# Patient Record
Sex: MALE | Race: WHITE | NOT HISPANIC OR LATINO | Employment: OTHER | URBAN - METROPOLITAN AREA
[De-identification: names, ages, dates, MRNs, and addresses within clinical notes are randomized per-mention and may not be internally consistent; named-entity substitution may affect disease eponyms.]

---

## 2017-02-23 DIAGNOSIS — I10 HYPERTENSION GOAL BP (BLOOD PRESSURE) < 140/90: ICD-10-CM

## 2017-02-24 NOTE — TELEPHONE ENCOUNTER
Routing refill request to provider for review/approval because:  Blood pressure not at goal.      lisinopril      Last Written Prescription Date: 8/24/16  Last Fill Quantity: 180, # refills: 1  Last Office Visit with [facility], [facility] or [facility] prescribing provider: 10/20/17       Potassium   Date Value Ref Range Status   08/02/2016 4.3 3.4 - 5.3 mmol/L Final     Creatinine   Date Value Ref Range Status   08/02/2016 0.97 0.66 - 1.25 mg/dL Final     BP Readings from Last 3 Encounters:   10/27/16 (!) 167/98   10/20/16 125/70   08/02/16 138/84     Mireya Pinzon RN,   [facility]

## 2017-02-27 RX ORDER — LISINOPRIL 10 MG/1
TABLET ORAL
Qty: 180 TABLET | Refills: 1 | Status: SHIPPED | OUTPATIENT
Start: 2017-02-27 | End: 2017-07-14

## 2017-02-27 NOTE — TELEPHONE ENCOUNTER
[facility] Call Center    Phone Message    Name of Caller: Wally    Phone Number: Cell number on file:    Telephone Information:   Mobile [phone]       Best time to return call: any    May a detailed message be left on voicemail: yes    Relation to patient: Self    Reason for Call: Other: pt calling to get update on medfill. please advise.     Action Taken: Message routed to:  Primary Care p [phone]

## 2017-02-27 NOTE — TELEPHONE ENCOUNTER
Routed to Dr. Triplett again.    Blood pressure not at goal.  Needs to be ordered by PCP.    Mireya Pinzon RN,   [facility]

## 2017-04-17 ENCOUNTER — RADIANT APPOINTMENT (OUTPATIENT)
Dept: ULTRASOUND IMAGING | Facility: CLINIC | Age: 57
End: 2017-04-17
Attending: INTERNAL MEDICINE
Payer: COMMERCIAL

## 2017-04-17 ENCOUNTER — TELEPHONE (OUTPATIENT)
Dept: NURSING | Facility: CLINIC | Age: 57
End: 2017-04-17

## 2017-04-17 ENCOUNTER — OFFICE VISIT (OUTPATIENT)
Dept: PEDIATRICS | Facility: CLINIC | Age: 57
End: 2017-04-17
Payer: COMMERCIAL

## 2017-04-17 VITALS
WEIGHT: 196.5 LBS | BODY MASS INDEX: 29.1 KG/M2 | SYSTOLIC BLOOD PRESSURE: 122 MMHG | HEIGHT: 69 IN | TEMPERATURE: 96.5 F | DIASTOLIC BLOOD PRESSURE: 90 MMHG | HEART RATE: 70 BPM | OXYGEN SATURATION: 99 %

## 2017-04-17 DIAGNOSIS — I82.4Y2 ACUTE DEEP VEIN THROMBOSIS (DVT) OF PROXIMAL VEIN OF LEFT LOWER EXTREMITY (H): Primary | ICD-10-CM

## 2017-04-17 DIAGNOSIS — M79.662 PAIN OF LEFT LOWER LEG: ICD-10-CM

## 2017-04-17 PROCEDURE — 93971 EXTREMITY STUDY: CPT | Mod: LT | Performed by: RADIOLOGY

## 2017-04-17 PROCEDURE — 99214 OFFICE O/P EST MOD 30 MIN: CPT | Performed by: INTERNAL MEDICINE

## 2017-04-17 RX ORDER — ENOXAPARIN SODIUM 100 MG/ML
90 INJECTION SUBCUTANEOUS 2 TIMES DAILY
Qty: 18 ML | Refills: 0 | Status: SHIPPED | OUTPATIENT
Start: 2017-04-17 | End: 2017-04-17

## 2017-04-17 RX ORDER — WARFARIN SODIUM 5 MG/1
5 TABLET ORAL DAILY
Qty: 30 TABLET | Refills: 1 | Status: SHIPPED | OUTPATIENT
Start: 2017-04-17 | End: 2017-06-06

## 2017-04-17 NOTE — TELEPHONE ENCOUNTER
Call Type: Triage Call    Presenting Problem: PHlebilitis in 2014' . This weekend having return  in left calf symptoms  with , heat and swelling .  Triage Note:  Guideline Title: Leg Non-Injury  Recommended Disposition: See ED Immediately  Original Inclination: Did not know what to do  Override Disposition:  Intended Action: Go to Hospital / ED  Physician Contacted: No  New, painful cord-like swelling in calf or thigh of the leg; cord-like swelling  may feel warm or look red or discolored. ?  YES  Gradual onset or worsening weakness/paralysis OR inability to purposely move ? NO  Gradual onset or worsening numbness/tingling ? NO  Generalized muscle pain or aching ? NO  Painful spasms or cramping of large muscle groups (back, legs or abdomen) AND  recent heat exposure ? NO  New onset of severe pain AND change in sensation (numb, tingling, or no feeling),  change in color (pale or blue), feels cool to touch compared to other extremity.  ? NO  Sudden onset of shortness of breath, chest pain and cough with blood tinged sputum  ? NO  New unexplained weakness/paralysis, change in sensation (numbness or tingling) or  inability to purposely move, especially when one side of body is involved  occurring now or within last 4 hours ? NO  Physician Instructions:  Care Advice: Another adult should drive.  Do not massage affected area if red, swollen, warm, or tender to touch OR  if history of blood clots.  Elevate part to improve circulation and decrease discomfort.  IMMEDIATE ACTION

## 2017-04-17 NOTE — PROGRESS NOTES
"  SUBJECTIVE:                                                    Wally Cano is a 56 year old male who presents to clinic today for the following health issues:    Joint Pain     Onset: 3-4 days    Description:   Location: left calf  Character: Sharp    Intensity: mild, moderate, 4/10    Progression of Symptoms: worse    Accompanying Signs & Symptoms:  Other symptoms: tenderness   History:   Previous similar pain: YES- had superficial thrombosis in 8/2014      Precipitating factors:   Trauma or overuse: no     Alleviating factors:  Improved by: rest/inactivity       Therapies Tried and outcome: kathy Lo has VSD (ventricular septal defect) and S/P infectious endocarditis on his pertinent problem list.   History of superficial thrombophlebitis in 2014, had negative hypercoag workup. Saw hematology, anticoagulated for total of 4 months (3 months was recommended, the extra month due to drug surplus). Has venous insufficiency, insurance does not cover for vein treatment. He has been wearing compression stockings on a regular basis. His work does require travel fairly frequently.         Problem list, Medication list, Allergies, and Medical/Social/Surgical histories reviewed in EPIC and updated as appropriate.    OBJECTIVE:                                                    /90 (BP Location: Right arm, Patient Position: Chair, Cuff Size: Adult Regular)  Pulse 70  Temp 96.5  F (35.8  C) (Temporal)  Ht 5' 8.75\" (1.746 m)  Wt 196 lb 8 oz (89.1 kg)  SpO2 99%  BMI 29.23 kg/m2    GEN: healthy, alert and no distress  LE: On the calf area there were couple palpable cords dull with tender. There is no significant swelling, or skin color changes.       Diagnostic test results:  Recent Results (from the past 744 hour(s))   US Lower Extremity Venous Duplex Left    Narrative    j EXAMINATION: US LOWER EXTREMITY VENOUS DUPLEX LEFT, 4/17/2017 4:06  PM     COMPARISON: 10/17/2014    HISTORY: History of " superficial thrombophlebitis in the left lower  extremity.    TECHNIQUE:  Gray-scale evaluation with compression, spectral flow, and  color Doppler assessment of the deep venous system of the left leg  from groin to knee, and then at the ankle.    FINDINGS:  The left common femoral vein, femoral vein, popliteal vein were  compressible with normal phasicity and augmentation.    There is a popliteal fossa cyst measuring 4.7 x 1.3 cm.    The left posterior tibialis vein was noncompressible. There is partial  blood flow at the ankle.    The superficial saphenous vein in the calf region was noncompressible  and contains echogenic thrombus. The distal superficial saphenous vein  was compressible. The peroneal vein in the mid calf region was also  noncompressible.    The right common femoral vein was compressible with normal phasicity  and augmentation.      Impression    IMPRESSION:  1.  Deep vein thrombosis in the left posterior tibial vein and both  peroneal veins in the proximal to mid-calf region.  The distal  posterior tibial vein near the ankle was compressible.  2.  Varicosity and thrombus in the superficial great saphenous vein in  the proximal and mid-calf region.  3.  Popliteal fossa cyst, 4.7 cm.    The patient was escorted back to the primary clinic area.    These findings were communicated to Dr. Triplett on 4/17/17 at 1629H.    AN MD KARL          ASSESSMENT/PLAN:                                                      56 year old male with the following diagnoses and treatment plan:      ICD-10-CM    1. Acute deep vein thrombosis (DVT) of proximal vein of left lower extremity (H) I82.4Y2 warfarin (COUMADIN) 5 MG tablet     INR     INR CLINIC REFERRAL     enoxaparin (LOVENOX) 100 MG/ML injection     DISCONTINUED: study - enoxaparin 100 mg/1 mL (IDS# [id number]) 100 MG/ML SOLN injection   2. Pain of left lower leg M79.662 US Lower Extremity Venous Duplex Left       -- Patient will be started on Lovenox and Coumadin for  DVT. He will continue to wear compression stockings. Duration of treatment will likely be 3 months again. Treating varicose veins and incompetent veins will be helpful in preventing future superficial thrombophlebitis. Avoid contact sports. May travel when therapeutic on INR. If urgent travel is needed, may bring Lovenox injection for travel. Stop ASA and NSAIDS while on warfarin.     A total of 25 minutes was spent face to face with this patient. More than 50% of the time was spent on education for the above problems and management plans.     Livier Triplett MD-PhD  [facility]    (Note: Chart documentation was done in part with Dragon Voice Recognition software. Although reviewed after completion, some word and grammatical errors may remain.)

## 2017-04-17 NOTE — NURSING NOTE
"Chief Complaint   Patient presents with     Musculoskeletal Problem     left calf pain, knots in the left calf x 3-4 days        Initial /90 (BP Location: Right arm, Patient Position: Chair, Cuff Size: Adult Regular)  Pulse 70  Temp 96.5  F (35.8  C) (Temporal)  Ht 5' 8.75\" (1.746 m)  Wt 196 lb 8 oz (89.1 kg)  SpO2 99%  BMI 29.23 kg/m2 Estimated body mass index is 29.23 kg/(m^2) as calculated from the following:    Height as of this encounter: 5' 8.75\" (1.746 m).    Weight as of this encounter: 196 lb 8 oz (89.1 kg).  Medication Reconciliation: complete      RACHEL Dill      "

## 2017-04-17 NOTE — MR AVS SNAPSHOT
After Visit Summary   4/17/2017    Wally Cano    MRN: [id number]           Patient Information     Date Of Birth          1960        Visit Information        Provider Department      4/17/2017 2:40 PM Livier Triplett MD [facility]        Today's Diagnoses     Acute deep vein thrombosis (DVT) of proximal vein of left lower extremity (H)    -  1    Pain of left lower leg          Care Instructions    INR clinic appointment in 3 days.    Medication(s) prescribed today:    Orders Placed This Encounter   Medications     study - enoxaparin 100 mg/1 mL (IDS# [id number]) 100 MG/ML SOLN injection     Sig: Inject 0.9 mLs (90 mg) Subcutaneous 2 times daily for 10 days     Dispense:  18 mL     Refill:  0     warfarin (COUMADIN) 5 MG tablet     Sig: Take 1 tablet (5 mg) by mouth daily     Dispense:  30 tablet     Refill:  1          Stop ASA and ibuprofen.        Follow-ups after your visit        Additional Services     INR CLINIC REFERRAL       Your provider has referred you to INR Services.    Please be aware that coverage of these services is subject to the terms and limitations of your health insurance plan.  Call member services at your health plan with any benefit or coverage questions.    Indication for Anticoagulation: DVT (first time)  If nonstandard INR is desired, indicate goal range and explanation: 2-3  Expected Duration of Therapy: 3 Months                  Future tests that were ordered for you today     Open Standing Orders        Priority Remaining Interval Expires Ordered    INR Routine 52/52 every 3 days to 1 year 4/17/2018 4/17/2017            Who to contact     If you have questions or need follow up information about today's clinic visit or your schedule please contact [facility] directly at [phone].  Normal or non-critical lab and imaging results will be communicated to you by MyChart, letter or phone within 4 business days after the clinic has  "received the results. If you do not hear from us within 7 days, please contact the clinic through [employer] or phone. If you have a critical or abnormal lab result, we will notify you by phone as soon as possible.  Submit refill requests through [employer] or call your pharmacy and they will forward the refill request to us. Please allow 3 business days for your refill to be completed.          Additional Information About Your Visit        [facility] Information     [employer] gives you secure access to your electronic health record. If you see a primary care provider, you can also send messages to your care team and make appointments. If you have questions, please call your primary care clinic.  If you do not have a primary care provider, please call [phone] and they will assist you.      [employer] is an electronic gateway that provides easy, online access to your medical records. With [employer], you can request a clinic appointment, read your test results, renew a prescription or communicate with your care team.     To access your existing account, please contact your [facility] or call [phone] for assistance.        Care EveryWhere ID     This is your Care EveryWhere ID. This could be used by other organizations to access your [facility] medical records  [id number]        Your Vitals Were     Pulse Temperature Height Pulse Oximetry BMI (Body Mass Index)       70 96.5  F (35.8  C) (Temporal) 5' 8.75\" (1.746 m) 99% 29.23 kg/m2        Blood Pressure from Last 3 Encounters:   04/17/17 122/90   10/27/16 (!) 167/98   10/20/16 125/70    Weight from Last 3 Encounters:   04/17/17 196 lb 8 oz (89.1 kg)   12/28/16 207 lb (93.9 kg)   10/20/16 206 lb 1.6 oz (93.5 kg)              We Performed the Following     INR CLINIC REFERRAL          Today's Medication Changes          These changes are accurate as of: 4/17/17  4:45 PM.  If you have any questions, ask your nurse or doctor.               Start " taking these medicines.        Dose/Directions    enoxaparin 100 MG/ML injection   Commonly known as:  LOVENOX   Used for:  Acute deep vein thrombosis (DVT) of proximal vein of left lower extremity (H)   Started by:  Livier Triplett MD        Dose:  90 mg   Inject 0.9 mLs (90 mg) Subcutaneous 2 times daily for 10 days   Quantity:  18 mL   Refills:  0       warfarin 5 MG tablet   Commonly known as:  COUMADIN   Used for:  Acute deep vein thrombosis (DVT) of proximal vein of left lower extremity (H)   Started by:  Livier Triplett MD        Dose:  5 mg   Take 1 tablet (5 mg) by mouth daily   Quantity:  30 tablet   Refills:  1            Where to get your medicines      These medications were sent to [facility] - [place], [place] - [address], [address]  [address], [address], [facility] [address]     Phone:  [phone]     enoxaparin 100 MG/ML injection    warfarin 5 MG tablet                Primary Care Provider Office Phone # Fax #    Livier Triplett [phone]       [facility] [address]  [facility] [address]        Thank you!     Thank you for choosing [facility]  for your care. Our goal is always to provide you with excellent care. Hearing back from our patients is one way we can continue to improve our services. Please take a few minutes to complete the written survey that you may receive in the mail after your visit with us. Thank you!             Your Updated Medication List - Protect others around you: Learn how to safely use, store and throw away your medicines at www.disposemymeds.org.          This list is accurate as of: 4/17/17  4:45 PM.  Always use your most recent med list.                   Brand Name Dispense Instructions for use    amoxicillin 500 MG capsule    AMOXIL    20 capsule    Take 4 capsules one hour prior to dental procedure       enoxaparin 100 MG/ML injection    LOVENOX    18 mL    Inject 0.9 mLs (90 mg) Subcutaneous 2  times daily for 10 days       hydrocortisone 2.5 % cream    ANUSOL-HC    30 g    Place  rectally 2 times daily.       IBUPROFEN PO      Take 200 mg by mouth Reported on 4/17/2017       lisinopril 10 MG tablet    PRINIVIL/ZESTRIL    180 tablet    TAKE ONE TABLET BY MOUTH TWICE DAILY       order for DME     2 each    Equipment being ordered: compression stocking knee high 20-30 mmHg       terazosin 1 MG capsule    HYTRIN    70 capsule    Start with 1 tab (1 mg) daily for 1 week, 2 tabs (2 mg) daily for 1 week, 3 tabs (3 mg) daily for 1 week, then 4 tabs (4 mg) daily       TYLENOL PO      Take 625 mg by mouth as needed for mild pain or fever       warfarin 5 MG tablet    COUMADIN    30 tablet    Take 1 tablet (5 mg) by mouth daily

## 2017-04-17 NOTE — PATIENT INSTRUCTIONS
INR clinic appointment in 3 days.    Medication(s) prescribed today:    Orders Placed This Encounter   Medications     study - enoxaparin 100 mg/1 mL (IDS# [id number]) 100 MG/ML SOLN injection     Sig: Inject 0.9 mLs (90 mg) Subcutaneous 2 times daily for 10 days     Dispense:  18 mL     Refill:  0     warfarin (COUMADIN) 5 MG tablet     Sig: Take 1 tablet (5 mg) by mouth daily     Dispense:  30 tablet     Refill:  1          Stop ASA and ibuprofen.

## 2017-04-19 NOTE — PROGRESS NOTES
Dear Wally,   Here are your recent results.   -- besides the blood clot, you also have a Baker's cyst (popliteal cyst) at the back of the knee. This is usually from arthritis. If you have significant left knee pain, may be worthwhile seeing a sports medicine doctor. If you don't have pain, by itself, the cyst does not need to be treated.     Please call or Mychart to our office if you have further questions.     Livier Triplett MD

## 2017-04-20 ENCOUNTER — ANTICOAGULATION THERAPY VISIT (OUTPATIENT)
Dept: PHARMACY | Facility: CLINIC | Age: 57
End: 2017-04-20
Payer: COMMERCIAL

## 2017-04-20 LAB — INR POINT OF CARE: 1.1 (ref 0.86–1.14)

## 2017-04-20 PROCEDURE — 99207 ZZC NO CHARGE NURSE ONLY: CPT

## 2017-04-20 PROCEDURE — 85610 PROTHROMBIN TIME: CPT | Mod: QW

## 2017-04-20 PROCEDURE — 36416 COLLJ CAPILLARY BLOOD SPEC: CPT

## 2017-04-20 NOTE — PROGRESS NOTES
ANTICOAGULATION FOLLOW-UP CLINIC VISIT    Patient Name:  Wally Cano  Date:  4/20/2017  Contact Type:  Face to Face    SUBJECTIVE:     Patient Findings     Positives No Problem Findings           OBJECTIVE    INR Protime   Date Value Ref Range Status   04/20/2017 1.1 0.86 - 1.14 Final       ASSESSMENT / PLAN  INR assessment SUB    Recheck INR In: 4 DAYS    INR Location Clinic      Anticoagulation Summary as of 4/20/2017     INR goal 2.0-3.0    Today's INR 1.1!    Maintenance plan No maintenance plan    Full instructions 4/20: 10 mg; 4/21: 7.5 mg; 4/22: 5 mg; 4/23: 5 mg; Otherwise No maintenance plan    Next INR check 4/24/2017    Target end date 6/19/2017      Anticoagulation Episode Summary     INR check location [facility]    Preferred lab     Send INR reminders to [facility] INR    Comments Estimated time frame is 3 months of anticoag treatment              See the Encounter Report to view Anticoagulation Flowsheet and Dosing Calendar (Go to Encounters tab in chart review, and find the Anticoagulation Therapy Visit)        Angelique Muro RN

## 2017-04-20 NOTE — MR AVS SNAPSHOT
Wally Dima Pritchardsimon   4/20/2017 12:00 PM   Anticoagulation Therapy Visit    Description:  56 year old male   Provider:  INR MG   Department:  Mg Med Monitoring           INR as of 4/20/2017     Today's INR 1.1!      Anticoagulation Summary as of 4/20/2017     INR goal 2.0-3.0    Today's INR 1.1!    Full instructions 4/20: 10 mg; 4/21: 7.5 mg; 4/22: 5 mg; 4/23: 5 mg; Otherwise No maintenance plan    Next INR check 4/24/2017      Your next Anticoagulation Clinic appointment(s)     Apr 24, 2017  9:00 AM CDT   Anticoagulation Visit with INR [facility]   [facility]    [address] [place] [address] [phone]              Contact Numbers     [facility]  Please call the anticoagulation nurse at [phone] to cancel and/or reschedule your appointment, or with any problems or questions regarding your therapy.  You may call the main clinic number at [phone] if the nurse is not available.           April 2017 Details    Sun Mon Tue Wed Thu Fri Sat           1                 2               3               4               5               6               7               8                 9               10               11               12               13               14               15                 16               17               18               19               20      10 mg   See details      21      7.5 mg         22      5 mg           23      5 mg         24            25               26               27               28               29                 30                      Date Details   04/20 This INR check       Date of next INR:  4/24/2017         How to take your warfarin dose     To take:  5 mg Take 1 of the 5 mg tablets.    To take:  7.5 mg Take 1.5 of the 5 mg tablets.    To take:  10 mg Take 2 of the 5 mg tablets.

## 2017-04-24 ENCOUNTER — ANTICOAGULATION THERAPY VISIT (OUTPATIENT)
Dept: PHARMACY | Facility: CLINIC | Age: 57
End: 2017-04-24
Payer: COMMERCIAL

## 2017-04-24 LAB — INR POINT OF CARE: 2.6 (ref 0.86–1.14)

## 2017-04-24 PROCEDURE — 99207 ZZC NO CHARGE NURSE ONLY: CPT

## 2017-04-24 PROCEDURE — 85610 PROTHROMBIN TIME: CPT | Mod: QW

## 2017-04-24 PROCEDURE — 36416 COLLJ CAPILLARY BLOOD SPEC: CPT

## 2017-04-24 NOTE — PROGRESS NOTES
ANTICOAGULATION FOLLOW-UP CLINIC VISIT    Patient Name:  Wally Cano  Date:  4/24/2017  Contact Type:  Face to Face    SUBJECTIVE:     Patient Findings     Positives No Problem Findings           OBJECTIVE    INR Protime   Date Value Ref Range Status   04/24/2017 2.6 (A) 0.86 - 1.14 Final       ASSESSMENT / PLAN  INR assessment THER    Recheck INR In: 4 DAYS    INR Location Clinic      Anticoagulation Summary as of 4/24/2017     INR goal 2.0-3.0    Today's INR 2.6    Maintenance plan No maintenance plan    Full instructions 4/24: 7.5 mg; 4/25: 5 mg; 4/26: 5 mg; 4/27: 7.5 mg; 4/28: 5 mg; Otherwise No maintenance plan    Next INR check 4/28/2017    Target end date 6/19/2017      Anticoagulation Episode Summary     INR check location [facility]    Preferred lab     Send INR reminders to [facility] INR    Comments Estimated time frame is 3 months of anticoag treatment              See the Encounter Report to view Anticoagulation Flowsheet and Dosing Calendar (Go to Encounters tab in chart review, and find the Anticoagulation Therapy Visit)        Angelique Muro RN

## 2017-04-24 NOTE — MR AVS SNAPSHOT
Wally Dima Jerome   4/24/2017 9:00 AM   Anticoagulation Therapy Visit    Description:  56 year old male   Provider:  INR MG   Department:  Mg Med Monitoring           INR as of 4/24/2017     Today's INR 2.6      Anticoagulation Summary as of 4/24/2017     INR goal 2.0-3.0    Today's INR 2.6    Full instructions 4/24: 7.5 mg; 4/25: 5 mg; 4/26: 5 mg; 4/27: 7.5 mg; 4/28: 5 mg; Otherwise No maintenance plan    Next INR check 4/28/2017      Your next Anticoagulation Clinic appointment(s)     Apr 28, 2017  8:40 AM CDT   [facility]    [address] [place] [address] [phone]              Contact Numbers     [facility]  Please call the anticoagulation nurse at [phone] to cancel and/or reschedule your appointment, or with any problems or questions regarding your therapy.  You may call the main clinic number at [phone] if the nurse is not available.           April 2017 Details    Sun Mon Tue Wed Thu Fri Sat           1                 2               3               4               5               6               7               8                 9               10               11               12               13               14               15                 16               17               18               19               20               21               22                 23               24      7.5 mg   See details      25      5 mg         26      5 mg         27      7.5 mg         28            29                 30                      Date Details   04/24 This INR check       Date of next INR:  4/28/2017         How to take your warfarin dose     To take:  5 mg Take 1 of the 5 mg tablets.    To take:  7.5 mg Take 1.5 of the 5 mg tablets.

## 2017-04-28 ENCOUNTER — ANTICOAGULATION THERAPY VISIT (OUTPATIENT)
Dept: PHARMACY | Facility: CLINIC | Age: 57
End: 2017-04-28
Payer: COMMERCIAL

## 2017-04-28 LAB — INR POINT OF CARE: 3.4 (ref 0.86–1.14)

## 2017-04-28 PROCEDURE — 99207 ZZC NO CHARGE NURSE ONLY: CPT

## 2017-04-28 PROCEDURE — 36416 COLLJ CAPILLARY BLOOD SPEC: CPT

## 2017-04-28 PROCEDURE — 85610 PROTHROMBIN TIME: CPT | Mod: QW

## 2017-04-28 NOTE — PROGRESS NOTES
ANTICOAGULATION FOLLOW-UP CLINIC VISIT    Patient Name:  Wally Cano  Date:  4/28/2017  Contact Type:  Face to Face    SUBJECTIVE:     Patient Findings     Positives No Problem Findings    Comments Pt's last dose of Lovenox was 4/27 AM.            OBJECTIVE    INR Protime   Date Value Ref Range Status   04/28/2017 3.4 (A) 0.86 - 1.14 Final       ASSESSMENT / PLAN  INR assessment SUPRA    Recheck INR In: 4 DAYS    INR Location Clinic      Anticoagulation Summary as of 4/28/2017     INR goal 2.0-3.0    Today's INR 3.4!    Maintenance plan No maintenance plan    Full instructions 4/28: 5 mg; 4/29: 5 mg; 4/30: 5 mg; Otherwise No maintenance plan    Next INR check 5/1/2017    Target end date 6/19/2017      Anticoagulation Episode Summary     INR check location [facility]    Preferred lab     Send INR reminders to [facility] INR    Comments Estimated time frame is 3 months of anticoag treatment              See the Encounter Report to view Anticoagulation Flowsheet and Dosing Calendar (Go to Encounters tab in chart review, and find the Anticoagulation Therapy Visit)        Angelique Muro RN

## 2017-04-28 NOTE — MR AVS SNAPSHOT
Wally Dima Bernabeleslee   4/28/2017 8:40 AM   Anticoagulation Therapy Visit    Description:  56 year old male   Provider:  INR MG   Department:  [facility]           INR as of 4/28/2017     Today's INR 3.4!      Anticoagulation Summary as of 4/28/2017     INR goal 2.0-3.0    Today's INR 3.4!    Full instructions 4/28: 5 mg; 4/29: 5 mg; 4/30: 5 mg; Otherwise No maintenance plan    Next INR check 5/1/2017      Your next Anticoagulation Clinic appointment(s)     May 01, 2017  7:40 AM CDT   Anticoagulation Visit with INR [facility]   [facility]    [address] [place] [address] [phone]              Contact Numbers     [facility]  Please call the anticoagulation nurse at [phone] to cancel and/or reschedule your appointment, or with any problems or questions regarding your therapy.  You may call the main clinic number at [phone] if the nurse is not available.           April 2017 Details    Sun Mon Tue Wed Thu Fri Sat           1                 2               3               4               5               6               7               8                 9               10               11               12               13               14               15                 16               17               18               19               20               21               22                 23               24               25               26               27               28      5 mg   See details      29      5 mg           30      5 mg                Date Details   04/28 This INR check               How to take your warfarin dose     To take:  5 mg Take 1 of the 5 mg tablets.           May 2017 Details    Sun Mon Tue Wed u Fri Sat      1            2               3               4               5               6                 7               8               9               10               11               12                13                 14               15               16               17               18               19               20                 21               22               23               24               25               26               27                 28               29               30               31                   Date Details   No additional details    Date of next INR:  5/1/2017

## 2017-05-03 ENCOUNTER — ANTICOAGULATION THERAPY VISIT (OUTPATIENT)
Dept: PHARMACY | Facility: CLINIC | Age: 57
End: 2017-05-03
Payer: COMMERCIAL

## 2017-05-03 LAB — INR POINT OF CARE: 4.9 (ref 0.86–1.14)

## 2017-05-03 PROCEDURE — 36416 COLLJ CAPILLARY BLOOD SPEC: CPT

## 2017-05-03 PROCEDURE — 85610 PROTHROMBIN TIME: CPT | Mod: QW

## 2017-05-03 PROCEDURE — 99207 ZZC NO CHARGE NURSE ONLY: CPT

## 2017-05-03 NOTE — MR AVS SNAPSHOT
Wally Dima Bernabeleslee   5/3/2017 7:30 AM   Anticoagulation Therapy Visit    Description:  56 year old male   Provider:  INR MG   Department:  [facility]           INR as of 5/3/2017     Today's INR 4.9!      Anticoagulation Summary as of 5/3/2017     INR goal 2.0-3.0    Today's INR 4.9!    Full instructions 5/3: Hold; 5/4: 5 mg; 5/5: 5 mg; 5/6: 5 mg; 5/7: 5 mg; 5/8: 5 mg; 5/9: 5 mg; 5/10: 5 mg; 5/11: 5 mg; 5/12: 5 mg; 5/13: 5 mg; Otherwise No maintenance plan    Next INR check 5/8/2017      Your next Anticoagulation Clinic appointment(s)     May 08, 2017  7:40 AM CDT   Anticoagulation Visit with INR MG   [facility] ([facility])    [address] [place] [address] [phone]              Contact Numbers     [facility]  Please call the anticoagulation nurse at [phone] to cancel and/or reschedule your appointment, or with any problems or questions regarding your therapy.  You may call the main clinic number at [phone] if the nurse is not available.           May 2017 Details    Sun Mon Tue Wed Thu Fri Sat      1               2               3      Hold   See details      4      5 mg         5      5 mg         6      5 mg           7      5 mg         8            9               10               11               12               13                 14               15               16               17               18               19               20                 21               22               23               24               25               26               27                 28               29               30               31                   Date Details   05/03 This INR check       Date of next INR:  5/8/2017         How to take your warfarin dose     To take:  5 mg Take 1 of the 5 mg tablets.    Hold Do not take your warfarin dose. See the Details table to the right for additional instructions.

## 2017-05-03 NOTE — PROGRESS NOTES
ANTICOAGULATION FOLLOW-UP CLINIC VISIT    Patient Name:  Wally Cano  Date:  5/3/2017  Contact Type:  Face to Face    SUBJECTIVE:     Patient Findings     Positives Nose bleeds, No Problem Findings    Comments Pt reported two nose bleeds over the last couple days that lasted less than 30 seconds.            OBJECTIVE    INR Protime   Date Value Ref Range Status   05/03/2017 4.9 (A) 0.86 - 1.14 Final       ASSESSMENT / PLAN  INR assessment SUPRA    Recheck INR In: 5 DAYS    INR Location Clinic      Anticoagulation Summary as of 5/3/2017     INR goal 2.0-3.0    Today's INR 4.9!    Maintenance plan No maintenance plan    Full instructions 5/3: Hold; 5/4: 5 mg; 5/5: 5 mg; 5/6: 5 mg; 5/7: 5 mg; 5/8: 5 mg; 5/9: 5 mg; 5/10: 5 mg; 5/11: 5 mg; 5/12: 5 mg; 5/13: 5 mg; Otherwise No maintenance plan    Next INR check 5/8/2017    Target end date 6/19/2017      Anticoagulation Episode Summary     [facility]    Preferred lab     Send INR reminders to [facility] INR    Comments Estimated time frame is 3 months of anticoag treatment              See the Encounter Report to view Anticoagulation Flowsheet and Dosing Calendar (Go to Encounters tab in chart review, and find the Anticoagulation Therapy Visit)        Angelique Muro RN

## 2017-05-08 ENCOUNTER — ANTICOAGULATION THERAPY VISIT (OUTPATIENT)
Dept: PHARMACY | Facility: CLINIC | Age: 57
End: 2017-05-08
Payer: OTHER MISCELLANEOUS

## 2017-05-08 LAB — INR POINT OF CARE: 2.8 (ref 0.86–1.14)

## 2017-05-08 PROCEDURE — 36416 COLLJ CAPILLARY BLOOD SPEC: CPT

## 2017-05-08 PROCEDURE — 99207 ZZC NO CHARGE NURSE ONLY: CPT

## 2017-05-08 PROCEDURE — 85610 PROTHROMBIN TIME: CPT | Mod: QW

## 2017-05-08 NOTE — MR AVS SNAPSHOT
Wally Cano   5/8/2017 7:40 AM   Anticoagulation Therapy Visit    Description:  56 year old male   Provider:  INR MG   Department:  [facility]           INR as of 5/8/2017     Today's INR 2.8      Anticoagulation Summary as of 5/8/2017     INR goal 2.0-3.0    Today's INR 2.8    Full instructions 5/8: 5 mg; 5/9: 5 mg; 5/10: 5 mg; 5/11: 5 mg; 5/12: 5 mg; 5/13: 5 mg; Otherwise No maintenance plan    Next INR check 5/12/2017      Your next Anticoagulation Clinic appointment(s)     May 12, 2017  8:00 AM CDT   Anticoagulation Visit with INR MG   [facility]    [address] [place] [address] [phone]              Contact Numbers     [facility]  Please call the anticoagulation nurse at [phone] to cancel and/or reschedule your appointment, or with any problems or questions regarding your therapy.  You may call the main clinic number at [phone] if the nurse is not available.           May 2017 Details    Sun Mon Tue Wed Thu Fri Sat      1               2               3               4               5               6                 7               8      5 mg   See details      9      5 mg         10      5 mg         11      5 mg         12            13                 14               15               16               17               18               19               20                 21               22               23               24               25               26               27                 28               29               30               31                   Date Details   05/08 This INR check       Date of next INR:  5/12/2017         How to take your warfarin dose     To take:  5 mg Take 1 of the 5 mg tablets.

## 2017-05-11 ENCOUNTER — ANTICOAGULATION THERAPY VISIT (OUTPATIENT)
Dept: PHARMACY | Facility: CLINIC | Age: 57
End: 2017-05-11
Payer: COMMERCIAL

## 2017-05-11 LAB — INR POINT OF CARE: 2.7 (ref 0.86–1.14)

## 2017-05-11 PROCEDURE — 36416 COLLJ CAPILLARY BLOOD SPEC: CPT

## 2017-05-11 PROCEDURE — 85610 PROTHROMBIN TIME: CPT | Mod: QW

## 2017-05-11 PROCEDURE — 99207 ZZC NO CHARGE NURSE ONLY: CPT

## 2017-05-11 NOTE — MR AVS SNAPSHOT
Wally Dima Jerome   5/11/2017 7:40 AM   Anticoagulation Therapy Visit    Description:  56 year old male   Provider:  INR MG   Department:  [facility]           INR as of 5/11/2017     Today's INR 2.7      Anticoagulation Summary as of 5/11/2017     INR goal 2.0-3.0    Today's INR 2.7    Full instructions 5/11: 5 mg; 5/12: 5 mg; 5/13: 5 mg; Otherwise 5 mg every day    Next INR check 5/19/2017      Your next Anticoagulation Clinic appointment(s)     May 19, 2017  7:40 AM CDT   Anticoagulation Visit with INR MG   [facility]    [address] [place] [address] [phone]              Contact Numbers     [facility]  Please call the anticoagulation nurse at [phone] to cancel and/or reschedule your appointment, or with any problems or questions regarding your therapy.  You may call the main clinic number at [phone] if the nurse is not available.           May 2017 Details    Sun Mon Tue Wed Thu Fri Sat      1               2               3               4               5               6                 7               8               9               10               11      5 mg   See details      12      5 mg         13      5 mg           14      5 mg         15      5 mg         16      5 mg         17      5 mg         18      5 mg         19            20                 21               22               23               24               25               26               27                 28               29               30               31                   Date Details   05/11 This INR check       Date of next INR:  5/19/2017         How to take your warfarin dose     To take:  5 mg Take 1 of the 5 mg tablets.

## 2017-05-11 NOTE — PROGRESS NOTES
ANTICOAGULATION FOLLOW-UP CLINIC VISIT    Patient Name:  Wally Cano  Date:  5/11/2017  Contact Type:  Face to Face    SUBJECTIVE:     Patient Findings     Positives No Problem Findings           OBJECTIVE    INR Protime   Date Value Ref Range Status   05/11/2017 2.7 (A) 0.86 - 1.14 Final       ASSESSMENT / PLAN  INR assessment THER    Recheck INR In: 1 WEEK    INR Location Clinic      Anticoagulation Summary as of 5/11/2017     INR goal 2.0-3.0    Today's INR 2.7    Maintenance plan 5 mg (5 mg x 1) every day    Full instructions 5/11: 5 mg; 5/12: 5 mg; 5/13: 5 mg; Otherwise 5 mg every day    Weekly total 35 mg    Plan last modified Angelique Muro RN (5/11/2017)    Next INR check 5/19/2017    Target end date 6/19/2017      Anticoagulation Episode Summary     [facility]    Preferred lab     Send INR reminders to MG  INR    Comments Estimated time frame is 3 months of anticoag treatment              See the Encounter Report to view Anticoagulation Flowsheet and Dosing Calendar (Go to Encounters tab in chart review, and find the Anticoagulation Therapy Visit)      Angelique Muro RN

## 2017-05-19 ENCOUNTER — ANTICOAGULATION THERAPY VISIT (OUTPATIENT)
Dept: PHARMACY | Facility: CLINIC | Age: 57
End: 2017-05-19
Payer: COMMERCIAL

## 2017-05-19 LAB — INR POINT OF CARE: 3 (ref 0.86–1.14)

## 2017-05-19 PROCEDURE — 36416 COLLJ CAPILLARY BLOOD SPEC: CPT

## 2017-05-19 PROCEDURE — 85610 PROTHROMBIN TIME: CPT | Mod: QW

## 2017-05-19 PROCEDURE — 99207 ZZC NO CHARGE NURSE ONLY: CPT

## 2017-05-19 NOTE — MR AVS SNAPSHOT
Wally Cano   5/19/2017 7:40 AM   Anticoagulation Therapy Visit    Description:  56 year old male   Provider:  INR MG   Department:  [facility] Monitoring           INR as of 5/19/2017     Today's INR 3.0      Anticoagulation Summary as of 5/19/2017     INR goal 2.0-3.0    Today's INR 3.0    Full instructions 5 mg every day    Next INR check 5/26/2017      Your next Anticoagulation Clinic appointment(s)     May 26, 2017  8:00 AM CDT   Anticoagulation Visit with INR MG   [facility] ([facility])    [address] [place] [address] [phone]              Contact Numbers     [facility]  Please call the anticoagulation nurse at [phone] to cancel and/or reschedule your appointment, or with any problems or questions regarding your therapy.  You may call the main clinic number at [phone] if the nurse is not available.           May 2017 Details    Sun Mon Tue Wed Thu Fri Sat      1               2               3               4               5               6                 7               8               9               10               11               12               13                 14               15               16               17               18               19      5 mg   See details      20      5 mg           21      5 mg         22      5 mg         23      5 mg         24      5 mg         25      5 mg         26            27                 28               29               30               31                   Date Details   05/19 This INR check       Date of next INR:  5/26/2017         How to take your warfarin dose     To take:  5 mg Take 1 of the 5 mg tablets.

## 2017-05-19 NOTE — PROGRESS NOTES
ANTICOAGULATION FOLLOW-UP CLINIC VISIT    Patient Name:  Wally Cano  Date:  5/19/2017  Contact Type:  Face to Face    SUBJECTIVE:     Patient Findings     Positives No Problem Findings           OBJECTIVE    INR Protime   Date Value Ref Range Status   05/19/2017 3.0 (A) 0.86 - 1.14 Final       ASSESSMENT / PLAN  INR assessment THER    Recheck INR In: 1 WEEK    INR Location Clinic      Anticoagulation Summary as of 5/19/2017     INR goal 2.0-3.0    Today's INR 3.0    Maintenance plan 5 mg (5 mg x 1) every day    Full instructions 5 mg every day    Weekly total 35 mg    No change documented Angelique Muro RN    Plan last modified Angelique Muro RN (5/11/2017)    Next INR check 5/26/2017    Target end date 6/19/2017      Anticoagulation Episode Summary     [facility]    Preferred lab     Send INR reminders to [facility] INR    Comments Estimated time frame is 3 months of anticoag treatment              See the Encounter Report to view Anticoagulation Flowsheet and Dosing Calendar (Go to Encounters tab in chart review, and find the Anticoagulation Therapy Visit)      Angelique Muro RN

## 2017-05-26 ENCOUNTER — ANTICOAGULATION THERAPY VISIT (OUTPATIENT)
Dept: PHARMACY | Facility: CLINIC | Age: 57
End: 2017-05-26
Payer: COMMERCIAL

## 2017-05-26 LAB — INR POINT OF CARE: 3 (ref 0.86–1.14)

## 2017-05-26 PROCEDURE — 85610 PROTHROMBIN TIME: CPT | Mod: QW

## 2017-05-26 PROCEDURE — 36416 COLLJ CAPILLARY BLOOD SPEC: CPT

## 2017-05-26 PROCEDURE — 99207 ZZC NO CHARGE NURSE ONLY: CPT

## 2017-05-26 NOTE — PROGRESS NOTES
"  ANTICOAGULATION FOLLOW-UP CLINIC VISIT    Patient Name:  Wally Cano  Date:  5/26/2017  Contact Type:  Face to Face    SUBJECTIVE:     Patient Findings     Positives No Problem Findings    Comments Pt expressed concern about \"riding the higher end of his goal\" worried that if he misses a few green salads during the week that it might increase out of range. Adjusted Wednesday dose to accommodate this.            OBJECTIVE    INR Protime   Date Value Ref Range Status   05/26/2017 3.0 (A) 0.86 - 1.14 Final       ASSESSMENT / PLAN  INR assessment THER    Recheck INR In: 1 WEEK    INR Location Clinic      Anticoagulation Summary as of 5/26/2017     INR goal 2.0-3.0    Today's INR 3.0    Maintenance plan 2.5 mg (5 mg x 0.5) on Wed; 5 mg (5 mg x 1) all other days    Full instructions 5/31: 2.5 mg; Otherwise 2.5 mg on Wed; 5 mg all other days    Weekly total 32.5 mg    Plan last modified Angelique Mruo RN (5/26/2017)    Next INR check 6/2/2017    Target end date 6/19/2017      Anticoagulation Episode Summary     [facility]    Preferred lab     Send INR reminders to [facility] INR    Comments Estimated time frame is 3 months of anticoag treatment              See the Encounter Report to view Anticoagulation Flowsheet and Dosing Calendar (Go to Encounters tab in chart review, and find the Anticoagulation Therapy Visit)      Angelique Muro RN               "

## 2017-06-02 ENCOUNTER — ANTICOAGULATION THERAPY VISIT (OUTPATIENT)
Dept: PHARMACY | Facility: CLINIC | Age: 57
End: 2017-06-02
Payer: COMMERCIAL

## 2017-06-02 LAB — INR POINT OF CARE: 2.4 (ref 0.86–1.14)

## 2017-06-02 PROCEDURE — 36416 COLLJ CAPILLARY BLOOD SPEC: CPT

## 2017-06-02 PROCEDURE — 99207 ZZC NO CHARGE NURSE ONLY: CPT

## 2017-06-02 PROCEDURE — 85610 PROTHROMBIN TIME: CPT | Mod: QW

## 2017-06-02 NOTE — MR AVS SNAPSHOT
Wally Dima Bernabeleslee   6/2/2017 8:40 AM   Anticoagulation Therapy Visit    Description:  56 year old male   Provider:  INR MG   Department:  [facility] Monitoring           INR as of 6/2/2017     Today's INR 2.4      Anticoagulation Summary as of 6/2/2017     INR goal 2.0-3.0    Today's INR 2.4    Full instructions 2.5 mg on Wed; 5 mg all other days    Next INR check 6/23/2017      Your next Anticoagulation Clinic appointment(s)     Jun 02, 2017  8:40 AM CDT   Anticoagulation Visit with INR MG   [facility] ([facility])    [id number] [place] [address] [phone]            Jun 23, 2017  7:40 AM CDT   Anticoagulation Visit with INR MG   [facility])    [address] [address] [place] [address] [phone]              Contact Numbers     [facility]  Please call the anticoagulation nurse at [phone] to cancel and/or reschedule your appointment, or with any problems or questions regarding your therapy.  You may call the main clinic number at [phone] if the nurse is not available.           June 2017 Details    Sun Mon Tue Wed Thu Fri Sat         1               2      5 mg   See details      3      5 mg           4      5 mg         5      5 mg         6      5 mg         7      2.5 mg         8      5 mg         9      5 mg         10      5 mg           11      5 mg         12      5 mg         13      5 mg         14      2.5 mg         15      5 mg         16      5 mg         17      5 mg           18      5 mg         19      5 mg         20      5 mg         21      2.5 mg         22      5 mg         23            24                 25               26               27               28               29               30                 Date Details   06/02 This INR check       Date of next INR:  6/23/2017         How to take your warfarin dose     To take:  2.5 mg Take 0.5 of a 5 mg  tablet.    To take:  5 mg Take 1 of the 5 mg tablets.

## 2017-06-02 NOTE — PROGRESS NOTES
ANTICOAGULATION FOLLOW-UP CLINIC VISIT    Patient Name:  Wally Cano  Date:  6/2/2017  Contact Type:  Face to Face    SUBJECTIVE:     Patient Findings     Positives No Problem Findings           OBJECTIVE    INR Protime   Date Value Ref Range Status   06/02/2017 2.4 (A) 0.86 - 1.14 Final       ASSESSMENT / PLAN  No question data found.  Anticoagulation Summary as of 6/2/2017     INR goal 2.0-3.0    Today's INR 2.4    Maintenance plan 2.5 mg (5 mg x 0.5) on Wed; 5 mg (5 mg x 1) all other days    Full instructions 2.5 mg on Wed; 5 mg all other days    Weekly total 32.5 mg    No change documented Angelique Muro RN    Plan last modified Angelique Muro RN (5/26/2017)    Next INR check     Target end date 6/19/2017      Anticoagulation Episode Summary     IN[facility] location [facility]    Preferred lab     Send INR reminders to MG  INR    Comments Estimated time frame is 3 months of anticoag treatment              See the Encounter Report to view Anticoagulation Flowsheet and Dosing Calendar (Go to Encounters tab in chart review, and find the Anticoagulation Therapy Visit)      Angelique Muro RN

## 2017-06-06 DIAGNOSIS — I82.4Y2 ACUTE DEEP VEIN THROMBOSIS (DVT) OF PROXIMAL VEIN OF LEFT LOWER EXTREMITY (H): ICD-10-CM

## 2017-06-07 RX ORDER — WARFARIN SODIUM 5 MG/1
TABLET ORAL
Qty: 30 TABLET | Refills: 1 | Status: SHIPPED | OUTPATIENT
Start: 2017-06-07 | End: 2017-07-14

## 2017-06-07 NOTE — TELEPHONE ENCOUNTER
JANTOVEN 5 MG tablet  Last Written Prescription Date: 4/17/2017  Last Fill Qty: 30, # refills: 1  Last Office Visit with G, P or [facility] prescribing provider: 4/17/2017     Date and Result of Last PT/INR:   Lab Results   Component Value Date    INR 2.4 06/02/2017    INR 3.0 05/26/2017    INR 2.57 10/17/2014    INR 0.94 12/15/2009      Refilled per [facility] protocol.    Angelique Muro RN

## 2017-06-23 ENCOUNTER — ANTICOAGULATION THERAPY VISIT (OUTPATIENT)
Dept: PHARMACY | Facility: CLINIC | Age: 57
End: 2017-06-23
Payer: COMMERCIAL

## 2017-06-23 LAB — INR POINT OF CARE: 2.4 (ref 0.86–1.14)

## 2017-06-23 PROCEDURE — 85610 PROTHROMBIN TIME: CPT | Mod: QW

## 2017-06-23 PROCEDURE — 99207 ZZC NO CHARGE NURSE ONLY: CPT

## 2017-06-23 PROCEDURE — 36416 COLLJ CAPILLARY BLOOD SPEC: CPT

## 2017-06-23 NOTE — PROGRESS NOTES
ANTICOAGULATION FOLLOW-UP CLINIC VISIT    Patient Name:  Wally Cano  Date:  6/23/2017  Contact Type:  Face to Face    SUBJECTIVE:     Patient Findings     Positives No Problem Findings           OBJECTIVE    INR Protime   Date Value Ref Range Status   06/23/2017 2.4 (A) 0.86 - 1.14 Final       ASSESSMENT / PLAN  INR assessment THER    Recheck INR In: 3 WEEKS    INR Location Clinic      Anticoagulation Summary as of 6/23/2017     INR goal 2.0-3.0    Today's INR 2.4    Maintenance plan 2.5 mg (5 mg x 0.5) on Wed; 5 mg (5 mg x 1) all other days    Full instructions 2.5 mg on Wed; 5 mg all other days    Weekly total 32.5 mg    No change documented Angelique Muro RN    Plan last modified Angelique Muro RN (5/26/2017)    Next INR check 7/14/2017    Target end date 7/17/2017      Anticoagulation Episode Summary     INR check location [facility]    Preferred lab     Send INR reminders to MG  INR    Comments Estimated time frame is 3 months of anticoag treatment              See the Encounter Report to view Anticoagulation Flowsheet and Dosing Calendar (Go to Encounters tab in chart review, and find the Anticoagulation Therapy Visit)      Angelique Muro RN

## 2017-06-23 NOTE — MR AVS SNAPSHOT
Wally Dima Pritchardsimon   6/23/2017 7:40 AM   Anticoagulation Therapy Visit    Description:  56 year old male   Provider:  INR MG   Department:  [facility] Med Monitoring           INR as of 6/23/2017     Today's INR 2.4      Anticoagulation Summary as of 6/23/2017     INR goal 2.0-3.0    Today's INR 2.4    Full instructions 2.5 mg on Wed; 5 mg all other days    Next INR check 7/14/2017      Your next Anticoagulation Clinic appointment(s)     Jul 14, 2017  7:40 AM CDT   Anticoagulation Visit with INR MG   [facility]    [address] [place] [address] [phone]              Contact Numbers     [facility]  Please call the anticoagulation nurse at [phone] to cancel and/or reschedule your appointment, or with any problems or questions regarding your therapy.  You may call the main clinic number at [phone] if the nurse is not available.           June 2017 Details    Sun Mon Tue Wed Thu Fri Sat         1               2               3                 4               5               6               7               8               9               10                 11               12               13               14               15               16               17                 18               19               20               21               22               23      5 mg   See details      24      5 mg           25      5 mg         26      5 mg         27      5 mg         28      2.5 mg         29      5 mg         30      5 mg           Date Details   06/23 This INR check               How to take your warfarin dose     To take:  2.5 mg Take 0.5 of a 5 mg tablet.    To take:  5 mg Take 1 of the 5 mg tablets.           July 2017 Details    Sun Mon Tue Wed Thu Fri Sat           1      5 mg           2      5 mg         3      5 mg         4      5 mg         5      2.5 mg         6      5 mg         7      5 mg         8      5 mg            9      5 mg         10      5 mg         11      5 mg         12      2.5 mg         13      5 mg         14            15                 16               17               18               19               20               21               22                 23               24               25               26               27               28               29                 30               31                     Date Details   No additional details    Date of next INR:  7/14/2017         How to take your warfarin dose     To take:  2.5 mg Take 0.5 of a 5 mg tablet.    To take:  5 mg Take 1 of the 5 mg tablets.

## 2017-07-11 ENCOUNTER — RADIANT APPOINTMENT (OUTPATIENT)
Dept: ULTRASOUND IMAGING | Facility: CLINIC | Age: 57
End: 2017-07-11
Attending: INTERNAL MEDICINE
Payer: COMMERCIAL

## 2017-07-11 DIAGNOSIS — I82.442 ACUTE DEEP VEIN THROMBOSIS (DVT) OF TIBIAL VEIN OF LEFT LOWER EXTREMITY (H): ICD-10-CM

## 2017-07-11 PROCEDURE — 93971 EXTREMITY STUDY: CPT | Mod: LT | Performed by: RADIOLOGY

## 2017-07-14 ENCOUNTER — OFFICE VISIT (OUTPATIENT)
Dept: PEDIATRICS | Facility: CLINIC | Age: 57
End: 2017-07-14
Payer: COMMERCIAL

## 2017-07-14 ENCOUNTER — ANTICOAGULATION THERAPY VISIT (OUTPATIENT)
Dept: PHARMACY | Facility: CLINIC | Age: 57
End: 2017-07-14

## 2017-07-14 ENCOUNTER — ANTICOAGULATION THERAPY VISIT (OUTPATIENT)
Dept: PHARMACY | Facility: CLINIC | Age: 57
End: 2017-07-14
Payer: COMMERCIAL

## 2017-07-14 VITALS
HEIGHT: 69 IN | BODY MASS INDEX: 28.73 KG/M2 | DIASTOLIC BLOOD PRESSURE: 78 MMHG | HEART RATE: 63 BPM | SYSTOLIC BLOOD PRESSURE: 124 MMHG | OXYGEN SATURATION: 100 % | WEIGHT: 194 LBS | TEMPERATURE: 96.3 F

## 2017-07-14 DIAGNOSIS — Z00.00 ROUTINE GENERAL MEDICAL EXAMINATION AT A HEALTH CARE FACILITY: Primary | ICD-10-CM

## 2017-07-14 DIAGNOSIS — Z12.5 SCREENING FOR PROSTATE CANCER: ICD-10-CM

## 2017-07-14 DIAGNOSIS — I10 HYPERTENSION GOAL BP (BLOOD PRESSURE) < 140/90: ICD-10-CM

## 2017-07-14 DIAGNOSIS — E78.5 HYPERLIPIDEMIA LDL GOAL <130: ICD-10-CM

## 2017-07-14 DIAGNOSIS — I82.442 ACUTE DEEP VEIN THROMBOSIS (DVT) OF TIBIAL VEIN OF LEFT LOWER EXTREMITY (H): ICD-10-CM

## 2017-07-14 LAB
ANION GAP SERPL CALCULATED.3IONS-SCNC: 7 MMOL/L (ref 3–14)
BUN SERPL-MCNC: 19 MG/DL (ref 7–30)
CALCIUM SERPL-MCNC: 9.2 MG/DL (ref 8.5–10.1)
CHLORIDE SERPL-SCNC: 107 MMOL/L (ref 94–109)
CHOLEST SERPL-MCNC: 212 MG/DL
CO2 SERPL-SCNC: 28 MMOL/L (ref 20–32)
CREAT SERPL-MCNC: 0.94 MG/DL (ref 0.66–1.25)
CREAT UR-MCNC: 56 MG/DL
D DIMER PPP FEU-MCNC: 0.3 UG/ML FEU (ref 0–0.5)
GFR SERPL CREATININE-BSD FRML MDRD: 83 ML/MIN/1.7M2
GLUCOSE SERPL-MCNC: 97 MG/DL (ref 70–99)
HDLC SERPL-MCNC: 50 MG/DL
INR POINT OF CARE: 2.2 (ref 0.86–1.14)
LDLC SERPL CALC-MCNC: 125 MG/DL
MICROALBUMIN UR-MCNC: <5 MG/L
MICROALBUMIN/CREAT UR: NORMAL MG/G CR (ref 0–17)
NONHDLC SERPL-MCNC: 162 MG/DL
POTASSIUM SERPL-SCNC: 4.3 MMOL/L (ref 3.4–5.3)
PSA SERPL-ACNC: 2.09 UG/L (ref 0–4)
SODIUM SERPL-SCNC: 142 MMOL/L (ref 133–144)
TRIGL SERPL-MCNC: 184 MG/DL

## 2017-07-14 PROCEDURE — 80048 BASIC METABOLIC PNL TOTAL CA: CPT | Performed by: INTERNAL MEDICINE

## 2017-07-14 PROCEDURE — 80061 LIPID PANEL: CPT | Performed by: INTERNAL MEDICINE

## 2017-07-14 PROCEDURE — 82043 UR ALBUMIN QUANTITATIVE: CPT | Performed by: INTERNAL MEDICINE

## 2017-07-14 PROCEDURE — 99396 PREV VISIT EST AGE 40-64: CPT | Performed by: INTERNAL MEDICINE

## 2017-07-14 PROCEDURE — 85610 PROTHROMBIN TIME: CPT | Mod: QW

## 2017-07-14 PROCEDURE — 85379 FIBRIN DEGRADATION QUANT: CPT | Performed by: INTERNAL MEDICINE

## 2017-07-14 PROCEDURE — G0103 PSA SCREENING: HCPCS | Performed by: INTERNAL MEDICINE

## 2017-07-14 PROCEDURE — 36416 COLLJ CAPILLARY BLOOD SPEC: CPT

## 2017-07-14 PROCEDURE — 99207 ZZC NO CHARGE NURSE ONLY: CPT

## 2017-07-14 RX ORDER — LISINOPRIL 10 MG/1
10 TABLET ORAL 2 TIMES DAILY
Qty: 180 TABLET | Refills: 3 | Status: SHIPPED | OUTPATIENT
Start: 2017-07-14 | End: 2018-08-13

## 2017-07-14 NOTE — PROGRESS NOTES
Dear Wally,   Here are your recent results which are within the expected range. Please continue with your current plan of care.     Please call or Mychart to our office if you have further questions.     Livier Triplett MD

## 2017-07-14 NOTE — MR AVS SNAPSHOT
Wally Pritchardsimon   7/14/2017 7:40 AM   Anticoagulation Therapy Visit    Description:  56 year old male   Provider:  INR MG   Department:  Mg Med Monitoring           INR as of 7/14/2017     Today's INR 2.2      Anticoagulation Summary as of 7/14/2017     INR goal 2.0-3.0    Today's INR 2.2    Full instructions 2.5 mg on Wed; 5 mg all other days    Next INR check 8/4/2017      Your next Anticoagulation Clinic appointment(s)     Aug 04, 2017  7:40 AM CDT   Anticoagulation Visit with INR MG   [facility]    [address] [place] [id number] [phone]              Contact Numbers     [facility]  Please call the anticoagulation nurse at [phone] to cancel and/or reschedule your appointment, or with any problems or questions regarding your therapy.  You may call the main clinic number at [phone] if the nurse is not available.           July 2017 Details    Sun Mon Tue Wed Thu Fri Sat           1                 2               3               4               5               6               7               8                 9               10               11               12               13               14      5 mg   See details      15      5 mg           16      5 mg         17      5 mg         18      5 mg         19      2.5 mg         20      5 mg         21      5 mg         22      5 mg           23      5 mg         24      5 mg         25      5 mg         26      2.5 mg         27      5 mg         28      5 mg         29      5 mg           30      5 mg         31      5 mg               Date Details   07/14 This INR check               How to take your warfarin dose     To take:  2.5 mg Take 0.5 of a 5 mg tablet.    To take:  5 mg Take 1 of the 5 mg tablets.           August 2017 Details    Sun Mon Tue Wed Thu Fri Sat       1      5 mg         2      2.5 mg         3      5 mg         4            5                  6               7               8               9               10               11               12                 13               14               15               16               17               18               19                 20               21               22               23               24               25               26                 27               28               29               30               31                  Date Details   No additional details    Date of next INR:  8/4/2017         How to take your warfarin dose     To take:  2.5 mg Take 0.5 of a 5 mg tablet.    To take:  5 mg Take 1 of the 5 mg tablets.

## 2017-07-14 NOTE — PROGRESS NOTES
ANTICOAGULATION FOLLOW-UP CLINIC VISIT    Patient Name:  Wally Cano  Date:  7/14/2017  Contact Type:  Face to Face    SUBJECTIVE:     Patient Findings     Positives No Problem Findings    Comments Patient is scheduled to follow up with Dr. Triplett today to determine if he can discontinue Warfarin. Patient had an ultrasound last week that confirmed the clot has dissolved. Patient will inform clinic of this or Dr. Triplett.            OBJECTIVE    INR Protime   Date Value Ref Range Status   07/14/2017 2.2 (A) 0.86 - 1.14 Final       ASSESSMENT / PLAN  INR assessment THER    Recheck INR In: 3 WEEKS    INR Location Clinic      Anticoagulation Summary as of 7/14/2017     INR goal 2.0-3.0    Today's INR 2.2    Maintenance plan 2.5 mg (5 mg x 0.5) on Wed; 5 mg (5 mg x 1) all other days    Full instructions 2.5 mg on Wed; 5 mg all other days    Weekly total 32.5 mg    No change documented Angelique Muro RN    Plan last modified Angelique Muro RN (5/26/2017)    Next INR check 8/4/2017    Target end date 7/17/2017      Anticoagulation Episode Summary     INR check location [facility]    Preferred lab     Send INR reminders to MG  INR    Comments Estimated time frame is 3 months of anticoag treatment              See the Encounter Report to view Anticoagulation Flowsheet and Dosing Calendar (Go to Encounters tab in chart review, and find the Anticoagulation Therapy Visit)      Angelique Muro RN

## 2017-07-14 NOTE — MR AVS SNAPSHOT
After Visit Summary   7/14/2017    Wally Cano    MRN: [id number]           Patient Information     Date Of Birth          1960        Visit Information        Provider Department      7/14/2017 9:40 AM Livier Triplett MD [facility]        Today's Diagnoses     Routine general medical examination at a health care facility    -  1    Hypertension goal BP (blood pressure) < 140/90        Hyperlipidemia LDL goal <130        Screening for prostate cancer        Acute deep vein thrombosis (DVT) of tibial vein of left lower extremity (H)          Care Instructions    Get labs done today.     You can stop warfarin.       Preventive Health Recommendations  Male Ages 50 - 64    Yearly exam:             See your health care provider every year in order to  o   Review health changes.   o   Discuss preventive care.    o   Review your medicines if your doctor has prescribed any.     Have a cholesterol test every 5 years, or more frequently if you are at risk for high cholesterol/heart disease.     Have a diabetes test (fasting glucose) every three years. If you are at risk for diabetes, you should have this test more often.     Have a colonoscopy at age 50, or have a yearly FIT test (stool test). These exams will check for colon cancer.      Talk with your health care provider about whether or not a prostate cancer screening test (PSA) is right for you.    You should be tested each year for STDs (sexually transmitted diseases), if you re at risk.     Shots: Get a flu shot each year. Get a tetanus shot every 10 years.     Nutrition:    Eat at least 5 servings of fruits and vegetables daily.     Eat whole-grain bread, whole-wheat pasta and brown rice instead of white grains and rice.     Talk to your provider about Calcium and Vitamin D.     Lifestyle    Exercise for at least 150 minutes a week (30 minutes a day, 5 days a week). This will help you control your weight and prevent disease.      Limit alcohol to one drink per day.     No smoking.     Wear sunscreen to prevent skin cancer.     See your dentist every six months for an exam and cleaning.     See your eye doctor every 1 to 2 years.            Follow-ups after your visit        Your next 10 appointments already scheduled     Aug 04, 2017  7:40 AM CDT   Anticoagulation Visit with INR MG   [facility]    [address] [place] [id number] [phone]              Who to contact     If you have questions or need follow up information about today's clinic visit or your schedule please contact [facility] directly at [phone].  Normal or non-critical lab and imaging results will be communicated to you by Optherion[employer], letter or phone within 4 business days after the clinic has received the results. If you do not hear from us within 7 days, please contact the clinic through Optherion[employer] or phone. If you have a critical or abnormal lab result, we will notify you by phone as soon as possible.  Submit refill requests through [employer] or call your pharmacy and they will forward the refill request to us. Please allow 3 business days for your refill to be completed.          Additional Information About Your Visit        MyChart Information     [employer] gives you secure access to your electronic health record. If you see a primary care provider, you can also send messages to your care team and make appointments. If you have questions, please call your primary care clinic.  If you do not have a primary care provider, please call [phone] and they will assist you.      [employer] is an electronic gateway that provides easy, online access to your medical records. With [employer], you can request a clinic appointment, read your test results, renew a prescription or communicate with your care team.     To access your existing account, please contact your [facility]  "Clinic or call [phone] for assistance.        Care EveryWhere ID     This is your Care EveryWhere ID. This could be used by other organizations to access your [facility] medical records  [id number]        Your Vitals Were     Pulse Temperature Height Pulse Oximetry BMI (Body Mass Index)       63 96.3  F (35.7  C) (Temporal) 5' 8.75\" (1.746 m) 100% 28.86 kg/m2        Blood Pressure from Last 3 Encounters:   07/14/17 124/78   04/17/17 122/90   10/27/16 (!) 167/98    Weight from Last 3 Encounters:   07/14/17 194 lb (88 kg)   04/17/17 196 lb 8 oz (89.1 kg)   12/28/16 207 lb (93.9 kg)              We Performed the Following     Albumin Random Urine Quantitative     Basic metabolic panel     D dimer, quantitative     Lipid panel reflex to direct LDL     PSA, screen          Today's Medication Changes          These changes are accurate as of: 7/14/17 10:39 AM.  If you have any questions, ask your nurse or doctor.               These medicines have changed or have updated prescriptions.        Dose/Directions    lisinopril 10 MG tablet   Commonly known as:  PRINIVIL/ZESTRIL   This may have changed:  See the new instructions.   Used for:  Hypertension goal BP (blood pressure) < 140/90   Changed by:  Livier Triplett MD        Dose:  10 mg   Take 1 tablet (10 mg) by mouth 2 times daily   Quantity:  180 tablet   Refills:  3            Where to get your medicines      These medications were sent to [place] [address] [place] [facility] [id number] [place] [address]     Phone:  [phone]     lisinopril 10 MG tablet                Primary Care Provider Office Phone # Fax #    Livier Triplett [phone]       [facility] [address]  [facility] [address]        Equal Access to Services     [address] AH: Hadii aad ku hadasho Soomaali, waaxda luqadaha, qaybta kaalmada adeegyada, waxay lin bey. So [facility] [phone].    ATENCIÓN: Si habla " español, tiene a hickman disposición servicios gratuitos de asistencia lingüística. Alejandra cano [phone].    We comply with applicable federal civil rights laws and [place] laws. We do not discriminate on the basis of race, color, national origin, age, disability sex, sexual orientation or gender identity.            Thank you!     Thank you for choosing [facility]  for your care. Our goal is always to provide you with excellent care. Hearing back from our patients is one way we can continue to improve our services. Please take a few minutes to complete the written survey that you may receive in the mail after your visit with us. Thank you!             Your Updated Medication List - Protect others around you: Learn how to safely use, store and throw away your medicines at www.disposemymeds.org.          This list is accurate as of: 7/14/17 10:39 AM.  Always use your most recent med list.                   Brand Name Dispense Instructions for use Diagnosis    amoxicillin 500 MG capsule    AMOXIL    20 capsule    Take 4 capsules one hour prior to dental procedure    VSD (ventricular septal defect)       hydrocortisone 2.5 % cream    ANUSOL-HC    30 g    Place  rectally 2 times daily.    External hemorrhoids       IBUPROFEN PO      Take 200 mg by mouth Reported on 4/17/2017        lisinopril 10 MG tablet    PRINIVIL/ZESTRIL    180 tablet    Take 1 tablet (10 mg) by mouth 2 times daily    Hypertension goal BP (blood pressure) < 140/90       order for DME     2 each    Equipment being ordered: compression stocking knee high 20-30 mmHg    Varicose veins of both lower extremities       terazosin 1 MG capsule    HYTRIN    70 capsule    Start with 1 tab (1 mg) daily for 1 week, 2 tabs (2 mg) daily for 1 week, 3 tabs (3 mg) daily for 1 week, then 4 tabs (4 mg) daily    Benign prostatic hyperplasia with lower urinary tract symptoms, unspecified morphology, Nocturia       TYLENOL PO      Take 625 mg by mouth as  needed for mild pain or fever    Superficial thrombophlebitis

## 2017-07-14 NOTE — PROGRESS NOTES
Dear Wally,   Here are your recent results.   -- lipid panel is worse than last year. This is quite different from your norm.   -- did you eat anything else besides drinking coffee this morning?     Please call or Mychart to our office if you have further questions.     Livier Triplett MD

## 2017-07-14 NOTE — NURSING NOTE
"Chief Complaint   Patient presents with     Physical       Initial /78 (Cuff Size: Adult Regular)  Pulse 63  Temp 96.3  F (35.7  C) (Temporal)  Ht 5' 8.75\" (1.746 m)  Wt 194 lb (88 kg)  SpO2 100%  BMI 28.86 kg/m2 Estimated body mass index is 28.86 kg/(m^2) as calculated from the following:    Height as of this encounter: 5' 8.75\" (1.746 m).    Weight as of this encounter: 194 lb (88 kg).  Medication Reconciliation: complete    "

## 2017-07-14 NOTE — PROGRESS NOTES
SUBJECTIVE:   CC: aWlly Cano is an 56 year old male who presents for preventative health visit.     Healthy Habits:    Do you get at least three servings of calcium containing foods daily (dairy, green leafy vegetables, etc.)? yes    Amount of exercise or daily activities, outside of work: 7 day(s) per week    Problems taking medications regularly No    Medication side effects: No    Have you had an eye exam in the past two years? yes    Do you see a dentist twice per year? yes    Do you have sleep apnea, excessive snoring or daytime drowsiness?no        PROBLEMS TO ADD ON...  History of DVT on left LE, on anticoagulation for 3 months. Recently doppler is negative for DVT. Has baker cyst behind the left knee. Pt has seen ortho before, had cortisone injection, told he has meniscal damage as well.     History of varicose veins with superficial thrombophlebitis, had hypercoag workup in 2014 that was negative. Had see vascular surgeon in 2014, vein procedure discussed but not done due to lack of insurance coverage for this. His current insurance does cover for it now. So he plans to do this in the future.     Today's PHQ-2 Score:   PHQ-2 ( 1999 Pfizer) 4/17/2017 10/20/2016   Q1: Little interest or pleasure in doing things 0 0   Q2: Feeling down, depressed or hopeless 0 0   PHQ-2 Score 0 0       Abuse: Current or Past(Physical, Sexual or Emotional)- No  Do you feel safe in your environment - Yes    Social History   Substance Use Topics     Smoking status: Former Smoker     Years: 10.00     Types: Cigarettes, Cigars     Quit date: 10/28/2006     Smokeless tobacco: Never Used     Alcohol use No      Comment: rare     The patient does not drink >3 drinks per day nor >7 drinks per week.    Last PSA:   PSA   Date Value Ref Range Status   08/02/2016 1.61 0 - 4 ug/L Final     Comment:     Assay Method:  Chemiluminescence using Siemens Vista analyzer       Reviewed orders with patient. Reviewed health maintenance  "and updated orders accordingly - Yes  Labs reviewed in EPIC    Reviewed and updated as needed this visit by clinical staff  Tobacco  Allergies  Meds  Med Hx  Surg Hx  Fam Hx  Soc Hx        Reviewed and updated as needed this visit by Provider            ROS:  C: NEGATIVE for fever, chills, change in weight  I: NEGATIVE for worrisome rashes, moles or lesions  E: NEGATIVE for vision changes or irritation  ENT: NEGATIVE for ear, mouth and throat problems  R: NEGATIVE for significant cough or SOB  CV: NEGATIVE for chest pain, palpitations or peripheral edema  GI: NEGATIVE for nausea, abdominal pain, heartburn, or change in bowel habits   male: negative for dysuria, hematuria, decreased urinary stream, erectile dysfunction, urethral discharge  M: NEGATIVE for significant arthralgias or myalgia  N: NEGATIVE for weakness, dizziness or paresthesias  P: NEGATIVE for changes in mood or affect    OBJECTIVE:   /78 (Cuff Size: Adult Regular)  Pulse 63  Temp 96.3  F (35.7  C) (Temporal)  Ht 5' 8.75\" (1.746 m)  Wt 194 lb (88 kg)  SpO2 100%  BMI 28.86 kg/m2  EXAM:  GENERAL: healthy, alert and no distress  EYES: Eyes grossly normal to inspection, PERRL and conjunctivae and sclerae normal  HENT: ear canals and TM's normal, nose and mouth without ulcers or lesions  NECK: no adenopathy, no asymmetry, masses, or scars and thyroid normal to palpation  RESP: lungs clear to auscultation - no rales, rhonchi or wheezes  CV: regular rate and rhythm, normal S1 S2, no S3 or S4, no murmur, click or rub, no peripheral edema and peripheral pulses strong  ABDOMEN: soft, nontender, no hepatosplenomegaly, no masses and bowel sounds normal  MS: no gross musculoskeletal defects noted, no edema  SKIN: no suspicious lesions or rashes  NEURO: Normal strength and tone, mentation intact and speech normal  PSYCH: mentation appears normal, affect normal/bright    ASSESSMENT/PLAN:       ICD-10-CM    1. Routine general medical examination at " "a health care facility Z00.00    2. Hypertension goal BP (blood pressure) < 140/90 I10 lisinopril (PRINIVIL/ZESTRIL) 10 MG tablet     Basic metabolic panel     Albumin Random Urine Quantitative   3. Hyperlipidemia LDL goal <130 E78.5 Lipid panel reflex to direct LDL   4. Screening for prostate cancer Z12.5 PSA, screen   5. Acute deep vein thrombosis (DVT) of tibial vein of left lower extremity (H) I82.442 D dimer, quantitative       -- stable chronic health issues. Medications refilled.    -- ok to stop warfarin, start baby ASA. He is planning to see vascular surgeon regarding the varicose veins.    COUNSELING:  Reviewed preventive health counseling, as reflected in patient instructions         reports that he quit smoking about 10 years ago. His smoking use included Cigarettes and Cigars. He quit after 10.00 years of use. He has never used smokeless tobacco.    Estimated body mass index is 28.86 kg/(m^2) as calculated from the following:    Height as of this encounter: 5' 8.75\" (1.746 m).    Weight as of this encounter: 194 lb (88 kg).   Weight management plan: active life style, working on weight loss.    Counseling Resources:  ATP IV Guidelines  Pooled Cohorts Equation Calculator  FRAX Risk Assessment  ICSI Preventive Guidelines  Dietary Guidelines for Americans, 2010  USDA's MyPlate  ASA Prophylaxis  Lung CA Screening    Livier Triplett MD, MD  [facility]  "

## 2017-07-14 NOTE — PATIENT INSTRUCTIONS
Get labs done today.     You can stop warfarin.       Preventive Health Recommendations  Male Ages 50   64    Yearly exam:             See your health care provider every year in order to  o   Review health changes.   o   Discuss preventive care.    o   Review your medicines if your doctor has prescribed any.     Have a cholesterol test every 5 years, or more frequently if you are at risk for high cholesterol/heart disease.     Have a diabetes test (fasting glucose) every three years. If you are at risk for diabetes, you should have this test more often.     Have a colonoscopy at age 50, or have a yearly FIT test (stool test). These exams will check for colon cancer.      Talk with your health care provider about whether or not a prostate cancer screening test (PSA) is right for you.    You should be tested each year for STDs (sexually transmitted diseases), if you re at risk.     Shots: Get a flu shot each year. Get a tetanus shot every 10 years.     Nutrition:    Eat at least 5 servings of fruits and vegetables daily.     Eat whole-grain bread, whole-wheat pasta and brown rice instead of white grains and rice.     Talk to your provider about Calcium and Vitamin D.     Lifestyle    Exercise for at least 150 minutes a week (30 minutes a day, 5 days a week). This will help you control your weight and prevent disease.     Limit alcohol to one drink per day.     No smoking.     Wear sunscreen to prevent skin cancer.     See your dentist every six months for an exam and cleaning.     See your eye doctor every 1 to 2 years.

## 2017-08-03 ENCOUNTER — OFFICE VISIT (OUTPATIENT)
Dept: VASCULAR SURGERY | Facility: CLINIC | Age: 57
End: 2017-08-03
Payer: COMMERCIAL

## 2017-08-03 ENCOUNTER — APPOINTMENT (OUTPATIENT)
Dept: VASCULAR SURGERY | Facility: CLINIC | Age: 57
End: 2017-08-03
Payer: COMMERCIAL

## 2017-08-03 DIAGNOSIS — Z53.9 ERRONEOUS ENCOUNTER--DISREGARD: Primary | ICD-10-CM

## 2017-08-03 PROCEDURE — 99213 OFFICE O/P EST LOW 20 MIN: CPT | Performed by: SURGERY

## 2017-08-03 PROCEDURE — 93970 EXTREMITY STUDY: CPT | Performed by: SURGERY

## 2017-08-03 NOTE — PROGRESS NOTES
[facility] Office Note    Wally Cano is a 56-year-old gentleman with whom I'm well familiar.  I have seen him on two previous occasions regarding bilateral lower extremity chronic venous insufficiency.  I last saw him in January 2015 at which time he had symptoms of burning pain in the left medial leg after standing for long periods of time and especially in warm weather.  His pain improved with compression hose and with walking.  He has been wearing compression hose on a regular basis since July 2014.    Unfortunately, a bit more than three months ago he suffered another episode of superficial thrombophlebitis involving a tributary of his left great saphenous vein in the calf and a small deep vein thrombosis in one of his tibial veins.  He is not sure which tibial vein was involved.  He only recently finished a three month course of warfarin anticoagulation.  Thus he has now had two episodes of superficial thrombophlebitis of his left leg despite the use of support hose.    The symptoms are interfering with his activities of daily living or cause he flies frequently for his work and spends long hours on his feet during long surgical procedures for his work.  The symptoms have not been responsive to daily compression hose, leg elevation, exercise, weight control.    Past medical history:  Medical: Hypertension, ventricular septal defect  Surgical: Left inguinal herniorrhaphy, tonsillectomy    Family history:  Varicose veins in his mother who underwent vein stripping    Medicines:  Lisinopril, Hytrin, amoxicillin prior to dental procedures, ibuprofen p.r.n.    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    12 point review of systems was updated and reviewed and is significant for urinary frequency, productive cough, epistaxis, leg pain but is otherwise as noted in the history of present illness and past medical  history.    Physical exam  General: Less than gentleman in no acute distress.  He is 5 feet eight records inches tall and weighs 88 kg blood pressure is 102/98  HEENT: Normocephalic, atraumatic.  EOMI.  He wears corrective lenses external ears and nose are normal  Respiratory: Normal respiratory effort  Cardiovascular: Pulse is regular  Musculoskeletal: Gait and station are normal.  The joints of his fingers and toes are without deformity.  There is no cyanosis.  Psychiatric: Judgment, insight, mood and affect are normal  Neurologic: Grossly normal  Extremities: Coursing down the right medial thigh and the right medial leg are 8 mm varicosities.  There is trace edema of his right ankle but no significant venous stasis changes.  There are 8 mm varicosities coursing down the medial and anterior aspect of his left leg from his knee to his ankle.  There is no erythema or significant induration to suggest an acute thrombotic process.  There are no significant venous stasis changes.  There is trace edema.  He has easily palpable dorsalis pedis and posterior tibial pulses bilaterally.    Duplex ultrasound of his right lower extremity veins reveals no evidence of deep vein thrombosis.  His right common femoral vein is incompetent but the remaining deep vein valves are competent.  His right great saphenous vein is incompetent and dilated to 6.5 mm in diameter in the thigh.  It becomes quite superficial in the distal thigh and below the knee (2.4 mm deep) the small saphenous vein is not visualized from the saphenous popliteal junction to the proximal calf but is patent and competent from the mid calf to the distal calf.  The anterior accessory saphenous vein is incompetent and dilated to 6.3 mm in diameter supplying a 6 mm incompetent vein branch in the thigh.  The vein of Giacomini is not visualized.  There is a 4.2 mm diameter incompetent perforating vein 11 cm from the right medial malleolus.  Ultrasound of the left lower  extremity reveals no evidence of deep vein thrombosis or deep vein valve incompetence.  The left great saphenous vein is incompetent from the saphenofemoral junction to the ankle and is dilated to 9.3 mm in diameter, giving off a 6.9 mm incompetent vein branch in the left calf.  The left great saphenous vein is superficial in the proximal calf and mid calf measuring 2.8 mm deep to the skin.  The left small saphenous vein is not visualized from the saphenofemoral popliteal junction to the mid calf but is patent and competent from the mid distal calf.  There is a 3.4 mm diameter incompetent  13 cm from the left medial malleolus which communicates with a vein branch.  The left anterior accessory saphenous vein is competent.  The left vein of Giacomini is not visualized.    He has popliteal cysts in both legs.    Impression  CEAP3, VCSS 4 bilateral lower extremity chronic venous insufficiency with complications of recurrent superficial thrombophlebitis.  He is a [employer] employee who has been wearing compression hose for three years but has suffered recurrent superficial thrombophlebitis despite the use of support hose.  We discussed options of continued conservative management with the use of support hose, leg elevation, exercise, dietary measures and analgesics on an as-needed basis.  He does not feel that he wants to take the risk of recurrent superficial phlebitis and the need to take warfarin anticoagulation.  I do not feel a workup for a hypercoagulable state is indicated at this time as these have been provoked events due to his long travel and varicose veins.    If he chose treatment, he would be a candidate for endovenous ablation of his great saphenous veins bilaterally.  I would recommend phlebectomies given his previous superficial thrombophlebitis.  Given the superficial nature of his great saphenous veins bilaterally, he will need to have a non-thermal technique to close his veins.  The venous  seal procedure is an optimal treatment for this [employer] employee given the superficial nature of his great saphenous vein and his frequent travel as there are no travel restrictions following the procedure.  There is no risk of thermal injury to the skin with the nonthermal technique and no risk of thermal injury to his saphenous nerve.    We will use antibiotic prophylaxis given his septal defect to prevent endocarditis in the unlikely event of wound infection.    Questions were answered.    RYDER Muse M.D.

## 2017-08-03 NOTE — MR AVS SNAPSHOT
After Visit Summary   8/3/2017    Wally Cano    MRN: [id number]           Patient Information     Date Of Birth          1960        Visit Information        Provider Department      8/3/2017 2:00 PM Jose Luis Muse MD [facility]      Today's Diagnoses     ERRONEOUS ENCOUNTER--DISREGARD    -  1       Follow-ups after your visit        Who to contact     If you have questions or need follow up information about today's clinic visit or your schedule please contact [facility] directly at [phone].  Normal or non-critical lab and imaging results will be communicated to you by Giveit100hart, letter or phone within 4 business days after the clinic has received the results. If you do not hear from us within 7 days, please contact the clinic through mPortalt or phone. If you have a critical or abnormal lab result, we will notify you by phone as soon as possible.  Submit refill requests through Share Some Style or call your pharmacy and they will forward the refill request to us. Please allow 3 business days for your refill to be completed.          Additional Information About Your Visit        MyChart Information     Share Some Style gives you secure access to your electronic health record. If you see a primary care provider, you can also send messages to your care team and make appointments. If you have questions, please call your primary care clinic.  If you do not have a primary care provider, please call [phone] and they will assist you.        Care EveryWhere ID     This is your Care EveryWhere ID. This could be used by other organizations to access your [place] medical records  [id number]         Blood Pressure from Last 3 Encounters:   11/02/17 (!) 166/99   10/04/17 126/80   07/14/17 124/78    Weight from Last 3 Encounters:   10/04/17 86.8 kg (191 lb 6.4 oz)   07/14/17 88 kg (194 lb)   04/17/17 89.1 kg (196  lb 8 oz)              Today, you had the following     No orders found for display       Primary Care Provider Office Phone # Fax #    Livier Triplett MD PhD [phone] [address]  [facility] [address]        Equal Access to Services     MARLEYRIC JAD : Hadii aad ku hadshannono Soomaali, waaxda luqadaha, qaybta kaalmada adeegyada, waxerin ulisesin haybhargavn ademarilou khgómez laMarjanbhargavshayy bey. So [facility] [phone].    ATENCIÓN: Si habla español, tiene a hickman disposición servicios gratuitos de asistencia lingüística. Llame al [phone].    We comply with applicable federal civil rights laws and [place] laws. We do not discriminate on the basis of race, color, national origin, age, disability, sex, sexual orientation, or gender identity.            Thank you!     Thank you for choosing [facility]  for your care. Our goal is always to provide you with excellent care. Hearing back from our patients is one way we can continue to improve our services. Please take a few minutes to complete the written survey that you may receive in the mail after your visit with us. Thank you!             Your Updated Medication List - Protect others around you: Learn how to safely use, store and throw away your medicines at www.disposemymeds.org.          This list is accurate as of 8/3/17 11:59 PM.  Always use your most recent med list.                   Brand Name Dispense Instructions for use Diagnosis    hydrocortisone 2.5 % cream    ANUSOL-HC    30 g    Place  rectally 2 times daily.    External hemorrhoids       IBUPROFEN PO      Take 200 mg by mouth Reported on 4/17/2017        lisinopril 10 MG tablet    PRINIVIL/ZESTRIL    180 tablet    Take 1 tablet (10 mg) by mouth 2 times daily    Hypertension goal BP (blood pressure) < 140/90       order for DME     2 each    Equipment being ordered: compression stocking knee high 20-30 mmHg    Varicose veins of both lower extremities       terazosin 1 MG capsule     HYTRIN    70 capsule    Start with 1 tab (1 mg) daily for 1 week, 2 tabs (2 mg) daily for 1 week, 3 tabs (3 mg) daily for 1 week, then 4 tabs (4 mg) daily    Benign prostatic hyperplasia with lower urinary tract symptoms, unspecified morphology, Nocturia       TYLENOL PO      Take 625 mg by mouth as needed for mild pain or fever    Superficial thrombophlebitis

## 2017-08-24 ENCOUNTER — MYC MEDICAL ADVICE (OUTPATIENT)
Dept: PEDIATRICS | Facility: CLINIC | Age: 57
End: 2017-08-24

## 2017-08-24 DIAGNOSIS — Q21.0 VSD (VENTRICULAR SEPTAL DEFECT): ICD-10-CM

## 2017-08-24 RX ORDER — AMOXICILLIN 500 MG/1
CAPSULE ORAL
Qty: 20 CAPSULE | Refills: 2 | Status: SHIPPED | OUTPATIENT
Start: 2017-08-24 | End: 2018-07-31

## 2017-09-08 ENCOUNTER — OFFICE VISIT (OUTPATIENT)
Dept: VASCULAR SURGERY | Facility: CLINIC | Age: 57
End: 2017-09-08
Payer: COMMERCIAL

## 2017-09-08 DIAGNOSIS — Z53.9 ERRONEOUS ENCOUNTER--DISREGARD: Primary | ICD-10-CM

## 2017-09-08 PROCEDURE — 37799 UNLISTED PX VASCULAR SURGERY: CPT | Mod: RT | Performed by: SURGERY

## 2017-09-08 NOTE — MR AVS SNAPSHOT
After Visit Summary   9/8/2017    Wally Cano    MRN: [id number]           Patient Information     Date Of Birth          1960        Visit Information        Provider Department      9/8/2017 3:00 PM Jose Luis Muse MD;  VEIN PROCEDURE ROOM 1;  [facility]      Today's Diagnoses     ERRONEOUS ENCOUNTER--DISREGARD    -  1       Follow-ups after your visit        Who to contact     If you have questions or need follow up information about today's clinic visit or your schedule please contact [facility] directly at [phone].  Normal or non-critical lab and imaging results will be communicated to you by Selleroutlethart, letter or phone within 4 business days after the clinic has received the results. If you do not hear from us within 7 days, please contact the clinic through Selleroutlethart or phone. If you have a critical or abnormal lab result, we will notify you by phone as soon as possible.  Submit refill requests through [employer] or call your pharmacy and they will forward the refill request to us. Please allow 3 business days for your refill to be completed.          Additional Information About Your Visit        MyChart Information     [employer] gives you secure access to your electronic health record. If you see a primary care provider, you can also send messages to your care team and make appointments. If you have questions, please call your primary care clinic.  If you do not have a primary care provider, please call [phone] and they will assist you.        Care EveryWhere ID     This is your Care EveryWhere ID. This could be used by other organizations to access your [place] medical records  [id number]         Blood Pressure from Last 3 Encounters:   11/02/17 (!) 166/99   10/04/17 126/80   07/14/17 124/78    Weight from Last 3 Encounters:   10/04/17 86.8 kg (191 lb 6.4 oz)    07/14/17 88 kg (194 lb)   04/17/17 89.1 kg (196 lb 8 oz)              Today, you had the following     No orders found for display       Primary Care Provider Office Phone # Fax #    Livier Triplett MD PhD [phone]       [address]  [facility] [address]        Equal Access to Services     [facility]: Hadii aad ku hadasho Soomaali, waaxda luqadaha, qaybta kaalmada adeegyada, waxay idiin hayaan adeeg khartemsh laMarjanaan . So [facility] [phone].    ATENCIÓN: Si habla español, tiene a hickman disposición servicios gratuitos de asistencia lingüística. Llame al [phone].    We comply with applicable federal civil rights laws and [place] laws. We do not discriminate on the basis of race, color, national origin, age, disability, sex, sexual orientation, or gender identity.            Thank you!     Thank you for choosing [facility]  for your care. Our goal is always to provide you with excellent care. Hearing back from our patients is one way we can continue to improve our services. Please take a few minutes to complete the written survey that you may receive in the mail after your visit with us. Thank you!             Your Updated Medication List - Protect others around you: Learn how to safely use, store and throw away your medicines at www.disposemymeds.org.          This list is accurate as of 9/8/17 11:59 PM.  Always use your most recent med list.                   Brand Name Dispense Instructions for use Diagnosis    amoxicillin 500 MG capsule    AMOXIL    20 capsule    Take 4 capsules one hour prior to dental procedure    VSD (ventricular septal defect)       hydrocortisone 2.5 % cream    ANUSOL-HC    30 g    Place  rectally 2 times daily.    External hemorrhoids       IBUPROFEN PO      Take 200 mg by mouth Reported on 4/17/2017        lisinopril 10 MG tablet    PRINIVIL/ZESTRIL    180 tablet    Take 1 tablet (10 mg) by mouth 2 times daily    Hypertension goal BP (blood  pressure) < 140/90       order for DME     2 each    Equipment being ordered: compression stocking knee high 20-30 mmHg    Varicose veins of both lower extremities       terazosin 1 MG capsule    HYTRIN    70 capsule    Start with 1 tab (1 mg) daily for 1 week, 2 tabs (2 mg) daily for 1 week, 3 tabs (3 mg) daily for 1 week, then 4 tabs (4 mg) daily    Benign prostatic hyperplasia with lower urinary tract symptoms, unspecified morphology, Nocturia       TYLENOL PO      Take 625 mg by mouth as needed for mild pain or fever    Superficial thrombophlebitis

## 2017-09-08 NOTE — PROGRESS NOTES
[facility] Operative Report    PREOPERATIVE DIAGNOSIS  1. CEAP 3, VCSS 5 chronic venous insufficiency right lower extremity  2.  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  Kenia Melendez CST/CSFA    ANESTHESIA  1% lidocaine    OPERATIVE DESCRIPTION  Details of the procedure including risks of bleeding and infection were discussed.  The patient appeared to understand and wish to proceed.  Informed consent was obtained.    I initialed the right leg as the operative site.  We proceeded to the operating room, had the patient lie supine on her operating table and prepped and draped the right groin and entire right lower extremity sterilely.    A timeout was taken to confirm the appropriate operative site and procedure: Radiofrequency ablation of the right anterior accessory saphenous vein.    With the operating table in reverse Trendelenburg position I interrogated the right anterior thigh with ultrasound and was able to clearly identify the right anterior accessory saphenous vein noting that it had an area of tortuosity approximately 15 cm from the saphenofemoral junction.  I did not think I could negotiate this area of tortuosity with the delivery catheter, therefore I chose to access the vein just cephalad of the area of tortuosity.  I infiltrated the skin overlying the accessory saphenous vein at this level with 1% lidocaine, placed a micropuncture needle in the vein followed by a guidewire and a 7-[place] citizen sheath.  Within past the VenaSeal delivery catheter which had been pre-loaded with glue through the sheath and positioned the tip at the saphenofemoral junction.  We then withdrew the delivery catheter 5 cm and confirmed that it indeed was 5 cm from the saphenofemoral junction.  I located the tip of the delivery  catheter, removed the ultrasound probe 1.5 cm cephalad and then applied compression to occlude the anterior accessory saphenous vein.  One aliquot of glue was administered and the catheter withdrawn 1 cm, a second aliquot of glue was administered and the catheter was withdrawn 3 cm with three minutes of compression held.  We then backed the sheath out of the scan in along the catheter out of the operative field.  We located the delivery catheter tip, slid our probe 1.5 cm cephalad, applied compression and then delivered one aliquot of glue, withdrew the catheter 3 cm and held compression for 30 seconds.  This sequence was continued until we were 3 cm from the skin at which time we withdrew the catheter quickly and held pressure.    I confirmed that the anterior accessory saphenous vein was closed and that the glue extended to approximately 3 cm from the saphenofemoral junction.  The common femoral vein and saphenofemoral junctions were freely compressible and free of thrombus.    We cleaned the leg was having solution and applied a gauze and Tegaderm dressing to the vein access site.  We observe the patient for 15 minutes to ensure excellent hemostasis then allowed him to walk out of the clinic and drive himself home.  There were no complications with the procedure.    RYDER Muse M.D.

## 2017-09-22 ENCOUNTER — APPOINTMENT (OUTPATIENT)
Dept: VASCULAR SURGERY | Facility: CLINIC | Age: 57
End: 2017-09-22
Payer: COMMERCIAL

## 2017-09-22 ENCOUNTER — OFFICE VISIT (OUTPATIENT)
Dept: VASCULAR SURGERY | Facility: CLINIC | Age: 57
End: 2017-09-22
Payer: COMMERCIAL

## 2017-09-22 DIAGNOSIS — Z53.9 ERRONEOUS ENCOUNTER--DISREGARD: Primary | ICD-10-CM

## 2017-09-22 PROCEDURE — 99207 ZZC VEINSOLUTIONS NO CHARGE VISIT: CPT | Performed by: SURGERY

## 2017-09-22 PROCEDURE — 37799 UNLISTED PX VASCULAR SURGERY: CPT | Mod: 50 | Performed by: SURGERY

## 2017-09-22 PROCEDURE — 37766 PHLEB VEINS - EXTREM 20+: CPT | Mod: LT | Performed by: SURGERY

## 2017-09-22 PROCEDURE — 37765 STAB PHLEB VEINS XTR 10-20: CPT | Mod: RT | Performed by: SURGERY

## 2017-09-22 NOTE — MR AVS SNAPSHOT
After Visit Summary   9/22/2017    Wally Cano    MRN: [id number]           Patient Information     Date Of Birth          1960        Visit Information        Provider Department      9/22/2017 8:30 AM Jose Luis Muse MD; MG VEIN PROCEDURE ROOM 1; [facility]      Today's Diagnoses     ERRONEOUS ENCOUNTER--DISREGARD    -  1       Follow-ups after your visit        Who to contact     If you have questions or need follow up information about today's clinic visit or your schedule please contact [facility] directly at [phone].  Normal or non-critical lab and imaging results will be communicated to you by Socowavehart, letter or phone within 4 business days after the clinic has received the results. If you do not hear from us within 7 days, please contact the clinic through Socowavehart or phone. If you have a critical or abnormal lab result, we will notify you by phone as soon as possible.  Submit refill requests through [employer] or call your pharmacy and they will forward the refill request to us. Please allow 3 business days for your refill to be completed.          Additional Information About Your Visit        MyChart Information     [employer] gives you secure access to your electronic health record. If you see a primary care provider, you can also send messages to your care team and make appointments. If you have questions, please call your primary care clinic.  If you do not have a primary care provider, please call [phone] and they will assist you.        Care EveryWhere ID     This is your Care EveryWhere ID. This could be used by other organizations to access your [place] medical records  [id number]         Blood Pressure from Last 3 Encounters:   11/02/17 (!) 166/99   10/04/17 126/80   07/14/17 124/78    Weight from Last 3 Encounters:   10/04/17 86.8 kg (191 lb 6.4 oz)    07/14/17 88 kg (194 lb)   04/17/17 89.1 kg (196 lb 8 oz)              Today, you had the following     No orders found for display       Primary Care Provider Office Phone # Fax #    Livier Triplett MD PhD [phone]       [address]  [facility] [address]        Equal Access to Services     [facility]: Hadii aad ku hadasho Soomaali, waaxda luqadaha, qaybta kaalmada adeegyada, waxay idiin hayaan adeeg khartemsh laMarjanaan . So [facility] [phone].    ATENCIÓN: Si habla español, tiene a hickman disposición servicios gratuitos de asistencia lingüística. Llame al [phone].    We comply with applicable federal civil rights laws and [place] laws. We do not discriminate on the basis of race, color, national origin, age, disability, sex, sexual orientation, or gender identity.            Thank you!     Thank you for choosing [facility]  for your care. Our goal is always to provide you with excellent care. Hearing back from our patients is one way we can continue to improve our services. Please take a few minutes to complete the written survey that you may receive in the mail after your visit with us. Thank you!             Your Updated Medication List - Protect others around you: Learn how to safely use, store and throw away your medicines at www.disposemymeds.org.          This list is accurate as of 9/22/17 11:59 PM.  Always use your most recent med list.                   Brand Name Dispense Instructions for use Diagnosis    amoxicillin 500 MG capsule    AMOXIL    20 capsule    Take 4 capsules one hour prior to dental procedure    VSD (ventricular septal defect)       hydrocortisone 2.5 % cream    ANUSOL-HC    30 g    Place  rectally 2 times daily.    External hemorrhoids       IBUPROFEN PO      Take 200 mg by mouth Reported on 4/17/2017        lisinopril 10 MG tablet    PRINIVIL/ZESTRIL    180 tablet    Take 1 tablet (10 mg) by mouth 2 times daily    Hypertension goal BP (blood  pressure) < 140/90       order for DME     2 each    Equipment being ordered: compression stocking knee high 20-30 mmHg    Varicose veins of both lower extremities       terazosin 1 MG capsule    HYTRIN    70 capsule    Start with 1 tab (1 mg) daily for 1 week, 2 tabs (2 mg) daily for 1 week, 3 tabs (3 mg) daily for 1 week, then 4 tabs (4 mg) daily    Benign prostatic hyperplasia with lower urinary tract symptoms, unspecified morphology, Nocturia       TYLENOL PO      Take 625 mg by mouth as needed for mild pain or fever    Superficial thrombophlebitis

## 2017-09-22 NOTE — PROGRESS NOTES
[facility] Operative Report    Preoperative diagnosis  1.  Bilateral CEAP 3 chronic venous insufficiency  2.  Symptomatic bilateral great saphenous vein incompetence  3.  Bilateral lower extremity varicose veins    Postoperative diagnosis  Same    Procedure  1. VenaSeal right great saphenous vein  2. VenaSeal left great saphenous vein  3.  Multiple bilateral lower extremity stab phlebectomies (38 stabs left leg, 17 stabs right leg)    Surgeon  RYDER Muse M.D.    First Asst.  Kenia Melendez CST/CSFA    Operative description  Details of procedure including risks of bleeding, infection, nerve injury, scarring, hyperpigmentation, deep vein thrombosis, recanalization of the closed veins and recurrent varicose veins had been discussed.  The patient appeared to understand and wish proceed.  Informed consent was obtained.    I had  stand and marked varicosities coursing from his thighs to his ankles bilaterally.  We proceeded to the operating room, had him lie supine on the operating table and prepped and draped both groins and both lower extremities sterilely.    We took a timeout to confirm the appropriate operative site and procedure: VenaSeal bilateral great saphenous veins with multiple bilateral lower extremity phlebectomies.    With the operating table in reverse Trendelenburg position I interrogated the right leg with ultrasound and noted that the right great saphenous vein became tortuous in the distal thigh.  I therefore chose to access the vein just cephalad of the area of tortuosity area infiltrated the skin overlying the vein at this level with 1% lidocaine with bicarbonate, placed a micropuncture needle into the vein followed by a 5-Greenlandic sheath.  We then passed the 025 guidewire over which we passed a long sheath.  The tip of the sheath was positioned 5 cm from the saphenofemoral junction.    We then accessed the left great saphenous vein just below the knee after infiltrating the skin  overlying the vein with 1% lidocaine with bicarbonate.  We placed a micropuncture needle in the vein followed by a micropuncture guidewire, leaving the guidewire in place and covering it with a sterile towel.    The delivery catheter was passed through the sheath to the distal laser beau and the sheath withdrawn an additional 5 cm, then the delivery catheter was advanced 5 cm and locked with the sheath.  I confirmed that the tip of the catheter was 5 cm from the saphenofemoral junction under ultrasound guidance, applied compression with Probe 1.5 Cm Cephalad of the Tip of the Catheter and Then Gave One Aliquot of Glue, Withdrew the Catheter Was Centimeters, Gave Another Aliquot of Glue and Then Withdrew the Catheter 3 Cm.  We Held Pressure for Three Minutes.  I Then Found the Delivery Catheter Tip, Advanced the Ultrasound Probe 1/2 Cm Cephalad, Applied Compression and Then Delivered Another Aliquot of Glue, Withdrew the Catheter 3 Cm and Held Pressure for 30 Seconds.  This Sequence Was repeated until the vein had been treated.  The patient tolerated this well.  The common femoral vein was imaged and no thrombus was noted on completion.     The long guidewire and sheath were placed in the left great saphenous vein followed by delivery catheter with the tip of the delivery catheter position 5 cm from the saphenofemoral junction under Guidance.  We Then Treated the Left Great Saphenous Vein in Exactly the Same Sequence As We Did the Right.  The Patient Was Comfortable throughout.  We Reimaged the Left Common Femoral Vein on Completion of Procedure and Noted No Thrombus in the Vein.  The common femoral vein was pretty compressible.    We infiltrated the tissues surrounding each of the marked varicosities with tumescent anesthetic, allow the block to take effect, made stab wounds beside each of the marked varicosities with an ophthalmic blade and then retrieved the varicosities with vein hooks or washington hooks, clamped  them with mosquito clamps and remove them.  Hemostasis was secured with pressure.    After the phlebectomies had been removed the leg was cleaned with saline solution and petroleum jelly placed over the phlebectomy sites.  The legs were dressed with ABD pads, cast padding and an Ace bandage from the toes to the groin.    We moved the patient to recovery and observe him for 30 minutes.  He tolerated procedure well without complications.  We used a total of 335 mL of tumescent anesthetic to treat 38 left leg phlebectomies and 17 right leg phlebectomies.  A total of two mL of 1% lidocaine.    He was taken to his car in a wheelchair.

## 2017-09-25 ENCOUNTER — APPOINTMENT (OUTPATIENT)
Dept: VASCULAR SURGERY | Facility: CLINIC | Age: 57
End: 2017-09-25
Payer: COMMERCIAL

## 2017-09-25 PROCEDURE — 99207 ZZC VEINSOLUTIONS 48 HOUR: CPT | Performed by: SURGERY

## 2017-09-25 PROCEDURE — 93970 EXTREMITY STUDY: CPT | Performed by: SURGERY

## 2017-10-04 ENCOUNTER — RADIANT APPOINTMENT (OUTPATIENT)
Dept: GENERAL RADIOLOGY | Facility: CLINIC | Age: 57
End: 2017-10-04
Attending: NURSE PRACTITIONER
Payer: COMMERCIAL

## 2017-10-04 ENCOUNTER — OFFICE VISIT (OUTPATIENT)
Dept: FAMILY MEDICINE | Facility: CLINIC | Age: 57
End: 2017-10-04
Payer: COMMERCIAL

## 2017-10-04 VITALS
TEMPERATURE: 98.8 F | HEART RATE: 72 BPM | WEIGHT: 191.4 LBS | DIASTOLIC BLOOD PRESSURE: 80 MMHG | BODY MASS INDEX: 28.47 KG/M2 | RESPIRATION RATE: 12 BRPM | OXYGEN SATURATION: 98 % | SYSTOLIC BLOOD PRESSURE: 126 MMHG

## 2017-10-04 DIAGNOSIS — M79.675 PAIN OF TOE OF LEFT FOOT: Primary | ICD-10-CM

## 2017-10-04 PROCEDURE — 99213 OFFICE O/P EST LOW 20 MIN: CPT | Mod: 24 | Performed by: NURSE PRACTITIONER

## 2017-10-04 PROCEDURE — 73660 X-RAY EXAM OF TOE(S): CPT | Mod: LT

## 2017-10-04 RX ORDER — INDOMETHACIN 25 MG/1
25-50 CAPSULE ORAL 3 TIMES DAILY PRN
Qty: 42 CAPSULE | Refills: 1 | Status: SHIPPED | OUTPATIENT
Start: 2017-10-04 | End: 2018-01-26

## 2017-10-04 ASSESSMENT — PATIENT HEALTH QUESTIONNAIRE - PHQ9
5. POOR APPETITE OR OVEREATING: NOT AT ALL
SUM OF ALL RESPONSES TO PHQ QUESTIONS 1-9: 0

## 2017-10-04 ASSESSMENT — ANXIETY QUESTIONNAIRES
1. FEELING NERVOUS, ANXIOUS, OR ON EDGE: SEVERAL DAYS
7. FEELING AFRAID AS IF SOMETHING AWFUL MIGHT HAPPEN: NOT AT ALL
5. BEING SO RESTLESS THAT IT IS HARD TO SIT STILL: NOT AT ALL
6. BECOMING EASILY ANNOYED OR IRRITABLE: NOT AT ALL
2. NOT BEING ABLE TO STOP OR CONTROL WORRYING: SEVERAL DAYS
3. WORRYING TOO MUCH ABOUT DIFFERENT THINGS: NOT AT ALL
IF YOU CHECKED OFF ANY PROBLEMS ON THIS QUESTIONNAIRE, HOW DIFFICULT HAVE THESE PROBLEMS MADE IT FOR YOU TO DO YOUR WORK, TAKE CARE OF THINGS AT HOME, OR GET ALONG WITH OTHER PEOPLE: NOT DIFFICULT AT ALL
GAD7 TOTAL SCORE: 2

## 2017-10-04 NOTE — MR AVS SNAPSHOT
After Visit Summary   10/4/2017    Wally Cano    MRN: [id number]           Patient Information     Date Of Birth          1960        Visit Information        Provider Department      10/4/2017 8:40 AM Amairani Bass NP [facility]        Today's Diagnoses     Pain of toe of left foot    -  1       Follow-ups after your visit        Follow-up notes from your care team     Return if symptoms worsen or fail to improve.      Your next 10 appointments already scheduled     Nov 17, 2017  8:30 AM CST   Post Op with Jose Luis Muse MD   [facility] ([facility])    [address] [facility] [address] [phone]              Who to contact     If you have questions or need follow up information about today's clinic visit or your schedule please contact [facility] directly at [phone].  Normal or non-critical lab and imaging results will be communicated to you by MyChart, letter or phone within 4 business days after the clinic has received the results. If you do not hear from us within 7 days, please contact the clinic through MyChart or phone. If you have a critical or abnormal lab result, we will notify you by phone as soon as possible.  Submit refill requests through Modanisa or call your pharmacy and they will forward the refill request to us. Please allow 3 business days for your refill to be completed.          Additional Information About Your Visit        MyChart Information     Modanisa gives you secure access to your electronic health record. If you see a primary care provider, you can also send messages to your care team and make appointments. If you have questions, please call your primary care clinic.  If you do not have a primary care provider, please call [phone] and they will assist you.        Care EveryWhere ID     This is your Care EveryWhere ID. This could be used by other  organizations to access your [place] medical records  [id number]        Your Vitals Were     Pulse Temperature Respirations Pulse Oximetry BMI (Body Mass Index)       72 98.8  F (37.1  C) (Oral) 12 98% 28.47 kg/m2        Blood Pressure from Last 3 Encounters:   10/04/17 126/80   07/14/17 124/78   04/17/17 122/90    Weight from Last 3 Encounters:   10/04/17 86.8 kg (191 lb 6.4 oz)   07/14/17 88 kg (194 lb)   04/17/17 89.1 kg (196 lb 8 oz)              We Performed the Following     XR Toe Left G/E 2 Views          Today's Medication Changes          These changes are accurate as of: 10/4/17 10:59 AM.  If you have any questions, ask your nurse or doctor.               Start taking these medicines.        Dose/Directions    indomethacin 25 MG capsule   Commonly known as:  INDOCIN   Used for:  Pain of toe of left foot   Started by:  Amairani Bass, DAVID        Dose:  25-50 mg   Take 1-2 capsules (25-50 mg) by mouth 3 times daily as needed for moderate pain   Quantity:  42 capsule   Refills:  1            Where to get your medicines      These medications were sent to [place] [address] [place] [facility] [id number] [place] [address]  [id number] [place] [address]     Phone:  [phone]     indomethacin 25 MG capsule                Primary Care Provider Office Phone # Fax #    Livier Triplett MD PhD [phone]       [address]  [facility] [address]        Equal Access to Services     [address] [facility]: Hadii aad ku hadasho Soomaali, waaxda luqadaha, qaybta kaalmada adeegyada, waxerin lin bey. So [facility] [phone].    ATENCIÓN: Si habla español, tiene a hickman disposición servicios gratuitos de asistencia lingüística. Llame al [phone].    We comply with applicable federal civil rights laws and [place] laws. We do not discriminate on the basis of race, color, national origin, age, disability, sex, sexual orientation, or gender identity.            Thank you!     Thank you  for choosing [facility]  for your care. Our goal is always to provide you with excellent care. Hearing back from our patients is one way we can continue to improve our services. Please take a few minutes to complete the written survey that you may receive in the mail after your visit with us. Thank you!             Your Updated Medication List - Protect others around you: Learn how to safely use, store and throw away your medicines at www.disposemymeds.org.          This list is accurate as of: 10/4/17 10:59 AM.  Always use your most recent med list.                   Brand Name Dispense Instructions for use Diagnosis    amoxicillin 500 MG capsule    AMOXIL    20 capsule    Take 4 capsules one hour prior to dental procedure    VSD (ventricular septal defect)       hydrocortisone 2.5 % cream    ANUSOL-HC    30 g    Place  rectally 2 times daily.    External hemorrhoids       IBUPROFEN PO      Take 200 mg by mouth Reported on 4/17/2017        indomethacin 25 MG capsule    INDOCIN    42 capsule    Take 1-2 capsules (25-50 mg) by mouth 3 times daily as needed for moderate pain    Pain of toe of left foot       lisinopril 10 MG tablet    PRINIVIL/ZESTRIL    180 tablet    Take 1 tablet (10 mg) by mouth 2 times daily    Hypertension goal BP (blood pressure) < 140/90       order for DME     2 each    Equipment being ordered: compression stocking knee high 20-30 mmHg    Varicose veins of both lower extremities       terazosin 1 MG capsule    HYTRIN    70 capsule    Start with 1 tab (1 mg) daily for 1 week, 2 tabs (2 mg) daily for 1 week, 3 tabs (3 mg) daily for 1 week, then 4 tabs (4 mg) daily    Benign prostatic hyperplasia with lower urinary tract symptoms, unspecified morphology, Nocturia       TYLENOL PO      Take 625 mg by mouth as needed for mild pain or fever    Superficial thrombophlebitis

## 2017-10-04 NOTE — PROGRESS NOTES
SUBJECTIVE:   Wally Cano is a 56 year old male who presents to clinic today for the following health issues:      Joint Pain    Onset: 4-5 days ago    Description:   Location: LT foot Great Toe  Character: Sharp    Intensity: moderate    Progression of Symptoms: worse    Accompanying Signs & Symptoms:  Other symptoms: none    History:   Previous similar pain: YES- similar about a month ago but it was not as bad      Precipitating factors:   Trauma or overuse: no     Alleviating factors:  Improved by: not wearing shoes    Therapies Tried and outcome: new shoes, ibuprofen but it is not doing too much    Walks 15-20,000 steps a day. Bought new shoes 1 month ago. Has no pain when barefoot, more pain with shoes. Got jerked forward by dog when walking him few days ago, denies rolling ankle/foot. Also has arthritic/build up on right thumb and left pointer finger. Possible pain is from arthritic changes or build up of calcifications.           Problem list and histories reviewed & adjusted, as indicated.  Additional history: as documented    Patient Active Problem List   Diagnosis     Hyperlipidemia LDL goal <130     DDD (degenerative disc disease), lumbar     VSD (ventricular septal defect)     * * * SBE PROPHYLAXIS * * *     External hemorrhoids     Varicose veins     Hypertension goal BP (blood pressure) < 140/90     Advanced directives, counseling/discussion     NSAID long-term use     Overweight (BMI 25.0-29.9)     S/P infectious endocarditis     Superficial thrombophlebitis     Past Surgical History:   Procedure Laterality Date     ARTHROSCOPY KNEE RT/LT  1979    removal of bone chips LT knee      EYE SURGERY  2016    Bilateral Cataract removal     HEMORRHOIDECTOMY  1/10     HERNIA REPAIR, INGUINAL RT/LT      Left     IMPLANT SHUNT VENTRICULOATRIAL       MOUTH SURGERY      wisdom teeth     TONSILLECTOMY  1997       Social History   Substance Use Topics     Smoking status: Former Smoker     Years: 10.00      Types: Cigarettes, Cigars     Quit date: 10/28/2006     Smokeless tobacco: Never Used     Alcohol use No      Comment: rare     Family History   Problem Relation Age of Onset     HEART DISEASE Father 50     MI     DIABETES Father      CEREBROVASCULAR DISEASE Maternal Grandmother 70     CEREBROVASCULAR DISEASE Maternal Grandfather 80     CEREBROVASCULAR DISEASE Paternal Grandmother 80     HEART DISEASE Paternal Grandfather 62     MI     Hypertension Brother      Obesity Sister      Circulatory Mother      varicose veins     Prostate Cancer Maternal Uncle      1/2 brother to mother     Cancer - colorectal No family hx of          Current Outpatient Prescriptions   Medication Sig Dispense Refill     indomethacin (INDOCIN) 25 MG capsule Take 1-2 capsules (25-50 mg) by mouth 3 times daily as needed for moderate pain 42 capsule 1     amoxicillin (AMOXIL) 500 MG capsule Take 4 capsules one hour prior to dental procedure 20 capsule 2     lisinopril (PRINIVIL/ZESTRIL) 10 MG tablet Take 1 tablet (10 mg) by mouth 2 times daily 180 tablet 3     IBUPROFEN PO Take 200 mg by mouth Reported on 4/17/2017       terazosin (HYTRIN) 1 MG capsule Start with 1 tab (1 mg) daily for 1 week, 2 tabs (2 mg) daily for 1 week, 3 tabs (3 mg) daily for 1 week, then 4 tabs (4 mg) daily 70 capsule 0     order for DME Equipment being ordered: compression stocking knee high 20-30 mmHg 2 each 2     Acetaminophen (TYLENOL PO) Take 625 mg by mouth as needed for mild pain or fever       hydrocortisone (ANUSOL-HC) 2.5 % rectal cream Place  rectally 2 times daily. 30 g 4     Allergies   Allergen Reactions     Ivp Dye [Contrast Dye]      Hypotensive         Reviewed and updated as needed this visit by clinical staffTobacco  Allergies  Meds  Problems       Reviewed and updated as needed this visit by Provider  Allergies  Meds  Problems         ROS:  Constitutional,MS  systems are negative, except as otherwise noted.      OBJECTIVE:   /80 (BP  Location: Right arm, Patient Position: Sitting, Cuff Size: Adult Regular)  Pulse 72  Temp 98.8  F (37.1  C) (Oral)  Resp 12  Wt 86.8 kg (191 lb 6.4 oz)  SpO2 98%  BMI 28.47 kg/m2  Body mass index is 28.47 kg/(m^2).  GENERAL: healthy, alert and no distress  MS: left great toe with slight bump, slightly tender to touch but no erythema or swelling. Pain with ambulation with shoes, minimal pain with ambulation without shoes      Diagnostic Test Results:  No results found for this or any previous visit (from the past 24 hour(s)).    ASSESSMENT/PLAN:           1. Pain of toe of left foot  Could be arthritic changes/calcifications. Trial stronger NSAID. Do not use ibuprofen while on this, take with food. Not recommended for long term use.   - XR Toe Left G/E 2 Views  - indomethacin (INDOCIN) 25 MG capsule; Take 1-2 capsules (25-50 mg) by mouth 3 times daily as needed for moderate pain  Dispense: 42 capsule; Refill: 1    FUTURE APPOINTMENTS:       - Follow-up visit prn not improving    SEJAL Carter, NP-C  [facility]

## 2017-10-05 ASSESSMENT — ANXIETY QUESTIONNAIRES: GAD7 TOTAL SCORE: 2

## 2017-11-02 ENCOUNTER — OFFICE VISIT (OUTPATIENT)
Dept: ORTHOPEDICS | Facility: CLINIC | Age: 57
End: 2017-11-02
Payer: COMMERCIAL

## 2017-11-02 VITALS — HEART RATE: 61 BPM | SYSTOLIC BLOOD PRESSURE: 166 MMHG | DIASTOLIC BLOOD PRESSURE: 99 MMHG | OXYGEN SATURATION: 99 %

## 2017-11-02 DIAGNOSIS — M25.562 CHRONIC PAIN OF LEFT KNEE: Primary | ICD-10-CM

## 2017-11-02 DIAGNOSIS — G89.29 CHRONIC PAIN OF LEFT KNEE: Primary | ICD-10-CM

## 2017-11-02 PROCEDURE — 20610 DRAIN/INJ JOINT/BURSA W/O US: CPT | Mod: LT | Performed by: ORTHOPAEDIC SURGERY

## 2017-11-02 ASSESSMENT — PAIN SCALES - GENERAL: PAINLEVEL: MILD PAIN (3)

## 2017-11-02 NOTE — PROGRESS NOTES
Patient seen and examined with the resident.     Assesment: Predominantly PF OA    Plan: steroid injection    After written informed consent obtained and signed, after sufficient prepping and sterile technique, 40 mg of kenalog and , 8 cc of 1% lidocaine were injected without complication into the Left knee. The patient tolerated the injection well and a sterile dressing was applied.      I agree with history, physical and imaging as well as the assessment and plan as detailed by Dr. Earl.

## 2017-11-02 NOTE — MR AVS SNAPSHOT
After Visit Summary   11/2/2017    Wally Cano    MRN: [id number]           Patient Information     Date Of Birth          1960        Visit Information        Provider Department      11/2/2017 8:45 AM Lucian Griffin MD [facility]        Today's Diagnoses     Chronic pain of left knee    -  1      Care Instructions    Thanks for coming today.  Ortho/Sports Medicine Clinic  [address] [place] [address]    To schedule future appointments in Ortho Clinic, you may call [phone].    To schedule ordered imaging by your Provider: Call [facility] at [phone]    SubtleData available online at:   [email or web address]    Please call if any further questions or concerns [phone] and ask for the Orthopedic Department. Clinic hours 8 am to 5 pm.    Return to clinic if symptoms worsen.            Follow-ups after your visit        Your next 10 appointments already scheduled     Nov 17, 2017  8:30 AM CST   Post Op with Jose Luis Muse MD   [facility] ([facility])    [address] [facility] [address] [phone]              Who to contact     If you have questions or need follow up information about today's clinic visit or your schedule please contact [facility] directly at [phone].  Normal or non-critical lab and imaging results will be communicated to you by MyChart, letter or phone within 4 business days after the clinic has received the results. If you do not hear from us within 7 days, please contact the clinic through MyChart or phone. If you have a critical or abnormal lab result, we will notify you by phone as soon as possible.  Submit refill requests through SubtleData or call your pharmacy and they will forward the refill request to us. Please allow 3 business days for your refill to be completed.          Additional Information About Your  Visit        [email or web address] Information     Red Aril gives you secure access to your electronic health record. If you see a primary care provider, you can also send messages to your care team and make appointments. If you have questions, please call your primary care clinic.  If you do not have a primary care provider, please call [phone] and they will assist you.      Red Aril is an electronic gateway that provides easy, online access to your medical records. With Red Aril, you can request a clinic appointment, read your test results, renew a prescription or communicate with your care team.     To access your existing account, please contact your [facility] or call [phone] for assistance.        Care EveryWhere ID     This is your Care EveryWhere ID. This could be used by other organizations to access your [place] medical records  [id number]        Your Vitals Were     Pulse Pulse Oximetry                61 99%           Blood Pressure from Last 3 Encounters:   11/02/17 (!) 166/99   10/04/17 126/80   07/14/17 124/78    Weight from Last 3 Encounters:   10/04/17 86.8 kg (191 lb 6.4 oz)   07/14/17 88 kg (194 lb)   04/17/17 89.1 kg (196 lb 8 oz)              We Performed the Following     DRAIN/INJECT LARGE JOINT/BURSA     KENALOG PER 10 MG        Primary Care Provider Office Phone # Fax #    Livier Triplett MD PhD [phone]       [address]  [place] [id number]        Equal Access to Services     [facility]: Hadii aad ku felibertoo Somelissa, waaxda luqadaha, qaybta kaalmada gorgeyada, joceline robert . So [facility] [phone].    ATENCIÓN: Si habla español, tiene a hickman disposición servicios gratuitos de asistencia lingüística. Llame al [phone].    We comply with applicable federal civil rights laws and [place] laws. We do not discriminate on the basis of race, color, national origin, age, disability, sex, sexual orientation, or gender  identity.            Thank you!     Thank you for choosing [facility]  for your care. Our goal is always to provide you with excellent care. Hearing back from our patients is one way we can continue to improve our services. Please take a few minutes to complete the written survey that you may receive in the mail after your visit with us. Thank you!             Your Updated Medication List - Protect others around you: Learn how to safely use, store and throw away your medicines at www.disposemymeds.org.          This list is accurate as of: 11/2/17 11:59 PM.  Always use your most recent med list.                   Brand Name Dispense Instructions for use Diagnosis    amoxicillin 500 MG capsule    AMOXIL    20 capsule    Take 4 capsules one hour prior to dental procedure    VSD (ventricular septal defect)       hydrocortisone 2.5 % cream    ANUSOL-HC    30 g    Place  rectally 2 times daily.    External hemorrhoids       IBUPROFEN PO      Take 200 mg by mouth Reported on 4/17/2017        indomethacin 25 MG capsule    INDOCIN    42 capsule    Take 1-2 capsules (25-50 mg) by mouth 3 times daily as needed for moderate pain    Pain of toe of left foot       lisinopril 10 MG tablet    PRINIVIL/ZESTRIL    180 tablet    Take 1 tablet (10 mg) by mouth 2 times daily    Hypertension goal BP (blood pressure) < 140/90       order for DME     2 each    Equipment being ordered: compression stocking knee high 20-30 mmHg    Varicose veins of both lower extremities       terazosin 1 MG capsule    HYTRIN    70 capsule    Start with 1 tab (1 mg) daily for 1 week, 2 tabs (2 mg) daily for 1 week, 3 tabs (3 mg) daily for 1 week, then 4 tabs (4 mg) daily    Benign prostatic hyperplasia with lower urinary tract symptoms, unspecified morphology, Nocturia       TYLENOL PO      Take 625 mg by mouth as needed for mild pain or fever    Superficial thrombophlebitis

## 2017-11-02 NOTE — PROGRESS NOTES
HISTORY OF PRESENT ILLNESS:  Mr. Cano is a pleasant 57-year-old male who presents today for evaluation of chronic left knee pain.  He has a known tricompartmental osteoarthritis.  He had a steroid injection in 10/2016 and had about 3-4 months of relief from this.  This is less than his first set of injections.  Overall, he has been getting along fairly well.  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  He states that he does have some crepitus in his knee.  He has pain that is worse with activity.  He has more medial than lateral pain.      PHYSICAL EXAMINATION:  Pleasant, age-appropriate male in no acute distress.  Range of motion from 0-135 degrees.      IMAGING:  None new obtained.  Previous x-rays reviewed which shows tricompartmental osteoarthritis with medial joint space narrowing.  The majority of his disease is in the patellofemoral compartment where he has bone-on-bone wear.      IMPRESSION:  55-year-old man with left knee tricompartmental osteoarthritis with good response to steroid injections.      PLAN:  At this time, we will provide another steroid injection.  As long as he is getting utility from these, we can certainly continue with this mode of treatment.  It is likely that his next surgical procedure and certainly the most definitive procedure would be a total knee replacement.  He is certainly not ready for this from a pain or function standpoint at this time.  He will follow up with Dr. Griffin on an as-needed basis.      PROCEDURE:  Risks, benefits and alternatives of a steroid injection were discussed with the patient who elected to proceed.  Written consent was obtained.  40 mg of Kenalog and 8 cc of 1% lidocaine were injected into the knee under sterile technique.  This was well tolerated without complication.      The patient was seen and examined with Dr. Griffin  who agrees with the assessment and plan.

## 2017-11-02 NOTE — PATIENT INSTRUCTIONS
Thanks for coming today.  [facility]  [address] [place] [address]    To schedule future appointments in Ortho Clinic, you may call [phone].    To schedule ordered imaging by your Provider: Call [facility] at [phone]    Fridge available online at:   [email or web address]    Please call if any further questions or concerns [phone] and ask for the Orthopedic Department. Clinic hours 8 am to 5 pm.    Return to clinic if symptoms worsen.

## 2017-11-02 NOTE — NURSING NOTE
"Wally Cano's goals for this visit include: Left knee cortisone injection- Going to softball tournament in [place] and needs it prior. Pain worse on stairs than flat surfaces.  He requests these members of his care team be copied on today's visit information: yes    PCP: Livier Triplett    Referring Provider:  No referring provider defined for this encounter.    Chief Complaint   Patient presents with     RECHECK     Knee pain follow up-possible injection       Initial BP (!) 166/99  Pulse 61  SpO2 99% Estimated body mass index is 28.47 kg/(m^2) as calculated from the following:    Height as of 7/14/17: 1.746 m (5' 8.75\").    Weight as of 10/4/17: 86.8 kg (191 lb 6.4 oz).  Medication Reconciliation: complete    "

## 2017-11-27 ENCOUNTER — APPOINTMENT (OUTPATIENT)
Dept: VASCULAR SURGERY | Facility: CLINIC | Age: 57
End: 2017-11-27

## 2017-11-27 PROCEDURE — 99207 ZZC VEINSOLUTIONS POST OPERATIVE VISIT: CPT | Performed by: SURGERY

## 2017-12-22 ENCOUNTER — TRANSFERRED RECORDS (OUTPATIENT)
Dept: HEALTH INFORMATION MANAGEMENT | Facility: CLINIC | Age: 57
End: 2017-12-22

## 2017-12-24 ENCOUNTER — TRANSFERRED RECORDS (OUTPATIENT)
Dept: HEALTH INFORMATION MANAGEMENT | Facility: CLINIC | Age: 57
End: 2017-12-24

## 2017-12-29 ENCOUNTER — CARE COORDINATION (OUTPATIENT)
Dept: CARDIOLOGY | Facility: CLINIC | Age: 57
End: 2017-12-29

## 2017-12-29 DIAGNOSIS — Q21.0 MEMBRANOUS VENTRICULAR SEPTAL DEFECT: Primary | ICD-10-CM

## 2018-01-11 ENCOUNTER — TELEPHONE (OUTPATIENT)
Dept: CARDIOLOGY | Facility: CLINIC | Age: 58
End: 2018-01-11

## 2018-01-26 DIAGNOSIS — M79.675 PAIN OF TOE OF LEFT FOOT: ICD-10-CM

## 2018-01-26 NOTE — TELEPHONE ENCOUNTER
"Requested Prescriptions   Pending Prescriptions Disp Refills     indomethacin (INDOCIN) 25 MG capsule  Last Written Prescription Date:  10/4/17  Last Fill Quantity: 42 capsule,  # refills: 1   Last Office Visit with G provider:  10/04/17   Future Office Visit:    42 capsule 1     Sig: Take 1-2 capsules (25-50 mg) by mouth 3 times daily as needed for moderate pain    Gout Agents Protocol Failed    1/26/2018  8:47 AM       Failed - CBC on file in past 12 months    Recent Labs   Lab Test  07/17/14   1628   WBC  7.4   RBC  4.72   HGB  14.6   HCT  42.2   PLT  210            Failed - ALT on file in past 12 months    Recent Labs   Lab Test  07/17/14   1628   ALT  36            Failed - Uric acid greater than or equal to 6 on file in past 12 months    No lab results found.  If level is 6mg/dL or greater, ok to refill one time and refer to provider.          Passed - Recent or future visit with authorizing provider's specialty    Patient had office visit in the last year or has a visit in the next 30 days with authorizing provider.  See \"Patient Info\" tab in inbasket, or \"Choose Columns\" in Meds & Orders section of the refill encounter.            Passed - Patient is age 18 or older       Passed - Normal serum creatinine on file in the past 12 months    Recent Labs   Lab Test  07/14/17   1046   CR  0.94               "

## 2018-01-31 RX ORDER — INDOMETHACIN 25 MG/1
25-50 CAPSULE ORAL 3 TIMES DAILY PRN
Qty: 42 CAPSULE | Refills: 0 | Status: SHIPPED | OUTPATIENT
Start: 2018-01-31 | End: 2019-10-30

## 2018-03-07 NOTE — MR AVS SNAPSHOT
Wally Pritchardsimon   5/26/2017 8:00 AM   Anticoagulation Therapy Visit    Description:  56 year old male   Provider:  INR MG   Department:  [facility] Monitoring           INR as of 5/26/2017     Today's INR 3.0      Anticoagulation Summary as of 5/26/2017     INR goal 2.0-3.0    Today's INR 3.0    Full instructions 5/31: 2.5 mg; Otherwise 2.5 mg on Wed; 5 mg all other days    Next INR check 6/2/2017      Your next Anticoagulation Clinic appointment(s)     Jun 02, 2017  8:40 AM CDT   [facility]    [address] [place] [id number] [phone]              Contact Numbers     [facility]  Please call the anticoagulation nurse at [phone] to cancel and/or reschedule your appointment, or with any problems or questions regarding your therapy.  You may call the main clinic number at [phone] if the nurse is not available.           May 2017 Details    Sun Mon Tue Wed Thu Fri Sat      1               2               3               4               5               6                 7               8               9               10               11               12               13                 14               15               16               17               18               19               20                 21               22               23               24               25               26      5 mg   See details      27      5 mg           28      5 mg         29      5 mg         30      5 mg         31      2.5 mg             Date Details   05/26 This INR check               How to take your warfarin dose     To take:  2.5 mg Take 0.5 of a 5 mg tablet.    To take:  5 mg Take 1 of the 5 mg tablets.           June 2017 Details    Sun Mon Tue Wed Thu Fri Sat         1      5 mg         2            3                 4               5               6               7               8              Pt presents today for height, weight and vision check. Pt states that he is doing well with no current complaints or concerns.        9               10                 11               12               13               14               15               16               17                 18               19               20               21               22               23               24                 25               26               27               28               29               30                 Date Details   No additional details    Date of next INR:  6/2/2017         How to take your warfarin dose     To take:  5 mg Take 1 of the 5 mg tablets.

## 2018-07-31 ENCOUNTER — MYC REFILL (OUTPATIENT)
Dept: PEDIATRICS | Facility: CLINIC | Age: 58
End: 2018-07-31

## 2018-07-31 DIAGNOSIS — Q21.0 VSD (VENTRICULAR SEPTAL DEFECT): ICD-10-CM

## 2018-07-31 RX ORDER — AMOXICILLIN 500 MG/1
CAPSULE ORAL
Qty: 20 CAPSULE | Refills: 0 | Status: SHIPPED | OUTPATIENT
Start: 2018-07-31 | End: 2020-01-30

## 2018-07-31 NOTE — TELEPHONE ENCOUNTER
Message from [place]:  Original authorizing provider: Livier Triplett MD PhD    Wally Cano would like a refill of the following medications:  amoxicillin (AMOXIL) 500 MG capsule [Livier Triplett MD PhD]    Preferred pharmacy: [facility] [address] [place] [address]    Comment:  Please send refill to [facility] in [place]. This is needed for dental work scheduled for Friday morning.

## 2018-07-31 NOTE — TELEPHONE ENCOUNTER
amoxicillin (AMOXIL) 500 MG capsule 20 capsule 2 8/24/2017  No   Sig: Take 4 capsules one hour prior to dental procedure     Last OV with Dr. Triplett: 7/14/2017    Next 5 appointments (look out 90 days)     Aug 24, 2018 10:50 AM CDT   PHYSICAL with Livier Triplett MD PhD   [facility])    [address] [place] [id number]   [phone]            Oct 25, 2018  8:00 AM CDT   Return Visit with Lucian Griffin MD   [facility])    [address] [place] [id number] [phone]                Oral Acne/Rosacea Medications Protocol Failed7/31 8:40 AM   Confirmation of diagnosis is required    Patient is 12 years of age or older    Recent (12 mo) or future (30 days) visit within the authorizing provider's specialty     Associated Diagnoses   VSD (ventricular septal defect) [Q21.0        Refilled per [facility] protocol.    Angelique Muro RN

## 2018-08-13 DIAGNOSIS — I10 HYPERTENSION GOAL BP (BLOOD PRESSURE) < 140/90: ICD-10-CM

## 2018-08-13 RX ORDER — LISINOPRIL 10 MG/1
TABLET ORAL
Qty: 180 TABLET | Refills: 0 | Status: SHIPPED | OUTPATIENT
Start: 2018-08-13 | End: 2018-11-15 | Stop reason: DRUGHIGH

## 2018-08-13 NOTE — TELEPHONE ENCOUNTER
lisinopril (PRINIVIL/ZESTRIL) 10 MG tablet 180 tablet 3 7/14/2017  No   Sig - Route: Take 1 tablet (10 mg) by mouth 2 times daily - Oral     Last OV with Dr. Triplett: 7/14/2017    Next 5 appointments (look out 90 days)     Aug 24, 2018 10:50 AM CDT   [facility] with [facility] MD [facility]    [address] [facility] [address]   [phone]            Oct 25, 2018  8:00 AM CDT   [facility] Visit with Lucian [facility] MD   [facility]    [address] [facility] [address]   [phone]                BP Readings from Last 3 Encounters:   11/02/17 (!) 166/99   10/04/17 126/80   07/14/17 124/78     Creatinine   Date Value Ref Range Status   07/14/2017 0.94 0.66 - 1.25 mg/dL Final     Potassium   Date Value Ref Range Status   07/14/2017 4.3 3.4 - 5.3 mmol/L Final       ACE Inhibitors (Including Combos) Protocol Failed8/13 1:53 AM   Blood pressure under 140/90 in past 12 months    Normal serum creatinine on file in past 12 months    Normal serum potassium on file in past 12 months    Recent (12 mo) or future (30 days) visit within the authorizing provider's specialty    Patient is age 18 or older     Refilled per [facility] protocol.    Angelique Muro RN

## 2018-10-03 ENCOUNTER — OFFICE VISIT (OUTPATIENT)
Dept: PEDIATRICS | Facility: CLINIC | Age: 58
End: 2018-10-03
Payer: COMMERCIAL

## 2018-10-03 VITALS
TEMPERATURE: 96.5 F | DIASTOLIC BLOOD PRESSURE: 97 MMHG | SYSTOLIC BLOOD PRESSURE: 149 MMHG | WEIGHT: 194 LBS | HEART RATE: 60 BPM | HEIGHT: 68 IN | OXYGEN SATURATION: 100 % | BODY MASS INDEX: 29.4 KG/M2

## 2018-10-03 DIAGNOSIS — M25.572 ARTHRALGIA OF LEFT FOOT: ICD-10-CM

## 2018-10-03 DIAGNOSIS — E78.5 HYPERLIPIDEMIA LDL GOAL <130: ICD-10-CM

## 2018-10-03 DIAGNOSIS — Z12.5 SCREENING FOR PROSTATE CANCER: ICD-10-CM

## 2018-10-03 DIAGNOSIS — Z00.00 ROUTINE HISTORY AND PHYSICAL EXAMINATION OF ADULT: Primary | ICD-10-CM

## 2018-10-03 DIAGNOSIS — Q21.0 MEMBRANOUS VENTRICULAR SEPTAL DEFECT: ICD-10-CM

## 2018-10-03 DIAGNOSIS — R35.1 NOCTURIA: ICD-10-CM

## 2018-10-03 DIAGNOSIS — I10 HYPERTENSION GOAL BP (BLOOD PRESSURE) < 140/90: ICD-10-CM

## 2018-10-03 LAB
ALBUMIN SERPL-MCNC: 3.9 G/DL (ref 3.4–5)
ALP SERPL-CCNC: 61 U/L (ref 40–150)
ALT SERPL W P-5'-P-CCNC: 31 U/L (ref 0–70)
ANION GAP SERPL CALCULATED.3IONS-SCNC: 5 MMOL/L (ref 3–14)
AST SERPL W P-5'-P-CCNC: 32 U/L (ref 0–45)
BASOPHILS # BLD AUTO: 0 10E9/L (ref 0–0.2)
BASOPHILS NFR BLD AUTO: 0.4 %
BILIRUB SERPL-MCNC: 1.5 MG/DL (ref 0.2–1.3)
BUN SERPL-MCNC: 19 MG/DL (ref 7–30)
CALCIUM SERPL-MCNC: 8.9 MG/DL (ref 8.5–10.1)
CHLORIDE SERPL-SCNC: 108 MMOL/L (ref 94–109)
CHOLEST SERPL-MCNC: 199 MG/DL
CO2 SERPL-SCNC: 28 MMOL/L (ref 20–32)
CREAT SERPL-MCNC: 1.07 MG/DL (ref 0.66–1.25)
CREAT UR-MCNC: 22 MG/DL
DIFFERENTIAL METHOD BLD: NORMAL
EOSINOPHIL # BLD AUTO: 0.4 10E9/L (ref 0–0.7)
EOSINOPHIL NFR BLD AUTO: 5.5 %
ERYTHROCYTE [DISTWIDTH] IN BLOOD BY AUTOMATED COUNT: 12.6 % (ref 10–15)
GFR SERPL CREATININE-BSD FRML MDRD: 71 ML/MIN/1.7M2
GLUCOSE SERPL-MCNC: 106 MG/DL (ref 70–99)
HCT VFR BLD AUTO: 44.8 % (ref 40–53)
HDLC SERPL-MCNC: 48 MG/DL
HGB BLD-MCNC: 14.9 G/DL (ref 13.3–17.7)
IMM GRANULOCYTES # BLD: 0 10E9/L (ref 0–0.4)
IMM GRANULOCYTES NFR BLD: 0.3 %
LDLC SERPL CALC-MCNC: 118 MG/DL
LYMPHOCYTES # BLD AUTO: 1.2 10E9/L (ref 0.8–5.3)
LYMPHOCYTES NFR BLD AUTO: 17.7 %
MCH RBC QN AUTO: 30.4 PG (ref 26.5–33)
MCHC RBC AUTO-ENTMCNC: 33.3 G/DL (ref 31.5–36.5)
MCV RBC AUTO: 91 FL (ref 78–100)
MICROALBUMIN UR-MCNC: <5 MG/L
MICROALBUMIN/CREAT UR: NORMAL MG/G CR (ref 0–17)
MONOCYTES # BLD AUTO: 0.5 10E9/L (ref 0–1.3)
MONOCYTES NFR BLD AUTO: 6.8 %
NEUTROPHILS # BLD AUTO: 4.8 10E9/L (ref 1.6–8.3)
NEUTROPHILS NFR BLD AUTO: 69.3 %
NONHDLC SERPL-MCNC: 151 MG/DL
PLATELET # BLD AUTO: 220 10E9/L (ref 150–450)
POTASSIUM SERPL-SCNC: 4.6 MMOL/L (ref 3.4–5.3)
PROT SERPL-MCNC: 7.1 G/DL (ref 6.8–8.8)
PSA SERPL-ACNC: 2.2 UG/L (ref 0–4)
RBC # BLD AUTO: 4.9 10E12/L (ref 4.4–5.9)
SODIUM SERPL-SCNC: 141 MMOL/L (ref 133–144)
TRIGL SERPL-MCNC: 163 MG/DL
TSH SERPL DL<=0.005 MIU/L-ACNC: 2.22 MU/L (ref 0.4–4)
URATE SERPL-MCNC: 5.1 MG/DL (ref 3.5–7.2)
WBC # BLD AUTO: 6.9 10E9/L (ref 4–11)

## 2018-10-03 PROCEDURE — 85025 COMPLETE CBC W/AUTO DIFF WBC: CPT | Performed by: INTERNAL MEDICINE

## 2018-10-03 PROCEDURE — 82043 UR ALBUMIN QUANTITATIVE: CPT | Performed by: INTERNAL MEDICINE

## 2018-10-03 PROCEDURE — 84550 ASSAY OF BLOOD/URIC ACID: CPT | Performed by: INTERNAL MEDICINE

## 2018-10-03 PROCEDURE — 36415 COLL VENOUS BLD VENIPUNCTURE: CPT | Performed by: INTERNAL MEDICINE

## 2018-10-03 PROCEDURE — 84443 ASSAY THYROID STIM HORMONE: CPT | Performed by: INTERNAL MEDICINE

## 2018-10-03 PROCEDURE — 99396 PREV VISIT EST AGE 40-64: CPT | Performed by: INTERNAL MEDICINE

## 2018-10-03 PROCEDURE — G0103 PSA SCREENING: HCPCS | Performed by: INTERNAL MEDICINE

## 2018-10-03 PROCEDURE — 80053 COMPREHEN METABOLIC PANEL: CPT | Performed by: INTERNAL MEDICINE

## 2018-10-03 PROCEDURE — 80061 LIPID PANEL: CPT | Performed by: INTERNAL MEDICINE

## 2018-10-03 RX ORDER — TERAZOSIN 1 MG/1
CAPSULE ORAL
Qty: 60 CAPSULE | Refills: 0 | Status: SHIPPED | OUTPATIENT
Start: 2018-10-03 | End: 2018-10-25

## 2018-10-03 NOTE — MR AVS SNAPSHOT
After Visit Summary   10/3/2018    Wally Cano    MRN: [id number]           Patient Information     Date Of Birth          1960        Visit Information        Provider Department      10/3/2018 8:30 AM Livier Triplett MD PhD [facility]        Today's Diagnoses     Routine history and physical examination of adult    -  1    Arthralgia of left foot        Nocturia        Hyperlipidemia LDL goal <130        Screening for prostate cancer        Hypertension goal BP (blood pressure) < 140/90          Care Instructions    Make appointment(s) for:   -- BP recheck at cardiologist's office.         Medication(s) prescribed today:    Orders Placed This Encounter   Medications     terazosin (HYTRIN) 1 MG capsule     Sig: Start with 1 tab (1 mg) daily for 1 week, 2 tabs (2 mg) daily     Dispense:  60 capsule     Refill:  0           Preventive Health Recommendations  Male Ages 50 - 64    Yearly exam:             See your health care provider every year in order to  o   Review health changes.   o   Discuss preventive care.    o   Review your medicines if your doctor has prescribed any.     Have a cholesterol test every 5 years, or more frequently if you are at risk for high cholesterol/heart disease.     Have a diabetes test (fasting glucose) every three years. If you are at risk for diabetes, you should have this test more often.     Have a colonoscopy at age 50, or have a yearly FIT test (stool test). These exams will check for colon cancer.      Talk with your health care provider about whether or not a prostate cancer screening test (PSA) is right for you.    You should be tested each year for STDs (sexually transmitted diseases), if you re at risk.     Shots: Get a flu shot each year. Get a tetanus shot every 10 years.     Nutrition:    Eat at least 5 servings of fruits and vegetables daily.     Eat whole-grain bread, whole-wheat pasta and brown rice instead of white grains and  rice.     Get adequate Calcium and Vitamin D.     Lifestyle    Exercise for at least 150 minutes a week (30 minutes a day, 5 days a week). This will help you control your weight and prevent disease.     Limit alcohol to one drink per day.     No smoking.     Wear sunscreen to prevent skin cancer.     See your dentist every six months for an exam and cleaning.     See your eye doctor every 1 to 2 years.            Follow-ups after your visit        Your next 10 appointments already scheduled     Oct 09, 2018  8:00 AM CDT   Ech Congenital Adult with UCECHCR2   [facility]    [address]  3rd Floor  [facility] [id number] [phone]           1.  Please bring or wear a comfortable two-piece outfit. 2.  You may eat, drink and take your normal medicines. 3.  For any questions that cannot be answered, please contact the ordering physician 4.  Please do not wear perfumes or scented lotions on the day of your exam.            Oct 09, 2018  9:00 AM CDT   (Arrive by 8:45 AM)   RETURN CONGENITAL HEART with Karen Lemus MD   [facility] ([facility])    [address]  [address] [place] [id number] [phone]            Oct 25, 2018  8:00 AM CDT   Return Visit with Lucian Griffin MD   [facility]    [address] [place] [address] [phone]              Who to contact     If you have questions or need follow up information about today's clinic visit or your schedule please contact [facility] directly at [phone].  Normal or non-critical lab and imaging results will be communicated to you by MyChart, letter or phone within 4 business days after the clinic has received the results. If you do not hear from us within 7 days, please contact the clinic through MyChart or phone. If you have a critical or abnormal lab  "result, we will notify you by phone as soon as possible.  Submit refill requests through [employer] or call your pharmacy and they will forward the refill request to us. Please allow 3 business days for your refill to be completed.          Additional Information About Your Visit        [employer] Information     [employer] gives you secure access to your electronic health record. If you see a primary care provider, you can also send messages to your care team and make appointments. If you have questions, please call your primary care clinic.  If you do not have a primary care provider, please call [phone] and they will assist you.      [employer] is an electronic gateway that provides easy, online access to your medical records. With [employer], you can request a clinic appointment, read your test results, renew a prescription or communicate with your care team.     To access your existing account, please contact your [facility] or call [phone] for assistance.        Care EveryWhere ID     This is your Care EveryWhere ID. This could be used by other organizations to access your [facility] medical records  [id number]        Your Vitals Were     Pulse Temperature Height Pulse Oximetry BMI (Body Mass Index)       60 96.5  F (35.8  C) (Temporal) 5' 8.25\" (1.734 m) 100% 29.28 kg/m2        Blood Pressure from Last 3 Encounters:   10/03/18 (!) 149/97   11/02/17 (!) 166/99   10/04/17 126/80    Weight from Last 3 Encounters:   10/03/18 194 lb (88 kg)   10/04/17 191 lb 6.4 oz (86.8 kg)   07/14/17 194 lb (88 kg)              We Performed the Following     Albumin Random Urine Quantitative with Creat Ratio     Lipid panel reflex to direct LDL Fasting     PSA, screen     Uric acid          Today's Medication Changes          These changes are accurate as of 10/3/18  9:16 AM.  If you have any questions, ask your nurse or doctor.               These medicines have changed or have updated prescriptions.  "       Dose/Directions    terazosin 1 MG capsule   Commonly known as:  HYTRIN   This may have changed:  additional instructions   Used for:  Nocturia   Changed by:  Livier Triplett MD PhD        Start with 1 tab (1 mg) daily for 1 week, 2 tabs (2 mg) daily   Quantity:  60 capsule   Refills:  0            Where to get your medicines      These medications were sent to [facility] [address] [address] [facility] [address]  [address], [facility] [address]     Phone:  [phone]     terazosin 1 MG capsule                Primary Care Provider Office Phone # Fax #    Livier Triplett MD PhD [phone]       [address]  [facility] [address]        Equal Access to Services     [facility]: Hadii fany ku hadasho Soomaali, waaxda luqadaha, qaybta kaalmada adeegyada, waxerin ceballos haysung robert . So [facility] [phone].    ATENCIÓN: Si habla español, tiene a hickman disposición servicios gratuitos de asistencia lingüística. [facility] [phone].    We comply with applicable federal civil rights laws and [place] laws. We do not discriminate on the basis of race, color, national origin, age, disability, sex, sexual orientation, or gender identity.            Thank you!     Thank you for choosing [facility]  for your care. Our goal is always to provide you with excellent care. Hearing back from our patients is one way we can continue to improve our services. Please take a few minutes to complete the written survey that you may receive in the mail after your visit with us. Thank you!             Your Updated Medication List - Protect others around you: Learn how to safely use, store and throw away your medicines at www.disposemymeds.org.          This list is accurate as of 10/3/18  9:16 AM.  Always use your most recent med list.                   Brand Name Dispense Instructions for use Diagnosis    amoxicillin 500 MG capsule    AMOXIL    20 capsule    Take 4 capsules one  hour prior to dental procedure    VSD (ventricular septal defect)       ASPIRIN PO      Take 81 mg by mouth daily        hydrocortisone 2.5 % cream    ANUSOL-HC    30 g    Place  rectally 2 times daily.    External hemorrhoids       indomethacin 25 MG capsule    INDOCIN    42 capsule    Take 1-2 capsules (25-50 mg) by mouth 3 times daily as needed for moderate pain    Pain of toe of left foot       lisinopril 10 MG tablet    PRINIVIL/ZESTRIL    180 tablet    TAKE 1 TABLET (10 MG) BY MOUTH 2 TIMES DAILY    Hypertension goal BP (blood pressure) < 140/90       order for DME     2 each    Equipment being ordered: compression stocking knee high 20-30 mmHg    Varicose veins of both lower extremities       terazosin 1 MG capsule    HYTRIN    60 capsule    Start with 1 tab (1 mg) daily for 1 week, 2 tabs (2 mg) daily    Nocturia

## 2018-10-03 NOTE — PATIENT INSTRUCTIONS
Make appointment(s) for:   -- BP recheck at cardiologist's office.         Medication(s) prescribed today:    Orders Placed This Encounter   Medications     terazosin (HYTRIN) 1 MG capsule     Sig: Start with 1 tab (1 mg) daily for 1 week, 2 tabs (2 mg) daily     Dispense:  60 capsule     Refill:  0           Preventive Health Recommendations  Male Ages 50 - 64    Yearly exam:             See your health care provider every year in order to  o   Review health changes.   o   Discuss preventive care.    o   Review your medicines if your doctor has prescribed any.     Have a cholesterol test every 5 years, or more frequently if you are at risk for high cholesterol/heart disease.     Have a diabetes test (fasting glucose) every three years. If you are at risk for diabetes, you should have this test more often.     Have a colonoscopy at age 50, or have a yearly FIT test (stool test). These exams will check for colon cancer.      Talk with your health care provider about whether or not a prostate cancer screening test (PSA) is right for you.    You should be tested each year for STDs (sexually transmitted diseases), if you re at risk.     Shots: Get a flu shot each year. Get a tetanus shot every 10 years.     Nutrition:    Eat at least 5 servings of fruits and vegetables daily.     Eat whole-grain bread, whole-wheat pasta and brown rice instead of white grains and rice.     Get adequate Calcium and Vitamin D.     Lifestyle    Exercise for at least 150 minutes a week (30 minutes a day, 5 days a week). This will help you control your weight and prevent disease.     Limit alcohol to one drink per day.     No smoking.     Wear sunscreen to prevent skin cancer.     See your dentist every six months for an exam and cleaning.     See your eye doctor every 1 to 2 years.

## 2018-10-03 NOTE — PROGRESS NOTES
SUBJECTIVE:   CC: Wally Cano is an 57 year old male who presents for preventative health visit.     Healthy Habits:    Do you get at least three servings of calcium containing foods daily (dairy, green leafy vegetables, etc.)? yes    Amount of exercise or daily activities, outside of work: 5-7 day(s) per week    Problems taking medications regularly No    Medication side effects: No    Have you had an eye exam in the past two years? yes    Do you see a dentist twice per year? yes    Do you have sleep apnea, excessive snoring or daytime drowsiness?no     HPI:  Patient has a history of VSD, history of infectious endocarditis.  He follows routinely with cardiology.  His echo and cardiology follow-up next week.    He noticed that his blood pressure has been trending higher recently.  Working on cutting back on salt intake   Weight loss. Continues to be active and playing baseball.     His company has been acquired by [employer].  He continues to travel a lot for work.  History of nocturia, likely related to BPH.  He was given a prescription of Hytrin, but he did not fill it.      History of superficial thrombophlebitis.  He has seen vein specialist and had varicose vein ablation.  He has not had any problem with leg cramps since then.  He continues on baby aspirin daily, and uses compression stockings when he travels.  Hematology does not think he needs to be on prophylactic anticoagulant with his travels.    He had a bout of left big toe pain, put on Indocin.  Not specifically diagnosed with gout.   -------------------------------------    Today's PHQ-2 Score:   PHQ-2 ( 1999 Pfizer) 10/3/2018 4/17/2017   Q1: Little interest or pleasure in doing things 0 0   Q2: Feeling down, depressed or hopeless 0 0   PHQ-2 Score 0 0       Abuse: Current or Past(Physical, Sexual or Emotional)- No  Do you feel safe in your environment - Yes    Social History   Substance Use Topics     Smoking status: Former Smoker      "Years: 10.00     Types: Cigarettes, Cigars     Quit date: 10/28/2006     Smokeless tobacco: Never Used     Alcohol use No      Comment: rare      If you drink alcohol do you typically have >3 drinks per day or >7 drinks per week? No                      Last PSA:   PSA   Date Value Ref Range Status   07/14/2017 2.09 0 - 4 ug/L Final     Comment:     Assay Method:  Chemiluminescence using Siemens Vista analyzer       Reviewed orders with patient. Reviewed health maintenance and updated orders accordingly - Yes  Labs reviewed in EPIC    Reviewed and updated as needed this visit by clinical staff  Tobacco  Meds  Med Hx  Surg Hx  Fam Hx  Soc Hx        Reviewed and updated as needed this visit by Provider            ROS:  CONSTITUTIONAL: NEGATIVE for fever, chills, change in weight  INTEGUMENTARY/SKIN: NEGATIVE for worrisome rashes, moles or lesions  EYES: NEGATIVE for vision changes or irritation  ENT: NEGATIVE for ear, mouth and throat problems  RESP: NEGATIVE for significant cough or SOB  CV: NEGATIVE for chest pain, palpitations or peripheral edema  GI: NEGATIVE for nausea, abdominal pain, heartburn, or change in bowel habits   male: negative for dysuria, hematuria, decreased urinary stream, erectile dysfunction, urethral discharge  MUSCULOSKELETAL: NEGATIVE for significant arthralgias or myalgia  NEURO: NEGATIVE for weakness, dizziness or paresthesias  PSYCHIATRIC: NEGATIVE for changes in mood or affect    OBJECTIVE:   BP (!) 149/97  Pulse 60  Temp 96.5  F (35.8  C) (Temporal)  Ht 5' 8.25\" (1.734 m)  Wt 194 lb (88 kg)  SpO2 100%  BMI 29.28 kg/m2  EXAM:  GENERAL: healthy, alert and no distress  EYES: Eyes grossly normal to inspection, PERRL and conjunctivae and sclerae normal  HENT: ear canals and TM's normal, nose and mouth without ulcers or lesions  NECK: no adenopathy, no asymmetry, masses, or scars and thyroid normal to palpation  RESP: lungs clear to auscultation - no rales, rhonchi or wheezes  CV: " regular rate and rhythm, normal S1 S2, no S3 or S4, no murmur, click or rub, no peripheral edema and peripheral pulses strong  ABDOMEN: soft, nontender, no hepatosplenomegaly, no masses and bowel sounds normal  MS: no gross musculoskeletal defects noted, no edema  SKIN: no suspicious lesions or rashes  NEURO: Normal strength and tone, mentation intact and speech normal  PSYCH: mentation appears normal, affect normal/bright    Diagnostic Test Results:  Results for orders placed or performed in visit on 10/03/18 (from the past 24 hour(s))   Uric acid   Result Value Ref Range    Uric Acid 5.1 3.5 - 7.2 mg/dL   Lipid panel reflex to direct LDL Fasting   Result Value Ref Range    Cholesterol 199 <200 mg/dL    Triglycerides 163 (H) <150 mg/dL    HDL Cholesterol 48 >39 mg/dL    LDL Cholesterol Calculated 118 (H) <100 mg/dL    Non HDL Cholesterol 151 (H) <130 mg/dL       ASSESSMENT/PLAN:       ICD-10-CM    1. Routine history and physical examination of adult Z00.00    2. Arthralgia of left foot M25.572 Uric acid   3. Nocturia R35.1 terazosin (HYTRIN) 1 MG capsule   4. Hyperlipidemia LDL goal <130 E78.5 Lipid panel reflex to direct LDL Fasting   5. Screening for prostate cancer Z12.5 PSA, screen   6. Hypertension goal BP (blood pressure) < 140/90 I10 Albumin Random Urine Quantitative with Creat Ratio     Hypertension: Blood pressure not at goal.  Options are increasing lisinopril versus adding HCTZ with the lisinopril.  With his history of possible gout, HCTZ may not be the best option.  I will check a uric acid level.  Hytrin may help reduce his blood pressure as well.  Since he still has persistent nocturia, I will have him start taking Hytrin.  If his blood pressure improves with this, we will not change his lisinopril dose.  He is seeing his cardiologist next week.  His blood pressure can be rechecked at that time.  We will appreciate cardiology input as well.      COUNSELING:  Reviewed preventive health counseling, as  "reflected in patient instructions    BP Readings from Last 1 Encounters:   10/03/18 (!) 149/97     Estimated body mass index is 29.28 kg/(m^2) as calculated from the following:    Height as of this encounter: 5' 8.25\" (1.734 m).    Weight as of this encounter: 194 lb (88 kg).      Weight management plan:  Patient is actively working on weight loss.  Has been able to lose a few pounds in the last month.     reports that he quit smoking about 11 years ago. His smoking use included Cigarettes and Cigars. He quit after 10.00 years of use. He has never used smokeless tobacco.      Counseling Resources:  ATP IV Guidelines  Pooled Cohorts Equation Calculator  FRAX Risk Assessment  ICSI Preventive Guidelines  Dietary Guidelines for Americans, 2010  USDA's MyPlate  ASA Prophylaxis  Lung CA Screening    Livier Triplett MD PhD  [facility]  "

## 2018-10-09 ENCOUNTER — RADIANT APPOINTMENT (OUTPATIENT)
Dept: CARDIOLOGY | Facility: CLINIC | Age: 58
End: 2018-10-09
Attending: INTERNAL MEDICINE
Payer: COMMERCIAL

## 2018-10-09 VITALS
BODY MASS INDEX: 31.9 KG/M2 | HEART RATE: 69 BPM | SYSTOLIC BLOOD PRESSURE: 168 MMHG | OXYGEN SATURATION: 96 % | RESPIRATION RATE: 18 BRPM | WEIGHT: 191.5 LBS | DIASTOLIC BLOOD PRESSURE: 104 MMHG | HEIGHT: 65 IN

## 2018-10-09 DIAGNOSIS — Q21.0 VSD (VENTRICULAR SEPTAL DEFECT): Primary | ICD-10-CM

## 2018-10-09 DIAGNOSIS — E78.5 HYPERLIPIDEMIA LDL GOAL <130: ICD-10-CM

## 2018-10-09 DIAGNOSIS — Q21.0 MEMBRANOUS VENTRICULAR SEPTAL DEFECT: ICD-10-CM

## 2018-10-09 PROCEDURE — 93010 ELECTROCARDIOGRAM REPORT: CPT | Mod: ZP | Performed by: INTERNAL MEDICINE

## 2018-10-09 PROCEDURE — G0463 HOSPITAL OUTPT CLINIC VISIT: HCPCS

## 2018-10-09 PROCEDURE — 99214 OFFICE O/P EST MOD 30 MIN: CPT | Mod: ZP | Performed by: INTERNAL MEDICINE

## 2018-10-09 RX ORDER — ATORVASTATIN CALCIUM 20 MG/1
20 TABLET, FILM COATED ORAL DAILY
Qty: 90 TABLET | Refills: 3 | Status: SHIPPED | OUTPATIENT
Start: 2018-10-09 | End: 2019-10-08

## 2018-10-09 ASSESSMENT — PAIN SCALES - GENERAL: PAINLEVEL: NO PAIN (0)

## 2018-10-09 NOTE — LETTER
10/9/2018      RE: Wally Cano  [address]  [place] [address]       Dear Colleague,    Thank you for the opportunity to participate in the care of your patient, Wally Cano, at the [facility]. Please see a copy of my visit note below.    HPI:  58 yo WM with known restrictive VSD and previous episode of endocarditis presents for ongoing evaluation and management. Pt denies any c/o.  Denies chest pain/pressure, sob, roman, orthopnea, pnd, syncope or presyncope, palpitations, live,any visual changes, weakness, numbness or paresthesias.  He denies any changes in exercise tolerance but does admit to not exercising as frequently as he has in the past.  He does still play baseball and is walking regularly typically obtained 100K steps per week. He denies any problems with his medications and reports compliance.  Reports that BP has been 140-150's/80's via home monitoring.      PAST MEDICAL HISTORY:  DDD (degenerative disc disease), lumbar    Elevated PSA 7/2014   Hypertension    Intervertebral disc rupture 2002   chronic pain medication-off now.    PE (pulmonary embolism) 1995   due to the SBE    SBE (subacute bacterial endocarditis) 1995   Varicose veins    VSD (ventricular septal defect)    1 cm south of septum        PSH   1. L left scope   2. tonsillectomy   3. wisdom teeth extraction.    Family Hx   No other congenital heart defects. Father had MI in his 50's. Mom healthy. 4 siblings, one with HTN o/w healthy.     Personal Hx   No tobacco since Jan 2008. Previous ~12 pack yr history. No etoh, no illicit drug use.  with four healthy children.    CURRENT MEDICATIONS:  Current Outpatient Prescriptions   Medication Sig Dispense Refill     Acetaminophen (TYLENOL PO) Take 500 mg by mouth once       amoxicillin (AMOXIL) 500 MG capsule Take 4 capsules one hour prior to dental procedure 20 capsule 0     ASPIRIN PO Take 81 mg by  "mouth daily       atorvastatin (LIPITOR) 20 MG tablet Take 1 tablet (20 mg) by mouth daily 90 tablet 3     hydrocortisone (ANUSOL-HC) 2.5 % rectal cream Place  rectally 2 times daily. 30 g 4     IBUPROFEN PO Take 400 mg by mouth every 8 hours as needed for moderate pain       indomethacin (INDOCIN) 25 MG capsule Take 1-2 capsules (25-50 mg) by mouth 3 times daily as needed for moderate pain 42 capsule 0     lisinopril (PRINIVIL/ZESTRIL) 10 MG tablet TAKE 1 TABLET (10 MG) BY MOUTH 2 TIMES DAILY 180 tablet 0     order for DME Equipment being ordered: compression stocking knee high 20-30 mmHg 2 each 2     terazosin (HYTRIN) 1 MG capsule Start with 1 tab (1 mg) daily for 1 week, 2 tabs (2 mg) daily 60 capsule 0       ROS:   Constitutional: No fever, chills, or sweats. No weight gain/loss.   ENT: No visual disturbance, ear ache, epistaxis, sore throat.   Allergies/Immunologic: Negative.   Respiratory: No cough, hemoptysis.   Cardiovascular: As per HPI.   GI: No nausea, vomiting, hematemesis, melena, or hematochezia.   : No urinary frequency, dysuria, or hematuria.   Integument: Negative.   Psychiatric: Negative.   Neuro: Negative.   Endocrinology: Negative.   Musculoskeletal: Negative.    EXAM:  BP (!) 168/104 (BP Location: Left arm, Patient Position: Sitting, Cuff Size: Adult Large)  Pulse 69  Resp 18  Ht 1.657 m (5' 5.25\")  Wt 86.9 kg (191 lb 8 oz)  SpO2 96%  BMI 31.62 kg/m2  General: appears comfortable, alert and articulate  Head: normocephalic, atraumatic  Eyes: anicteric sclera, EOMI  Neck: no adenopathy or masses, 2+ carotids without bruits  Orophyarynx: moist mucosa, no lesions, dentition intact  Heart: regular, S1/S2, no murmur, gallop, rub, estimated JVP 6-7cm  Lungs: clear, no rales or wheezing  Abdomen: soft, non-tender, bowel sounds present, no hepatomegaly  Extremities: no clubbing, cyanosis or edema  Neurological: normal speech and affect, no gross motor deficits    Labs:  CBC RESULTS:  Lab Results "   Component Value Date    WBC 6.9 10/03/2018    RBC 4.90 10/03/2018    HGB 14.9 10/03/2018    HCT 44.8 10/03/2018    MCV 91 10/03/2018    MCH 30.4 10/03/2018    MCHC 33.3 10/03/2018    RDW 12.6 10/03/2018     10/03/2018       CMP RESULTS:  Lab Results   Component Value Date     10/03/2018    POTASSIUM 4.6 10/03/2018    CHLORIDE 108 10/03/2018    CO2 28 10/03/2018    ANIONGAP 5 10/03/2018     (H) 10/03/2018    BUN 19 10/03/2018    CR 1.07 10/03/2018    GFRESTIMATED 71 10/03/2018    GFRESTBLACK 86 10/03/2018    PARK 8.9 10/03/2018    BILITOTAL 1.5 (H) 10/03/2018    ALBUMIN 3.9 10/03/2018    ALKPHOS 61 10/03/2018    ALT 31 10/03/2018    AST 32 10/03/2018        INR RESULTS:  Lab Results   Component Value Date    INR 2.2 (A) 07/14/2017    INR 2.57 (H) 10/17/2014       PROCEDURES:  Imaging   7/30/2008 Echocardiogram   Interpretation Summary  Left ventricular function is normal.The EF is 60-65%. No regional wall motion abnormalities are seen. A small membranousventricular-septal defect is present. The shunt is predominantly left-to-right. Borderline right ventricular enlargement. Pulmonary artery systolic pressure is normal. Borderline left ventricular dilation is present. Aortic valve is normal in structure and function.  The aortic valve is tricuspid.  Trace aortic insufficiency is present.    Echo 9/3/13:  CONCLUSION: Small perimembraneous pressure restrictive ventricular septal defect with a peak gradient of 100 mmHg. Trivial aortic insufficiency. No left sided cardiac enlargement.   M-Mode Report   Left Atrium 38 mm   Aortic Root 33 mm   LV end Diastolic 47 mm   LV end Systolic 29 mm   Intravent Septum 9 mm   LV Post Wall 9 mm   1. ECG shows normal sinus rhythm.   2. Normal left atrial dimension.   3. Normal left ventricular dimension and contractility.     Echo today reviewed by me:  There is a tiny perimembranous ventricular septal defect with restrictive flow  across thedefect. The peak gradient  across the ventricular septal defect 114  mmHg. Normal left ventricular size. The left and right ventricles have normal  chamber size, wall thickness, and systolic function.    EKG today reviewed by me:      Assessment and Plan:   58 yo WM with known restrictive VSD and previous episode of endocarditis presents for ongoing evaluation and management..   1. VSD - echo today continues to reveal small perimembraneous VSD with restrictive flow. Normal biventricular size and function with normal pulmonary pressures.  Also continue to have only trace to mild aortic regurgitation.  No indication for VSD repair unless pt develops progressive aortic valve regurgitation, LV dilation, pulmonary htn or recurrent endocarditis.  Stressed with patient the need to continue good oral hygiene and regular dental care with SBE prophylaxis. Would recommend TPN filters on all IV to reduce risk of paradoxical air embolus although acknowledge in fact in this risk this risk is quite low.   2. HTN - BP with borderline continue on current regimen.  BP elevated today however patient did not take lisinopril this am.  Instructed patient to conitnue to monitor BP, if BP is consistently greater than 135/85 will have patient increase lisinopril to 20mg bid. Pt will continue to follow with PCP.  Encouraged patient to begin increase aerobic exercise aiming for at least 150 minutes of moderate physical activity or 75 minutes of vigorous physical activity - or an equal combination of both - each week. and follow low-salt, heart healthy diet.  Pt CV risk is elevated to degree that statin therapy would be indicated.  Will begin atorvastatin 20mg daily.       Follow-up in 5 years with an echocardiogram.  Continue to follow up with your PCP for continued blood pressure management and cardiovascular risk factors.  Will be happy to see sooner if new questions or concerns develop.      Karen Lemus MD  Co-Director Adult Cong[facility]ital [facility]   of Medicine, Advanced Heart Failure and Transplant, Cardiology Division  [facility]      CC  Patient Care Team:  Livier Triplett MD PhD as PCP - General (Internal Medicine)  Herminio Quevedo, RN as Nurse Coordinator (Cardiology)

## 2018-10-09 NOTE — MR AVS SNAPSHOT
"              After Visit Summary   10/9/2018    Wally Cano    MRN: [id number]           Patient Information     Date Of Birth          1960        Visit Information        Provider Department      10/9/2018 9:30 AM Karen Lemus MD [facility]        Today's Diagnoses     VSD (ventricular septal defect)    -  1    Hyperlipidemia LDL goal <130          Care Instructions    You were seen today in the Adult Congenital and Cardiovascular Genetics Clinic at the [facility].    Cardiology Providers you saw during your visit:  Dr Karen Lemus    Diagnosis:  VSD    Results:  Dr Lemus reviewed the results of your EKG, labs and preliminary results of your echo with you in clinic today    Recommendations:    1.  Continue to eat a heart healthy, low salt diet.  2.  Continue to get 20-30 minutes of aerobic activity, 4-5 days per week.  Examples of aerobic activity include walking, running, swimming, cycling, etc.  3.  Continue to observe good oral hygiene, with regular dental visits.  4.  Continue to monitor your blood pressure at home.  If your blood pressure is consistently greater then 135/85, please increase your Lisinopril to 20 mg twice a day.  Please give us a call if you update your Lisinopril so we can update your prescription.  5.  Start taking Lipitor 20 mg 1 tablet every evening.       Vitals:    10/09/18 0906 10/09/18 0910   BP: 154/89 (!) 168/104   BP Location:  Left arm   Patient Position:  Sitting   Cuff Size:  Adult Large   Pulse: 69    Resp: 18    SpO2: 96%    Weight: 86.9 kg (191 lb 8 oz)    Height: 1.657 m (5' 5.25\")        SBE prophylaxis:   Yes__x__  No____    Lifelong Bacterial Endocarditis Prophylaxis:  YES____  NO____    If YES is checked, follow the recommendations outlined below:   1. Take antibiotic(s) prior to recommended dental procedures and procedures on the respiratory tract or with infected skin, muscle or bones. SBE prophylaxis is not needed for " routine GI and  procedures (ie. Colonoscopy or vaginal delivery)   2. Observe good oral hygiene daily, as advised by your dentist. Get regular professional dental care.   3. Keep cuts clean.   4. Infections should be treated promptly.   5. Symptoms of Infective Endocarditis could include: fever lasting more than 4-5 days or a recurrent fever that initially resolves but returns within 1-2 days)     Exercise restrictions:   Yes____  No__x__         If yes, list restrictions:  Must be allowed to rest if fatigued or SOB      Work restrictions:  Yes____  No__x__         If yes, list restrictions:    FASTING CHOLESTEROL was checked in the last 5 years YES_x__  NO___  2018  Continue to eat a heart healthy, low salt diet.         ____ Fasting lipid panel order today         ____ No changes in medications          ____ I recommend the following changes in your cholesterol medications.:          ____ Please follow up for cholesterol screening at your primary care physician      Follow-up:  Follow up with Dr Lemus in 5 years with an echocardiogram.  Continue to follow up with your PCP for continued blood pressure management and cardiovascular risk factors.     For after hours urgent needs, call [phone] and ask to speak to the Adult Congenital Physician on call.  Mention Job Code 0401.    For emergencies call [phone].    For any scheduling needs, please call [facility] , at [phone]  Thank you for your visit today!  If you have questions or concerns about today's visit, please call me.    Herminio Quevedo RN, BSN  Cardiology Care Coordinator  [facility]  [phone]    [address] [place] [address]  Mail Code [id number]  [place] [address]            Follow-ups after your visit        Your next 10 appointments already scheduled     Oct 25, 2018  8:00 AM CDT   [facility] Visit with Lucian Griffin MD   [facility]    [address]  "[address] [place] [address] [phone]              Who to contact     If you have questions or need follow up information about today's clinic visit or your schedule please contact [facility] directly at [phone].  Normal or non-critical lab and imaging results will be communicated to you by MyChart, letter or phone within 4 business days after the clinic has received the results. If you do not hear from us within 7 days, please contact the clinic through [employer] or phone. If you have a critical or abnormal lab result, we will notify you by phone as soon as possible.  Submit refill requests through [employer] or call your pharmacy and they will forward the refill request to us. Please allow 3 business days for your refill to be completed.          Additional Information About Your Visit        [employer] Information     [employer] gives you secure access to your electronic health record. If you see a primary care provider, you can also send messages to your care team and make appointments. If you have questions, please call your primary care clinic.  If you do not have a primary care provider, please call [phone] and they will assist you.        Care EveryWhere ID     This is your Care EveryWhere ID. This could be used by other organizations to access your [place] medical records  [id number]        Your Vitals Were     Pulse Respirations Height Pulse Oximetry BMI (Body Mass Index)       69 18 1.657 m (5' 5.25\") 96% 31.62 kg/m2        Blood Pressure from Last 3 Encounters:   10/09/18 (!) 168/104   10/03/18 (!) 149/97   11/02/17 (!) 166/99    Weight from Last 3 Encounters:   10/09/18 86.9 kg (191 lb 8 oz)   10/03/18 88 kg (194 lb)   10/04/17 86.8 kg (191 lb 6.4 oz)              We Performed the Following     EKG 12-lead, tracing only (Same Day)          Today's Medication Changes          These changes are accurate as of 10/9/18 10:07 AM.  If you have any questions, ask your nurse or " doctor.               Start taking these medicines.        Dose/Directions    atorvastatin 20 MG tablet   Commonly known as:  LIPITOR   Used for:  Hyperlipidemia LDL goal <130   Started by:  Karen Lemus MD        Dose:  20 mg   Take 1 tablet (20 mg) by mouth daily   Quantity:  90 tablet   Refills:  3            Where to get your medicines      These medications were sent to [place] [address] [facility] [address] [facility]  [address] [facility] [address]     Phone:  [phone]     atorvastatin 20 MG tablet                Primary Care Provider Office Phone # Fax #    Livier Triplett MD PhD [phone]       [address]  [facility] [address]        Equal Access to Services     [facility]: Hadii fany huber hadasho Soomaali, waaxda luqadaha, qaybta kaalmada adeegyada, joceline robert . So [facility] [phone].    ATENCIÓN: Si habla español, tiene a hickman disposición servicios gratuitos de asistencia lingüística. Llame al [phone].    We comply with applicable federal civil rights laws and [place] laws. We do not discriminate on the basis of race, color, national origin, age, disability, sex, sexual orientation, or gender identity.            Thank you!     Thank you for choosing [facility]  for your care. Our goal is always to provide you with excellent care. Hearing back from our patients is one way we can continue to improve our services. Please take a few minutes to complete the written survey that you may receive in the mail after your visit with us. Thank you!             Your Updated Medication List - Protect others around you: Learn how to safely use, store and throw away your medicines at www.disposemymeds.org.          This list is accurate as of 10/9/18 10:07 AM.  Always use your most recent med list.                   Brand Name Dispense Instructions for use Diagnosis    amoxicillin 500 MG capsule    AMOXIL    20 capsule     Take 4 capsules one hour prior to dental procedure    VSD (ventricular septal defect)       ASPIRIN PO      Take 81 mg by mouth daily        atorvastatin 20 MG tablet    LIPITOR    90 tablet    Take 1 tablet (20 mg) by mouth daily    Hyperlipidemia LDL goal <130       hydrocortisone 2.5 % cream    ANUSOL-HC    30 g    Place  rectally 2 times daily.    External hemorrhoids       IBUPROFEN PO      Take 400 mg by mouth every 8 hours as needed for moderate pain        indomethacin 25 MG capsule    INDOCIN    42 capsule    Take 1-2 capsules (25-50 mg) by mouth 3 times daily as needed for moderate pain    Pain of toe of left foot       lisinopril 10 MG tablet    PRINIVIL/ZESTRIL    180 tablet    TAKE 1 TABLET (10 MG) BY MOUTH 2 TIMES DAILY    Hypertension goal BP (blood pressure) < 140/90       order for DME     2 each    Equipment being ordered: compression stocking knee high 20-30 mmHg    Varicose veins of both lower extremities       terazosin 1 MG capsule    HYTRIN    60 capsule    Start with 1 tab (1 mg) daily for 1 week, 2 tabs (2 mg) daily    Nocturia       TYLENOL PO      Take 500 mg by mouth once

## 2018-10-09 NOTE — NURSING NOTE
Chief Complaint   Patient presents with     RECHECK     57 year old male with history of perimembranous VSD, endocarditis, and hypertension presenting for follow up     Dony Soriano CMA at 9:10 AM on 10/9/2018     Medications and vitals reviewed with patient and confirmed.

## 2018-10-09 NOTE — PATIENT INSTRUCTIONS
"You were seen today in the Adult Congenital and Cardiovascular Genetics Clinic at the [facility].    Cardiology Providers you saw during your visit:  Dr Karen Lemus    Diagnosis:  VSD    Results:  Dr Lemus reviewed the results of your EKG, labs and preliminary results of your echo with you in clinic today    Recommendations:    1.  Continue to eat a heart healthy, low salt diet.  2.  Continue to get 20-30 minutes of aerobic activity, 4-5 days per week.  Examples of aerobic activity include walking, running, swimming, cycling, etc.  3.  Continue to observe good oral hygiene, with regular dental visits.  4.  Continue to monitor your blood pressure at home.  If your blood pressure is consistently greater then 135/85, please increase your Lisinopril to 20 mg twice a day.  Please give us a call if you update your Lisinopril so we can update your prescription.  5.  Start taking Lipitor 20 mg 1 tablet every evening.       Vitals:    10/09/18 0906 10/09/18 0910   BP: 154/89 (!) 168/104   BP Location:  Left arm   Patient Position:  Sitting   Cuff Size:  Adult Large   Pulse: 69    Resp: 18    SpO2: 96%    Weight: 86.9 kg (191 lb 8 oz)    Height: 1.657 m (5' 5.25\")        SBE prophylaxis:   Yes__x__  No____    Lifelong Bacterial Endocarditis Prophylaxis:  YES____  NO____    If YES is checked, follow the recommendations outlined below:   1. Take antibiotic(s) prior to recommended dental procedures and procedures on the respiratory tract or with infected skin, muscle or bones. SBE prophylaxis is not needed for routine GI and  procedures (ie. Colonoscopy or vaginal delivery)   2. Observe good oral hygiene daily, as advised by your dentist. Get regular professional dental care.   3. Keep cuts clean.   4. Infections should be treated promptly.   5. Symptoms of Infective Endocarditis could include: fever lasting more than 4-5 days or a recurrent fever that initially resolves but returns within 1-2 days)     Exercise " restrictions:   Yes____  No__x__         If yes, list restrictions:  Must be allowed to rest if fatigued or SOB      Work restrictions:  Yes____  No__x__         If yes, list restrictions:    FASTING CHOLESTEROL was checked in the last 5 years YES_x__  NO___  2018  Continue to eat a heart healthy, low salt diet.         ____ Fasting lipid panel order today         ____ No changes in medications          ____ I recommend the following changes in your cholesterol medications.:          ____ Please follow up for cholesterol screening at your primary care physician      Follow-up:  Follow up with Dr Lemus in 5 years with an echocardiogram.  Continue to follow up with your PCP for continued blood pressure management and cardiovascular risk factors.     For after hours urgent needs, call [phone] and ask to speak to the Adult Congenital Physician on call.  Mention Job Code 0401.    For emergencies call 911.    For any scheduling needs, please call [facility] , at [phone]  Thank you for your visit today!  If you have questions or concerns about today's visit, please call me.    Herminio Quevedo, RN, BSN  Cardiology Care Coordinator  [facility]  [phone]    [address] [facility]  Mail Code [id number]  [place] [address]

## 2018-10-09 NOTE — PROGRESS NOTES
HPI:  58 yo WM with known restrictive VSD and previous episode of endocarditis presents for ongoing evaluation and management. Pt denies any c/o.  Denies chest pain/pressure, sob, roman, orthopnea, pnd, syncope or presyncope, palpitations, live,any visual changes, weakness, numbness or paresthesias.  He denies any changes in exercise tolerance but does admit to not exercising as frequently as he has in the past.  He does still play baseball and is walking regularly typically obtained 100K steps per week. He denies any problems with his medications and reports compliance.  Reports that BP has been 140-150's/80's via home monitoring.      PAST MEDICAL HISTORY:  DDD (degenerative disc disease), lumbar    Elevated PSA 7/2014   Hypertension    Intervertebral disc rupture 2002   chronic pain medication-off now.    PE (pulmonary embolism) 1995   due to the SBE    SBE (subacute bacterial endocarditis) 1995   Varicose veins    VSD (ventricular septal defect)    1 cm south of septum        PSH   1. L left scope   2. tonsillectomy   3. wisdom teeth extraction.    Family Hx   No other congenital heart defects. Father had MI in his 50's. Mom healthy. 4 siblings, one with HTN o/w healthy.     Personal Hx   No tobacco since Jan 2008. Previous ~12 pack yr history. No etoh, no illicit drug use.  with four healthy children.    CURRENT MEDICATIONS:  Current Outpatient Prescriptions   Medication Sig Dispense Refill     Acetaminophen (TYLENOL PO) Take 500 mg by mouth once       amoxicillin (AMOXIL) 500 MG capsule Take 4 capsules one hour prior to dental procedure 20 capsule 0     ASPIRIN PO Take 81 mg by mouth daily       atorvastatin (LIPITOR) 20 MG tablet Take 1 tablet (20 mg) by mouth daily 90 tablet 3     hydrocortisone (ANUSOL-HC) 2.5 % rectal cream Place  rectally 2 times daily. 30 g 4     IBUPROFEN PO Take 400 mg by mouth every 8 hours as needed for moderate pain       indomethacin (INDOCIN) 25 MG capsule Take 1-2 capsules  "(25-50 mg) by mouth 3 times daily as needed for moderate pain 42 capsule 0     lisinopril (PRINIVIL/ZESTRIL) 10 MG tablet TAKE 1 TABLET (10 MG) BY MOUTH 2 TIMES DAILY 180 tablet 0     order for DME Equipment being ordered: compression stocking knee high 20-30 mmHg 2 each 2     terazosin (HYTRIN) 1 MG capsule Start with 1 tab (1 mg) daily for 1 week, 2 tabs (2 mg) daily 60 capsule 0       ROS:   Constitutional: No fever, chills, or sweats. No weight gain/loss.   ENT: No visual disturbance, ear ache, epistaxis, sore throat.   Allergies/Immunologic: Negative.   Respiratory: No cough, hemoptysis.   Cardiovascular: As per HPI.   GI: No nausea, vomiting, hematemesis, melena, or hematochezia.   : No urinary frequency, dysuria, or hematuria.   Integument: Negative.   Psychiatric: Negative.   Neuro: Negative.   Endocrinology: Negative.   Musculoskeletal: Negative.    EXAM:  BP (!) 168/104 (BP Location: Left arm, Patient Position: Sitting, Cuff Size: Adult Large)  Pulse 69  Resp 18  Ht 1.657 m (5' 5.25\")  Wt 86.9 kg (191 lb 8 oz)  SpO2 96%  BMI 31.62 kg/m2  General: appears comfortable, alert and articulate  Head: normocephalic, atraumatic  Eyes: anicteric sclera, EOMI  Neck: no adenopathy or masses, 2+ carotids without bruits  Orophyarynx: moist mucosa, no lesions, dentition intact  Heart: regular, S1/S2, no murmur, gallop, rub, estimated JVP 6-7cm  Lungs: clear, no rales or wheezing  Abdomen: soft, non-tender, bowel sounds present, no hepatomegaly  Extremities: no clubbing, cyanosis or edema  Neurological: normal speech and affect, no gross motor deficits    Labs:  CBC RESULTS:  Lab Results   Component Value Date    WBC 6.9 10/03/2018    RBC 4.90 10/03/2018    HGB 14.9 10/03/2018    HCT 44.8 10/03/2018    MCV 91 10/03/2018    MCH 30.4 10/03/2018    MCHC 33.3 10/03/2018    RDW 12.6 10/03/2018     10/03/2018       CMP RESULTS:  Lab Results   Component Value Date     10/03/2018    POTASSIUM 4.6 10/03/2018 "    CHLORIDE 108 10/03/2018    CO2 28 10/03/2018    ANIONGAP 5 10/03/2018     (H) 10/03/2018    BUN 19 10/03/2018    CR 1.07 10/03/2018    GFRESTIMATED 71 10/03/2018    GFRESTBLACK 86 10/03/2018    PARK 8.9 10/03/2018    BILITOTAL 1.5 (H) 10/03/2018    ALBUMIN 3.9 10/03/2018    ALKPHOS 61 10/03/2018    ALT 31 10/03/2018    AST 32 10/03/2018        INR RESULTS:  Lab Results   Component Value Date    INR 2.2 (A) 07/14/2017    INR 2.57 (H) 10/17/2014       PROCEDURES:  Imaging   7/30/2008 Echocardiogram   Interpretation Summary  Left ventricular function is normal.The EF is 60-65%. No regional wall motion abnormalities are seen. A small membranousventricular-septal defect is present. The shunt is predominantly left-to-right. Borderline right ventricular enlargement. Pulmonary artery systolic pressure is normal. Borderline left ventricular dilation is present. Aortic valve is normal in structure and function.  The aortic valve is tricuspid.  Trace aortic insufficiency is present.    Echo 9/3/13:  CONCLUSION: Small perimembraneous pressure restrictive ventricular septal defect with a peak gradient of 100 mmHg. Trivial aortic insufficiency. No left sided cardiac enlargement.   M-Mode Report   Left Atrium 38 mm   Aortic Root 33 mm   LV end Diastolic 47 mm   LV end Systolic 29 mm   Intravent Septum 9 mm   LV Post Wall 9 mm   1. ECG shows normal sinus rhythm.   2. Normal left atrial dimension.   3. Normal left ventricular dimension and contractility.     Echo today reviewed by me:  There is a tiny perimembranous ventricular septal defect with restrictive flow  across thedefect. The peak gradient across the ventricular septal defect 114  mmHg. Normal left ventricular size. The left and right ventricles have normal  chamber size, wall thickness, and systolic function.    EKG today reviewed by me:      Assessment and Plan:   56 yo WM with known restrictive VSD and previous episode of endocarditis presents for ongoing  evaluation and management..   1. VSD - echo today continues to reveal small perimembraneous VSD with restrictive flow. Normal biventricular size and function with normal pulmonary pressures.  Also continue to have only trace to mild aortic regurgitation.  No indication for VSD repair unless pt develops progressive aortic valve regurgitation, LV dilation, pulmonary htn or recurrent endocarditis.  Stressed with patient the need to continue good oral hygiene and regular dental care with SBE prophylaxis. Would recommend TPN filters on all IV to reduce risk of paradoxical air embolus although acknowledge in fact in this risk this risk is quite low.   2. HTN - BP with borderline continue on current regimen.  BP elevated today however patient did not take lisinopril this am.  Instructed patient to conitnue to monitor BP, if BP is consistently greater than 135/85 will have patient increase lisinopril to 20mg bid. Pt will continue to follow with PCP.  Encouraged patient to begin increase aerobic exercise aiming for at least 150 minutes of moderate physical activity or 75 minutes of vigorous physical activity - or an equal combination of both - each week. and follow low-salt, heart healthy diet.  Pt CV risk is elevated to degree that statin therapy would be indicated.  Will begin atorvastatin 20mg daily.       Follow-up in 5 years with an echocardiogram.  Continue to follow up with your PCP for continued blood pressure management and cardiovascular risk factors.  Will be happy to see sooner if new questions or concerns develop.      Karen Lemus MD  Co-Director [facility]   of Medicine, Advanced Heart Failure and Transplant, Cardiology Division  [facility]  Patient Care Team:  Livier Triplett MD PhD as PCP - General (Internal Medicine)  Herminio Quevedo RN as Nurse Coordinator (Cardiology)  Karen Lemus MD as MD (Cardiology)  SELF,  REFERRED

## 2018-10-09 NOTE — NURSING NOTE
Cardiac Testing: Patient given instructions regarding  echocardiogram . Discussed purpose, preparation, procedure and when to expect results reported back to the patient. Patient demonstrated understanding of this information and agreed to call with further questions or concerns.    Labs: Patient was given results of the laboratory testing obtained today.  Patient demonstrated understanding of this information and agreed to call with further questions or concerns.     New Medication: Lipitor 20 mg daily.  Patient was educated regarding newly prescribed medication, including discussion of  the indication, administration, side effects, and when to report to MD or RN. Patient demonstrated understanding of this information and agreed to call with further questions or concerns.    Return Appointment: Follow up with Dr Lemus in 5 years with an echo.  Patient given instructions regarding scheduling next clinic visit. Patient demonstrated understanding of this information and agreed to call with further questions or concerns.    Patient stated he understood all health information given and agreed to call with further questions or concerns.    Herminio Quevedo, RN  RN Care Coordinator  [facility]  [phone]

## 2018-10-11 LAB — INTERPRETATION ECG - MUSE: NORMAL

## 2018-10-24 DIAGNOSIS — G89.29 CHRONIC PAIN OF LEFT KNEE: Primary | ICD-10-CM

## 2018-10-24 DIAGNOSIS — M25.562 CHRONIC PAIN OF LEFT KNEE: Primary | ICD-10-CM

## 2018-10-25 ENCOUNTER — OFFICE VISIT (OUTPATIENT)
Dept: ORTHOPEDICS | Facility: CLINIC | Age: 58
End: 2018-10-25
Payer: COMMERCIAL

## 2018-10-25 ENCOUNTER — RADIANT APPOINTMENT (OUTPATIENT)
Dept: GENERAL RADIOLOGY | Facility: CLINIC | Age: 58
End: 2018-10-25
Attending: ORTHOPAEDIC SURGERY
Payer: COMMERCIAL

## 2018-10-25 VITALS — HEART RATE: 78 BPM | OXYGEN SATURATION: 96 % | SYSTOLIC BLOOD PRESSURE: 143 MMHG | DIASTOLIC BLOOD PRESSURE: 77 MMHG

## 2018-10-25 DIAGNOSIS — R35.1 NOCTURIA: ICD-10-CM

## 2018-10-25 DIAGNOSIS — G89.29 CHRONIC PAIN OF LEFT KNEE: ICD-10-CM

## 2018-10-25 DIAGNOSIS — M17.12 PRIMARY OSTEOARTHRITIS OF LEFT KNEE: Primary | ICD-10-CM

## 2018-10-25 DIAGNOSIS — M25.562 CHRONIC PAIN OF LEFT KNEE: ICD-10-CM

## 2018-10-25 PROCEDURE — 99207 ZZC DROP WITH A PROCEDURE: CPT | Mod: GC | Performed by: ORTHOPAEDIC SURGERY

## 2018-10-25 PROCEDURE — 20610 DRAIN/INJ JOINT/BURSA W/O US: CPT | Mod: LT | Performed by: ORTHOPAEDIC SURGERY

## 2018-10-25 PROCEDURE — 73562 X-RAY EXAM OF KNEE 3: CPT | Mod: LT | Performed by: RADIOLOGY

## 2018-10-25 RX ORDER — TRIAMCINOLONE ACETONIDE 40 MG/ML
40 INJECTION, SUSPENSION INTRA-ARTICULAR; INTRAMUSCULAR ONCE
Status: DISCONTINUED | OUTPATIENT
Start: 2018-10-25 | End: 2024-06-19

## 2018-10-25 ASSESSMENT — PAIN SCALES - GENERAL: PAINLEVEL: MODERATE PAIN (4)

## 2018-10-25 NOTE — TELEPHONE ENCOUNTER
Pending Prescriptions:                       Disp   Refills    terazosin (HYTRIN) 1 MG capsule           60 cap*0            Sig: Start with 1 tab (1 mg) daily for 1 week, 2 tabs           (2 mg) daily      Last Written Prescription Date:  10/3/18  Last Fill Quantity: 60,  # refills: 0   Last office visit: 10/3/2018 with prescribing provider:  Dr. Livier Triplett   Future Office Visit:      90 day supply request per pharmacy

## 2018-10-25 NOTE — LETTER
10/25/2018         RE: Wally Cano  [address] [facility] [address]        Dear Colleague,    Thank you for referring your patient, Wally Cano, to the [facility]. Please see a copy of my visit note below.    DIAGNOSIS:   1. Left knee osteoarthritis.     PROCEDURES:  1. Non-operative. Steroid injections x2.    HISTORY:  57-year-old male who remains active in and over [employer] baseball league who has known left knee tricompartmental osteoarthritis.  He has had a previous steroid injection in October 2016 which provided him about 3-4 months relief and then most recently, he had a steroid injection in November 2017.  After this injection he had approximately 5 months relief of symptoms.  Although he admits he is seeing shorter-term relief from the injections he would still like to continue with nonoperative treatment. Currently he feels the knee is doing pretty well but states that the pain returned over the summer.  He will be leaving in about a week or so to go back down to [place] and participate in a baseball tournament. Today he would like to get a repeat corticosteroid injection.    EXAM:     General: Awake, Alert, and oriented. Articulates and communicates with a normal affect     Left lower Extremity:    No effusion    Tenderness to palpation: No tenderness palpation over the medial or lateral joint lines but there is pain with patellar grind    Range of motion 0-130 degrees    Neurovascularly intact    IMAGING:  3 views of the left knee are reviewed and compared to films obtained approximately 1 year ago.  Tricompartmental osteoarthritis with the majority of degenerative changes in the medial and patellofemoral compartments persists.  There is been some slight progression of medial compartment osteoarthritis.    ASSESSMENT:  1. 57-year-old male with known left knee tricompartmental osteoarthritis chiefly in the medial and patellofemoral compartments who has been  managing nonoperatively with steroid injections up until this point.    PLAN:   -He was counseled that he has had some progression of his medial compartment arthritis based on his most recent x-rays.  He also verified that he is starting to see shorter term relief after steroid injections but he still feels that they give a lasting enough benefit for him to participate in his activities of daily living and recreational activities and would like to proceed with another corticosteroid injection in his left knee.   -Follow-up as needed      PROCEDURE:   Risks, benefits and alternatives of a steroid injection were discussed with the patient who elected to proceed.  Written consent was obtained.  40 mg of Kenalog and 8 cc of 1% lidocaine were injected into the knee under sterile technique.  This was well tolerated without complication.     Patient seen and examined with Dr. Gaby Joseph PGY-5  Orthopedic Surgery          Patient seen and examined with the resident.     Assesment: left knee medial compartment OA, responding well to injections    Plan: repeat injection today    After written informed consent obtained and signed, after sufficient prepping and sterile technique, 40 mg of kenalog and , 8 cc of 1% lidocaine were injected without complication into the Left knee. The patient tolerated the injection well and a sterile dressing was applied.    I agree with history, physical and imaging as well as the assessment and plan as detailed by Dr. Joseph.       Again, thank you for allowing me to participate in the care of your patient.        Sincerely,        Lucian Griffin MD

## 2018-10-25 NOTE — MR AVS SNAPSHOT
After Visit Summary   10/25/2018    Wally Cano    MRN: [id number]           Patient Information     Date Of Birth          1960        Visit Information        Provider Department      10/25/2018 8:00 AM Lucian Griffin MD [facility]        Today's Diagnoses     Primary osteoarthritis of left knee    -  1       Follow-ups after your visit        Who to contact     If you have questions or need follow up information about today's clinic visit or your schedule please contact [facility] directly at [phone].  Normal or non-critical lab and imaging results will be communicated to you by [employer] Mark[employer], letter or phone within 4 business days after the clinic has received the results. If you do not hear from us within 7 days, please contact the clinic through TwitChatt or phone. If you have a critical or abnormal lab result, we will notify you by phone as soon as possible.  Submit refill requests through [employer] or call your pharmacy and they will forward the refill request to us. Please allow 3 business days for your refill to be completed.          Additional Information About Your Visit        MyChart Information     [employer] gives you secure access to your electronic health record. If you see a primary care provider, you can also send messages to your care team and make appointments. If you have questions, please call your primary care clinic.  If you do not have a primary care provider, please call [phone] and they will assist you.      En[employer]ch is an electronic gateway that provides easy, online access to your medical records. With [employer], you can request a clinic appointment, read your test results, renew a prescription or communicate with your care team.     To access your existing account, please contact your [facility] or call [phone] for assistance.        Care EveryWhere ID     This is your Care  EveryWhere ID. This could be used by other organizations to access your [place] medical records  [id number]        Your Vitals Were     Pulse Pulse Oximetry                78 96%           Blood Pressure from Last 3 Encounters:   10/25/18 143/77   10/09/18 (!) 168/104   10/03/18 (!) 149/97    Weight from Last 3 Encounters:   10/09/18 86.9 kg (191 lb 8 oz)   10/03/18 88 kg (194 lb)   10/04/17 86.8 kg (191 lb 6.4 oz)              Today, you had the following     No orders found for display       Primary Care Provider Office Phone # Fax #    Livier Triplett MD [facility] [phone]       [address] [facility] [address]        Equal Access to Services     [address] : Hadii fany huber hadasho Soomaali, waaxda luqadaha, qaybta kaalmada adeegyada, joceline ceballos haysung robert . So [facility] [phone].    ATENCIÓN: Si habla español, tiene a hickman disposición servicios gratuitos de asistencia lingüística. Llame al [phone].    We comply with applicable federal civil rights laws and [place] laws. We do not discriminate on the basis of race, color, national origin, age, disability, sex, sexual orientation, or gender identity.            Thank you!     Thank you for choosing [facility]  for your care. Our goal is always to provide you with excellent care. Hearing back from our patients is one way we can continue to improve our services. Please take a few minutes to complete the written survey that you may receive in the mail after your visit with us. Thank you!             Your Updated Medication List - Protect others around you: Learn how to safely use, store and throw away your medicines at www.disposemymeds.org.          This list is accurate as of 10/25/18 10:08 AM.  Always use your most recent med list.                   Brand Name Dispense Instructions for use Diagnosis    amoxicillin 500 MG capsule    AMOXIL    20 capsule    Take 4 capsules one hour prior to dental procedure     VSD (ventricular septal defect)       ASPIRIN PO      Take 81 mg by mouth daily        atorvastatin 20 MG tablet    LIPITOR    90 tablet    Take 1 tablet (20 mg) by mouth daily    Hyperlipidemia LDL goal <130       hydrocortisone 2.5 % cream    ANUSOL-HC    30 g    Place  rectally 2 times daily.    External hemorrhoids       IBUPROFEN PO      Take 400 mg by mouth every 8 hours as needed for moderate pain        indomethacin 25 MG capsule    INDOCIN    42 capsule    Take 1-2 capsules (25-50 mg) by mouth 3 times daily as needed for moderate pain    Pain of toe of left foot       lisinopril 10 MG tablet    PRINIVIL/ZESTRIL    180 tablet    TAKE 1 TABLET (10 MG) BY MOUTH 2 TIMES DAILY    Hypertension goal BP (blood pressure) < 140/90       order for DME     2 each    Equipment being ordered: compression stocking knee high 20-30 mmHg    Varicose veins of both lower extremities       terazosin 1 MG capsule    HYTRIN    60 capsule    Start with 1 tab (1 mg) daily for 1 week, 2 tabs (2 mg) daily    Nocturia       TYLENOL PO      Take 500 mg by mouth once

## 2018-10-25 NOTE — PROGRESS NOTES
Patient seen and examined with the resident.     Assesment: left knee medial compartment OA, responding well to injections    Plan: repeat injection today    After written informed consent obtained and signed, after sufficient prepping and sterile technique, 40 mg of kenalog and , 8 cc of 1% lidocaine were injected without complication into the Left knee. The patient tolerated the injection well and a sterile dressing was applied.    I agree with history, physical and imaging as well as the assessment and plan as detailed by Dr. Joseph.

## 2018-10-25 NOTE — PROGRESS NOTES
DIAGNOSIS:   1. Left knee osteoarthritis.     PROCEDURES:  1. Non-operative. Steroid injections x2.    HISTORY:  57-year-old male who remains active in and over [employer] baseball league who has known left knee tricompartmental osteoarthritis.  He has had a previous steroid injection in October 2016 which provided him about 3-4 months relief and then most recently, he had a steroid injection in November 2017.  After this injection he had approximately 5 months relief of symptoms.  Although he admits he is seeing shorter-term relief from the injections he would still like to continue with nonoperative treatment. Currently he feels the knee is doing pretty well but states that the pain returned over the summer.  He will be leaving in about a week or so to go back down to [place] and participate in a baseball tournament. Today he would like to get a repeat corticosteroid injection.    EXAM:     General: Awake, Alert, and oriented. Articulates and communicates with a normal affect     Left lower Extremity:    No effusion    Tenderness to palpation: No tenderness palpation over the medial or lateral joint lines but there is pain with patellar grind    Range of motion 0-130 degrees    Neurovascularly intact    IMAGING:  3 views of the left knee are reviewed and compared to films obtained approximately 1 year ago.  Tricompartmental osteoarthritis with the majority of degenerative changes in the medial and patellofemoral compartments persists.  There is been some slight progression of medial compartment osteoarthritis.    ASSESSMENT:  1. 57-year-old male with known left knee tricompartmental osteoarthritis chiefly in the medial and patellofemoral compartments who has been managing nonoperatively with steroid injections up until this point.    PLAN:   -He was counseled that he has had some progression of his medial compartment arthritis based on his most recent x-rays.  He also verified that he is starting to see shorter term  relief after steroid injections but he still feels that they give a lasting enough benefit for him to participate in his activities of daily living and recreational activities and would like to proceed with another corticosteroid injection in his left knee.   -Follow-up as needed      PROCEDURE:   Risks, benefits and alternatives of a steroid injection were discussed with the patient who elected to proceed.  Written consent was obtained.  40 mg of Kenalog and 8 cc of 1% lidocaine were injected into the knee under sterile technique.  This was well tolerated without complication.     Patient seen and examined with Dr. Gaby Joseph PGY-5  Orthopedic Surgery

## 2018-10-26 RX ORDER — TERAZOSIN 2 MG/1
2 CAPSULE ORAL AT BEDTIME
Qty: 90 CAPSULE | Refills: 3 | Status: SHIPPED | OUTPATIENT
Start: 2018-10-26 | End: 2019-08-12

## 2018-10-26 NOTE — TELEPHONE ENCOUNTER
Routing refill request to provider for review/approval because:  Labs out of range:  BP's    terazosin (HYTRIN) 1 MG capsule 60 capsule 0 10/3/2018  No   Sig: Start with 1 tab (1 mg) daily for 1 week, 2 tabs (2 mg) daily     Last OV with Dr. Triplett: 10/3/2018    No future apts.     BP Readings from Last 3 Encounters:   10/25/18 143/77   10/09/18 (!) 168/104   10/03/18 (!) 149/97     Alpha Blockers [id number] 4:28 PM   Blood pressure under 140/90 in past 12 months    Recent (12 mo) or future (30 days) visit within the authorizing provider's specialty    Patient does not have Tadalafil, Vardenafil, or Sildenafil on their medication list    Patient is 18 years of age or older     Angelique Muro RN

## 2018-11-11 DIAGNOSIS — I10 HYPERTENSION GOAL BP (BLOOD PRESSURE) < 140/90: ICD-10-CM

## 2018-11-13 NOTE — TELEPHONE ENCOUNTER
Routing refill request to provider for review/approval because:  Drug interaction warning    lisinopril (PRINIVIL/ZESTRIL) 10 MG tablet 180 tablet 0 8/13/2018  No   Sig: TAKE 1 TABLET (10 MG) BY MOUTH 2 TIMES DAILY   Class: E-Prescribe   Notes to Pharmacy: Patient needs to be seen for further refills. Apt 8/24/2018     Last OV with Dr. MOSS: 10/3/2018    No future apts.     BP Readings from Last 3 Encounters:   10/25/18 143/77   10/09/18 (!) 168/104   10/03/18 (!) 149/97     Creatinine   Date Value Ref Range Status   10/03/2018 1.07 0.66 - 1.25 mg/dL Final     Potassium   Date Value Ref Range Status   10/03/2018 4.6 3.4 - 5.3 mmol/L Final     ACE Inhibitors (Including Combos) Protocol [id number] 1:54 AM   Blood pressure under 140/90 in past 12 months    Recent (12 mo) or future (30 days) visit within the authorizing provider's specialty    Patient is age 18 or older    Normal serum creatinine on file in past 12 months    Normal serum potassium on file in past 12 months     Angelique Muro RN

## 2018-11-13 NOTE — TELEPHONE ENCOUNTER
Please inform patient.  His last blood pressure at the orthopedics office is elevated.  His cardiologist want his blood pressure go to be 135/85.  Has he checked his blood pressure outside of the clinic?.  If his blood pressure is above this goal, increase lisinopril to 20 mg twice a day.  Please let me know his blood pressure outside of the clinic.

## 2018-11-14 NOTE — TELEPHONE ENCOUNTER
[employer] message sent to patient with 's note below.    Mireya Pinzon RN,   [facility]

## 2018-11-15 RX ORDER — LISINOPRIL 20 MG/1
20 TABLET ORAL DAILY
Qty: 90 TABLET | Refills: 0 | Status: SHIPPED | OUTPATIENT
Start: 2018-11-15 | End: 2018-11-21

## 2018-11-15 RX ORDER — LISINOPRIL 10 MG/1
TABLET ORAL
Qty: 180 TABLET | Refills: 1 | OUTPATIENT
Start: 2018-11-15

## 2018-11-15 NOTE — TELEPHONE ENCOUNTER
Spoke with patient he reports that his BP's at home have been 140-150/80-90's.     He reports that Dr. Lemus is agreeable to the patient increasing his Lisinopril to 20 mg BID.     New script sent per Dr. Triplett.     Writer asked that the patient check BP with home monitor daily for a week after starting the new dose and report the readings through a [employer] message or by calling in. Patient verbalized understanding and is agreeable to plan.     Angelique Muro RN

## 2019-02-19 DIAGNOSIS — I10 HYPERTENSION GOAL BP (BLOOD PRESSURE) < 140/90: ICD-10-CM

## 2019-02-19 RX ORDER — LISINOPRIL 20 MG/1
20 TABLET ORAL 2 TIMES DAILY
Qty: 180 TABLET | Refills: 0 | OUTPATIENT
Start: 2019-02-19

## 2019-03-22 ENCOUNTER — OFFICE VISIT (OUTPATIENT)
Dept: FAMILY MEDICINE | Facility: CLINIC | Age: 59
End: 2019-03-22
Payer: COMMERCIAL

## 2019-03-22 ENCOUNTER — TELEPHONE (OUTPATIENT)
Dept: PEDIATRICS | Facility: CLINIC | Age: 59
End: 2019-03-22

## 2019-03-22 VITALS
BODY MASS INDEX: 31.96 KG/M2 | WEIGHT: 191.8 LBS | OXYGEN SATURATION: 98 % | HEIGHT: 65 IN | TEMPERATURE: 95.8 F | DIASTOLIC BLOOD PRESSURE: 78 MMHG | SYSTOLIC BLOOD PRESSURE: 133 MMHG | HEART RATE: 71 BPM

## 2019-03-22 DIAGNOSIS — M77.12 LATERAL EPICONDYLITIS OF LEFT ELBOW: Primary | ICD-10-CM

## 2019-03-22 DIAGNOSIS — E66.09 CLASS 1 OBESITY DUE TO EXCESS CALORIES WITH SERIOUS COMORBIDITY AND BODY MASS INDEX (BMI) OF 31.0 TO 31.9 IN ADULT: ICD-10-CM

## 2019-03-22 DIAGNOSIS — E66.811 CLASS 1 OBESITY DUE TO EXCESS CALORIES WITH SERIOUS COMORBIDITY AND BODY MASS INDEX (BMI) OF 31.0 TO 31.9 IN ADULT: ICD-10-CM

## 2019-03-22 DIAGNOSIS — I10 HYPERTENSION GOAL BP (BLOOD PRESSURE) < 140/90: ICD-10-CM

## 2019-03-22 PROCEDURE — 99213 OFFICE O/P EST LOW 20 MIN: CPT | Performed by: NURSE PRACTITIONER

## 2019-03-22 ASSESSMENT — MIFFLIN-ST. JEOR: SCORE: 1620.84

## 2019-03-22 NOTE — PROGRESS NOTES
SUBJECTIVE:   Wally Cano is a 58 year old male who presents to clinic today for the following health issues:      Musculoskeletal problem/pain      Duration: 1 week    Description  Location: left elbow    Intensity:  moderate, severe    Accompanying signs and symptoms: swelling    History  Previous similar problem: no   Previous evaluation:  none    Precipitating or alleviating factors:  Trauma or overuse: YES- overuse  Aggravating factors include: lifting and movement    Therapies tried and outcome: Ibuprofen and ice  Patient has been doing a lot of repetitive work, is left handed.  Rest helps.  He has not taken anything for pain.    Hypertension Follow-up      Outpatient blood pressures are not being checked.    Low Salt Diet: low salt      Problem list and histories reviewed & adjusted, as indicated.  Additional history: as documented    Patient Active Problem List   Diagnosis     Hyperlipidemia LDL goal <130     DDD (degenerative disc disease), lumbar     VSD (ventricular septal defect)     * * * SBE PROPHYLAXIS * * *     External hemorrhoids     Varicose veins     Hypertension goal BP (blood pressure) < 140/90     Advanced directives, counseling/discussion     NSAID long-term use     Overweight (BMI 25.0-29.9)     S/P infectious endocarditis     Superficial thrombophlebitis     [facility]     Past Surgical History:   Procedure Laterality Date     ARTHROSCOPY KNEE RT/LT      removal of bone chips LT knee      EYE SURGERY  2016    Bilateral Cataract removal     HEMORRHOIDECTOMY  1/10     HERNIA REPAIR, INGUINAL RT/LT      Left     IMPLANT SHUNT VENTRICULOATRIAL       MOUTH SURGERY      wisdom teeth     TONSILLECTOMY         Social History     Tobacco Use     Smoking status: Former Smoker     Years: 10.00     Types: Cigarettes, Cigars     Last attempt to quit: 10/28/2006     Years since quittin.4     Smokeless tobacco:  Never Used   Substance Use Topics     Alcohol use: No     Comment: rare     Family History   Problem Relation Age of Onset     Heart Disease Father 50        MI     Diabetes Father      Cerebrovascular Disease Maternal Grandmother 70     Cerebrovascular Disease Maternal Grandfather 80     Cerebrovascular Disease Paternal Grandmother 80     Heart Disease Paternal Grandfather 62        MI     Hypertension Brother      Obesity Sister      Circulatory Mother         varicose veins     Prostate Cancer Maternal Uncle         1/2 brother to mother     Cancer - colorectal No family hx of          Current Outpatient Medications   Medication Sig Dispense Refill     Acetaminophen (TYLENOL PO) Take 500 mg by mouth once       amoxicillin (AMOXIL) 500 MG capsule Take 4 capsules one hour prior to dental procedure 20 capsule 0     ASPIRIN PO Take 81 mg by mouth daily       atorvastatin (LIPITOR) 20 MG tablet Take 1 tablet (20 mg) by mouth daily 90 tablet 3     hydrocortisone (ANUSOL-HC) 2.5 % rectal cream Place  rectally 2 times daily. 30 g 4     IBUPROFEN PO Take 400 mg by mouth every 8 hours as needed for moderate pain       indomethacin (INDOCIN) 25 MG capsule Take 1-2 capsules (25-50 mg) by mouth 3 times daily as needed for moderate pain 42 capsule 0     lisinopril (PRINIVIL/ZESTRIL) 20 MG tablet Take 1 tablet (20 mg) by mouth 2 times daily 180 tablet 1     order for DME Equipment being ordered: Tennis elbow strap 1 Device 0     order for DME Equipment being ordered: compression stocking knee high 20-30 mmHg 2 each 2     terazosin (HYTRIN) 2 MG capsule Take 1 capsule (2 mg) by mouth At Bedtime 90 capsule 3     BP Readings from Last 3 Encounters:   03/22/19 133/78   10/25/18 143/77   10/09/18 (!) 168/104    Wt Readings from Last 3 Encounters:   03/22/19 87 kg (191 lb 12.8 oz)   10/09/18 86.9 kg (191 lb 8 oz)   10/03/18 88 kg (194 lb)                    Reviewed and updated as needed this visit by clinical staff  Tobacco   "Allergies  Meds  Med Hx  Surg Hx  Fam Hx  Soc Hx      Reviewed and updated as needed this visit by Provider         ROS:  Constitutional, HEENT, cardiovascular, pulmonary, gi and gu systems are negative, except as otherwise noted.    OBJECTIVE:     /78   Pulse 71   Temp 95.8  F (35.4  C) (Oral)   Ht 1.657 m (5' 5.25\")   Wt 87 kg (191 lb 12.8 oz)   SpO2 98%   BMI 31.67 kg/m    Body mass index is 31.67 kg/m .  GENERAL: healthy, alert and no distress  EYES: Eyes grossly normal to inspection, PERRL and conjunctivae and sclerae normal  HENT: ear canals and TM's normal, nose and mouth without ulcers or lesions  NECK: no adenopathy, no asymmetry, masses, or scars and thyroid normal to palpation  RESP: lungs clear to auscultation - no rales, rhonchi or wheezes  CV: regular rate and rhythm, normal S1 S2, no S3 or S4, no murmur, click or rub, no peripheral edema and peripheral pulses strong  ABDOMEN: soft, nontender, no hepatosplenomegaly, no masses and bowel sounds normal  MS: Left shoulder and wrist with FAROM, left elbow TTP at lateral epicondyle, pain in forearm with resisted wrist flexion,otherwise, no gross musculoskeletal defects noted, no edema  NEURO: Normal strength and tone, mentation intact and speech normal  PSYCH: mentation appears normal, affect normal/bright  LYMPH: normal ant/post cervical, supraclavicular nodes    Diagnostic Test Results:  none     ASSESSMENT/PLAN:         BMI:   Estimated body mass index is 31.67 kg/m  as calculated from the following:    Height as of this encounter: 1.657 m (5' 5.25\").    Weight as of this encounter: 87 kg (191 lb 12.8 oz).   Weight management plan: Discussed healthy diet and exercise guidelines      1. Lateral epicondylitis of left elbow  Treating with 600 mg Ibprofen every 6 hours with food and plenty of water, DME for tennis elbow strap, return to clinic if not improved, new, or worsening symptoms.   - order for DME; Equipment being ordered: Tennis " elbow strap  Dispense: 1 Device; Refill: 0    2. Hypertension goal BP (blood pressure) < 140/90  BP at goal, continue current management.    3.  Class 1 obesity with serious comorbidity and body mass index (BMI) of 31.0-31.9 in adult  Benefits of weight loss reviewed in detail, encouraged him to cut back on the carbohydrates in the diet, consume more fruits and vegetables, drink plenty of water, avoid fruit juices, sodas, get 150 min moderate exercise/week.  Recheck weight in 6 months.    Work on weight loss  Regular exercise    SEJAL Neal [facility]

## 2019-03-22 NOTE — PATIENT INSTRUCTIONS
At [facility], we strive to deliver an exceptional experience to you, every time we see you.  If you receive a survey in the mail, please send us back your thoughts. We really do value your feedback.    Your care team:                            Family Medicine Internal Medicine   MD Liam Ricci MD Shantel Branch-Fleming, MD Katya Georgiev PA-C Megan Hill, APRN CNP    Kavon Triplett, MD Pediatrics   Ashu Arora, JOHNATHAN Baker, MD Justyna Perez APRN CNP   MD Teresa Lindsay MD Deborah Mielke, MD Judith Bryant, APRN [facility]      Clinic hours: Monday - Thursday 7 am-7 pm; Fridays 7 am-5 pm.   Urgent care: Monday - Friday 11 am-9 pm; Saturday and Sunday 9 am-5 pm.  Pharmacy : Monday -Thursday 8 am-8 pm; Friday 8 am-6 pm; Saturday and Sunday 9 am-5 pm.     Clinic: [phone]   Pharmacy: [phone]        Patient Education     Treating Tennis Elbow    Your treatment will depend on how inflamed your tendon is. The goal is to relieve your symptoms and help you regain full use of your elbow.  Rest and medicine  Wearing a tennis elbow splint allows the inflamed tendon to rest. It must be worn properly. It should be placed down the arm past the painful area of the elbow. If it is directly over the inflamed tendon, it can worsen the symptoms. This brace can help the tendon heal. Using your other hand or changing your  also takes stress off the tendon. Oral nonsteroidal anti-inflammatory medicines (NSAIDs) and ice can relieve pain and reduce swelling.  Exercises and therapy  Your healthcare provider may give you an exercise program. He or she may refer you to a physical therapist. The physical therapist will teach you how to gently stretch and strengthen the muscles around your elbow.  Anti-inflammatory injections  Your healthcare provider may give you injections of an anti-inflammatory medicine, such as cortisone. This helps reduce  swelling. You may have more pain at first. But in a few days, your elbow should feel better.  If surgery is needed  If your symptoms persist for a long time, or other treatments don t work, your healthcare provider may recommend surgery. Surgery repairs the inflamed tendon.   Date Last Reviewed: 1/1/2018 2000-2018 [employer]. [address] [place] [address], [place] [address]. All rights reserved. This information is not intended as a substitute for professional medical care. Always follow your healthcare professional's instructions.

## 2019-03-22 NOTE — TELEPHONE ENCOUNTER
[facility] Call Center    Phone Message  patient has swelling and pain in his right elbow, he would like to see Dr. Triplett today - I did transfer him to [facility] to check about a same day appt but patient still wants a call from Dr. Triplett's team     May a detailed message be left on voicemail: yes    Reason for Call: Other: patient has swelling and pain in his right elbow, he would like to see Dr. Triplett today - I did transfer him to [facility] to check about a same day appt but patient still wants a call from Dr. Triplett's team      Action Taken: Message routed to:  Primary Care p [phone]

## 2019-03-22 NOTE — TELEPHONE ENCOUNTER
Patient states he played baseball after doing some heavy lifting and felt elbow pain and swelling. He iced & rested. Scheduled at  in 1 hour.      Tasha Yost RN

## 2019-04-03 ASSESSMENT — ANXIETY QUESTIONNAIRES
7. FEELING AFRAID AS IF SOMETHING AWFUL MIGHT HAPPEN: SEVERAL DAYS
IF YOU CHECKED OFF ANY PROBLEMS ON THIS QUESTIONNAIRE, HOW DIFFICULT HAVE THESE PROBLEMS MADE IT FOR YOU TO DO YOUR WORK, TAKE CARE OF THINGS AT HOME, OR GET ALONG WITH OTHER PEOPLE: NOT DIFFICULT AT ALL
GAD7 TOTAL SCORE: 2
6. BECOMING EASILY ANNOYED OR IRRITABLE: NOT AT ALL
5. BEING SO RESTLESS THAT IT IS HARD TO SIT STILL: NOT AT ALL
1. FEELING NERVOUS, ANXIOUS, OR ON EDGE: NOT AT ALL
3. WORRYING TOO MUCH ABOUT DIFFERENT THINGS: SEVERAL DAYS
2. NOT BEING ABLE TO STOP OR CONTROL WORRYING: NOT AT ALL

## 2019-04-03 ASSESSMENT — PATIENT HEALTH QUESTIONNAIRE - PHQ9
5. POOR APPETITE OR OVEREATING: NOT AT ALL
SUM OF ALL RESPONSES TO PHQ QUESTIONS 1-9: 0

## 2019-04-04 ASSESSMENT — ANXIETY QUESTIONNAIRES: GAD7 TOTAL SCORE: 2

## 2019-07-03 DIAGNOSIS — E78.5 HYPERLIPIDEMIA LDL GOAL <130: ICD-10-CM

## 2019-07-03 RX ORDER — ATORVASTATIN CALCIUM 20 MG/1
TABLET, FILM COATED ORAL
Qty: 90 TABLET | Refills: 3 | OUTPATIENT
Start: 2019-07-03

## 2019-08-12 DIAGNOSIS — R35.1 NOCTURIA: ICD-10-CM

## 2019-08-12 DIAGNOSIS — I10 HYPERTENSION GOAL BP (BLOOD PRESSURE) < 140/90: ICD-10-CM

## 2019-08-12 RX ORDER — LISINOPRIL 20 MG/1
20 TABLET ORAL 2 TIMES DAILY
Qty: 180 TABLET | Refills: 0 | Status: SHIPPED | OUTPATIENT
Start: 2019-08-12 | End: 2019-10-30

## 2019-08-12 RX ORDER — TERAZOSIN 2 MG/1
2 CAPSULE ORAL AT BEDTIME
Qty: 90 CAPSULE | Refills: 0 | Status: SHIPPED | OUTPATIENT
Start: 2019-08-12 | End: 2019-10-30

## 2019-09-30 ENCOUNTER — HEALTH MAINTENANCE LETTER (OUTPATIENT)
Age: 59
End: 2019-09-30

## 2019-10-02 DIAGNOSIS — E78.5 HYPERLIPIDEMIA LDL GOAL <130: ICD-10-CM

## 2019-10-06 RX ORDER — ATORVASTATIN CALCIUM 20 MG/1
TABLET, FILM COATED ORAL
Qty: 90 TABLET | Refills: 0 | OUTPATIENT
Start: 2019-10-06

## 2019-10-08 DIAGNOSIS — E78.5 HYPERLIPIDEMIA LDL GOAL <130: ICD-10-CM

## 2019-10-10 RX ORDER — ATORVASTATIN CALCIUM 20 MG/1
TABLET, FILM COATED ORAL
Qty: 30 TABLET | Refills: 0 | Status: SHIPPED | OUTPATIENT
Start: 2019-10-10 | End: 2019-10-30

## 2019-10-10 NOTE — TELEPHONE ENCOUNTER
atorvastatin (LIPITOR) 20 MG tablet  Last Written Prescription Date:  10/9/2018 (per cardiology)  Last Fill Quantity: 90,  # refills: 3   Last office visit: 10/3/2018 with prescribing provider  Future Office Visit:   Next 5 appointments (look out 90 days)    Oct 30, 2019 10:50 AM CDT  PHYSICAL with Livier Triplett MD PhD  [facility] [address] [place] [id number] [phone]          Farzana given until appt per Dr. Triplett.      Liane Freeman, RN

## 2019-10-30 ENCOUNTER — OFFICE VISIT (OUTPATIENT)
Dept: PEDIATRICS | Facility: CLINIC | Age: 59
End: 2019-10-30
Payer: COMMERCIAL

## 2019-10-30 VITALS
SYSTOLIC BLOOD PRESSURE: 110 MMHG | BODY MASS INDEX: 30.55 KG/M2 | OXYGEN SATURATION: 97 % | DIASTOLIC BLOOD PRESSURE: 68 MMHG | TEMPERATURE: 97.4 F | HEART RATE: 60 BPM | WEIGHT: 185 LBS

## 2019-10-30 DIAGNOSIS — Z00.00 ROUTINE GENERAL MEDICAL EXAMINATION AT A HEALTH CARE FACILITY: Primary | ICD-10-CM

## 2019-10-30 DIAGNOSIS — E78.5 HYPERLIPIDEMIA LDL GOAL <130: ICD-10-CM

## 2019-10-30 DIAGNOSIS — Z12.5 SCREENING PSA (PROSTATE SPECIFIC ANTIGEN): ICD-10-CM

## 2019-10-30 DIAGNOSIS — R35.1 NOCTURIA: ICD-10-CM

## 2019-10-30 DIAGNOSIS — Q21.0 MEMBRANOUS VENTRICULAR SEPTAL DEFECT: Primary | ICD-10-CM

## 2019-10-30 DIAGNOSIS — I10 HYPERTENSION GOAL BP (BLOOD PRESSURE) < 140/90: ICD-10-CM

## 2019-10-30 LAB
ANION GAP SERPL CALCULATED.3IONS-SCNC: 3 MMOL/L (ref 3–14)
BUN SERPL-MCNC: 20 MG/DL (ref 7–30)
CALCIUM SERPL-MCNC: 9.2 MG/DL (ref 8.5–10.1)
CHLORIDE SERPL-SCNC: 109 MMOL/L (ref 94–109)
CHOLEST SERPL-MCNC: 131 MG/DL
CO2 SERPL-SCNC: 29 MMOL/L (ref 20–32)
CREAT SERPL-MCNC: 0.85 MG/DL (ref 0.66–1.25)
CREAT UR-MCNC: 41 MG/DL
GFR SERPL CREATININE-BSD FRML MDRD: >90 ML/MIN/{1.73_M2}
GLUCOSE SERPL-MCNC: 106 MG/DL (ref 70–99)
HDLC SERPL-MCNC: 63 MG/DL
LDLC SERPL CALC-MCNC: 51 MG/DL
MICROALBUMIN UR-MCNC: <5 MG/L
MICROALBUMIN/CREAT UR: NORMAL MG/G CR (ref 0–17)
NONHDLC SERPL-MCNC: 68 MG/DL
POTASSIUM SERPL-SCNC: 4.2 MMOL/L (ref 3.4–5.3)
PSA SERPL-ACNC: 2.98 UG/L (ref 0–4)
SODIUM SERPL-SCNC: 141 MMOL/L (ref 133–144)
TRIGL SERPL-MCNC: 85 MG/DL

## 2019-10-30 PROCEDURE — 82043 UR ALBUMIN QUANTITATIVE: CPT | Performed by: INTERNAL MEDICINE

## 2019-10-30 PROCEDURE — 99396 PREV VISIT EST AGE 40-64: CPT | Performed by: INTERNAL MEDICINE

## 2019-10-30 PROCEDURE — G0103 PSA SCREENING: HCPCS | Performed by: INTERNAL MEDICINE

## 2019-10-30 PROCEDURE — 80061 LIPID PANEL: CPT | Performed by: INTERNAL MEDICINE

## 2019-10-30 PROCEDURE — 36415 COLL VENOUS BLD VENIPUNCTURE: CPT | Performed by: INTERNAL MEDICINE

## 2019-10-30 PROCEDURE — 80048 BASIC METABOLIC PNL TOTAL CA: CPT | Performed by: INTERNAL MEDICINE

## 2019-10-30 RX ORDER — SODIUM FLUORIDE 5 MG/G
GEL, DENTIFRICE DENTAL AT BEDTIME
COMMUNITY
End: 2021-02-22

## 2019-10-30 RX ORDER — ATORVASTATIN CALCIUM 20 MG/1
20 TABLET, FILM COATED ORAL DAILY
Qty: 90 TABLET | Refills: 3 | Status: SHIPPED | OUTPATIENT
Start: 2019-10-30 | End: 2020-01-29

## 2019-10-30 RX ORDER — TERAZOSIN 2 MG/1
2 CAPSULE ORAL AT BEDTIME
Qty: 90 CAPSULE | Refills: 3 | Status: SHIPPED | OUTPATIENT
Start: 2019-10-30 | End: 2020-01-29

## 2019-10-30 RX ORDER — ATORVASTATIN CALCIUM 20 MG/1
TABLET, FILM COATED ORAL
Qty: 30 TABLET | Refills: 0 | Status: SHIPPED | OUTPATIENT
Start: 2019-10-30 | End: 2019-10-30

## 2019-10-30 RX ORDER — LISINOPRIL 20 MG/1
20 TABLET ORAL 2 TIMES DAILY
Qty: 180 TABLET | Refills: 3 | Status: SHIPPED | OUTPATIENT
Start: 2019-10-30 | End: 2020-01-29

## 2019-10-30 NOTE — TELEPHONE ENCOUNTER
Medication is being filled for 1 time refill only due to:  Patient needs to be seen because due for follow up.     Patient is scheduled to be seen this month.    Katelyn Velasquez RN, BSN, PHN  [facility]

## 2019-10-30 NOTE — LETTER
October 30, 2019      Wally Cano  [address]  [place] [address]        Dear ,    We are writing to inform you of your test results.    Labs were all normal or stable. Continue current medications. Return in one year.     Resulted Orders   BASIC METABOLIC PANEL   Result Value Ref Range    Sodium 141 133 - 144 mmol/L    Potassium 4.2 3.4 - 5.3 mmol/L    Chloride 109 94 - 109 mmol/L    Carbon Dioxide 29 20 - 32 mmol/L    Anion Gap 3 3 - 14 mmol/L    Glucose 106 (H) 70 - 99 mg/dL      Comment:      Fasting specimen    Urea Nitrogen 20 7 - 30 mg/dL    Creatinine 0.85 0.66 - 1.25 mg/dL    GFR Estimate >90 >60 mL/min/[1.73_m2]      Comment:      Non  GFR Calc  Starting 12/18/2018, serum creatinine based estimated GFR (eGFR) will be   calculated using the Chronic Kidney Disease Epidemiology Collaboration   (CKD-EPI) equation.      GFR Estimate If Black >90 >60 mL/min/[1.73_m2]      Comment:       GFR Calc  Starting 12/18/2018, serum creatinine based estimated GFR (eGFR) will be   calculated using the Chronic Kidney Disease Epidemiology Collaboration   (CKD-EPI) equation.      Calcium 9.2 8.5 - 10.1 mg/dL   Lipid panel reflex to direct LDL Fasting   Result Value Ref Range    Cholesterol 131 <200 mg/dL    Triglycerides 85 <150 mg/dL      Comment:      Fasting specimen    HDL Cholesterol 63 >39 mg/dL    LDL Cholesterol Calculated 51 <100 mg/dL      Comment:      Desirable:       <100 mg/dl    Non HDL Cholesterol 68 <130 mg/dL   Albumin Random Urine Quantitative with Creat Ratio   Result Value Ref Range    Creatinine Urine 41 mg/dL    Albumin Urine mg/L <5 mg/L    Albumin Urine mg/g Cr Unable to calculate due to low value 0 - 17 mg/g Cr   PROSTATE SPEC ANTIGEN SCREEN   Result Value Ref Range    PSA 2.98 0 - 4 ug/L      Comment:      Assay Method:  Chemiluminescence using Siemens Vista analyzer       If you have any questions or concerns, please call the clinic at  the number listed above.       Sincerely,        Livier Triplett MD PhD

## 2019-10-30 NOTE — PATIENT INSTRUCTIONS
Make appointment(s) for:   -- get urine test.       Check with your insurance regarding coverage for Shingrix (RZV) - the new shingles vaccine.         Medication(s) prescribed today:    Orders Placed This Encounter   Medications     sodium fluoride dental gel (PREVIDENT) 1.1 % GEL topical gel     Sig: Apply to affected area At Bedtime     atorvastatin (LIPITOR) 20 MG tablet     Sig: Take 1 tablet (20 mg) by mouth daily     Dispense:  90 tablet     Refill:  3     lisinopril (PRINIVIL/ZESTRIL) 20 MG tablet     Sig: Take 1 tablet (20 mg) by mouth 2 times daily     Dispense:  180 tablet     Refill:  3     terazosin (HYTRIN) 2 MG capsule     Sig: Take 1 capsule (2 mg) by mouth At Bedtime     Dispense:  90 capsule     Refill:  3           Preventive Health Recommendations  Male Ages 50 - 64    Yearly exam:             See your health care provider every year in order to  o   Review health changes.   o   Discuss preventive care.    o   Review your medicines if your doctor has prescribed any.     Have a cholesterol test every 5 years, or more frequently if you are at risk for high cholesterol/heart disease.     Have a diabetes test (fasting glucose) every three years. If you are at risk for diabetes, you should have this test more often.     Have a colonoscopy at age 50, or have a yearly FIT test (stool test). These exams will check for colon cancer.      Talk with your health care provider about whether or not a prostate cancer screening test (PSA) is right for you.    You should be tested each year for STDs (sexually transmitted diseases), if you re at risk.     Shots: Get a flu shot each year. Get a tetanus shot every 10 years.     Nutrition:    Eat at least 5 servings of fruits and vegetables daily.     Eat whole-grain bread, whole-wheat pasta and brown rice instead of white grains and rice.     Get adequate Calcium and Vitamin D.     Lifestyle    Exercise for at least 150 minutes a week (30 minutes a day, 5 days a  week). This will help you control your weight and prevent disease.     Limit alcohol to one drink per day.     No smoking.     Wear sunscreen to prevent skin cancer.     See your dentist every six months for an exam and cleaning.     See your eye doctor every 1 to 2 years.

## 2019-10-30 NOTE — PROGRESS NOTES
3  SUBJECTIVE:   CC: Wally Cano is an 58 year old male who presents for preventive health visit.     Healthy Habits:    Do you get at least three servings of calcium containing foods daily (dairy, green leafy vegetables, etc.)? yes    Amount of exercise or daily activities, outside of work: 10,000 STEPS DAILY, walks nightly    Problems taking medications regularly No    Medication side effects: No    Have you had an eye exam in the past two years? yes    Do you see a dentist twice per year? yes    Do you have sleep apnea, excessive snoring or daytime drowsiness?no    HPI:  Doing well. He was let go from [employer] but retrospect it was a good thing. 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 It is less stressful and very enjoyable. He is happy with the new job. He has been walking over 10K steps with this job and lost over 20 lbs.   -------------------------------------    Today's PHQ-2 Score:   PHQ-2 ( 1999 [employer]) 10/30/2019 10/3/2018   Q1: Little interest or pleasure in doing things 0 0   Q2: Feeling down, depressed or hopeless 0 0   PHQ-2 Score 0 0       Abuse: Current or Past(Physical, Sexual or Emotional)- No  Do you feel safe in your environment? Yes    Have you ever done Advance Care Planning? (For example, a Health Directive, POLST, or a discussion with a medical provider about your wishes): on file    Social History     Tobacco Use     Smoking status: Former Smoker     Years: 10.00     Types: Cigarettes, Cigars     Last attempt to quit: 10/28/2006     Years since quittin.0     Smokeless tobacco: Never Used   Substance Use Topics     Alcohol use: No     Comment: rare     If you drink alcohol do you typically have >3 drinks per day or >7 drinks per week? No                      Last PSA:   PSA   Date Value Ref Range Status   10/30/2019 2.98 0 - 4 ug/L Final     Comment:     Assay Method:  Chemiluminescence using Siemens Vista analyzer       Reviewed orders with  patient. Reviewed health maintenance and updated orders accordingly - Yes  Labs reviewed in EPIC    Reviewed and updated as needed this visit by clinical staff  Tobacco  Allergies         Reviewed and updated as needed this visit by Provider            ROS:  CONSTITUTIONAL: NEGATIVE for fever, chills, change in weight  INTEGUMENTARY/SKIN: NEGATIVE for worrisome rashes, moles or lesions  EYES: NEGATIVE for vision changes or irritation  ENT: NEGATIVE for ear, mouth and throat problems  RESP: NEGATIVE for significant cough or SOB  CV: NEGATIVE for chest pain, palpitations or peripheral edema  GI: NEGATIVE for nausea, abdominal pain, heartburn, or change in bowel habits   male: negative for dysuria, hematuria, decreased urinary stream, erectile dysfunction, urethral discharge  MUSCULOSKELETAL: NEGATIVE for significant arthralgias or myalgia  NEURO: NEGATIVE for weakness, dizziness or paresthesias  PSYCHIATRIC: NEGATIVE for changes in mood or affect    OBJECTIVE:   /68   Pulse 60   Temp 97.4  F (36.3  C) (Oral)   Wt 83.9 kg (185 lb)   SpO2 97%   BMI 30.55 kg/m    EXAM:  GENERAL: healthy, alert and no distress  EYES: Eyes grossly normal to inspection, PERRL and conjunctivae and sclerae normal  HENT: ear canals and TM's normal, nose and mouth without ulcers or lesions  NECK: no adenopathy, no asymmetry, masses, or scars and thyroid normal to palpation  RESP: lungs clear to auscultation - no rales, rhonchi or wheezes  CV: regular rate and rhythm, normal S1 S2, no S3 or S4, no murmur, click or rub, no peripheral edema and peripheral pulses strong  ABDOMEN: soft, nontender, no hepatosplenomegaly, no masses and bowel sounds normal  MS: no gross musculoskeletal defects noted, no edema  SKIN: no suspicious lesions or rashes  NEURO: Normal strength and tone, mentation intact and speech normal  PSYCH: mentation appears normal, affect normal/bright    Diagnostic Test Results:  Results for orders placed or performed  "in visit on 10/30/19 (from the past 24 hour(s))   BASIC METABOLIC PANEL   Result Value Ref Range    Sodium 141 133 - 144 mmol/L    Potassium 4.2 3.4 - 5.3 mmol/L    Chloride 109 94 - 109 mmol/L    Carbon Dioxide 29 20 - 32 mmol/L    Anion Gap 3 3 - 14 mmol/L    Glucose 106 (H) 70 - 99 mg/dL    Urea Nitrogen 20 7 - 30 mg/dL    Creatinine 0.85 0.66 - 1.25 mg/dL    GFR Estimate >90 >60 mL/min/[1.73_m2]    GFR Estimate If Black >90 >60 mL/min/[1.73_m2]    Calcium 9.2 8.5 - 10.1 mg/dL   Lipid panel reflex to direct LDL Fasting   Result Value Ref Range    Cholesterol 131 <200 mg/dL    Triglycerides 85 <150 mg/dL    HDL Cholesterol 63 >39 mg/dL    LDL Cholesterol Calculated 51 <100 mg/dL    Non HDL Cholesterol 68 <130 mg/dL   PROSTATE SPEC ANTIGEN SCREEN   Result Value Ref Range    PSA 2.98 0 - 4 ug/L   Albumin Random Urine Quantitative with Creat Ratio   Result Value Ref Range    Creatinine Urine 41 mg/dL    Albumin Urine mg/L <5 mg/L    Albumin Urine mg/g Cr Unable to calculate due to low value 0 - 17 mg/g Cr       ASSESSMENT/PLAN:       ICD-10-CM    1. Routine general medical examination at a health care facility Z00.00    2. Hyperlipidemia LDL goal <130 E78.5 Lipid panel reflex to direct LDL Fasting     atorvastatin (LIPITOR) 20 MG tablet   3. Hypertension goal BP (blood pressure) < 140/90 I10 BASIC METABOLIC PANEL     Albumin Random Urine Quantitative with Creat Ratio     lisinopril (PRINIVIL/ZESTRIL) 20 MG tablet   4. Screening PSA (prostate specific antigen) Z12.5 PROSTATE SPEC ANTIGEN SCREEN   5. Nocturia R35.1 terazosin (HYTRIN) 2 MG capsule     -- stable chronic health issues. Medications refilled.      -- Shingrix (RZV) advised. Pt will check insurance coverage.      COUNSELING:  Reviewed preventive health counseling, as reflected in patient instructions    Estimated body mass index is 30.55 kg/m  as calculated from the following:    Height as of 3/22/19: 1.657 m (5' 5.25\").    Weight as of this encounter: 83.9 " kg (185 lb).         reports that he quit smoking about 13 years ago. His smoking use included cigarettes and cigars. He quit after 10.00 years of use. He has never used smokeless tobacco.      Counseling Resources:  ATP IV Guidelines  Pooled Cohorts Equation Calculator  FRAX Risk Assessment  ICSI Preventive Guidelines  Dietary Guidelines for Americans, 2010  USDA's MyPlate  ASA Prophylaxis  Lung CA Screening    Livier Triplett MD PhD  [facility]

## 2019-12-17 ENCOUNTER — APPOINTMENT (OUTPATIENT)
Age: 59
Setting detail: DERMATOLOGY
End: 2019-12-17

## 2019-12-17 ENCOUNTER — TRANSFERRED RECORDS (OUTPATIENT)
Dept: HEALTH INFORMATION MANAGEMENT | Facility: CLINIC | Age: 59
End: 2019-12-17

## 2019-12-17 VITALS — RESPIRATION RATE: 16 BRPM | WEIGHT: 182 LBS | HEIGHT: 69 IN

## 2019-12-17 DIAGNOSIS — L57.3 POIKILODERMA OF CIVATTE: ICD-10-CM

## 2019-12-17 DIAGNOSIS — L57.0 ACTINIC KERATOSIS: ICD-10-CM

## 2019-12-17 DIAGNOSIS — B07.8 OTHER VIRAL WARTS: ICD-10-CM

## 2019-12-17 DIAGNOSIS — D22 MELANOCYTIC NEVI: ICD-10-CM

## 2019-12-17 PROBLEM — D48.5 NEOPLASM OF UNCERTAIN BEHAVIOR OF SKIN: Status: ACTIVE | Noted: 2019-12-17

## 2019-12-17 PROCEDURE — OTHER LIQUID NITROGEN: OTHER

## 2019-12-17 PROCEDURE — 99202 OFFICE O/P NEW SF 15 MIN: CPT | Mod: 25

## 2019-12-17 PROCEDURE — 17000 DESTRUCT PREMALG LESION: CPT | Mod: 59

## 2019-12-17 PROCEDURE — OTHER COUNSELING: OTHER

## 2019-12-17 PROCEDURE — OTHER BIOPSY BY SHAVE METHOD: OTHER

## 2019-12-17 PROCEDURE — 88305 TISSUE EXAM BY PATHOLOGIST: CPT

## 2019-12-17 PROCEDURE — 17110 DESTRUCT B9 LESION 1-14: CPT

## 2019-12-17 PROCEDURE — OTHER PATHOLOGY BILLING: OTHER

## 2019-12-17 PROCEDURE — 11102 TANGNTL BX SKIN SINGLE LES: CPT | Mod: 59

## 2019-12-17 ASSESSMENT — LOCATION SIMPLE DESCRIPTION DERM
LOCATION SIMPLE: LEFT CALF
LOCATION SIMPLE: SCALP
LOCATION SIMPLE: RIGHT ANTERIOR NECK
LOCATION SIMPLE: LEFT SCALP
LOCATION SIMPLE: LEFT ANTERIOR NECK

## 2019-12-17 ASSESSMENT — LOCATION DETAILED DESCRIPTION DERM
LOCATION DETAILED: RIGHT INFERIOR LATERAL NECK
LOCATION DETAILED: RIGHT SUPERIOR PARIETAL SCALP
LOCATION DETAILED: LEFT MEDIAL FRONTAL SCALP
LOCATION DETAILED: LEFT PROXIMAL CALF
LOCATION DETAILED: LEFT INFERIOR LATERAL NECK

## 2019-12-17 ASSESSMENT — LOCATION ZONE DERM
LOCATION ZONE: LEG
LOCATION ZONE: NECK
LOCATION ZONE: SCALP

## 2019-12-17 NOTE — PROCEDURE: BIOPSY BY SHAVE METHOD
Dressing: bandage
Detail Level: Detailed
Consent: - Verbal and written consent was obtained and risks were reviewed prior to procedure today. \\n- Risks discussed include but are not limited to scarring, infection, bleeding, scabbing, incomplete removal, nerve damage, pain, and allergy to anesthesia.
X Size Of Lesion In Cm: 0
Silver Nitrate Text: The wound bed was treated with silver nitrate after the biopsy was performed.
Render Path Notes In Note?: No
Electrodesiccation Text: The wound bed was treated with electrodesiccation after the biopsy was performed.
Biopsy Type: H and E
Post-Care Instructions: WOUND CARE:\\nDo NOT submerge wound in a bath, swimming pool, or hot tub for at least 1 week or for as long as there is an open wound. Gently cleanse the site daily with mild soap and water. Be careful NOT to allow a forceful stream of water to hit the biopsy site. After cleaning/showering, apply a thin layer of petrolatum ointment or Aquaphor in the wound followed by an adhesive bandage. Continue this process daily until healed. \\n\\nBLEEDING:\\nIf you develop persistent bleeding, apply firm and steady pressure over the dressing with gauze for 15 minutes. If bleeding persists, reapply pressure with an ice pack over the gauze for 15 minutes. NEVER APPLY ICE DIRECTLY TO THE SKIN. Do NOT peek under the gauze during these 15 minutes of pressure.  Call our office at [phone] if you cannot get the bleeding to stop. \\n\\nINFECTION:\\nSigns of infection may include increasing rather than decreasing pain (after the first few days), increasing redness/swelling/heat, draining pus, pink/red streaks around the wound, and fever or chills.  Please call our office immediately at [phone] if infection is suspected. It is normal to have some mild redness on or around the wound edges; this will lighten day by day and will become less tender.\\n\\nPAIN:\\nPain is usually minimal, but if needed you may take acetaminophen (Tylenol) every four hours as needed. Applying an ice pack over the dressing for 15-20 minutes every 2-3 hours will relieve swelling, lessen pain, and help minimize bruising. NEVER APPLY ICE DIRECTLY TO THE SKIN. Avoid ibuprofen (Advil, Motrin) and naproxen (Aleve) for the first 48 hours as these can increase bleeding.\\n\\nSWELLIG AND BRUISING:\\nSwelling and bruising are common and temporary, usually lasting 1 - 2 weeks. It is more common in areas treated around the eyes, hands, and feet. In the days following the procedure, swelling and bruising can be minimized by keeping the affected areas elevated when possible, reducing salty foods, and applying ice packs over the dressing for 15-20 minutes every 2-3 hours. NEVER APPLY ICE DIRECTLY TO THE SKIN.\\n\\nITCHING:\\nItchiness on and around the wound is very common and can last several days to weeks after surgery. Mild itch is normal as the wound is healing. \\n\\nNERVE CHANGES:\\nNumbness is usually temporary, but it may last for several weeks to months. You may also experience sharp pains at the wound sight as it heals.  Mild pain is normal and will gradually improve with time.\\n \\nNO SMOKING:\\nSmoking will delay the healing process. If you smoke, we recommend trying to quit or at minimum reduce how much you smoke following a procedure.
Anesthesia Volume In Cc (Will Not Render If 0): 0.5
Electrodesiccation And Curettage Text: The wound bed was treated with electrodesiccation and curettage after the biopsy was performed.
Render In Bullet Format When Appropriate: Yes
Path Notes Override (Will Replace All Of The Above Text): NROD
Biopsy Method: Dermablade
Curettage Text: The wound bed was treated with curettage after the biopsy was performed.
Anesthesia Type: 2% lidocaine with epinephrine
Notification Instructions: - It can take up to 2 weeks to get a biopsy result. \\n- Please refrain from calling to request results until after 2 weeks.
Wound Care: Petrolatum
Depth Of Biopsy: dermis
Cryotherapy Text: The wound bed was treated with cryotherapy after the biopsy was performed.
Hemostasis: Aluminum Chloride
Type Of Destruction Used: Electrodesiccation
Billing Type: Client Bill

## 2019-12-17 NOTE — PROCEDURE: COUNSELING
Patient Specific Counseling (Will Not Stick From Patient To Patient): - Discussed and recommended liquid nitrogen cryosurgery.
Detail Level: Simple
Patient Specific Counseling (Will Not Stick From Patient To Patient): Pt declined treatment.
Patient Specific Counseling (Will Not Stick From Patient To Patient): - Discussed that this nevus has atypical features that warrant a biopsy.\\n- Patient expressed understanding.\\n- Follow-up for re-examination for regimentation or an additional removal procedure may become necessary depending the pathologist's report.
Detail Level: Detailed

## 2019-12-17 NOTE — HPI: SKIN LESION
Has Your Skin Lesion Been Treated?: not been treated
Is This A New Presentation, Or A Follow-Up?: Skin Lesion
Additional History: Lost hair 15 years ago.

## 2019-12-17 NOTE — PROCEDURE: PATHOLOGY BILLING
Immunohistochemistry ([id number] and [id number]) billing is not performed here. Please use the Immunohistochemistry Stain Billing plan to accomplish this. Immunohistochemistry ([id number] and [id number]) billing is not performed here. Please use the Immunohistochemistry Stain Billing plan to accomplish this.

## 2019-12-17 NOTE — PROCEDURE: LIQUID NITROGEN
Render Post-Care Instructions In Note?: yes
Include Z78.9 (Other Specified Conditions Influencing Health Status) As An Associated Diagnosis?: No
Post-Care Instructions: - Avoid picking at any of the treated lesions.\\n- Blisters should not be popped. However should a blister rupture, cover it with Vaseline ointment or Aquaphor and a bandage until healed.
Detail Level: Detailed
Duration Of Freeze Thaw-Cycle (Seconds): 0
Consent: - Discussed that these are a result of cumulative sun exposure.\\n- Verbal and written consent was obtained, and risks were reviewed prior to procedure today. \\n- Risks discussed include but are not limited to pain, crusting, scabbing, blistering, scarring, temporary or permanent darker or lighter pigmentary change, recurrence, incomplete resolution, and infection.
Medical Necessity Information: It is in your best interest to select a reason for this procedure from the list below. All of these items fulfill various CMS LCD requirements except the new and changing color options.
Pared With?: 15 blade
Post-Care Instructions: - Patient was instructed to avoid picking at any of the treated lesions.\\n- Should any lesions treated today not be resolved within 4 weeks or if not certain, then return to clinic for re-evaluation.
Medical Necessity Clause: This procedure was medically necessary because the lesions that were treated were:
Consent: - Verbal and written consent was obtained, and risks were reviewed prior to procedure today. \\n- Risks discussed include but are not limited to pain, crusting, scabbing, blistering, scarring, temporary or permanent darker or lighter pigmentary change, recurrence, incomplete resolution, and infection.

## 2019-12-17 NOTE — PROCEDURE: PATHOLOGY BILLING
Rendering Text In Billing: The slides will be read by [employer] and reported in the attached document. Rendering Text In Billing: The slides will be read by [employer] and reported in the attached document.

## 2019-12-30 ENCOUNTER — TELEPHONE (OUTPATIENT)
Dept: OTOLARYNGOLOGY | Facility: CLINIC | Age: 59
End: 2019-12-30

## 2019-12-30 NOTE — TELEPHONE ENCOUNTER
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 Santiago is who performed wifes facial plastic surgery so patient wants to schedule removal of superficial melanoma from the top of his head.    This location of the melanoma is not listed under Dr. Santiago's protocols, so please reach out to patient and discuss if scheduling this for the patient is possible with Dr. Santiago or where he should go if Dr. Santiago would not do this procedure.    OK to leave detailed.

## 2019-12-31 NOTE — TELEPHONE ENCOUNTER
Phone call to pt, appointment scheduled 1/9/20, notes received from [facility]. Diane Mitchell RN

## 2020-01-06 NOTE — PROGRESS NOTES
Dear Duglas SAAB,    Thank you for asking me to see your patient, . Wally Cano, in consultation to evaluate his anticipated right vertex scalp defect after melanoma excision.  Today I had the pleasure of seeing him at the [facility] Otolaryngology at [facility].    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 No angiolymphatic or perineural invasion.     Comorbidities: VSD.   Pertinent medications: ASA 81mg.  Photographs:  consents signed January 9, 2020.  Connection: Wife = Aaliyah. 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    Care Checklist:   _Schedule excision followed by delayed reconstruction 1-2 weeks later. Also schedule an office visit with me between the excision and the delayed reconstruction to finalize the reconstructive plan. He favors serial closure for reconstruction.   _Hold ASA 7-10 days preop and 7 days postop.   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   _Sew in a bolster after the reconstruction to help with wound care and his work.        MEDICAL DECISION MAKING:    Excision Recommended. I recommend excision of the surrounding skin to obtain an appropriate margin.  An extensive preprocedure discussion was held.  Mr. Cano agrees with this plan. We have initiated procedure scheduling.  I look forward to seeing him on his procedure day.      Anticipated right vertex scalp defect after with planned delayed reconstruction. We reviewed the plan for delayed reconstruction to allow adequate time for permanent section review of the specimen to assure negative margins. If the margins are positive, we would proceed with an additional excision at a second surgery. We will proceed with reconstruction after negative margins are obtained. He understands that he will  "have a hole in his scalp during the delay period. We discussed the reconstructive options of healing by secondary intention versus skin grafting versus local flap reconstruction. The patient understands the reconstructive plan cannot be finalized until the size, depth, and contents of the defect are known. I would like him to follow up with me in the office after the initial excision to examine the defect and finalize our reconstructive plan.     We have initiated surgery coordination. I look forward to seeing him on his surgery day. It has been a pleasure to participate in the care of Mr. Cano. Thank you for this kind referral.      Sincerely,    Colt Santiago MD    Division of Facial Plastic and Reconstructive Surgery,   Department of Otolaryngology  [facility]   ____________________________________________________          HISTORY OF PRESENT ILLNESS and RECONSTRUCTIVE QUESTIONAAIRE:  As you know, Mr. Cano is an 59 year old-year-old male who has been referred to discuss reconstruction of an anticipated facial defect.      How would you describe the site on your face or neck with the cancer? red spot, \"port wine stain\"  When did you first notice the lesion or growth? 15-20 year(s), but had not changed  Has the lesion grown? none.     Has this area been biopsied? Yes, records from [facility].   Provider- Has this area been biopsied? See clinical summary.     Has this lesion been treated before? What treatment? none.   Provider- Has this lesion been treated before? What treatment? none.     What treatment has been recommended? Excision and clean margins.   Provider- What treatment has been recommended? excision.     Pain: Immediately after the biopsy, it was tender.  Provider- Pain: none now.    Ulceration: none.   Provider- Ulceration: none.     Bleeding: none.   Color change: none.   Purulence: none.   Oozing: none.    History of rupture: none.     For lesions near the " eye:   No: Does the lesion restrict eyelid function?  No: Do you have trouble closing your eyelids?  No: Do you currently have a dry eye?  No: Do you have a history of dry eyes (dry eyes before the lesion ever grew)?   No: Do you have a history of other eye problems?     For lesions near the nose:   No: History of nasal congestion?  No: History of allergies?  No: Allergic symptoms?  No: History of prior nasal surgery?  No: History of nasal trauma?  No: History of nasal medications?    For lesions near the ear:   No: Does the lesion block your hearing?  No: Do you have a history of hearing loss (hearing loss before the lesion ever grew)?  No: Do you use hearing aids?  Have you had a lot of sun exposure in the past? Yes  Do you remember blistering sunburns anywhere on your body? Yes, as a kid - not so much as an adult  Have you used a tanning bed in the past? Yes - not for 25-30 years    Do you currently smoke? No  Provider- Do you currently smoke? No  Have you smoked in the past? Yes  No: Have you had radiation to your head or neck in the past?   No: Have you had a prior burn injury to the face?   No: Have you had previous skin cancers?    REVIEW OF SYSTEMS: a 12 system review was performed by the patient care staff:    Do you currently have or have you ever had in the past:    No: Complications with sedation or anesthesia.  No: Use blood thinners. No - varicose vein surgery 2 years ago and helped superficial thrombophlebitis and possible DVT.  Yes - Congenital VSD, followed by Dr. Lemus at the [facility] every 5 years: Any heart problems.   No: Chest pain.   No: A pacemaker.  No: Problems with excessive bleeding or a bleeding disorder.  Yes: Problems with blood clots or a clotting disorder.   No: Sleep apnea or sleep with a CPAP machine.       No: Excessive scarring.   No: Night sweats.   No: Fevers.   No: Double vision.   No: Vision loss.   No: Snoring.   No: Difficulty breathing through your nose.   No: Runny nose.    No: Sneezing.   No: Itchy eyes.   No: Itchy throat.   No: Face pain.   No: Face weakness.   No: Face numbness.   No: Difficulty swallowing.   No: Pain with swallowing.,   Yes - partial hearing loss in left ear: Difficulty hearing or hearing loss.   No: Difficulty urinating.   No: Anxiety.   No: Depression.     FAMILY HISTORY:  No: Family history of excessive bleeding or a bleeding disorder.    No: Family history of blood clots or a clotting disorder.    Family History   Problem Relation Age of Onset     Heart Disease Father 50        MI     Diabetes Father      Cerebrovascular Disease Maternal Grandmother 70     Cerebrovascular Disease Maternal Grandfather 80     Cerebrovascular Disease Paternal Grandmother 80     Heart Disease Paternal Grandfather 62        MI     Hypertension Brother      Obesity Sister      Circulatory Mother         varicose veins     Prostate Cancer Maternal Uncle         1/2 brother to mother     Cancer - colorectal No family hx of        PAST MEDICAL HISTORY:   Past Medical History:   Diagnosis Date     * * * SBE PROPHYLAXIS * * *      DDD (degenerative disc disease), lumbar      Elevated PSA 7/2014     Hypertension      Intervertebral disc rupture 2002    chronic pain medication-off now.      PE (pulmonary embolism) 1995    due to the SBE      SBE (subacute bacterial endocarditis) 1995     Varicose veins      VSD (ventricular septal defect)     1 cm south of septum        PAST SURGICAL HISTORY:   Past Surgical History:   Procedure Laterality Date     ARTHROSCOPY KNEE RT/LT  1979    removal of bone chips LT knee      EYE SURGERY  2016    Bilateral Cataract removal     HEMORRHOIDECTOMY  1/10     HERNIA REPAIR, INGUINAL RT/LT      Left     IMPLANT SHUNT VENTRICULOATRIAL       MOUTH SURGERY      wisdom teeth     TONSILLECTOMY  1997       SOCIAL HISTORY:   Social History     Tobacco Use     Smoking status: Former Smoker     Years: 10.00     Types: Cigarettes, Cigars     Last attempt to quit:  10/28/2006     Years since quittin.2     Smokeless tobacco: Never Used   Substance Use Topics     Alcohol use: No     Comment: rare       ALLERGIES: Ivp dye [contrast dye]    MEDICATIONS:   Current Outpatient Medications   Medication Sig Dispense Refill     Acetaminophen (TYLENOL PO) Take 500 mg by mouth once       amoxicillin (AMOXIL) 500 MG capsule Take 4 capsules one hour prior to dental procedure 20 capsule 0     ASPIRIN PO Take 81 mg by mouth daily       atorvastatin (LIPITOR) 20 MG tablet Take 1 tablet (20 mg) by mouth daily 90 tablet 3     IBUPROFEN PO Take 400 mg by mouth every 8 hours as needed for moderate pain       lisinopril (PRINIVIL/ZESTRIL) 20 MG tablet Take 1 tablet (20 mg) by mouth 2 times daily 180 tablet 3     order for DME Equipment being ordered: Tennis elbow strap 1 Device 0     sodium fluoride dental gel (PREVIDENT) 1.1 % GEL topical gel Apply to affected area At Bedtime       terazosin (HYTRIN) 2 MG capsule Take 1 capsule (2 mg) by mouth At Bedtime 90 capsule 3       Lesion size measured on the patient in centimeters (check OR report or Mohs report): 1.3 cm brown patch on scalp    Patient Care Staff Signature: Serina Mejia LPN  ______________________________________________    Provider- Review of systems, FH, PMH, PSH, SH, ALL, and Medications taken by the patient care staff was reviewed by me: Colt Santiago MD      Provider- PHYSICAL EXAMINATION:  CONSTITUTIONAL:  No apparent distress.  Pleasant affect.  Normal ability to communicate.  CRANIOFACIAL:  Normocephalic, atraumatic.    SKIN:  80k11ve defect.   Subunits involved: right vertex scalp.   Depth: central wound with crusting and the peripheral portion is reepithalized.  Surrounding skin is viable. No bleeding.    EYES:  Extraocular muscles intact.  EARS:  Normal auricles.  Tympanic membranes clear bilaterally.  NOSE: No external nasal valve collapse.  Slight septal deviation.  No polyps or purulence.  ORAL CAVITY AND  OROPHARYNX: no lesions on inspection.  NECK:  The parotid is soft, without masses.  Supple laryngeal landmarks.  LYMPHATIC:  No palpable lymphadenopathy.  CARDIOVASCULAR:  Carotid pulses are palpable bilaterally.  NEUROLOGIC:  Facial nerve intact.  RESPIRATORY:  Normal respiratory effort.  No stridor.  Voice strong.        PREPROCEDURE COUNSELING: An extensive preoperative discussion was held.  The patient stated he understood the risks, benefits, alternatives and limitations of the procedure.     PREPROCEDURE COUNSELING FOR LESION EXCISION: The risks of lesion excision were discussed, including but not limited to: bleeding, infection, damage to surrounding structures, weakness, numbness, chronic pain, unfavorable change in his appearance, partial or total skin loss, excessive scarring, extruded sutures, positive margins requiring further resection, discovery of unexpected pathology requiring a treatment plan change and unforeseen complications related to the procedure or anesthesia.      Provider-: PREOPERATIVE COUNSELING for RECONSTRUCTION: An extensive preoperative discussion was held. The patient stated he understood the risks, benefits, alternatives and limitations of the procedure. The risks including but not limited to bleeding, infection, damage to surrounding structures, numbness, weakness, cancer recurrence, chronic pain, poor aesthetic result, partial or total skin loss, distortion of surrounding facial structures, and unforeseen complications related to surgery or anesthesia were described. I emphasized the risks of partial or total skin loss at the surgical sites.  I discussed the limitations of this procedure in that any donor sites will have anticipated scars and complications can occur at the donor site.  He understands that more skin may need to be excised during the reconstruction. The patient understands that reconstruction may be delayed to allow permanent section review of the specimen to assure  negative margins or to adjust the reconstructive plan.  We discussed how additional surgeries may be needed to obtain an optimal result. I described how no reconstructive effort will be able to restore him to his exact precancer condition. We also acknowledged that facial asymmetries will be present after the reconstruction.    The patient stated he understood these risks and limitations and elects to proceed with surgery.  He also stated he had his questions answered to his satisfaction.

## 2020-01-09 ENCOUNTER — OFFICE VISIT (OUTPATIENT)
Dept: OTOLARYNGOLOGY | Facility: CLINIC | Age: 60
End: 2020-01-09
Payer: COMMERCIAL

## 2020-01-09 ENCOUNTER — PREP FOR PROCEDURE (OUTPATIENT)
Dept: OTOLARYNGOLOGY | Facility: CLINIC | Age: 60
End: 2020-01-09

## 2020-01-09 VITALS — SYSTOLIC BLOOD PRESSURE: 157 MMHG | OXYGEN SATURATION: 98 % | HEART RATE: 69 BPM | DIASTOLIC BLOOD PRESSURE: 90 MMHG

## 2020-01-09 DIAGNOSIS — C43.9 MELANOMA OF SKIN (H): Primary | ICD-10-CM

## 2020-01-09 DIAGNOSIS — C43.4 MELANOMA OF SCALP (H): Primary | ICD-10-CM

## 2020-01-09 PROCEDURE — 99244 OFF/OP CNSLTJ NEW/EST MOD 40: CPT | Performed by: OTOLARYNGOLOGY

## 2020-01-09 NOTE — LETTER
1/9/2020         RE: Wally Cano  [address]  [facility] [address]        Dear Colleague,    Thank you for referring your patient, Wally Cano, to the [facility]. Please see a copy of my visit note below.    Dear Duglas SAAB,    Thank you for asking me to see your patient, . Wally Cano, in consultation to evaluate his anticipated right vertex scalp defect after melanoma excision.  Today I had the pleasure of seeing him at the Facial Plastic and Re[facility]ive Surgery Clinic Otolaryngology at [facility], [place], operated by [facility].    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 No angiolymphatic or perineural invasion.     Comorbidities: VSD.   Pertinent medications: ASA 81mg.  Photographs: [facility] consents signed January 9, 2020.  Connection: Wife = Aaliyah. 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    Care Checklist:   _Schedule excision followed by delayed reconstruction 1-2 weeks later. Also schedule an office visit with me between the excision and the delayed reconstruction to finalize the reconstructive plan. He favors serial closure for reconstruction.   _Hold ASA 7-10 days preop and 7 days postop.   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   _Sew in a bolster after the reconstruction to help with wound care and his work.        MEDICAL DECISION MAKING:    Excision Recommended. I recommend excision of the surrounding skin to obtain an appropriate margin.  An extensive preprocedure discussion was held.  Mr. Cano agrees with this plan. We have initiated procedure scheduling.  I look forward to seeing him on his procedure day.      Anticipated right vertex scalp defect after with planned delayed reconstruction. We reviewed the plan for delayed reconstruction to allow adequate time for permanent  "section review of the specimen to assure negative margins. If the margins are positive, we would proceed with an additional excision at a second surgery. We will proceed with reconstruction after negative margins are obtained. He understands that he will have a hole in his scalp during the delay period. We discussed the reconstructive options of healing by secondary intention versus skin grafting versus local flap reconstruction. The patient understands the reconstructive plan cannot be finalized until the size, depth, and contents of the defect are known. I would like him to follow up with me in the office after the initial excision to examine the defect and finalize our reconstructive plan.     We have initiated surgery coordination. I look forward to seeing him on his surgery day. It has been a pleasure to participate in the care of Mr. Cano. Thank you for this kind referral.      Sincerely,    Colt Santiago MD    Division of Facial Plastic and Reconstructive Surgery,   Department of Otolaryngology  [facility]   ____________________________________________________          HISTORY OF PRESENT ILLNESS and RECONSTRUCTIVE QUESTIONAAIRE:  As you know, Mr. Cano is an 59 year old-year-old male who has been referred to discuss reconstruction of an anticipated facial defect.      How would you describe the site on your face or neck with the cancer? red spot, \"port wine stain\"  When did you first notice the lesion or growth? 15-20 year(s), but had not changed  Has the lesion grown? none.     Has this area been biopsied? Yes, records from [facility].   Provider- Has this area been biopsied? See clinical summary.     Has this lesion been treated before? What treatment? none.   Provider- Has this lesion been treated before? What treatment? none.     What treatment has been recommended? Excision and clean margins.   Provider- What treatment has been recommended? excision.     Pain: " Immediately after the biopsy, it was tender.  Provider- Pain: none now.    Ulceration: none.   Provider- Ulceration: none.     Bleeding: none.   Color change: none.   Purulence: none.   Oozing: none.    History of rupture: none.     For lesions near the eye:   No: Does the lesion restrict eyelid function?  No: Do you have trouble closing your eyelids?  No: Do you currently have a dry eye?  No: Do you have a history of dry eyes (dry eyes before the lesion ever grew)?   No: Do you have a history of other eye problems?     For lesions near the nose:   No: History of nasal congestion?  No: History of allergies?  No: Allergic symptoms?  No: History of prior nasal surgery?  No: History of nasal trauma?  No: History of nasal medications?    For lesions near the ear:   No: Does the lesion block your hearing?  No: Do you have a history of hearing loss (hearing loss before the lesion ever grew)?  No: Do you use hearing aids?  Have you had a lot of sun exposure in the past? Yes  Do you remember blistering sunburns anywhere on your body? Yes, as a kid - not so much as an adult  Have you used a tanning bed in the past? Yes - not for 25-30 years    Do you currently smoke? No  Provider- Do you currently smoke? No  Have you smoked in the past? Yes  No: Have you had radiation to your head or neck in the past?   No: Have you had a prior burn injury to the face?   No: Have you had previous skin cancers?    REVIEW OF SYSTEMS: a 12 system review was performed by the patient care staff:    Do you currently have or have you ever had in the past:    No: Complications with sedation or anesthesia.  No: Use blood thinners. No - varicose vein surgery 2 years ago and helped superficial thrombophlebitis and possible DVT.  Yes - Congenital VSD, followed by Dr. Lemus at the [facility] every 5 years: Any heart problems.   No: Chest pain.   No: A pacemaker.  No: Problems with excessive bleeding or a bleeding disorder.  Yes: Problems with blood clots or  a clotting disorder.   No: Sleep apnea or sleep with a CPAP machine.       No: Excessive scarring.   No: Night sweats.   No: Fevers.   No: Double vision.   No: Vision loss.   No: Snoring.   No: Difficulty breathing through your nose.   No: Runny nose.   No: Sneezing.   No: Itchy eyes.   No: Itchy throat.   No: Face pain.   No: Face weakness.   No: Face numbness.   No: Difficulty swallowing.   No: Pain with swallowing.,   Yes - partial hearing loss in left ear: Difficulty hearing or hearing loss.   No: Difficulty urinating.   No: Anxiety.   No: Depression.     FAMILY HISTORY:  No: Family history of excessive bleeding or a bleeding disorder.    No: Family history of blood clots or a clotting disorder.    Family History   Problem Relation Age of Onset     Heart Disease Father 50        MI     Diabetes Father      Cerebrovascular Disease Maternal Grandmother 70     Cerebrovascular Disease Maternal Grandfather 80     Cerebrovascular Disease Paternal Grandmother 80     Heart Disease Paternal Grandfather 62        MI     Hypertension Brother      Obesity Sister      Circulatory Mother         varicose veins     Prostate Cancer Maternal Uncle         1/2 brother to mother     Cancer - colorectal No family hx of        PAST MEDICAL HISTORY:   Past Medical History:   Diagnosis Date     * * * SBE PROPHYLAXIS * * *      DDD (degenerative disc disease), lumbar      Elevated PSA 7/2014     Hypertension      Intervertebral disc rupture 2002    chronic pain medication-off now.      PE (pulmonary embolism) 1995    due to the SBE      SBE (subacute bacterial endocarditis) 1995     Varicose veins      VSD (ventricular septal defect)     1 cm south of septum        PAST SURGICAL HISTORY:   Past Surgical History:   Procedure Laterality Date     ARTHROSCOPY KNEE RT/LT  1979    removal of bone chips LT knee      EYE SURGERY  2016    Bilateral Cataract removal     HEMORRHOIDECTOMY  1/10     HERNIA REPAIR, INGUINAL RT/LT      Left      IMPLANT SHUNT VENTRICULOATRIAL       MOUTH SURGERY      wisdom teeth     TONSILLECTOMY         SOCIAL HISTORY:   Social History     Tobacco Use     Smoking status: Former Smoker     Years: 10.00     Types: Cigarettes, Cigars     Last attempt to quit: 10/28/2006     Years since quittin.2     Smokeless tobacco: Never Used   Substance Use Topics     Alcohol use: No     Comment: rare       ALLERGIES: Ivp dye [contrast dye]    MEDICATIONS:   Current Outpatient Medications   Medication Sig Dispense Refill     Acetaminophen (TYLENOL PO) Take 500 mg by mouth once       amoxicillin (AMOXIL) 500 MG capsule Take 4 capsules one hour prior to dental procedure 20 capsule 0     ASPIRIN PO Take 81 mg by mouth daily       atorvastatin (LIPITOR) 20 MG tablet Take 1 tablet (20 mg) by mouth daily 90 tablet 3     IBUPROFEN PO Take 400 mg by mouth every 8 hours as needed for moderate pain       lisinopril (PRINIVIL/ZESTRIL) 20 MG tablet Take 1 tablet (20 mg) by mouth 2 times daily 180 tablet 3     order for DME Equipment being ordered: Tennis elbow strap 1 Device 0     sodium fluoride dental gel (PREVIDENT) 1.1 % GEL topical gel Apply to affected area At Bedtime       terazosin (HYTRIN) 2 MG capsule Take 1 capsule (2 mg) by mouth At Bedtime 90 capsule 3       Lesion size measured on the patient in centimeters (check OR report or Mohs report): 1.3 cm brown patch on scalp    Patient Care Staff Signature: Serina Mejia LPN  ______________________________________________    Provider- Review of systems, FH, PMH, PSH, SH, ALL, and Medications taken by the patient care staff was reviewed by me: Colt Santiago MD      Provider- PHYSICAL EXAMINATION:  CONSTITUTIONAL:  No apparent distress.  Pleasant affect.  Normal ability to communicate.  CRANIOFACIAL:  Normocephalic, atraumatic.    SKIN:  [id number] defect.   Subunits involved: right vertex scalp.   Depth: central wound with crusting and the peripheral portion is  reepithalized.  Surrounding skin is viable. No bleeding.    EYES:  Extraocular muscles intact.  EARS:  Normal auricles.  Tympanic membranes clear bilaterally.  NOSE: No external nasal valve collapse.  Slight septal deviation.  No polyps or purulence.  ORAL CAVITY AND OROPHARYNX: no lesions on inspection.  NECK:  The parotid is soft, without masses.  Supple laryngeal landmarks.  LYMPHATIC:  No palpable lymphadenopathy.  CARDIOVASCULAR:  Carotid pulses are palpable bilaterally.  NEUROLOGIC:  Facial nerve intact.  RESPIRATORY:  Normal respiratory effort.  No stridor.  Voice strong.        PREPROCEDURE COUNSELING: An extensive preoperative discussion was held.  The patient stated he understood the risks, benefits, alternatives and limitations of the procedure.     PREPROCEDURE COUNSELING FOR LESION EXCISION: The risks of lesion excision were discussed, including but not limited to: bleeding, infection, damage to surrounding structures, weakness, numbness, chronic pain, unfavorable change in his appearance, partial or total skin loss, excessive scarring, extruded sutures, positive margins requiring further resection, discovery of unexpected pathology requiring a treatment plan change and unforeseen complications related to the procedure or anesthesia.      Provider-: PREOPERATIVE COUNSELING for RECONSTRUCTION: An extensive preoperative discussion was held. The patient stated he understood the risks, benefits, alternatives and limitations of the procedure. The risks including but not limited to bleeding, infection, damage to surrounding structures, numbness, weakness, cancer recurrence, chronic pain, poor aesthetic result, partial or total skin loss, distortion of surrounding facial structures, and unforeseen complications related to surgery or anesthesia were described. I emphasized the risks of partial or total skin loss at the surgical sites.  I discussed the limitations of this procedure in that any donor sites will  have anticipated scars and complications can occur at the donor site.  He understands that more skin may need to be excised during the reconstruction. The patient understands that reconstruction may be delayed to allow permanent section review of the specimen to assure negative margins or to adjust the reconstructive plan.  We discussed how additional surgeries may be needed to obtain an optimal result. I described how no reconstructive effort will be able to restore him to his exact precancer condition. We also acknowledged that facial asymmetries will be present after the reconstruction.    The patient stated he understood these risks and limitations and elects to proceed with surgery.  He also stated he had his questions answered to his satisfaction.          Again, thank you for allowing me to participate in the care of your patient.        Sincerely,        Colt Santiago MD

## 2020-01-09 NOTE — LETTER
January 9, 2020      Re:Wally Cano        [address]        [facility] [address]              To Whom it may concern,     Wally Cano is under my care and requires a staged medical procedure.  This is currently scheduled for 1/16/20, clinic appointment 1/23/20, stage 2 procedure 1/30/20, clinic appointment 2/6/20 with further appointments to be determined. Please allow Wally to take this time off, for these necessary procedures.      Sincerely,      Colt Santiago MD

## 2020-01-09 NOTE — NURSING NOTE
Wally Cano's goals for this visit include:   Chief Complaint   Patient presents with     Consult     Right scalp superficial spreading melanoma     He requests these members of his care team be copied on today's visit information: Yes    PCP: Livier Triplett    Referring Provider:  No referring provider defined for this encounter.    BP (!) 157/90 (BP Location: Left arm, Patient Position: Sitting, Cuff Size: Adult Large)   Pulse 69   SpO2 98%     Do you need any medication refills at today's visit? No    Serina Mejia LPN

## 2020-01-13 ENCOUNTER — TELEPHONE (OUTPATIENT)
Dept: OTOLARYNGOLOGY | Facility: CLINIC | Age: 60
End: 2020-01-13

## 2020-01-13 NOTE — TELEPHONE ENCOUNTER
Health Call Center    Phone Message    May a detailed message be left on voicemail: yes    Reason for Call: Aaliyah from [employer] is requesting office visit notes and other clinical information regarding the patient's disability.  She is requesting documentation regarding diagnosis, restrictions/limitations, treatment plan.  Please advise.    Fax: [phone]    Action Taken: Message routed to:  Adult Clinics: ENT p [phone]

## 2020-01-14 NOTE — TELEPHONE ENCOUNTER
Short Term Disability forms completed and faxed to [facility] at [phone], claim [id number].  Diane Mitchell RN

## 2020-01-16 ENCOUNTER — ANESTHESIA (OUTPATIENT)
Dept: SURGERY | Facility: AMBULATORY SURGERY CENTER | Age: 60
End: 2020-01-16

## 2020-01-16 ENCOUNTER — ANESTHESIA EVENT (OUTPATIENT)
Dept: SURGERY | Facility: AMBULATORY SURGERY CENTER | Age: 60
End: 2020-01-16

## 2020-01-16 ENCOUNTER — HOSPITAL ENCOUNTER (OUTPATIENT)
Facility: AMBULATORY SURGERY CENTER | Age: 60
Discharge: HOME | End: 2020-01-16
Attending: OTOLARYNGOLOGY | Admitting: OTOLARYNGOLOGY
Payer: COMMERCIAL

## 2020-01-16 VITALS
OXYGEN SATURATION: 94 % | SYSTOLIC BLOOD PRESSURE: 148 MMHG | TEMPERATURE: 97.9 F | RESPIRATION RATE: 18 BRPM | DIASTOLIC BLOOD PRESSURE: 85 MMHG

## 2020-01-16 DIAGNOSIS — C43.4 MELANOMA OF SCALP (H): ICD-10-CM

## 2020-01-16 PROCEDURE — 11624 EXC S/N/H/F/G MAL+MRG 3.1-4: CPT

## 2020-01-16 PROCEDURE — G8907 PT DOC NO EVENTS ON DISCHARG: HCPCS

## 2020-01-16 PROCEDURE — 88305 TISSUE EXAM BY PATHOLOGIST: CPT | Performed by: OTOLARYNGOLOGY

## 2020-01-16 PROCEDURE — G8918 PT W/O PREOP ORDER IV AB PRO: HCPCS

## 2020-01-16 PROCEDURE — 11624 EXC S/N/H/F/G MAL+MRG 3.1-4: CPT | Performed by: OTOLARYNGOLOGY

## 2020-01-16 RX ORDER — CEPHALEXIN 500 MG/1
500 CAPSULE ORAL 4 TIMES DAILY
Qty: 28 CAPSULE | Refills: 0 | Status: SHIPPED | OUTPATIENT
Start: 2020-01-16 | End: 2020-01-23

## 2020-01-16 RX ORDER — LIDOCAINE HYDROCHLORIDE AND EPINEPHRINE 10; 10 MG/ML; UG/ML
INJECTION, SOLUTION INFILTRATION; PERINEURAL PRN
Status: DISCONTINUED | OUTPATIENT
Start: 2020-01-16 | End: 2020-01-16 | Stop reason: HOSPADM

## 2020-01-16 RX ORDER — MUPIROCIN 20 MG/G
OINTMENT TOPICAL
Qty: 22 G | Refills: 1 | Status: SHIPPED | OUTPATIENT
Start: 2020-01-16 | End: 2020-01-30

## 2020-01-16 RX ORDER — OXYCODONE HYDROCHLORIDE 5 MG/1
5 TABLET ORAL EVERY 6 HOURS PRN
Qty: 12 TABLET | Refills: 0 | Status: SHIPPED | OUTPATIENT
Start: 2020-01-16 | End: 2020-01-30

## 2020-01-16 NOTE — DISCHARGE INSTRUCTIONS
Patient Instructions for Facial Discharge    Colt Santiago MD    Please read and familiarize yourself with these instructions. By following them carefully, you will assist in obtaining the best possible result from your surgery. If questions arise, do not hesitate to contact with me and discuss your questions at any time.     Surgery Site Care:    [] Incision care (for incisions away from the eyes): Apply the antibiotic ointment Bactroban (mupirocin) to all of your incisions every 2 hours while you are awake. The incisions should be covered at all times by ointment because new skin cells grow best across moist surfaces. Use the Bactroban ointment for 3 days. On the fourth day, stop using the Bactroban and switch to Vaseline ointment. Continue to apply the Vaseline ointment every 2 hours to your incisions while you are awake. The switch to Vasoline is made because occasionally patients can develop an allergy to Bactoban if used for a long time.     [] Incision care (for incisions around the eyes):  For incisions near the eyes, apply the antibiotic ointment Erythromycin ophthalmic to all of your incisions every 2 hours while you are awake. Erythromycin ophthalmic ointment is safe to have near or in your eyes. The incisions should be covered at all times by the ointment because skin cells grow best across moist surfaces.     [] Crusting: Avoid crust build-up along your incisions. If crusts form, apply ointment more frequently. Crust build-up is a sign that your incisions or wounds are too dry.    [x] Bolster: A bolster has been applied to your reconstructive site to immobilize the area and promote healing. The bolster is usually removed at the first or second postoperative visit. It is very important to avoid bumping the bolster. Any trauma to the bolster may damage the skin graft. Apply the antibiotic ointment Bactroban (mupirocin) to the bolster every 2 hours while you are awake. Use the Bactroban ointment for 3  days. On the fourth day, stop using the Bactroban and switch to Vaseline ointment. Continue to apply the Vaseline ointment every 2 hours to your incisions while you are awake. The switch to Vaseline is made because occasionally patients can develop an allergy to Bactroban if used for a long time. If the bolster is near your eye, do not use Bactroban or Vasoline near the eye; rather use Erythromycin ointment on the part of the bolster near your eye. The bolster should be covered at all times by the ointment. Do not get the bolster wet in the shower or bath.      [] Granulation: Some areas of your face were left open to allow healing from the inside-out, a process known as granulation. To promote healing, the open area should be covered at all times by ointment. If the open area drys out, it will not heal properly. Apply the Bactroban ointment every 2 hours while you are awake to this area for 3 days. On the fourth day, stop using the Bactroban and switch to Vaseline ointment. Continue to apply the Vasoline oointment every 2 hours to the open areas while you are awake     [] Full Thickness Skin Graft Donor Site: A skin graft was removed from your ear, neck, face, leg, or groin. It has been partially or fully closed with sutures.     [] Steri-strips (wound tape) have been applied to the area. Keep this tape in place and dry until you see Dr. Santiago.     [] Please keep this area moist at all times. Apply the antibiotic ointment Bactroban to this donor area every 2 hours while you are awake. Use the Bactroban ointment for 3 days. On the fourth day, stop using the Bactroban and switch to Vaseline ointment. Continue to apply the Vasoline ointment every 2 hours to the donor area while you are awake until you see Dr. Santiago.    [] Split Thickness Skin Graft Donor Site: A skin graft was removed from your leg, arm, or waist. It is covered with a clear dressing. Bloody fluid will collect under the dressing. This fluid helps  promote healing. Try to keep the dressing and collected fluid in place for as long as possible. Do not disturb the dressing or get the dressing wet. If the fluid drains out, do not remove the dressing. Simply tape gauze over the leaking area to catch the fluid and keep your clothes clean. If there is concern about the dressing or healing of the skin graft site, please call our office.     [] Ear Cartilage [place]: Ear cartilage was removed from one or both of your ears and used to strengthen and shape your reconstruction. A gauze bolster has been sewn onto your ear to immobilize the area and promote healing. The bolster is usually removed at the first or second postoperative visit. It is very important to avoid bumping the bolster. Apply the antibiotic ointment Bactroban to the bolster every 2 hours while you are awake. Use the Bactroban ointment for 3 days. On the fourth day, stop using the Bactroban and switch to Vaseline ointment. Continue to apply the Vaseline ointment every 2 hours to your incisions while you are awake. The bolster should be covered at all times by the ointment. Do not get the bolster wet in the shower or bath.     [] Rib Cartilage [place]: Rib cartilage was removed from your chest and used to strengthen and your reconstruction. Steri-strips are across the incision. You may get these strips wet in the shower 72 hours after surgery. They will eventually fall off on their own.     [] Pressure Dressing: A pressure dressing was applied to your _____________________________.     [] Remove this pressure dressing ____ hours after surgery.     [] Keep this pressure dressing in place until your next visit to our office.    [] Drain: A drain was placed in your surgical site.  If this drain was removed in the hospital, leaking of fluid out of the wound where the drain exited is expected. You may get this area wet in the shower 72 hours after removal of the drain. Please call our office if the drainage is  foul smelling or the site becomes more red or painful. If the drain has not been removed, please call ____to arrange removal of the drain on ________     [] Ice Packs:     [] Apply ice packs to your eyes and around the surgical site during the first 24 hours. The packs can rest gently on the surgery site but avoid traumatizing the surgical areas. Either a commercially available ice pack from your pharmacy or crushed ice in a plastic bag covered with a cloth may be used. Do not let the skin get too cool by keeping a cloth between the ice pack and your skin.     [] No Ice Packs: the cold could damage your skin.    Bathing Instructions:      Showering:    [] Do NOT get your surgery site(s) wet until approved by Dr. Santiago. You may take a tub bath after surgery but you must keep your surgery site(s) dry.     [] You may shower and wash your hair 72 hours after surgery. Use hypoallergenic shampoo (baby shampoo). Let warm, soapy shower water run over your face and incisions. Do not scrub or bump your surgery site(s). Gently pat your surgery site(s) dry with a towel. Apply ointment over your surgery sites after drying.    [x]  For patients with a bolster: You may wash your face, but carefully avoid the bolster. You may take a tub bath starting 48 hours after surgery but you must keep your bolster dry. Do NOT get the bolster wet.       Bath: You may take a bath 48 hours after surgery, but do not get any incisions wet in the bath for 6 weeks.     Facial Care Instructions:      Brushing:    [x] You may brush your teeth normally.  [] Brush your teeth gently with a soft small toothbrush.      Shave:    [x] You may shave your entire face as normal.  [] Refrain from shaving around the surgical site until approved by Dr. Santiago.    Makeup:    [] Resume normal makeup use.  [] Do not wear makeup until approved by Dr. Santiago.      Lipstick:  [] Resume normal lipstick use.  [] Do not use lipstick for one week. Gently coat lips with  Vaseline if needed to treat dryness.      Facial movement:  [x] Resume your normal facial movements and speaking.  [] Avoid smiling, grinning, or excessive facial movement for one week.  [] Avoid long conversations or speaking on the phone for one week.  [] Avoid turning your head from side-to-side or up-and-down.      Hair dryer:  [] You may use a hair dryer as usual.  [x] Use a hair dryer on COOL setting only since you may not have full sensation in the operative areas.      Hair care:  [] You may comb and color your hair as usual.  [x] Be careful when combing your hair to avoid catching your comb in the suture line.  [x] Hair coloring and permanents should be postponed until 4 weeks after surgery.    Activities:      Eating: Avoid foods that require prolonged chewing. Otherwise, your diet has no restrictions.      No smoking. Tobacco or any product with nicotine (patch or gum) can severely impair the healing process and result in a poor surgical result.      Alcohol. No alcohol consumption until all bandages and sutures have been removed and you have fully recovered from surgery.      Rest. Obtain more rest than usual.      Strenuous activities. Avoid any straining, bending, lifting over 15 lbs. and other activities that will raise your blood pressure or pulse because this may cause unnecessary bleeding. Do not resume your activities until approved by Dr. Santiago. You can usually resume light activities 1 week after your surgery, but you must continue to avoid any activity that will raise your blood pressure or pulse because this may cause unnecessary bleeding. Four weeks after surgery you can usually begin to slowly resume your usual activities with the goal of returning to all activities 6 weeks after surgery.      Head elevated. Keep your head elevated to reduce swelling and drainage. When resting, lie on 3 pillows or sleep in a recliner.       Driving. Do not drive until you have stopped all pain medications,  are pain free, and can turn your head fully in all directions.      Avoid trauma. Be careful not to bump your surgery site(s).      Sun protection. Avoid sun or sunlamps for 6 weeks after surgery. Heat may cause your surgery site to swell. After that, please wear a hat and use sunscreen when exposed to sunlight (SPF of 30 or greater is recommended).      Swimming. Do not go swimming for 6 weeks.      Travel. Avoid travel for 6 weeks after surgery.      Medication Instructions:     [x] Tylenol: Please take over-the-counter Tylenol (acetaminophen) for pain as instructed on the packaging. Never take more Tylenol than the packaging says is safe.      [x] Ibuprofen: You can also take over-the-counter Ibuprofen (Motrin, Advil) for pain as instructed on the packaging. Never take more Ibuprofen than the packaging says is safe.     [x] Moderate to Severe Pain: A prescription pain medication has been prescribed. Only take this prescription pain medication if you are experiencing moderate to serve pain despite taking over the counter pain medications like Tylenol or Ibuprofen. Take this prescription medication as needed according to your pharmacist's instructions.     [] Avoid Excessive Tylenol: The prescription pain medication that was prescribed after surgery or that you already take has Tylenol in it. Taking this prescription pain medication and over-the-counter Tylenol may result in Tylenol overdose and damage to your liver. Avoid too much Tylenol. If questions arise about pain management call Dr. Santiago or talk with your pharmacist.     [x] Avoid Aspirin: Do not take Aspirin or pain medications that contain Aspirin for 7 days after surgery. These medications will thin your blood and may cause a hematoma or excessive bleeding.     [] Avoid NSAIDs: Do not take Non-Steroidal Anti-Inflammatory Drugs (NSAIDs) such as Advil, Excedrine, Ibuprofen, Alieve, Naproxen, etc  These medications will thin your blood and may cause a  hematoma or excessive bleeding     [x] Antibiotics: Antibiotics have been prescribed to prevent infection. Take the antibiotics according to your pharmacist's instructions.     [] Blood Thinners: You usually take a blood thinner. Your primary care provider or specialist who prescribes your blood thinners has created an anticoagulation plan for you around the time of your surgery. Please follow those instructions.       Your blood thinning medication plan includes: ________.    If your primary care provider feels that you need to restart your blood thinner before the planned restarting date, please contact Dr. Santiago or his office staff immediately. If you do not understand what to do with your blood thinners, call our office, or Dr. Santiago immediately!    [x] Resume your usual medications unless otherwise instructed.    [] Other: _________________________________________________________      What to Expect During Healing:      Bleeding: There may be a fair amount of bloody fluid oozing from your surgery site. You will be applying ointment frequently to the surgery sites. As the ointment heats to your body temperature, it becomes runny and mixes with bloody secretions to form a pink colored fluid. This is very common and slows down during the first 3-5 days after surgery. Also during the first 72 hours, drips of blood commonly form at your surgery site. Continue to apply ointment to prevent those drips from hardening into crusts. If you have bright red blood constantly dripping or pouring down your face, please call Dr. Santiago, Dr. Santiago's office, 911, or go to your nearest Emergency Department.      Bruising and Swelling: Bruising or swelling around the surgery site is common and it can extend well into the face and neck. If you have rapid swelling or bruising, or areas that turn hard and dark purple, please call Dr. Santiago or his office immediately. Mild bruising will usually dissipate in several weeks. In certain  patients it may require 6 months for all swelling to subside completely. Be aware that when you expose a bruise to sunlight it can cause permanent discoloration to the skin. Please wear a hat and use sunscreen when exposed to sunlight (SPF of 30 or greater is recommended).       Discomfort: There will be discomfort after surgery, most of which will be within 1-2 days after the procedure. After the first 48 hours, your pain should slowly start to improve. If after this time you experience significantly worsening pain, please call Dr. Santiago's office or Dr. Santiago immediately.      Redness: The surgery site may remain pink for one year as new blood vessels grow into the area to help it heal. This redness will likely fade over time.       Numbness: It is normal to experience some numbness near the incisions. This could last several months but usually resolves over time.      Buried Sutures: On occasion a suture under the skin can resurface a month or two later. If this happens, please call and we will schedule an appointment to have it removed.      Tightness: Your surgical site may feel tight after the procedure. On average, it takes 1 to 6 months for the skin to start to relax, and one complete year for full relaxation of the skin. This will vary with each patient.    Please call the office or Dr. Santiago directly if you have a fever over 101?F, excessive swelling, vision changes, severe pain, excessive bleeding, any other unexpected problem, or an injury to your surgery area.    Follow-up Appointment: please call    [] [phone] or [phone] for an appointment at [facility] 4th Floor ENT Clinic with       [] Dr. Santiago or ENT Physician Assistant (PA) on _____________      [] Dr. Santiago or ENT Physician Assistant (PA) on _____________    [x] [phone] or [phone] for an appointment at [facility] with       [x] Dr. Santiago or ENT Nurse in one week.       [] Dr. Santiago or ENT  Nurse on _____________      Please contact our office or me anytime if you have questions or if I can be of service.    Sincerely,    Colt Santiago MD    If you have questions or concerns during business hours, please call the office where you had surgery:    [facility], ENT Clinic: [phone].    [facility] ENT Clinic: [phone].    If it is an emergency or after business hours and you need to talk with Dr. Santiago, please call:  1) Dr. Santiago's cell phone: [phone]. I will not be able to answer it if I am operating. You can leave a message for me to call you back when I am available. If I don't answer and you need help right away, call the pager    2) Pager : [phone], ask for Dr. Santiago to be paged. If Dr. Santiago not available, ask to talk to the ENT resident  on call.

## 2020-01-16 NOTE — PROGRESS NOTES
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 No angiolymphatic or perineural invasion.   -1/16/20: wide local excision with 1cm margins (path = pending), stage 1 temporary partial closure and bolster placement.     Comorbidities: VSD.   Pertinent medications: ASA 81mg.  Photographs:  consents signed January 9, 2020.  Connection: Wife = Aaliyah. 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    Care Checklist:   _Delayed reconstruction 1-2 weeks later. Schedule an office visit with me between the excision and the delayed reconstruction to finalize the reconstructive plan. He favors serial closure for reconstruction.   _Hold ASA 7-10 days preop and 7 days postop.   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   _RTC 1 week for bolster removal.   _Path from 01/16/20  _Pain management: Tylenol and Ibuprofen. Oxycodone for breakthrough pain.    Colt Santiago MD

## 2020-01-16 NOTE — BRIEF OP NOTE
[facility] Brief Operative Note    Pre-operative diagnosis: Melanoma of scalp (H) [C43.4]   Post-operative diagnosis right vertex scalp melnoma    Procedure: Procedure(s):  Right vertex scalp melanoma excision   Surgeon(s): Surgeon(s) and Role:     * Colt Santiago MD - Primary   Estimated blood loss: * No values recorded between 1/16/2020  9:18 AM and 1/16/2020  9:55 AM *    Specimens: ID Type Source Tests Collected by Time Destination   A : Right Vertex Scap Melanoma, Black Suture 2 Short Tails 12 o'clock Anterior, Black Suture 2 Long Tails 3 o'clock  Lateral , Black Suture 1 Short Tail 1 Long Tail 6 o'clock Posterior, Blue Suture 9 o'clock Medial  Tissue Scalp SURGICAL PATHOLOGY EXAM Colt Santiago MD 1/16/2020  9:27 AM       Findings: Viable tissue

## 2020-01-16 NOTE — OP NOTE
Date: 1/16/20    Surgeon: Colt Santiago MD    Preoperative diagnosis:   Skin melanoma, right vertex scalp, measuring [id number].    Postoperative diagnosis: same.    Procedure:  Wide local excision of right vertex scalp melanoma and appropriate margin measuring [id number].  Stage 1 temporary simple partial closure 35mm in length      Operative findings: At the end of the procedure, all tissue was 100% viable. No visible pigmented tissue, only scar from the biopsy site.     A preoperative discussion was held. Mr. Cano stated he understood the risks, benefits, alternatives, and limitations of the procedure. The risks, including but not limited to, bleeding, infection, damage to surrounding structures, chronic pain, sensation loss, scarring, positive margins, need for further resection or revision surgeries, or unforseen complications related to surgery were discussed. He stated that his questions were answered to his satisfaction. He wished to undergo the procedure.    Procedure under local anesthetic: After informed consent was obtained, the patient was marked for the proposed excision. This was shown to the patient using his phone and he agreed with this plan.     An injection time out was performed. 1% lidocaine with 1:100,000 epi was injected at the proposed operative site.The patient was prepped and draped in the standard sterile fashion. A time out was repeated.     The procedure began with marking a 1cm margin around the visible lesion and scar. The lesion and appropriate margin were incised around the periphery with a 15 blade scalpel. The deep aspect was undermined in the thick subcutaneous plane with a 15 blade scalpel. The specimen was oriented with sutures at the 12, 3, 6, and 9 o-clock positions and sent for routine pathology. Hemostasis as obtained with a bipolar cautery.     Attention then turned towards partial closure of this wound to initiate skin stretching. No undermining was performed. The wound  was closed using multiple 0 PDS sutures in the deep layer.    A xeroform bolster was placed and secured with 5-0 nylon tie-over sutures. Antibiotic ointment was applied over the wound site.     At the end of the procedure, all tissue was 100% viable, complete hemostasis was obtained, no evidence of hematoma. The patient was returned to recovery room in stable condition.     Postoperative instructions were reviewed with the patient. He should follow up next week for suture removal. I encouraged him to call or return sooner if he has questions or if problems arise.     Colt Santiago MD

## 2020-01-17 NOTE — PROGRESS NOTES
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 No angiolymphatic or perineural invasion.   -1/16/20: wide local excision with 1cm margins (path = no residual melanoma), stage 1 temporary partial closure and bolster placement.     Comorbidities: VSD.   Pertinent medications: ASA 81mg.  Photographs:  consents signed January 9, 2020.  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   Care Checklist:   _Delayed reconstruction next week: serial closure for reconstruction.   _Hold ASA 7-10 days preop and 7 days postop. Continue to hold for the next surgery.   _Pain = 2/10. Pain management: Tylenol and Ibuprofen. Oxycodone for breakthrough pain.    Facial Plastic and Reconstructive Surgery Note    Today I had the pleasure of seeing the patient at the Facial Plastic and Reconstructive Surgery Clinic in the Department of Otolaryngology at [facility]. He follows up after undergoing lesion excision. The patient has been doing well. No bleeding or discharge from the wound. Pain when he sneezes or touches the bolster.     Physical examination:   The tissue at the vertex scalp excision and temporary reconstruction site is 100% viable with adequate color and capillary refill. Bolster removed. No evidence of hematoma or infection.     Pathology from the excision was reviewed in Epic. No evidence of malignancy.    Impression and Recommendations:  Vertex scalp melanoma status post excision and temporary reconstruction one week ago. Continue with routine wound care. We are planning on delayed reconstruction with serial closure next week. He understands that we may only obtain partial closure and need further stages of reconstruction.     PREOPERATIVE COUNSELING: An extensive preoperative discussion was held. The patient  stated he understood the risks, benefits, alternatives and limitations of the procedure. The risks including but not limited to bleeding, infection, damage to surrounding structures, numbness, weakness, cancer recurrence, chronic pain, poor aesthetic result, partial or total skin loss, distortion of surrounding facial structures, and unforeseen complications related to surgery or anesthesia were described. I emphasized the risks of partial or total skin loss at the surgical sites. I discussed the limitations of this procedure in that the donor site will have anticipated scars and complications can occur at the donor site.  He understands that more skin may need to be excised during the reconstruction. We discussed how additional surgeries may be needed to obtain an optimal result.    I described how no reconstructive effort will be able to restore him to his exact precancer condition. The patient stated he had his questions answered to his satisfaction.      Colt Santiago MD

## 2020-01-20 LAB — COPATH REPORT: NORMAL

## 2020-01-23 ENCOUNTER — OFFICE VISIT (OUTPATIENT)
Dept: PEDIATRICS | Facility: CLINIC | Age: 60
End: 2020-01-23
Payer: COMMERCIAL

## 2020-01-23 ENCOUNTER — OFFICE VISIT (OUTPATIENT)
Dept: OTOLARYNGOLOGY | Facility: CLINIC | Age: 60
End: 2020-01-23
Payer: COMMERCIAL

## 2020-01-23 VITALS — DIASTOLIC BLOOD PRESSURE: 86 MMHG | OXYGEN SATURATION: 100 % | SYSTOLIC BLOOD PRESSURE: 153 MMHG | HEART RATE: 60 BPM

## 2020-01-23 VITALS
WEIGHT: 187 LBS | HEIGHT: 65 IN | BODY MASS INDEX: 31.16 KG/M2 | SYSTOLIC BLOOD PRESSURE: 153 MMHG | TEMPERATURE: 97.7 F | DIASTOLIC BLOOD PRESSURE: 86 MMHG | OXYGEN SATURATION: 100 % | HEART RATE: 60 BPM

## 2020-01-23 DIAGNOSIS — C43.4 MELANOMA OF SCALP (H): ICD-10-CM

## 2020-01-23 DIAGNOSIS — Q21.0 VSD (VENTRICULAR SEPTAL DEFECT): ICD-10-CM

## 2020-01-23 DIAGNOSIS — I10 HYPERTENSION GOAL BP (BLOOD PRESSURE) < 140/90: ICD-10-CM

## 2020-01-23 DIAGNOSIS — C43.4 MELANOMA OF SCALP (H): Primary | ICD-10-CM

## 2020-01-23 DIAGNOSIS — E78.5 HYPERLIPIDEMIA LDL GOAL <100: ICD-10-CM

## 2020-01-23 DIAGNOSIS — Z79.1 NSAID LONG-TERM USE: ICD-10-CM

## 2020-01-23 DIAGNOSIS — Z01.818 PREOP GENERAL PHYSICAL EXAM: Primary | ICD-10-CM

## 2020-01-23 LAB
ANION GAP SERPL CALCULATED.3IONS-SCNC: 4 MMOL/L (ref 3–14)
BUN SERPL-MCNC: 25 MG/DL (ref 7–30)
CALCIUM SERPL-MCNC: 9.1 MG/DL (ref 8.5–10.1)
CHLORIDE SERPL-SCNC: 109 MMOL/L (ref 94–109)
CO2 SERPL-SCNC: 27 MMOL/L (ref 20–32)
CREAT SERPL-MCNC: 0.94 MG/DL (ref 0.66–1.25)
ERYTHROCYTE [DISTWIDTH] IN BLOOD BY AUTOMATED COUNT: 12.4 % (ref 10–15)
GFR SERPL CREATININE-BSD FRML MDRD: 88 ML/MIN/{1.73_M2}
GLUCOSE SERPL-MCNC: 86 MG/DL (ref 70–99)
HCT VFR BLD AUTO: 45.9 % (ref 40–53)
HGB BLD-MCNC: 15.1 G/DL (ref 13.3–17.7)
MCH RBC QN AUTO: 31 PG (ref 26.5–33)
MCHC RBC AUTO-ENTMCNC: 32.9 G/DL (ref 31.5–36.5)
MCV RBC AUTO: 94 FL (ref 78–100)
PLATELET # BLD AUTO: 193 10E9/L (ref 150–450)
POTASSIUM SERPL-SCNC: 4.1 MMOL/L (ref 3.4–5.3)
RBC # BLD AUTO: 4.87 10E12/L (ref 4.4–5.9)
SODIUM SERPL-SCNC: 140 MMOL/L (ref 133–144)
WBC # BLD AUTO: 8.4 10E9/L (ref 4–11)

## 2020-01-23 PROCEDURE — 80048 BASIC METABOLIC PNL TOTAL CA: CPT | Performed by: INTERNAL MEDICINE

## 2020-01-23 PROCEDURE — 85027 COMPLETE CBC AUTOMATED: CPT | Performed by: INTERNAL MEDICINE

## 2020-01-23 PROCEDURE — 99024 POSTOP FOLLOW-UP VISIT: CPT | Performed by: OTOLARYNGOLOGY

## 2020-01-23 PROCEDURE — 93000 ELECTROCARDIOGRAM COMPLETE: CPT | Performed by: INTERNAL MEDICINE

## 2020-01-23 PROCEDURE — 36415 COLL VENOUS BLD VENIPUNCTURE: CPT | Performed by: INTERNAL MEDICINE

## 2020-01-23 PROCEDURE — 99215 OFFICE O/P EST HI 40 MIN: CPT | Performed by: INTERNAL MEDICINE

## 2020-01-23 ASSESSMENT — MIFFLIN-ST. JEOR: SCORE: 1594.07

## 2020-01-23 NOTE — LETTER
1/23/2020         RE: Wally Cano  [address]  [facility] [address]        Dear Colleague,    Thank you for referring your patient, Wally Cano, to the [facility]. Please see a copy of my visit note below.    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 No angiolymphatic or perineural invasion.   -1/16/20: wide local excision with 1cm margins (path = no residual melanoma), stage 1 temporary partial closure and bolster placement.     Comorbidities: VSD.   Pertinent medications: ASA 81mg.  Photographs: [facility] consents signed January 9, 2020.  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   Care Checklist:   _Delayed reconstruction next week: serial closure for reconstruction.   _Hold ASA 7-10 days preop and 7 days postop. Continue to hold for the next surgery.   _Pain = 2/10. Pain management: Tylenol and Ibuprofen. Oxycodone for breakthrough pain.    Facial Plastic and Reconstructive Surgery Note    Today I had the pleasure of seeing the patient at the Facial Plastic and Reconstructive Surgery Clinic in the Department of Otolaryngology at [facility]. He follows up after undergoing lesion excision. The patient has been doing well. No bleeding or discharge from the wound. Pain when he sneezes or touches the bolster.     Physical examination:   The tissue at the vertex scalp excision and temporary reconstruction site is 100% viable with adequate color and capillary refill. Bolster removed. No evidence of hematoma or infection.     Pathology from the excision was reviewed in Epic. No evidence of malignancy.    Impression and Recommendations:  Vertex scalp melanoma status post excision and temporary reconstruction one week ago. Continue with routine wound care. We  are planning on delayed reconstruction with serial closure next week. He understands that we may only obtain partial closure and need further stages of reconstruction.     PREOPERATIVE COUNSELING: An extensive preoperative discussion was held. The patient stated he understood the risks, benefits, alternatives and limitations of the procedure. The risks including but not limited to bleeding, infection, damage to surrounding structures, numbness, weakness, cancer recurrence, chronic pain, poor aesthetic result, partial or total skin loss, distortion of surrounding facial structures, and unforeseen complications related to surgery or anesthesia were described. I emphasized the risks of partial or total skin loss at the surgical sites. I discussed the limitations of this procedure in that the donor site will have anticipated scars and complications can occur at the donor site.  He understands that more skin may need to be excised during the reconstruction. We discussed how additional surgeries may be needed to obtain an optimal result.    I described how no reconstructive effort will be able to restore him to his exact precancer condition. The patient stated he had his questions answered to his satisfaction.      Colt Santiago MD        Again, thank you for allowing me to participate in the care of your patient.        Sincerely,        Colt Santiago MD

## 2020-01-23 NOTE — RESULT ENCOUNTER NOTE
Dear Wally,   Your recent test result are within acceptable range or at baseline. Please continue with your current plan of care.       Please call or Mychart to our office if you have further questions.     Livier Triplett MD-PhD

## 2020-01-23 NOTE — PROGRESS NOTES
[address] [address]  [phone]  Dept: [phone]    PRE-OP EVALUATION:  Today's date: 2020    Wally Cano (: 1960) presents for pre-operative evaluation assessment as requested by Dr. Santiago.  He requires evaluation and anesthesia risk assessment prior to undergoing surgery/procedure for treatment of Stage 1 right vertex scalp reconstruction with wound preparation, local flaps, complex closure .    Proposed Surgery/ Procedure: Stage 1 right vertex scalp reconstruction with wound preparation, local flaps, complex closure  Date of Surgery/ Procedure: 20  Time of Surgery/ Procedure: 12:00pm  [facility]ospital[facility]urgical [facility]  Primary Physician: Livier Triplett  Type of Anesthesia Anticipated: Local with MAC    Patient has a Health Care Directive or Living Will:  YES     1. YES - Do you have a history of heart attack, stroke, stent, bypass or surgery on an artery in the head, neck, heart or legs? VSD repair  2. NO - Do you ever have any pain or discomfort in your chest?  3. NO - Do you have a history of  Heart Failure?  4. NO - Are you troubled by shortness of breath when: walking on the level, up a slight hill or at night?  5. NO - Do you currently have a cold, bronchitis or other respiratory infection?  6. NO - Do you have a cough, shortness of breath or wheezing?  7. NO - Do you sometimes get pains in the calves of your legs when you walk?  8. YES - Do you or anyone in your family have previous history of blood clots? Mother  from stroke. Self history of thrombophlebitis in the LE.   9. NO - Do you or does anyone in your family have a serious bleeding problem such as prolonged bleeding following surgeries or cuts?  10. NO - Have you ever had problems with anemia or been told to take iron pills?  11. NO - Have you had any abnormal blood loss such as black, tarry or bloody stools, or abnormal vaginal bleeding?  12.  NO - Have you ever had a blood transfusion?  13. NO - Have you or any of your relatives ever had problems with anesthesia?  14. NO - Do you have sleep apnea, excessive snoring or daytime drowsiness?  15. NO - Do you have any prosthetic heart valves?  16. NO - Do you have prosthetic joints?  17. NO - Is there any chance that you may be pregnant?      HPI:     HPI related to upcoming procedure: Patient was recently diagnosed with melanoma on the scalp.  He had an wide excision done today.  He is scheduled for wound closure in February.    Wally has Hyperlipidemia LDL goal <100; VSD (ventricular septal defect); S/P infectious endocarditis; and Melanoma of scalp (H) on their pertinent problem list.    History of VSD, very small, not closed.  He is last cardiology visit was 2018.  Echo is stable.  Next follow-up is 5 years from 2018.    MEDICAL HISTORY:     Patient Active Problem List    Diagnosis Date Noted     Melanoma of scalp (H) 01/09/2020     Priority: High     Added automatically from request for surgery [id number]       S/P infectious endocarditis 07/17/2014     Priority: High     Hyperlipidemia LDL goal <100 05/20/2011     Priority: High     The 10-year ASCVD risk score (P[place]mouth ELIZABETH Jr., et al., 2013) is: 7.1%    Values used to calculate the score:      Age: 59 years      Sex: Male      Is Non- : No      Diabetic: No      Tobacco smoker: No      Systolic Blood Pressure: 153 mmHg      Is BP treated: Yes      HDL Cholesterol: 63 mg/dL      Total Cholesterol: 131 mg/dL         VSD (ventricular septal defect)      Priority: High     1 cm south of septum. Echo 9/2013. Repeat in 4 years.       [facility] 10/04/2018     Priority: Medium     For urgent after hours needs, please call [phone] and ask to speak with the Adult Cong[facility]ital [facility] Disease Physician on call - mention job code [phone].         Superficial thrombophlebitis  07/17/2014     Priority: Medium     NSAID long-term use 07/12/2013     Priority: Medium     Hypertension goal BP (blood pressure) < 140/90 06/03/2011     Priority: Medium     * * * SBE PROPHYLAXIS * * *      Priority: Medium     Amoxicillin 2 grams 1 hour prior to antibiotics        Class 1 obesity with serious comorbidity and body mass index (BMI) of 31.0 to 31.9 in adult 07/12/2013     Priority: Low     Advanced directives, counseling/discussion 05/01/2012     Priority: Low     Advance Directive received and scanned. Click on Code in the patient header to view. 5/1/2012     Lucia Cano       Spouse  [phone]   Radha Cano         Brother  [phone]            DDD (degenerative disc disease), lumbar 05/20/2011     Priority: Low     Treated with medication and off all pain medications for over one year now.        External hemorrhoids 05/20/2011     Priority: Low     Varicose veins 05/20/2011     Priority: Low      Past Medical History:   Diagnosis Date     * * * SBE PROPHYLAXIS * * *      DDD (degenerative disc disease), lumbar      Elevated PSA 7/2014     Hypertension      Intervertebral disc rupture 2002    chronic pain medication-off now.      Melanoma of scalp (H) 1/9/2020     PE (pulmonary embolism) 1995    due to the SBE      SBE (subacute bacterial endocarditis) 1995     Varicose veins      VSD (ventricular septal defect)     1 cm south of septum      Past Surgical History:   Procedure Laterality Date     ARTHROSCOPY KNEE RT/LT  1979    removal of bone chips LT knee      EXCISE LESION FACE Right 1/16/2020    Procedure: Right vertex scalp melanoma excision;  Surgeon: Colt Santiago MD;  Location: [facility] OR     EYE SURGERY  2016    Bilateral Cataract removal     HEMORRHOIDECTOMY  1/10     HERNIA REPAIR, INGUINAL RT/LT      Left     IMPLANT SHUNT VENTRICULOATRIAL       MOUTH SURGERY      wisdom teeth     TONSILLECTOMY  1997     Current Outpatient Medications   Medication Sig Dispense Refill      Acetaminophen (TYLENOL PO) Take 500 mg by mouth once       amoxicillin (AMOXIL) 500 MG capsule Take 4 capsules one hour prior to dental procedure 20 capsule 0     atorvastatin (LIPITOR) 20 MG tablet Take 1 tablet (20 mg) by mouth daily 90 tablet 3     IBUPROFEN PO Take 400 mg by mouth every 8 hours as needed for moderate pain       lisinopril (PRINIVIL/ZESTRIL) 20 MG tablet Take 1 tablet (20 mg) by mouth 2 times daily 180 tablet 3     mupirocin (BACTROBAN) 2 % external ointment Apply topically every 2 hours (while awake) Follow instructions for ointment use on your discharge instructions from Dr. Santiago 22 g 1     sodium fluoride dental gel (PREVIDENT) 1.1 % GEL topical gel Apply to affected area At Bedtime       terazosin (HYTRIN) 2 MG capsule Take 1 capsule (2 mg) by mouth At Bedtime 90 capsule 3     order for DME Equipment being ordered: Tennis elbow strap 1 Device 0     oxyCODONE (ROXICODONE) 5 MG tablet Take 1 tablet (5 mg) by mouth every 6 hours as needed for severe pain maximum 6 tablet(s) per day 12 tablet 0     OTC products: NSAIDS    Allergies   Allergen Reactions     Ivp Dye [Contrast Dye]      Hypotensive      Latex Allergy: NO    Social History     Tobacco Use     Smoking status: Former Smoker     Years: 10.00     Types: Cigarettes, Cigars     Last attempt to quit: 10/28/2006     Years since quittin.2     Smokeless tobacco: Never Used   Substance Use Topics     Alcohol use: No     Comment: rare     History   Drug Use No       REVIEW OF SYSTEMS:   CONSTITUTIONAL: NEGATIVE for fever, chills, change in weight  ENT/MOUTH: NEGATIVE for ear, mouth and throat problems  RESP: NEGATIVE for significant cough or SOB  CV: NEGATIVE for chest pain, palpitations or peripheral edema  GI: NEGATIVE for nausea, abdominal pain, heartburn, or change in bowel habits  MUSCULOSKELETAL: NEGATIVE for significant arthralgias or myalgia  HEME/ALLERGY/IMMUNE: NEGATIVE for bleeding problems    EXAM:   BP (!) 153/86   Pulse 60   " Temp 97.7  F (36.5  C) (Temporal)   Ht 1.657 m (5' 5.25\")   Wt 84.8 kg (187 lb)   SpO2 100%   BMI 30.88 kg/m    GENERAL APPEARANCE: healthy, alert and no distress  HENT: ear canals and TM's normal and nose and mouth without ulcers or lesions  RESP: lungs clear to auscultation - no rales, rhonchi or wheezes  CV: regular rate and rhythm, normal S1 S2, no S3 or S4 and no murmur, click or rub   ABDOMEN: soft, nontender, no HSM or masses and bowel sounds normal  MS: extremities normal- no gross deformities noted  NEURO: Normal strength and tone, sensory exam grossly normal, mentation intact and speech normal  Skin: has scalp wound on the crown of the head with dressing in place.     DIAGNOSTICS:   EKG: appears normal, NSR    Echo 10/8/2018  -----CONCLUSIONS------  There is a tiny perimembranous ventricular septal defect with restrictive flow  across thedefect. The peak gradient across the ventricular septal defect 114  mmHg. Normal left ventricular size. The left and right ventricles have normal  chamber size, wall thickness, and systolic function.  _____________________________________________________________________________        Results for orders placed or performed in visit on 01/23/20   Basic metabolic panel     Status: None   Result Value Ref Range    Sodium 140 133 - 144 mmol/L    Potassium 4.1 3.4 - 5.3 mmol/L    Chloride 109 94 - 109 mmol/L    Carbon Dioxide 27 20 - 32 mmol/L    Anion Gap 4 3 - 14 mmol/L    Glucose 86 70 - 99 mg/dL    Urea Nitrogen 25 7 - 30 mg/dL    Creatinine 0.94 0.66 - 1.25 mg/dL    GFR Estimate 88 >60 mL/min/[1.73_m2]    GFR Estimate If Black >90 >60 mL/min/[1.73_m2]    Calcium 9.1 8.5 - 10.1 mg/dL   CBC with platelets     Status: None   Result Value Ref Range    WBC 8.4 4.0 - 11.0 10e9/L    RBC Count 4.87 4.4 - 5.9 10e12/L    Hemoglobin 15.1 13.3 - 17.7 g/dL    Hematocrit 45.9 40.0 - 53.0 %    MCV 94 78 - 100 fl    MCH 31.0 26.5 - 33.0 pg    MCHC 32.9 31.5 - 36.5 g/dL    RDW 12.4 " 10.0 - 15.0 %    Platelet Count 193 150 - 450 10e9/L      IMPRESSION:   Reason for surgery/procedure: Melanoma of the scalp  Diagnosis/reason for consult:       ICD-10-CM    1. Preop general physical exam Z01.818 EKG 12-lead complete w/read - Clinics     Basic metabolic panel     CBC with platelets   2. Melanoma of scalp (H) C43.4 Basic metabolic panel     CBC with platelets   3. Hypertension goal BP (blood pressure) < 140/90 I10    4. VSD (ventricular septal defect) Q21.0    5. NSAID long-term use Z79.1    6. Hyperlipidemia LDL goal <100 E78.5          The proposed surgical procedure is considered LOW risk.    REVISED CARDIAC RISK INDEX  The patient has the following serious cardiovascular risks for perioperative complications such as (MI, PE, VFib and 3  AV Block):  No serious cardiac risks  INTERPRETATION: 0 risks: Class I (very low risk - 0.4% complication rate)    The patient has the following additional risks for perioperative complications:  No identified additional risks      ICD-10-CM    1. Preop general physical exam Z01.818 EKG 12-lead complete w/read - Clinics     Basic metabolic panel     CBC with platelets   2. Melanoma of scalp (H) C43.4 Basic metabolic panel     CBC with platelets   3. Hypertension goal BP (blood pressure) < 140/90 I10    4. VSD (ventricular septal defect) Q21.0    5. NSAID long-term use Z79.1    6. Hyperlipidemia LDL goal <100 E78.5        RECOMMENDATIONS:         --Patient is to take all scheduled medications on the day of surgery EXCEPT for modifications listed below.    ACE Inhibitor or Angiotensin Receptor Blocker (ARB) Use  Ace inhibitor or Angiotensin Receptor Blocker (ARB) and should HOLD this medication for the 24 hours prior to surgery.    VSD  Does not require SBE prophylaxis for this procedure but he will receive antibiotic per surgeon before and after to prevent wound infection.     APPROVAL GIVEN to proceed with proposed procedure, without further diagnostic  evaluation       Signed Electronically by: Livier Triplett MD PhD    Copy of this evaluation report is provided to requesting physician.    [facility] Preop Guidelines    Revised Cardiac Risk Index

## 2020-01-23 NOTE — NURSING NOTE
Wally Cano's goals for this visit include:   Chief Complaint   Patient presents with     RECHECK     1 week post-op     He requests these members of his care team be copied on today's visit information: Yes    PCP: Livier Triplett    Referring Provider:  No referring provider defined for this encounter.    BP (!) 153/86 (BP Location: Right arm, Patient Position: Sitting, Cuff Size: Adult Large)   Pulse 60   SpO2 100%     Do you need any medication refills at today's visit? No    Serina Mejia LPN

## 2020-01-23 NOTE — PATIENT INSTRUCTIONS
Get labs today.       Before your surgery:  10 days before: stop all over the counter supplements  1 week before: stop aspirin if you are taking aspirin.  2 days before: stop ibuprofen, naproxen or similar antiinflammatory medications. You may use Tylenol for pain or headache.     On the day of your surgery:  You may take scheduled medication with the smallest amount of water possible except for holding Lisinopril.     After surgery:   You may resume all your medications after the surgery unless your surgeon instructs you otherwise.       Before Your Surgery      Call your surgeon if there is any change in your health. This includes signs of a cold or flu (such as a sore throat, runny nose, cough, rash or fever).    Do not smoke, drink alcohol or take over the counter medicine (unless your surgeon or primary care doctor tells you to) for the 24 hours before and after surgery.    If you take prescribed drugs: Follow your doctor s orders about which medicines to take and which to stop until after surgery.    Eating and drinking prior to surgery: follow the instructions from your surgeon    Take a shower or bath the night before surgery. Use the soap your surgeon gave you to gently clean your skin. If you do not have soap from your surgeon, use your regular soap. Do not shave or scrub the surgery site.  Wear clean pajamas and have clean sheets on your bed.

## 2020-01-28 ENCOUNTER — TELEPHONE (OUTPATIENT)
Dept: PEDIATRICS | Facility: CLINIC | Age: 60
End: 2020-01-28

## 2020-01-28 DIAGNOSIS — I10 HYPERTENSION GOAL BP (BLOOD PRESSURE) < 140/90: ICD-10-CM

## 2020-01-28 DIAGNOSIS — E78.5 HYPERLIPIDEMIA LDL GOAL <130: ICD-10-CM

## 2020-01-28 DIAGNOSIS — R35.1 NOCTURIA: ICD-10-CM

## 2020-01-28 NOTE — TELEPHONE ENCOUNTER
[facility] CLINICAL DOCUMENTATION    Form Documentation Form or Letter Request    Patient dropped off form for patient home delivery pharmacy. Dr Triplett needs to make copies and fill out prescriptions for patient and fax to number on the form. Fax number is [phone]

## 2020-01-29 ENCOUNTER — ANESTHESIA EVENT (OUTPATIENT)
Dept: SURGERY | Facility: AMBULATORY SURGERY CENTER | Age: 60
End: 2020-01-29

## 2020-01-29 RX ORDER — ATORVASTATIN CALCIUM 20 MG/1
20 TABLET, FILM COATED ORAL DAILY
Qty: 90 TABLET | Refills: 3 | Status: SHIPPED | OUTPATIENT
Start: 2020-01-29 | End: 2021-02-08

## 2020-01-29 RX ORDER — LISINOPRIL 20 MG/1
20 TABLET ORAL 2 TIMES DAILY
Qty: 180 TABLET | Refills: 3 | Status: SHIPPED | OUTPATIENT
Start: 2020-01-29 | End: 2021-02-25

## 2020-01-29 RX ORDER — TERAZOSIN 2 MG/1
2 CAPSULE ORAL AT BEDTIME
Qty: 90 CAPSULE | Refills: 3 | Status: SHIPPED | OUTPATIENT
Start: 2020-01-29 | End: 2021-03-25

## 2020-01-29 NOTE — TELEPHONE ENCOUNTER
I Have called pt and let him know that we have figured it out and scripts have been sent in for him. Courtney CARDENAS,CMA

## 2020-01-29 NOTE — TELEPHONE ENCOUNTER
Let pt know that we are Able to find the correct pharmacy through E script. The fax number on the form is different from what he gave me at the clinic visit. We are able to find the pharmacy based on the fax number and address on the form. Atorvastatin, lisinopril, and Terazosin have been sent to [facility].

## 2020-01-30 ENCOUNTER — ANESTHESIA (OUTPATIENT)
Dept: SURGERY | Facility: AMBULATORY SURGERY CENTER | Age: 60
End: 2020-01-30
Payer: COMMERCIAL

## 2020-01-30 ENCOUNTER — HOSPITAL ENCOUNTER (OUTPATIENT)
Facility: AMBULATORY SURGERY CENTER | Age: 60
Discharge: HOME | End: 2020-01-30
Attending: OTOLARYNGOLOGY | Admitting: OTOLARYNGOLOGY
Payer: COMMERCIAL

## 2020-01-30 VITALS
SYSTOLIC BLOOD PRESSURE: 134 MMHG | DIASTOLIC BLOOD PRESSURE: 88 MMHG | RESPIRATION RATE: 16 BRPM | OXYGEN SATURATION: 99 % | TEMPERATURE: 96.5 F

## 2020-01-30 DIAGNOSIS — C43.4 MELANOMA OF SCALP (H): ICD-10-CM

## 2020-01-30 PROCEDURE — 13121 CMPLX RPR S/A/L 2.6-7.5 CM: CPT | Performed by: OTOLARYNGOLOGY

## 2020-01-30 PROCEDURE — G8907 PT DOC NO EVENTS ON DISCHARG: HCPCS

## 2020-01-30 PROCEDURE — 13122 CMPLX RPR S/A/L ADDL 5 CM/>: CPT | Performed by: OTOLARYNGOLOGY

## 2020-01-30 PROCEDURE — G8916 PT W IV AB GIVEN ON TIME: HCPCS

## 2020-01-30 PROCEDURE — 88302 TISSUE EXAM BY PATHOLOGIST: CPT | Performed by: OTOLARYNGOLOGY

## 2020-01-30 PROCEDURE — 13121 CMPLX RPR S/A/L 2.6-7.5 CM: CPT

## 2020-01-30 PROCEDURE — 13122 CMPLX RPR S/A/L ADDL 5 CM/>: CPT

## 2020-01-30 RX ORDER — OXYCODONE HYDROCHLORIDE 5 MG/1
5-10 TABLET ORAL EVERY 4 HOURS PRN
Status: DISCONTINUED | OUTPATIENT
Start: 2020-01-30 | End: 2020-01-31 | Stop reason: HOSPADM

## 2020-01-30 RX ORDER — HYDROMORPHONE HYDROCHLORIDE 1 MG/ML
.3-.5 INJECTION, SOLUTION INTRAMUSCULAR; INTRAVENOUS; SUBCUTANEOUS EVERY 10 MIN PRN
Status: DISCONTINUED | OUTPATIENT
Start: 2020-01-30 | End: 2020-01-31 | Stop reason: HOSPADM

## 2020-01-30 RX ORDER — ACETAMINOPHEN 325 MG/1
975 TABLET ORAL ONCE
Status: COMPLETED | OUTPATIENT
Start: 2020-01-30 | End: 2020-01-30

## 2020-01-30 RX ORDER — KETOROLAC TROMETHAMINE 30 MG/ML
30 INJECTION, SOLUTION INTRAMUSCULAR; INTRAVENOUS EVERY 6 HOURS PRN
Status: DISCONTINUED | OUTPATIENT
Start: 2020-01-30 | End: 2020-01-31 | Stop reason: HOSPADM

## 2020-01-30 RX ORDER — SODIUM CHLORIDE, SODIUM LACTATE, POTASSIUM CHLORIDE, CALCIUM CHLORIDE 600; 310; 30; 20 MG/100ML; MG/100ML; MG/100ML; MG/100ML
INJECTION, SOLUTION INTRAVENOUS CONTINUOUS
Status: DISCONTINUED | OUTPATIENT
Start: 2020-01-30 | End: 2020-01-31 | Stop reason: HOSPADM

## 2020-01-30 RX ORDER — ALBUTEROL SULFATE 0.83 MG/ML
2.5 SOLUTION RESPIRATORY (INHALATION) EVERY 4 HOURS PRN
Status: DISCONTINUED | OUTPATIENT
Start: 2020-01-30 | End: 2020-01-31 | Stop reason: HOSPADM

## 2020-01-30 RX ORDER — MEPERIDINE HYDROCHLORIDE 25 MG/ML
12.5 INJECTION INTRAMUSCULAR; INTRAVENOUS; SUBCUTANEOUS
Status: DISCONTINUED | OUTPATIENT
Start: 2020-01-30 | End: 2020-01-31 | Stop reason: HOSPADM

## 2020-01-30 RX ORDER — DEXAMETHASONE SODIUM PHOSPHATE 4 MG/ML
4 INJECTION, SOLUTION INTRA-ARTICULAR; INTRALESIONAL; INTRAMUSCULAR; INTRAVENOUS; SOFT TISSUE EVERY 10 MIN PRN
Status: DISCONTINUED | OUTPATIENT
Start: 2020-01-30 | End: 2020-01-31 | Stop reason: HOSPADM

## 2020-01-30 RX ORDER — ONDANSETRON 2 MG/ML
INJECTION INTRAMUSCULAR; INTRAVENOUS PRN
Status: DISCONTINUED | OUTPATIENT
Start: 2020-01-30 | End: 2020-01-30

## 2020-01-30 RX ORDER — MUPIROCIN 20 MG/G
OINTMENT TOPICAL PRN
Status: DISCONTINUED | OUTPATIENT
Start: 2020-01-30 | End: 2020-01-30 | Stop reason: HOSPADM

## 2020-01-30 RX ORDER — LIDOCAINE 40 MG/G
CREAM TOPICAL
Status: DISCONTINUED | OUTPATIENT
Start: 2020-01-30 | End: 2020-01-31 | Stop reason: HOSPADM

## 2020-01-30 RX ORDER — PROPOFOL 10 MG/ML
INJECTION, EMULSION INTRAVENOUS CONTINUOUS PRN
Status: DISCONTINUED | OUTPATIENT
Start: 2020-01-30 | End: 2020-01-30

## 2020-01-30 RX ORDER — NALOXONE HYDROCHLORIDE 0.4 MG/ML
.1-.4 INJECTION, SOLUTION INTRAMUSCULAR; INTRAVENOUS; SUBCUTANEOUS
Status: DISCONTINUED | OUTPATIENT
Start: 2020-01-30 | End: 2020-01-31 | Stop reason: HOSPADM

## 2020-01-30 RX ORDER — CEPHALEXIN 500 MG/1
500 CAPSULE ORAL 4 TIMES DAILY
Qty: 4 CAPSULE | Refills: 0 | Status: SHIPPED | OUTPATIENT
Start: 2020-01-30 | End: 2020-01-31

## 2020-01-30 RX ORDER — ONDANSETRON 2 MG/ML
4 INJECTION INTRAMUSCULAR; INTRAVENOUS EVERY 30 MIN PRN
Status: DISCONTINUED | OUTPATIENT
Start: 2020-01-30 | End: 2020-01-31 | Stop reason: HOSPADM

## 2020-01-30 RX ORDER — LIDOCAINE HYDROCHLORIDE AND EPINEPHRINE 10; 10 MG/ML; UG/ML
INJECTION, SOLUTION INFILTRATION; PERINEURAL PRN
Status: DISCONTINUED | OUTPATIENT
Start: 2020-01-30 | End: 2020-01-30 | Stop reason: HOSPADM

## 2020-01-30 RX ORDER — FENTANYL CITRATE 50 UG/ML
25-50 INJECTION, SOLUTION INTRAMUSCULAR; INTRAVENOUS
Status: DISCONTINUED | OUTPATIENT
Start: 2020-01-30 | End: 2020-01-31 | Stop reason: HOSPADM

## 2020-01-30 RX ORDER — GABAPENTIN 300 MG/1
300 CAPSULE ORAL ONCE
Status: COMPLETED | OUTPATIENT
Start: 2020-01-30 | End: 2020-01-30

## 2020-01-30 RX ORDER — PROPOFOL 10 MG/ML
INJECTION, EMULSION INTRAVENOUS PRN
Status: DISCONTINUED | OUTPATIENT
Start: 2020-01-30 | End: 2020-01-30

## 2020-01-30 RX ORDER — FENTANYL CITRATE 50 UG/ML
INJECTION, SOLUTION INTRAMUSCULAR; INTRAVENOUS PRN
Status: DISCONTINUED | OUTPATIENT
Start: 2020-01-30 | End: 2020-01-30

## 2020-01-30 RX ORDER — OXYCODONE HYDROCHLORIDE 5 MG/1
5 TABLET ORAL EVERY 6 HOURS PRN
Qty: 12 TABLET | Refills: 0 | Status: SHIPPED | OUTPATIENT
Start: 2020-01-30 | End: 2020-11-04

## 2020-01-30 RX ORDER — LIDOCAINE HYDROCHLORIDE 20 MG/ML
INJECTION, SOLUTION INFILTRATION; PERINEURAL PRN
Status: DISCONTINUED | OUTPATIENT
Start: 2020-01-30 | End: 2020-01-30

## 2020-01-30 RX ORDER — CEFAZOLIN SODIUM 1 G/3ML
1 INJECTION, POWDER, FOR SOLUTION INTRAMUSCULAR; INTRAVENOUS SEE ADMIN INSTRUCTIONS
Status: DISCONTINUED | OUTPATIENT
Start: 2020-01-30 | End: 2020-01-31 | Stop reason: HOSPADM

## 2020-01-30 RX ORDER — DEXAMETHASONE SODIUM PHOSPHATE 4 MG/ML
INJECTION, SOLUTION INTRA-ARTICULAR; INTRALESIONAL; INTRAMUSCULAR; INTRAVENOUS; SOFT TISSUE PRN
Status: DISCONTINUED | OUTPATIENT
Start: 2020-01-30 | End: 2020-01-30

## 2020-01-30 RX ORDER — MUPIROCIN 20 MG/G
OINTMENT TOPICAL
Qty: 22 G | Refills: 1 | Status: SHIPPED | OUTPATIENT
Start: 2020-01-30 | End: 2020-11-06

## 2020-01-30 RX ORDER — GLYCOPYRROLATE 0.2 MG/ML
INJECTION, SOLUTION INTRAMUSCULAR; INTRAVENOUS PRN
Status: DISCONTINUED | OUTPATIENT
Start: 2020-01-30 | End: 2020-01-30

## 2020-01-30 RX ORDER — ONDANSETRON 4 MG/1
4 TABLET, ORALLY DISINTEGRATING ORAL EVERY 30 MIN PRN
Status: DISCONTINUED | OUTPATIENT
Start: 2020-01-30 | End: 2020-01-31 | Stop reason: HOSPADM

## 2020-01-30 RX ORDER — DEXAMETHASONE SODIUM PHOSPHATE 4 MG/ML
10 INJECTION, SOLUTION INTRA-ARTICULAR; INTRALESIONAL; INTRAMUSCULAR; INTRAVENOUS; SOFT TISSUE ONCE
Status: DISCONTINUED | OUTPATIENT
Start: 2020-01-30 | End: 2020-01-31 | Stop reason: HOSPADM

## 2020-01-30 RX ORDER — CEFAZOLIN SODIUM 2 G/100ML
2 INJECTION, SOLUTION INTRAVENOUS
Status: COMPLETED | OUTPATIENT
Start: 2020-01-30 | End: 2020-01-30

## 2020-01-30 RX ADMIN — PROPOFOL 50 MG: 10 INJECTION, EMULSION INTRAVENOUS at 12:52

## 2020-01-30 RX ADMIN — CEFAZOLIN SODIUM 2 G: 2 INJECTION, SOLUTION INTRAVENOUS at 12:48

## 2020-01-30 RX ADMIN — CEFAZOLIN SODIUM 1 G: 2 INJECTION, SOLUTION INTRAVENOUS at 14:48

## 2020-01-30 RX ADMIN — PROPOFOL 50 MG: 10 INJECTION, EMULSION INTRAVENOUS at 12:47

## 2020-01-30 RX ADMIN — FENTANYL CITRATE 50 MCG: 50 INJECTION, SOLUTION INTRAMUSCULAR; INTRAVENOUS at 15:05

## 2020-01-30 RX ADMIN — ACETAMINOPHEN 975 MG: 325 TABLET ORAL at 11:08

## 2020-01-30 RX ADMIN — PROPOFOL 30 MG: 10 INJECTION, EMULSION INTRAVENOUS at 12:54

## 2020-01-30 RX ADMIN — FENTANYL CITRATE 25 MCG: 50 INJECTION, SOLUTION INTRAMUSCULAR; INTRAVENOUS at 12:47

## 2020-01-30 RX ADMIN — FENTANYL CITRATE 25 MCG: 50 INJECTION, SOLUTION INTRAMUSCULAR; INTRAVENOUS at 13:43

## 2020-01-30 RX ADMIN — SODIUM CHLORIDE, SODIUM LACTATE, POTASSIUM CHLORIDE, CALCIUM CHLORIDE: 600; 310; 30; 20 INJECTION, SOLUTION INTRAVENOUS at 11:19

## 2020-01-30 RX ADMIN — GLYCOPYRROLATE 0.1 MG: 0.2 INJECTION, SOLUTION INTRAMUSCULAR; INTRAVENOUS at 12:56

## 2020-01-30 RX ADMIN — GABAPENTIN 300 MG: 300 CAPSULE ORAL at 11:08

## 2020-01-30 RX ADMIN — DEXAMETHASONE SODIUM PHOSPHATE 4 MG: 4 INJECTION, SOLUTION INTRA-ARTICULAR; INTRALESIONAL; INTRAMUSCULAR; INTRAVENOUS; SOFT TISSUE at 13:08

## 2020-01-30 RX ADMIN — PROPOFOL 100 MCG/KG/MIN: 10 INJECTION, EMULSION INTRAVENOUS at 12:54

## 2020-01-30 RX ADMIN — LIDOCAINE HYDROCHLORIDE 60 MG: 20 INJECTION, SOLUTION INFILTRATION; PERINEURAL at 12:47

## 2020-01-30 RX ADMIN — ONDANSETRON 4 MG: 2 INJECTION INTRAMUSCULAR; INTRAVENOUS at 14:38

## 2020-01-30 ASSESSMENT — LIFESTYLE VARIABLES: TOBACCO_USE: 1

## 2020-01-30 NOTE — BRIEF OP NOTE
[facility] Brief Operative Note    Pre-operative diagnosis: Melanoma of scalp (H) [C43.4]   Post-operative diagnosis right vertex scalp wound     Procedure: Procedure(s):  Stage 2 right vertex scalp reconstruction with wound preparation, local flaps, complex closure   Surgeon(s): Surgeon(s) and Role:     * Colt Santiago MD - Primary   Estimated blood loss: 10 mL    Specimens: ID Type Source Tests Collected by Time Destination   A : Right vertex scalp posterior standing cutaneous deformity new margin inked Tissue Scalp SURGICAL PATHOLOGY EXAM Colt Santiago MD 1/30/2020  1:27 PM    B : Right vertex scalp anterior standing cutaneous deformity new margin inked Tissue Scalp SURGICAL PATHOLOGY EXAM Colt Santiago MD 1/30/2020  1:30 PM    C : Right vertex scalp posterior lateral standing cutaneous deformity excision #2 Tissue Scalp SURGICAL PATHOLOGY EXAM Colt Santiago MD 1/30/2020  1:51 PM    D : Right vertex scalp anterior lateral standing cutaneous deformity excision #2 Tissue Scalp SURGICAL PATHOLOGY EXAM Colt Santiago MD 1/30/2020  1:54 PM    E : Right vertex scalp posterior medial standing cutaneous deformity excision #2 Tissue Scalp SURGICAL PATHOLOGY EXAM Colt Santiago MD 1/30/2020  2:16 PM       Findings: Viable tissue

## 2020-01-30 NOTE — ANESTHESIA POSTPROCEDURE EVALUATION
Anesthesia POST Procedure Evaluation    Patient: Wally Cano   MRN:     [id number] Gender:   male   Age:    59 year old :      1960        Preoperative Diagnosis: Melanoma of scalp (H) [C43.4]   Procedure(s):  Stage 1 right vertex scalp reconstruction with wound preparation, local flaps, complex closure   Postop Comments: No value filed.       Anesthesia Type:  Not documented  MAC    Reportable Event: NO     PAIN: Uncomplicated   Sign Out status: Comfortable, Well controlled pain     PONV: No PONV   Sign Out status:  No Nausea or Vomiting     Neuro/Psych: Uneventful perioperative course   Sign Out Status: Preoperative baseline; Age appropriate mentation     Airway/Resp.: Uneventful perioperative course   Sign Out Status: Non labored breathing, age appropriate RR; Resp. Status within EXPECTED Parameters     CV: Uneventful perioperative course   Sign Out status: Appropriate BP and perfusion indices; Appropriate HR/Rhythm     Disposition:   Sign Out in:  PACU  Disposition:  Phase II; Home  Recovery Course: Uneventful  Follow-Up: Not required           Last Anesthesia Record Vitals:  CRNA VITALS  2020 1448 - 2020 1542      2020             Pulse:  51    SpO2:  95 %          Last PACU Vitals:  Vitals Value Taken Time   /71 2020  3:36 PM   Temp     Pulse     Resp 16 2020  3:36 PM   SpO2 99 % 2020  3:36 PM   Temp src     NIBP     Pulse     SpO2     Resp     Temp     Ht Rate     Temp 2           Electronically Signed By: Yuriy Dubon DO, 2020, 3:42 PM

## 2020-01-30 NOTE — DISCHARGE INSTRUCTIONS
Patient Instructions for Facial Discharge    Colt Santiago MD    Please read and familiarize yourself with these instructions. By following them carefully, you will assist in obtaining the best possible result from your surgery. If questions arise, do not hesitate to contact with me and discuss your questions at any time.     Surgery Site Care:    [x] Incision care (for incisions away from the eyes): Apply the antibiotic ointment Bactroban (mupirocin) to all of your incisions every 2 hours while you are awake. The incisions should be covered at all times by ointment because new skin cells grow best across moist surfaces. Use the Bactroban ointment for 3 days. On the fourth day, stop using the Bactroban and switch to Vaseline ointment. Continue to apply the Vaseline ointment every 2 hours to your incisions while you are awake. The switch to Vasoline is made because occasionally patients can develop an allergy to Bactoban if used for a long time.     [] Incision care (for incisions around the eyes):  For incisions near the eyes, apply the antibiotic ointment Erythromycin ophthalmic to all of your incisions every 2 hours while you are awake. Erythromycin ophthalmic ointment is safe to have near or in your eyes. The incisions should be covered at all times by the ointment because skin cells grow best across moist surfaces.     [] Crusting: Avoid crust build-up along your incisions. If crusts form, apply ointment more frequently. Crust build-up is a sign that your incisions or wounds are too dry.    [] Bolster: A bolster has been applied to your reconstructive site to immobilize the area and promote healing. The bolster is usually removed at the first or second postoperative visit. It is very important to avoid bumping the bolster. Any trauma to the bolster may damage the skin graft. Apply the antibiotic ointment Bactroban (mupirocin) to the bolster every 2 hours while you are awake. Use the Bactroban ointment for 3  days. On the fourth day, stop using the Bactroban and switch to Vaseline ointment. Continue to apply the Vaseline ointment every 2 hours to your incisions while you are awake. The switch to Vaseline is made because occasionally patients can develop an allergy to Bactroban if used for a long time. If the bolster is near your eye, do not use Bactroban or Vasoline near the eye; rather use Erythromycin ointment on the part of the bolster near your eye. The bolster should be covered at all times by the ointment. Do not get the bolster wet in the shower or bath.      [] Granulation: Some areas of your face were left open to allow healing from the inside-out, a process known as granulation. To promote healing, the open area should be covered at all times by ointment. If the open area drys out, it will not heal properly. Apply the Bactroban ointment every 2 hours while you are awake to this area for 3 days. On the fourth day, stop using the Bactroban and switch to Vaseline ointment. Continue to apply the Vasoline oointment every 2 hours to the open areas while you are awake     [] Full Thickness Skin Graft Donor Site: A skin graft was removed from your ear, neck, face, leg, or groin. It has been partially or fully closed with sutures.     [] Steri-strips (wound tape) have been applied to the area. Keep this tape in place and dry until you see Dr. Santiago.     [] Please keep this area moist at all times. Apply the antibiotic ointment Bactroban to this donor area every 2 hours while you are awake. Use the Bactroban ointment for 3 days. On the fourth day, stop using the Bactroban and switch to Vaseline ointment. Continue to apply the Vasoline ointment every 2 hours to the donor area while you are awake until you see Dr. Santiago.    [] Split Thickness Skin Graft Donor Site: A skin graft was removed from your leg, arm, or waist. It is covered with a clear dressing. Bloody fluid will collect under the dressing. This fluid helps  promote healing. Try to keep the dressing and collected fluid in place for as long as possible. Do not disturb the dressing or get the dressing wet. If the fluid drains out, do not remove the dressing. Simply tape gauze over the leaking area to catch the fluid and keep your clothes clean. If there is concern about the dressing or healing of the skin graft site, please call our office.     [] Ear Cartilage Collins: Ear cartilage was removed from one or both of your ears and used to strengthen and shape your reconstruction. A gauze bolster has been sewn onto your ear to immobilize the area and promote healing. The bolster is usually removed at the first or second postoperative visit. It is very important to avoid bumping the bolster. Apply the antibiotic ointment Bactroban to the bolster every 2 hours while you are awake. Use the Bactroban ointment for 3 days. On the fourth day, stop using the Bactroban and switch to Vaseline ointment. Continue to apply the Vaseline ointment every 2 hours to your incisions while you are awake. The bolster should be covered at all times by the ointment. Do not get the bolster wet in the shower or bath.     [] Rib Cartilage Collins: Rib cartilage was removed from your chest and used to strengthen and your reconstruction. Steri-strips are across the incision. You may get these strips wet in the shower 72 hours after surgery. They will eventually fall off on their own.     [] Pressure Dressing: A pressure dressing was applied to your _____________________________.     [] Remove this pressure dressing ____ hours after surgery.     [] Keep this pressure dressing in place until your next visit to our office.    [] Drain: A drain was placed in your surgical site.  If this drain was removed in the hospital, leaking of fluid out of the wound where the drain exited is expected. You may get this area wet in the shower 72 hours after removal of the drain. Please call our office if the drainage is  foul smelling or the site becomes more red or painful. If the drain has not been removed, please call ____to arrange removal of the drain on ________     [] Ice Packs:     [x] Apply ice packs around the surgical site during the first 24 hours. The packs can rest gently on the surgery site but avoid traumatizing the surgical areas. Either a commercially available ice pack from your pharmacy or crushed ice in a plastic bag covered with a cloth may be used. Do not let the skin get too cool by keeping a cloth between the ice pack and your skin.     [] No Ice Packs: the cold could damage your skin.    Bathing Instructions:      Showering:    [] Do NOT get your surgery site(s) wet until approved by Dr. Santiago. You may take a tub bath after surgery but you must keep your surgery site(s) dry.     [x] You may shower and wash your hair 72 hours after surgery. Use hypoallergenic shampoo (baby shampoo). Let warm, soapy shower water run over your face and incisions. Do not scrub or bump your surgery site(s). Gently pat your surgery site(s) dry with a towel. Apply ointment over your surgery sites after drying.    []  For patients with a bolster: You may wash your face, but carefully avoid the bolster. You may take a tub bath starting 48 hours after surgery but you must keep your bolster dry. Do NOT get the bolster wet.       Bath: You may take a bath 48 hours after surgery, but do not get any incisions wet in the bath for 6 weeks.     Facial Care Instructions:      Brushing:    [x] You may brush your teeth normally.  [] Brush your teeth gently with a soft small toothbrush.      Shave:    [x] You may shave your entire face as normal.  [] Refrain from shaving around the surgical site until approved by Dr. Santiago.    Makeup:    [] Resume normal makeup use.  [] Do not wear makeup until approved by Dr. Santiago.      Lipstick:  [] Resume normal lipstick use.  [] Do not use lipstick for one week. Gently coat lips with Vaseline if needed to  treat dryness.      Facial movement:  [x] Resume your normal facial movements and speaking.  [] Avoid smiling, grinning, or excessive facial movement for one week.  [] Avoid long conversations or speaking on the phone for one week.  [] Avoid turning your head from side-to-side or up-and-down.      Hair dryer:  [] You may use a hair dryer as usual.  [x] Use a hair dryer on COOL setting only since you may not have full sensation in the operative areas.      Hair care:  [] You may comb and color your hair as usual.  [x] Be careful when combing your hair to avoid catching your comb in the suture line.  [x] Hair coloring and permanents should be postponed until 4 weeks after surgery.    Activities:      Eating: Avoid foods that require prolonged chewing. Otherwise, your diet has no restrictions.      No smoking. Tobacco or any product with nicotine (patch or gum) can severely impair the healing process and result in a poor surgical result.      Alcohol. No alcohol consumption until all bandages and sutures have been removed and you have fully recovered from surgery.      Rest. Obtain more rest than usual.      Strenuous activities. Avoid any straining, bending, lifting over 15 lbs. and other activities that will raise your blood pressure or pulse because this may cause unnecessary bleeding. Do not resume your activities until approved by Dr. Santiago. You can usually resume light activities 1 week after your surgery, but you must continue to avoid any activity that will raise your blood pressure or pulse because this may cause unnecessary bleeding. Four weeks after surgery you can usually begin to slowly resume your usual activities with the goal of returning to all activities 6 weeks after surgery.      Head elevated. Keep your head elevated to reduce swelling and drainage. When resting, lie on 3 pillows or sleep in a recliner.       Driving. Do not drive until you have stopped all pain medications, are pain free, and can  turn your head fully in all directions.      Avoid trauma. Be careful not to bump your surgery site(s).      Sun protection. Avoid sun or sunlamps for 6 weeks after surgery. Heat may cause your surgery site to swell. After that, please wear a hat and use sunscreen when exposed to sunlight (SPF of 30 or greater is recommended).      Swimming. Do not go swimming for 6 weeks.      Travel. Avoid travel for 6 weeks after surgery.      Medication Instructions:     [x] Tylenol: Please take over-the-counter Tylenol (acetaminophen) for pain as instructed on the packaging. Never take more Tylenol than the packaging says is safe.      [x] Ibuprofen: You can also take over-the-counter Ibuprofen (Motrin, Advil) for pain as instructed on the packaging. Never take more Ibuprofen than the packaging says is safe.     [x] Moderate to Severe Pain: A prescription pain medication has been prescribed. Only take this prescription pain medication if you are experiencing moderate to serve pain despite taking over the counter pain medications like Tylenol or Ibuprofen. Take this prescription medication as needed according to your pharmacist's instructions.     [] Avoid Excessive Tylenol: The prescription pain medication that was prescribed after surgery or that you already take has Tylenol in it. Taking this prescription pain medication and over-the-counter Tylenol may result in Tylenol overdose and damage to your liver. Avoid too much Tylenol. If questions arise about pain management call Dr. Santiago or talk with your pharmacist.     [x] Avoid Aspirin: Do not take Aspirin or pain medications that contain Aspirin for 7 days after surgery. These medications will thin your blood and may cause a hematoma or excessive bleeding.     [] Avoid NSAIDs: Do not take Non-Steroidal Anti-Inflammatory Drugs (NSAIDs) such as Advil, Excedrine, Ibuprofen, Alieve, Naproxen, etc  These medications will thin your blood and may cause a hematoma or excessive  bleeding     [x] Antibiotics: Antibiotics have been prescribed to prevent infection. Take the antibiotics according to your pharmacist's instructions.     [] Blood Thinners: You usually take a blood thinner. Your primary care provider or specialist who prescribes your blood thinners has created an anticoagulation plan for you around the time of your surgery. Please follow those instructions.       Your blood thinning medication plan includes: ________.    If your primary care provider feels that you need to restart your blood thinner before the planned restarting date, please contact Dr. Santiago or his office staff immediately. If you do not understand what to do with your blood thinners, call our office, or Dr. Santiago immediately!    [x] Resume your usual medications unless otherwise instructed.    [] Other: _________________________________________________________      What to Expect During Healing:      Bleeding: There may be a fair amount of bloody fluid oozing from your surgery site. You will be applying ointment frequently to the surgery sites. As the ointment heats to your body temperature, it becomes runny and mixes with bloody secretions to form a pink colored fluid. This is very common and slows down during the first 3-5 days after surgery. Also during the first 72 hours, drips of blood commonly form at your surgery site. Continue to apply ointment to prevent those drips from hardening into crusts. If you have bright red blood constantly dripping or pouring down your face, please call Dr. Santiago, Dr. Santiago's office, 911, or go to your nearest Emergency Department.      Bruising and Swelling: Bruising or swelling around the surgery site is common and it can extend well into the face and neck. If you have rapid swelling or bruising, or areas that turn hard and dark purple, please call Dr. Santiago or his office immediately. Mild bruising will usually dissipate in several weeks. In certain patients it may require 6  months for all swelling to subside completely. Be aware that when you expose a bruise to sunlight it can cause permanent discoloration to the skin. Please wear a hat and use sunscreen when exposed to sunlight (SPF of 30 or greater is recommended).       Discomfort: There will be discomfort after surgery, most of which will be within 1-2 days after the procedure. After the first 48 hours, your pain should slowly start to improve. If after this time you experience significantly worsening pain, please call Dr. Santiago's office or Dr. Santiago immediately.      Redness: The surgery site may remain pink for one year as new blood vessels grow into the area to help it heal. This redness will likely fade over time.       Numbness: It is normal to experience some numbness near the incisions. This could last several months but usually resolves over time.      Buried Sutures: On occasion a suture under the skin can resurface a month or two later. If this happens, please call and we will schedule an appointment to have it removed.      Tightness: Your surgical site may feel tight after the procedure. On average, it takes 1 to 6 months for the skin to start to relax, and one complete year for full relaxation of the skin. This will vary with each patient.    Please call the office or Dr. Santiago directly if you have a fever over 101?F, excessive swelling, vision changes, severe pain, excessive bleeding, any other unexpected problem, or an injury to your surgery area.    Follow-up Appointment: please call    [] [phone] or [phone] for an appointment at [facility] 4th Floor E[facility] Clinic with       [] Dr. Santiago or ENT Physician Assistant (PA) on _____________      [] Dr. Santiago or ENT Physician Assistant (PA) on _____________    [x] [phone] or [phone] for an appointment at [facility] with       [x] Dr. Santiago in about 1 week.        [] Dr. Santiago or ENT Nurse on _____________      Please  contact our office or me anytime if you have questions or if I can be of service.    Sincerely,    Colt Santiago MD    If you have questions or concerns during business hours, please call the office where you had surgery:    [facility], ENT Clinic: [phone].    [facility]: [phone].    If it is an emergency or after business hours and you need to talk with Dr. Santiago, please call:  1) Dr. Santiago's cell phone: [phone]. I will not be able to answer it if I am operating. You can leave a message for me to call you back when I am available. If I don't answer and you need help right away, call the pager    2) Pager : [phone], ask for Dr. Santiago to be paged. If Dr. Santiago not available, ask to talk to the ENT resident  on call.       [facility]  Same-Day Surgery   Adult Discharge Orders & Instructions   For 24 hours after surgery  1. Get plenty of rest.  A responsible adult must stay with you for at least 24 hours after you leave the hospital.   2. Do not drive or use heavy equipment.  If you have weakness or tingling, don't drive or use heavy equipment until this feeling goes away.  3. Do not drink alcohol.  4. Avoid strenuous or risky activities.  Ask for help when climbing stairs.   5. You may feel lightheaded.  IF so, sit for a few minutes before standing.  Have someone help you get up.   6. If you have nausea (feel sick to your stomach): Drink only clear liquids such as apple juice, ginger ale, broth or 7-Up.  Rest may also help.  Be sure to drink enough fluids.  Move to a regular diet as you feel able.  7. You may have a slight fever. Call the doctor if your fever is over 100 F (37.7 C) (taken under the tongue) or lasts longer than 24 hours.  8. You may have a dry mouth, a sore throat, muscle aches or trouble sleeping.  These should go away after 24 hours.  9. Do not make important or legal decisions.   Call your  doctor for any of the followin.  Signs of infection (fever, growing tenderness at the surgery site, a large amount of drainage or bleeding, severe pain, foul-smelling drainage, redness, swelling).    2. It has been over 8 to 10 hours since surgery and you are still not able to urinate (pass water).    3.  Headache for over 24 hours.

## 2020-01-30 NOTE — ANESTHESIA PREPROCEDURE EVALUATION
Anesthesia Pre-Procedure Evaluation    Patient: Wally Cano   MRN:     [id number] Gender:   male   Age:    59 year old :      1960        Preoperative Diagnosis: Melanoma of scalp (H) [C43.4]   Procedure(s):  Stage 1 right vertex scalp reconstruction with wound preparation, local flaps, complex closure     Past Medical History:   Diagnosis Date     * * * SBE PROPHYLAXIS * * *      DDD (degenerative disc disease), lumbar      Elevated PSA 2014     Hypertension      Intervertebral disc rupture     chronic pain medication-off now.      Melanoma of scalp (H) 2020     PE (pulmonary embolism)     due to the SBE      SBE (subacute bacterial endocarditis)      Varicose veins      VSD (ventricular septal defect)     1 cm south of septum       Past Surgical History:   Procedure Laterality Date     ARTHROSCOPY KNEE RT/LT      removal of bone chips LT knee      EXCISE LESION FACE Right 2020    Procedure: Right vertex scalp melanoma excision;  Surgeon: Colt Santiago MD;  Location: [facility] OR     EYE SURGERY      Bilateral Cataract removal     HEMORRHOIDECTOMY  1/10     HERNIA REPAIR, INGUINAL RT/LT      Left     IMPLANT SHUNT VENTRICULOATRIAL       MOUTH SURGERY      wisdom teeth     TONSILLECTOMY            Anesthesia Evaluation     . Pt has had prior anesthetic.     No history of anesthetic complications          ROS/MED HX    ENT/Pulmonary:  - neg pulmonary ROS   (+)tobacco use, Past use , . .    Neurologic:  - neg neurologic ROS     Cardiovascular:     (+) Dyslipidemia, hypertension----. : . . . :. . congenital heart disease VSD: Follows with [facility]. Tiny with only L->R flow. Doing well :, Previous cardiac testing Echodate:results:------CONCLUSIONS------  There is a tiny perimembranous ventricular septal defect with restrictive flow  across thedefect. The peak gradient across the ventricular septal defect 114  mmHg. Normal left ventricular size. The left and right ventricles  have normal  chamber size, wall thickness, and systolic function.  date: results:ECG reviewed date: results:NSR date: results:          METS/Exercise Tolerance:  >4 METS   Hematologic:  - neg hematologic  ROS   (+) History of blood clots pt is not anticoagulated, -      Musculoskeletal:   (+)  other musculoskeletal- DDD      GI/Hepatic:  - neg GI/hepatic ROS       Renal/Genitourinary:     (+) Other Renal/ Genitourinary, Elevated PSA      Endo:  - neg endo ROS       Psychiatric:  - neg psychiatric ROS       Infectious Disease:  - neg infectious disease ROS       Malignancy:      - no malignancy   Other:    - neg other ROS                     PHYSICAL EXAM:   Mental Status/Neuro: A/A/O   Airway: Facies: Feasible  Mallampati: I  Mouth/Opening: Full  TM distance: > 6 cm  Neck ROM: Full   Respiratory: Auscultation: CTAB     Resp. Rate: Normal     Resp. Effort: Normal      CV: Rhythm: Regular  Rate: Age appropriate  Heart: Normal Sounds  Edema: None   Comments:      Dental: Normal Dentition                LABS:  CBC:   Lab Results   Component Value Date    WBC 8.4 01/23/2020    WBC 6.9 10/03/2018    HGB 15.1 01/23/2020    HGB 14.9 10/03/2018    HCT 45.9 01/23/2020    HCT 44.8 10/03/2018     01/23/2020     10/03/2018     BMP:   Lab Results   Component Value Date     01/23/2020     10/30/2019    POTASSIUM 4.1 01/23/2020    POTASSIUM 4.2 10/30/2019    CHLORIDE 109 01/23/2020    CHLORIDE 109 10/30/2019    CO2 27 01/23/2020    CO2 29 10/30/2019    BUN 25 01/23/2020    BUN 20 10/30/2019    CR 0.94 01/23/2020    CR 0.85 10/30/2019    GLC 86 01/23/2020     (H) 10/30/2019     COAGS:   Lab Results   Component Value Date    PTT 31 12/15/2009    INR 2.2 (A) 07/14/2017    FIBR 276 07/17/2014     POC: No results found for: BGM, HCG, HCGS  OTHER:   Lab Results   Component Value Date    PARK 9.1 01/23/2020    ALBUMIN 3.9 10/03/2018    PROTTOTAL 7.1 10/03/2018    ALT 31 10/03/2018    AST 32 10/03/2018     "ALKPHOS 61 10/03/2018    BILITOTAL 1.5 (H) 10/03/2018    TSH 2.22 10/03/2018    CRP <3.0 07/17/2008        Preop Vitals    BP Readings from Last 3 Encounters:   01/23/20 (!) 153/86   01/23/20 (!) 153/86   01/16/20 (!) 148/85    Pulse Readings from Last 3 Encounters:   01/23/20 60   01/23/20 60   01/09/20 69      Resp Readings from Last 3 Encounters:   01/16/20 18   10/09/18 18   10/04/17 12    SpO2 Readings from Last 3 Encounters:   01/23/20 100%   01/23/20 100%   01/16/20 94%      Temp Readings from Last 1 Encounters:   01/23/20 97.7  F (36.5  C) (Temporal)    Ht Readings from Last 1 Encounters:   01/23/20 1.657 m (5' 5.25\")      Wt Readings from Last 1 Encounters:   01/23/20 84.8 kg (187 lb)    Estimated body mass index is 30.88 kg/m  as calculated from the following:    Height as of 1/23/20: 1.657 m (5' 5.25\").    Weight as of 1/23/20: 84.8 kg (187 lb).     LDA:        Assessment:   ASA SCORE: 3    H&P: History and physical reviewed and following examination; no interval change.   Smoking Status:  Non-Smoker/Unknown   NPO Status: NPO Appropriate     Plan:   Anes. Type:  MAC   Pre-Medication: Acetaminophen; Gabapentin   Induction:  N/a   Airway: Native Airway   Access/Monitoring: PIV   Maintenance: N/a     Postop Plan:   Postop Pain: None  Postop Sedation/Airway: Not planned  Disposition: Outpatient     PONV Management:   Adult Risk Factors:, Non-Smoker   Prevention: Ondansetron     CONSENT: Direct conversation   Plan and risks discussed with: Patient   Blood Products: Consent Deferred (Minimal Blood Loss)                   Yuriy Dubon,   "

## 2020-01-30 NOTE — ANESTHESIA CARE TRANSFER NOTE
Patient: Wally Cano    Procedure(s):  Stage 1 right vertex scalp reconstruction with wound preparation, local flaps, complex closure    Diagnosis: Melanoma of scalp (H) [C43.4]  Diagnosis Additional Information: No value filed.    Anesthesia Type:   MAC     Note:  Airway :Room Air  Patient transferred to:Phase II  Comments: Patient awake, alert, and oriented. SpO2 97%.  No apparent anesthesia complications. Handoff Report: Identifed the Patient, Identified the Reponsible Provider, Reviewed the pertinent medical history, Discussed the surgical course, Reviewed Intra-OP anesthesia mangement and issues during anesthesia, Set expectations for post-procedure period and Allowed opportunity for questions and acknowledgement of understanding      Vitals: (Last set prior to Anesthesia Care Transfer)    CRNA VITALS  1/30/2020 1448 - 1/30/2020 1524      1/30/2020             Pulse:  51    SpO2:  95 %                Electronically Signed By: SEJAL Crawford CRNA  January 30, 2020  3:24 PM

## 2020-01-30 NOTE — PROGRESS NOTES
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 No angiolymphatic or perineural invasion.   -1/16/20: wide local excision with 1cm margins (path = no residual melanoma), stage 1 temporary partial closure and bolster placement.  -1/30/20: stage 2- complex closure (path = pending).      Comorbidities: VSD.   Pertinent medications: ASA 81mg.  Photographs:  consents signed January 9, 2020.  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   Care Checklist:   _Hold ASA 7-10 days preop and 7 days postop.   _Path from 01/30/20  _RTC 1 week for suture removal  _Pain management: Tylenol and Ibuprofen. Oxycodone for breakthrough pain.  _May need an additional stage of surgery if his scar widens during healing.     Colt Santiago MD

## 2020-01-31 NOTE — OP NOTE
Procedure Date: 01/30/2020      ATTENDING SURGEON:  Colt Santiago MD      PREOPERATIVE DIAGNOSIS:  Right vertex scalp wound, 20 x 35 mm.      POSTOPERATIVE DIAGNOSIS:  Right vertex scalp wound, 20 x 35 mm.      PROCEDURES:  Stage II right vertex scalp reconstruction consisting of:   1.  Right vertex scalp wound preparation for reconstruction, 95 x 22 mm.   2.  Complex closure right vertex scalp wound 95 mm in length.      OPERATIVE FINDINGS:  A healthy wound of the right vertex scalp.  At the end of the procedure, all reconstructed tissue appeared 100% viable with adequate color and capillary refill.      INDICATIONS:  Mr. aCno is a 59-year-old gentleman who was recently diagnosed with a right vertex scalp melanoma.  He underwent excision with an appropriate margin. His last specimen had no residual melanoma found on pathology.  At the time of his resection, he underwent stage I temporary partial closure without any undermining.  He presents today for stage II reconstruction with wound preparation and complex closure.  An extensive preoperative discussion was held.  The patient indicated he understood the risks, benefits, alternatives and limitations of the procedure.  He also indicated he had his questions answered to his satisfaction.  He wished to proceed with surgery.      DESCRIPTION OF PROCEDURE:  After informed consent was obtained and placed in chart, the patient was brought to the operating room, placed in supine position.  After successful induction of conscious sedation, the patient was prepped and draped in the standard sterile fashion, which included injection of 1% lidocaine with 1:100,000 epinephrine at the proposed operative site.      A time-out was performed.  The correct patient and procedure were verified.  It was also confirmed he was wearing functional pneumoboots, antibiotics were given, his pressure points were adequately protected, and his eyes were protected with the towels.  Fire  safety plan was discussed with the entire team.      The procedure began with a #15 blade scalpel used to incise around the periphery of his wound to perform circumferential scar release.  Excess granulation tissue and debris was also removed from the base of the defect.  Finally, standing cutaneous deformity was removed from the tissue anterior and posterior to the wound.  This tissue was sent for routine pathology.      Attention then turned towards closure of this complex wound.  The wound was then undermined in the subgaleal plane with a #15 blade scalpel.  Relaxing incisions were made in the galeal layer 2 cm lateral and medial to the closure line in order to relax the galeal layer.  Complete hemostasis was obtained with bipolar cautery.  The wound was then closed in multiple layers using multiple interrupted 0 Vicryl sutures in the galeal layer, multiple interrupted 3-0 Vicryl sutures in the dermal layer and running 5-0 Prolene subcuticular suture in the superficial layer.  At the end of the procedure, all tissue appeared 100% viable with adequate color and capillary refill.  Complete hemostasis was obtained.  The patient was returned to the care of the Anesthesia Service in stable condition.      Postoperative written and verbal instructions were given in detail.  He will follow up next week for suture removal.  He has my direct number to call if he has questions or problems arise.         SUSANA WILSON MD             D: 2020   T: 2020   MT: SUMIT      Name:     PRICE CLINTON   MRN:      [id number]        Account:        [id number]   :      1960           Procedure Date: 2020      Document: [id number]

## 2020-02-03 NOTE — PROGRESS NOTES
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 No angiolymphatic or perineural invasion.   -1/16/20: wide local excision with 1cm margins (path = no residual melanoma), stage 1 temporary partial closure and bolster placement.  -1/30/20: stage 2- complex closure (path = pending).      Comorbidities: VSD.   Pertinent medications: ASA 81mg.  Photographs: [facility] consents signed January 9, 2020.  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   Care Checklist:   _Hold ASA 7-10 days preop and 7 days postop.   _Path from 01/30/20: still pending.   _RTC 6-8 weeks.   _Pain 0-1/10: Pain management: Tylenol and Ibuprofen. Oxycodone for breakthrough pain.  _May need an additional stage of surgery if his scar widens during healing.       Facial Plastic and Reconstructive Surgery Note    Today I had the pleasure of seeing the patient at the Facial Plastic and Reconstructive Surgery Clinic in the Department of Otolaryngology at [facility]. The patient underwent reconstruction last week. Pain is appropriately controlled and decreasing. No bleeding or discharge from the wound.    On examination, the patient is in no apparent distress, no stridor, voice is strong.   The flap is viable with adequate color and capillary refill. Sutures were removed. Incisions are clean, dry, and intact. No evidence of hematoma or infection.        Impression and Recommendations: status post stage 2 reconstruction last week. The patient is recovering well and the reconstruction appears viable. Continue with careful wound care to maintain wound moisture and promote healing. The patient can shower and get the reconstruction site wet but should avoid scrubbing. We discussed the importance of sun protection especially at the reconstruction site.  Follow up in 6-8 weeks for monitoring. I encouraged the patient to call or return sooner if questions or problems arise. Colt Santiago MD

## 2020-02-06 ENCOUNTER — OFFICE VISIT (OUTPATIENT)
Dept: OTOLARYNGOLOGY | Facility: CLINIC | Age: 60
End: 2020-02-06
Payer: COMMERCIAL

## 2020-02-06 DIAGNOSIS — C43.4 MELANOMA OF SCALP (H): Primary | ICD-10-CM

## 2020-02-06 PROCEDURE — 99024 POSTOP FOLLOW-UP VISIT: CPT | Performed by: OTOLARYNGOLOGY

## 2020-02-06 NOTE — LETTER
2/6/2020         RE: Wally Cano  [address]  [place] [facility] [address]        Dear Colleague,    Thank you for referring your patient, Wally Cano, to the [facility]. Please see a copy of my visit note below.    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 No angiolymphatic or perineural invasion.   -1/16/20: wide local excision with 1cm margins (path = no residual melanoma), stage 1 temporary partial closure and bolster placement.  -1/30/20: stage 2- complex closure (path = pending).      Comorbidities: VSD.   Pertinent medications: ASA 81mg.  Photographs: [facility] consents signed January 9, 2020.  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   Care Checklist:   _Hold ASA 7-10 days preop and 7 days postop.   _Path from 01/30/20: still pending.   _RTC 6-8 weeks.   _Pain 0-1/10: Pain management: Tylenol and Ibuprofen. Oxycodone for breakthrough pain.  _May need an additional stage of surgery if his scar widens during healing.       Facial Plastic and Reconstructive Surgery Note    Today I had the pleasure of seeing the patient at the Facial Plastic and Reconstructive Surgery Clinic in the Department of Otolaryngology at [facility]. The patient underwent reconstruction last week. Pain is appropriately controlled and decreasing. No bleeding or discharge from the wound.    On examination, the patient is in no apparent distress, no stridor, voice is strong.   The flap is viable with adequate color and capillary refill. Sutures were removed. Incisions are clean, dry, and intact. No evidence of hematoma or infection.        Impression and Recommendations: status post stage 2 reconstruction last week. The patient is recovering well and the reconstruction appears viable.  Continue with careful wound care to maintain wound moisture and promote healing. The patient can shower and get the reconstruction site wet but should avoid scrubbing. We discussed the importance of sun protection especially at the reconstruction site. Follow up in 6-8 weeks for monitoring. I encouraged the patient to call or return sooner if questions or problems arise. Colt Santiago MD        Again, thank you for allowing me to participate in the care of your patient.        Sincerely,        Colt Santiago MD

## 2020-02-06 NOTE — NURSING NOTE
Wally Cano's goals for this visit include:   Chief Complaint   Patient presents with     RECHECK     1 week post-op     He requests these members of his care team be copied on today's visit information: Yes    PCP: Livier Triplett    Referring Provider:  No referring provider defined for this encounter.    There were no vitals taken for this visit.    Do you need any medication refills at today's visit? No    Serina Mejia LPN

## 2020-02-11 ENCOUNTER — APPOINTMENT (OUTPATIENT)
Age: 60
Setting detail: DERMATOLOGY
End: 2020-02-11

## 2020-02-11 VITALS — RESPIRATION RATE: 15 BRPM | WEIGHT: 192 LBS | HEIGHT: 69 IN

## 2020-02-11 DIAGNOSIS — D22 MELANOCYTIC NEVI: ICD-10-CM

## 2020-02-11 DIAGNOSIS — Z85.820 PERSONAL HISTORY OF MALIGNANT MELANOMA OF SKIN: ICD-10-CM

## 2020-02-11 PROBLEM — D22.5 MELANOCYTIC NEVI OF TRUNK: Status: ACTIVE | Noted: 2020-02-11

## 2020-02-11 PROCEDURE — 99214 OFFICE O/P EST MOD 30 MIN: CPT

## 2020-02-11 PROCEDURE — OTHER COUNSELING: OTHER

## 2020-02-11 ASSESSMENT — LOCATION ZONE DERM
LOCATION ZONE: SCALP
LOCATION ZONE: TRUNK

## 2020-02-11 ASSESSMENT — LOCATION DETAILED DESCRIPTION DERM
LOCATION DETAILED: EPIGASTRIC SKIN
LOCATION DETAILED: RIGHT SUPERIOR PARIETAL SCALP
LOCATION DETAILED: RIGHT MEDIAL UPPER BACK

## 2020-02-11 ASSESSMENT — LOCATION SIMPLE DESCRIPTION DERM
LOCATION SIMPLE: ABDOMEN
LOCATION SIMPLE: RIGHT UPPER BACK
LOCATION SIMPLE: SCALP

## 2020-02-12 LAB — COPATH REPORT: NORMAL

## 2020-04-02 ENCOUNTER — VIRTUAL VISIT (OUTPATIENT)
Dept: OTOLARYNGOLOGY | Facility: CLINIC | Age: 60
End: 2020-04-02
Payer: COMMERCIAL

## 2020-04-02 DIAGNOSIS — C43.4 MELANOMA OF SCALP (H): Primary | ICD-10-CM

## 2020-04-02 PROCEDURE — 99024 POSTOP FOLLOW-UP VISIT: CPT | Mod: GT | Performed by: OTOLARYNGOLOGY

## 2020-04-02 NOTE — PROGRESS NOTES
"We performed these services through the Facial Plastic and Reconstructive Surgery Clinic in the Department of Otolaryngology at [facility] to promote the patient's safety by reducing clinic visits and any potential exposure to the coronavirus.       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 No angiolymphatic or perineural invasion.   -1/16/20: wide local excision with 1cm margins (path = no residual melanoma), stage 1 temporary partial closure and bolster placement.  -1/30/20: stage 2- complex closure (path = benign tissue).      Comorbidities: VSD.   Pertinent medications: ASA 81mg.  Photographs: [facility] consents signed January 9, 2020.  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   Care Checklist:   _RTC 6 months after restrictions on office visits have lifted.    _Pain 0/10: Pain management: Tylenol and Ibuprofen.  _May need an additional stage of surgery if his scar widens during healing.   _Sun protection.   _Dermatology for cancer surveillance.         MEDICAL DECISION MAKING:      Recovering appropriately. See checklist.     Sincerely,      Colt Santiago MD  Facial Plastic and Reconstructive Surgeon,   Department of Otolaryngology  [facility]          ____________________________________________________  Video documentation          Wally Cano is a 59 year old male who is being evaluated via a billable video visit.      The patient has been notified of following:     \"This video visit will be conducted via a call between you and your physician/provider. We have found that certain health care needs can be provided without the need for an in-person physical exam.  This service lets us provide the care you need with a video conversation.  If a prescription is necessary " "we can send it directly to your pharmacy.  If lab work is needed we can place an order for that and you can then stop by our lab to have the test done at a later time.    If during the course of the call the physician/provider feels a video visit is not appropriate, you will not be charged for this service.\"     Patient has given verbal consent for Video visit? Yes    Patient would like the video invitation sent by: Text to cell phone: [phone]    Video Start Time: 10:25 AM    Wally Cano complains of  No chief complaint on file.      I have reviewed and updated the patient's Past Medical History, Social History, Family History and Medication List.    ALLERGIES  Ivp dye [contrast dye]        Privacy description to the patient: \"My current location is a private room so that what we discuss will remain confidential.\"     Attendees: Who else participated in the telemedicine visit? Aaliyah and his kids.     Safety Instructions: if you have any problems after this televisit, please call my cell phone number or our office.    ____________________________________________________          He reports he is doing well. No pain, no bleeding, no discharge. What do you think about the appearance? \"I can't see it.\"         Real-time video images of the patient using his phone showed his scar without any obvious openings or residual wound. The scar appears somewhat widened in the middle.     COVID Questions:  No: Sore throat?  No: Cough?  No: Fevers?  No: Shortness of breath or troubles breathing?               Video-Visit Details    Type of service:  Video Visit    Video End Time (time video stopped): 1050    Originating Location (pt. Location): Home    Distant Location (provider location):  Office.    Mode of Communication:  Video Conference via [place]      Colt Santiago MD                "

## 2020-05-22 ENCOUNTER — APPOINTMENT (OUTPATIENT)
Age: 60
Setting detail: DERMATOLOGY
End: 2020-05-22

## 2020-05-22 VITALS — HEIGHT: 69 IN | RESPIRATION RATE: 16 BRPM | WEIGHT: 178 LBS

## 2020-05-22 DIAGNOSIS — Z85.820 PERSONAL HISTORY OF MALIGNANT MELANOMA OF SKIN: ICD-10-CM

## 2020-05-22 DIAGNOSIS — L71.8 OTHER ROSACEA: ICD-10-CM

## 2020-05-22 DIAGNOSIS — D22 MELANOCYTIC NEVI: ICD-10-CM

## 2020-05-22 PROBLEM — D48.5 NEOPLASM OF UNCERTAIN BEHAVIOR OF SKIN: Status: ACTIVE | Noted: 2020-05-22

## 2020-05-22 PROBLEM — D22.5 MELANOCYTIC NEVI OF TRUNK: Status: ACTIVE | Noted: 2020-05-22

## 2020-05-22 PROCEDURE — 99214 OFFICE O/P EST MOD 30 MIN: CPT | Mod: 25

## 2020-05-22 PROCEDURE — OTHER PATHOLOGY BILLING: OTHER

## 2020-05-22 PROCEDURE — 88305 TISSUE EXAM BY PATHOLOGIST: CPT

## 2020-05-22 PROCEDURE — OTHER BIOPSY BY SHAVE METHOD: OTHER

## 2020-05-22 PROCEDURE — OTHER COUNSELING: OTHER

## 2020-05-22 PROCEDURE — 11102 TANGNTL BX SKIN SINGLE LES: CPT

## 2020-05-22 ASSESSMENT — LOCATION SIMPLE DESCRIPTION DERM
LOCATION SIMPLE: RIGHT UPPER BACK
LOCATION SIMPLE: RIGHT CHEEK
LOCATION SIMPLE: LEFT LOWER BACK
LOCATION SIMPLE: UPPER BACK
LOCATION SIMPLE: SCALP
LOCATION SIMPLE: ABDOMEN

## 2020-05-22 ASSESSMENT — LOCATION DETAILED DESCRIPTION DERM
LOCATION DETAILED: LEFT INFERIOR LATERAL MIDBACK
LOCATION DETAILED: EPIGASTRIC SKIN
LOCATION DETAILED: RIGHT SUPERIOR PARIETAL SCALP
LOCATION DETAILED: RIGHT MEDIAL MALAR CHEEK
LOCATION DETAILED: INFERIOR THORACIC SPINE
LOCATION DETAILED: RIGHT MEDIAL UPPER BACK

## 2020-05-22 ASSESSMENT — LOCATION ZONE DERM
LOCATION ZONE: SCALP
LOCATION ZONE: FACE
LOCATION ZONE: TRUNK

## 2020-05-22 NOTE — PROCEDURE: PATHOLOGY BILLING
Immunohistochemistry (20164 and 15852) billing is not performed here. Please use the Immunohistochemistry Stain Billing plan to accomplish this. Immunohistochemistry (79775 and 10180) billing is not performed here. Please use the Immunohistochemistry Stain Billing plan to accomplish this.

## 2020-05-22 NOTE — PROCEDURE: BIOPSY BY SHAVE METHOD
Render Post-Care Instructions In Note?: no
Electrodesiccation And Curettage Text: The wound bed was treated with electrodesiccation and curettage after the biopsy was performed.
Biopsy Method: Dermablade
Additional Anesthesia Volume In Cc (Will Not Render If 0): 0
Post-Care Instructions: WOUND CARE:\\nDo NOT submerge wound in a bath, swimming pool, or hot tub for at least 1 week or for as long as there is an open wound. Gently cleanse the site daily with mild soap and water. Be careful NOT to allow a forceful stream of water to hit the biopsy site. After cleaning/showering, apply a thin layer of petrolatum ointment or Aquaphor in the wound followed by an adhesive bandage. Continue this process daily until healed. \\n\\nBLEEDING:\\nIf you develop persistent bleeding, apply firm and steady pressure over the dressing with gauze for 15 minutes. If bleeding persists, reapply pressure with an ice pack over the gauze for 15 minutes. NEVER APPLY ICE DIRECTLY TO THE SKIN. Do NOT peek under the gauze during these 15 minutes of pressure.  Call our office at [phone] if you cannot get the bleeding to stop. \\n\\nINFECTION:\\nSigns of infection may include increasing rather than decreasing pain (after the first few days), increasing redness/swelling/heat, draining pus, pink/red streaks around the wound, and fever or chills.  Please call our office immediately at [phone] if infection is suspected. It is normal to have some mild redness on or around the wound edges; this will lighten day by day and will become less tender.\\n\\nPAIN:\\nPain is usually minimal, but if needed you may take acetaminophen (Tylenol) every four hours as needed. Applying an ice pack over the dressing for 15-20 minutes every 2-3 hours will relieve swelling, lessen pain, and help minimize bruising. NEVER APPLY ICE DIRECTLY TO THE SKIN. Avoid ibuprofen (Advil, Motrin) and naproxen (Aleve) for the first 48 hours as these can increase bleeding.\\n\\nSWELLIG AND BRUISING:\\nSwelling and bruising are common and temporary, usually lasting 1 - 2 weeks. It is more common in areas treated around the eyes, hands, and feet. In the days following the procedure, swelling and bruising can be minimized by keeping the affected areas elevated when possible, reducing salty foods, and applying ice packs over the dressing for 15-20 minutes every 2-3 hours. NEVER APPLY ICE DIRECTLY TO THE SKIN.\\n\\nITCHING:\\nItchiness on and around the wound is very common and can last several days to weeks after surgery. Mild itch is normal as the wound is healing. \\n\\nNERVE CHANGES:\\nNumbness is usually temporary, but it may last for several weeks to months. You may also experience sharp pains at the wound sight as it heals.  Mild pain is normal and will gradually improve with time.\\n \\nNO SMOKING:\\nSmoking will delay the healing process. If you smoke, we recommend trying to quit or at minimum reduce how much you smoke following a procedure.
Detail Level: Detailed
Cryotherapy Text: The wound bed was treated with cryotherapy after the biopsy was performed.
Anesthesia Volume In Cc (Will Not Render If 0): 0.5
Path Notes Override (Will Replace All Of The Above Text): NROD
Depth Of Biopsy: dermis
Dressing: bandage
Biopsy Type: H and E
Billing Type: Client Bill
Silver Nitrate Text: The wound bed was treated with silver nitrate after the biopsy was performed.
Consent: - Verbal and written consent was obtained and risks were reviewed prior to procedure today. \\n- Risks discussed include but are not limited to scarring, infection, bleeding, scabbing, incomplete removal, nerve damage, pain, and allergy to anesthesia.
Wound Care: Petrolatum
Was A Bandage Applied: Yes
Curettage Text: The wound bed was treated with curettage after the biopsy was performed.
Type Of Destruction Used: Electrodesiccation
Electrodesiccation Text: The wound bed was treated with electrodesiccation after the biopsy was performed.
Notification Instructions: - It can take up to 2 weeks to get a biopsy result. \\n- Please refrain from calling to request results until after 2 weeks.
Hemostasis: Aluminum Chloride
Anesthesia Type: 2% lidocaine with epinephrine
postmenopausal

## 2020-05-22 NOTE — PROCEDURE: PATHOLOGY BILLING
Rendering Text In Billing: The slides will be read by [employer] and reported in the attached document. Rendering Text In Billing: The slides will be read by [employer] and reported in the attached document.

## 2020-05-22 NOTE — HPI: FULL BODY SKIN EXAMINATION
What Is The Reason For Today's Visit?: Full Body Skin Examination
What Is The Reason For Today's Visit? (Being Monitored For X): the risk of recurrence of previously treated lesion(s)
Additional History: Melanoma of scalp diagnosed December 2019.

## 2020-05-22 NOTE — PROCEDURE: COUNSELING
Detail Level: Simple
Detail Level: Detailed
Patient Specific Counseling (Will Not Stick From Patient To Patient): - Discussed that this nevus has atypical features that warrant a biopsy.\\n- Patient expressed understanding.\\n- Follow-up for re-examination for regimentation or an additional removal procedure may become necessary depending the pathologist's report.
Detail Level: Zone

## 2020-07-23 ENCOUNTER — TELEPHONE (OUTPATIENT)
Dept: OTOLARYNGOLOGY | Facility: CLINIC | Age: 60
End: 2020-07-23

## 2020-07-23 NOTE — TELEPHONE ENCOUNTER
7/23 Provided phone number [phone] to schedule in about 6 months (around 10/2/2020    Jeanne Zhou   Procedure    Ortho/Sports Med/Pod/Ent/Eye/Surgical Specialties  [facility]   [phone]

## 2020-08-20 ENCOUNTER — APPOINTMENT (OUTPATIENT)
Dept: URBAN - METROPOLITAN AREA CLINIC 252 | Age: 60
Setting detail: DERMATOLOGY
End: 2020-08-20

## 2020-08-20 VITALS — HEIGHT: 69 IN | RESPIRATION RATE: 16 BRPM | WEIGHT: 176 LBS

## 2020-08-20 DIAGNOSIS — Z85.820 PERSONAL HISTORY OF MALIGNANT MELANOMA OF SKIN: ICD-10-CM

## 2020-08-20 DIAGNOSIS — D22 MELANOCYTIC NEVI: ICD-10-CM

## 2020-08-20 DIAGNOSIS — L81.4 OTHER MELANIN HYPERPIGMENTATION: ICD-10-CM

## 2020-08-20 DIAGNOSIS — Z87.2 PERSONAL HISTORY OF DISEASES OF THE SKIN AND SUBCUTANEOUS TISSUE: ICD-10-CM

## 2020-08-20 PROBLEM — D22.72 MELANOCYTIC NEVI OF LEFT LOWER LIMB, INCLUDING HIP: Status: ACTIVE | Noted: 2020-08-20

## 2020-08-20 PROBLEM — D22.71 MELANOCYTIC NEVI OF RIGHT LOWER LIMB, INCLUDING HIP: Status: ACTIVE | Noted: 2020-08-20

## 2020-08-20 PROBLEM — D22.62 MELANOCYTIC NEVI OF LEFT UPPER LIMB, INCLUDING SHOULDER: Status: ACTIVE | Noted: 2020-08-20

## 2020-08-20 PROBLEM — D22.5 MELANOCYTIC NEVI OF TRUNK: Status: ACTIVE | Noted: 2020-08-20

## 2020-08-20 PROBLEM — D22.61 MELANOCYTIC NEVI OF RIGHT UPPER LIMB, INCLUDING SHOULDER: Status: ACTIVE | Noted: 2020-08-20

## 2020-08-20 PROCEDURE — OTHER COUNSELING: OTHER

## 2020-08-20 PROCEDURE — 99214 OFFICE O/P EST MOD 30 MIN: CPT

## 2020-08-20 PROCEDURE — OTHER SUNSCREEN RECOMMENDATIONS: OTHER

## 2020-08-20 ASSESSMENT — LOCATION DETAILED DESCRIPTION DERM
LOCATION DETAILED: STERNUM
LOCATION DETAILED: LEFT ANTERIOR DISTAL THIGH
LOCATION DETAILED: RIGHT SUPERIOR UPPER BACK
LOCATION DETAILED: RIGHT PROXIMAL DORSAL FOREARM
LOCATION DETAILED: LEFT ANTERIOR PROXIMAL UPPER ARM
LOCATION DETAILED: RIGHT SUPERIOR OCCIPITAL SCALP
LOCATION DETAILED: LEFT INFERIOR LATERAL MIDBACK
LOCATION DETAILED: RIGHT ANTERIOR DISTAL UPPER ARM
LOCATION DETAILED: INFERIOR THORACIC SPINE
LOCATION DETAILED: LEFT PROXIMAL DORSAL FOREARM
LOCATION DETAILED: RIGHT INFERIOR UPPER BACK
LOCATION DETAILED: RIGHT PROXIMAL PRETIBIAL REGION

## 2020-08-20 ASSESSMENT — LOCATION SIMPLE DESCRIPTION DERM
LOCATION SIMPLE: RIGHT UPPER ARM
LOCATION SIMPLE: RIGHT FOREARM
LOCATION SIMPLE: CHEST
LOCATION SIMPLE: LEFT LOWER BACK
LOCATION SIMPLE: LEFT FOREARM
LOCATION SIMPLE: LEFT THIGH
LOCATION SIMPLE: RIGHT PRETIBIAL REGION
LOCATION SIMPLE: RIGHT OCCIPITAL SCALP
LOCATION SIMPLE: UPPER BACK
LOCATION SIMPLE: RIGHT UPPER BACK
LOCATION SIMPLE: LEFT UPPER ARM

## 2020-08-20 ASSESSMENT — LOCATION ZONE DERM
LOCATION ZONE: ARM
LOCATION ZONE: SCALP
LOCATION ZONE: TRUNK
LOCATION ZONE: LEG

## 2020-09-17 ENCOUNTER — TELEPHONE (OUTPATIENT)
Dept: OTOLARYNGOLOGY | Facility: CLINIC | Age: 60
End: 2020-09-17

## 2020-09-17 NOTE — TELEPHONE ENCOUNTER
9/17 Provided phone number [phone] to schedule in about 6 months (around 10/2/2020).    Jeanne Zhou   Procedure    Ortho/Sports Med/Pod/Ent/Eye/Surgical Specialties  [facility] [place]   [phone]

## 2020-11-04 ENCOUNTER — DOCUMENTATION ONLY (OUTPATIENT)
Dept: LAB | Facility: CLINIC | Age: 60
End: 2020-11-04

## 2020-11-04 DIAGNOSIS — Z12.5 SCREENING PSA (PROSTATE SPECIFIC ANTIGEN): ICD-10-CM

## 2020-11-04 DIAGNOSIS — I10 HYPERTENSION GOAL BP (BLOOD PRESSURE) < 140/90: ICD-10-CM

## 2020-11-04 DIAGNOSIS — E78.5 HYPERLIPIDEMIA LDL GOAL <100: Primary | ICD-10-CM

## 2020-11-06 ENCOUNTER — OFFICE VISIT (OUTPATIENT)
Dept: PEDIATRICS | Facility: CLINIC | Age: 60
End: 2020-11-06
Payer: COMMERCIAL

## 2020-11-06 VITALS
BODY MASS INDEX: 26.2 KG/M2 | WEIGHT: 176.9 LBS | TEMPERATURE: 98.2 F | OXYGEN SATURATION: 97 % | DIASTOLIC BLOOD PRESSURE: 84 MMHG | SYSTOLIC BLOOD PRESSURE: 150 MMHG | HEART RATE: 78 BPM | HEIGHT: 69 IN

## 2020-11-06 DIAGNOSIS — E80.6 HYPERBILIRUBINEMIA: ICD-10-CM

## 2020-11-06 DIAGNOSIS — I10 HYPERTENSION GOAL BP (BLOOD PRESSURE) < 140/90: ICD-10-CM

## 2020-11-06 DIAGNOSIS — E78.5 HYPERLIPIDEMIA LDL GOAL <100: ICD-10-CM

## 2020-11-06 DIAGNOSIS — Z12.5 SCREENING PSA (PROSTATE SPECIFIC ANTIGEN): ICD-10-CM

## 2020-11-06 DIAGNOSIS — Q24.9 CONGENITAL ANOMALIES OF THE HEART: ICD-10-CM

## 2020-11-06 DIAGNOSIS — Z00.00 ROUTINE GENERAL MEDICAL EXAMINATION AT A HEALTH CARE FACILITY: Primary | ICD-10-CM

## 2020-11-06 LAB
ALBUMIN SERPL-MCNC: 4 G/DL (ref 3.4–5)
ALP SERPL-CCNC: 77 U/L (ref 40–150)
ALT SERPL W P-5'-P-CCNC: 42 U/L (ref 0–70)
ANION GAP SERPL CALCULATED.3IONS-SCNC: 2 MMOL/L (ref 3–14)
AST SERPL W P-5'-P-CCNC: 36 U/L (ref 0–45)
BILIRUB SERPL-MCNC: 2.1 MG/DL (ref 0.2–1.3)
BUN SERPL-MCNC: 22 MG/DL (ref 7–30)
CALCIUM SERPL-MCNC: 8.9 MG/DL (ref 8.5–10.1)
CHLORIDE SERPL-SCNC: 110 MMOL/L (ref 94–109)
CHOLEST SERPL-MCNC: 131 MG/DL
CO2 SERPL-SCNC: 29 MMOL/L (ref 20–32)
CREAT SERPL-MCNC: 0.97 MG/DL (ref 0.66–1.25)
CREAT UR-MCNC: 93 MG/DL
GFR SERPL CREATININE-BSD FRML MDRD: 85 ML/MIN/{1.73_M2}
GLUCOSE SERPL-MCNC: 108 MG/DL (ref 70–99)
HDLC SERPL-MCNC: 61 MG/DL
LDLC SERPL CALC-MCNC: 50 MG/DL
MICROALBUMIN UR-MCNC: 7 MG/L
MICROALBUMIN/CREAT UR: 7.07 MG/G CR (ref 0–17)
NONHDLC SERPL-MCNC: 70 MG/DL
POTASSIUM SERPL-SCNC: 4.4 MMOL/L (ref 3.4–5.3)
PROT SERPL-MCNC: 7 G/DL (ref 6.8–8.8)
PSA SERPL-ACNC: 2.74 UG/L (ref 0–4)
SODIUM SERPL-SCNC: 141 MMOL/L (ref 133–144)
TRIGL SERPL-MCNC: 98 MG/DL

## 2020-11-06 PROCEDURE — 99213 OFFICE O/P EST LOW 20 MIN: CPT | Mod: 25 | Performed by: INTERNAL MEDICINE

## 2020-11-06 PROCEDURE — 36415 COLL VENOUS BLD VENIPUNCTURE: CPT | Performed by: INTERNAL MEDICINE

## 2020-11-06 PROCEDURE — 80061 LIPID PANEL: CPT | Performed by: INTERNAL MEDICINE

## 2020-11-06 PROCEDURE — 82043 UR ALBUMIN QUANTITATIVE: CPT | Performed by: INTERNAL MEDICINE

## 2020-11-06 PROCEDURE — G0103 PSA SCREENING: HCPCS | Performed by: INTERNAL MEDICINE

## 2020-11-06 PROCEDURE — 99396 PREV VISIT EST AGE 40-64: CPT | Performed by: INTERNAL MEDICINE

## 2020-11-06 PROCEDURE — 80053 COMPREHEN METABOLIC PANEL: CPT | Performed by: INTERNAL MEDICINE

## 2020-11-06 ASSESSMENT — MIFFLIN-ST. JEOR: SCORE: 1602.79

## 2020-11-06 NOTE — PROGRESS NOTES
3  SUBJECTIVE:   CC: Wally Cano is an 60 year old male who presents for preventive health visit.     HPI:  Doing well. Plans to retire at the end of December.   Home BP this morning 126/78  Has been walking regularly, eating health and lost weight from 205 down to 176 lbs  No complaints.     Patient has been advised of split billing requirements and indicates understanding: Yes  Healthy Habits:    Do you get at least three servings of calcium containing foods daily (dairy, green leafy vegetables, etc.)? yes    Amount of exercise or daily activities, outside of work: 5-7 day(s) per week    Problems taking medications regularly No    Medication side effects: No    Have you had an eye exam in the past two years? yes    Do you see a dentist twice per year? yes    Do you have sleep apnea, excessive snoring or daytime drowsiness?no      -------------------------------------    Today's PHQ-2 Score:   PHQ-2 (  [employer]) 2020   Q1: Little interest or pleasure in doing things 0 0   Q2: Feeling down, depressed or hopeless 0 0   PHQ-2 Score 0 0       Abuse: Current or Past(Physical, Sexual or Emotional)- No  Do you feel safe in your environment? Yes    Have you ever done Advance Care Planning? (For example, a Health Directive, POLST, or a discussion with a medical provider or your loved ones about your wishes): Yes, advance care planning is on file.    Social History     Tobacco Use     Smoking status: Former Smoker     Years: 10.00     Types: Cigarettes, Cigars     Quit date: 10/28/2006     Years since quittin.0     Smokeless tobacco: Never Used   Substance Use Topics     Alcohol use: No     Comment: rare     If you drink alcohol do you typically have >3 drinks per day or >7 drinks per week? No                      Last PSA:   PSA   Date Value Ref Range Status   2020 2.74 0 - 4 ug/L Final     Comment:     Assay Method:  Chemiluminescence using Siemens Vista analyzer       Reviewed orders  "with patient. Reviewed health maintenance and updated orders accordingly - Yes  Labs reviewed in EPIC    Reviewed and updated as needed this visit by clinical staff  Tobacco  Allergies  Meds   Med Hx  Surg Hx  Fam Hx  Soc Hx        Reviewed and updated as needed this visit by Provider                    ROS:  CONSTITUTIONAL: NEGATIVE for fever, chills, change in weight  INTEGUMENTARY/SKIN: NEGATIVE for worrisome rashes, moles or lesions  EYES: NEGATIVE for vision changes or irritation  ENT: NEGATIVE for ear, mouth and throat problems  RESP: NEGATIVE for significant cough or SOB  CV: NEGATIVE for chest pain, palpitations or peripheral edema  GI: NEGATIVE for nausea, abdominal pain, heartburn, or change in bowel habits   male: negative for dysuria, hematuria, decreased urinary stream, erectile dysfunction, urethral discharge  MUSCULOSKELETAL: NEGATIVE for significant arthralgias or myalgia  NEURO: NEGATIVE for weakness, dizziness or paresthesias  PSYCHIATRIC: NEGATIVE for changes in mood or affect    OBJECTIVE:   BP (!) 150/84   Pulse 78   Temp 98.2  F (36.8  C) (Temporal)   Ht 1.753 m (5' 9\")   Wt 80.2 kg (176 lb 14.4 oz)   SpO2 97%   BMI 26.12 kg/m    EXAM:  GENERAL: healthy, alert and no distress  EYES: Eyes grossly normal to inspection, PERRL and conjunctivae and sclerae normal  HENT: ear canals and TM's normal, nose and mouth without ulcers or lesions  NECK: no adenopathy, no asymmetry, masses, or scars and thyroid normal to palpation  RESP: lungs clear to auscultation - no rales, rhonchi or wheezes  CV: regular rate and rhythm, normal S1 S2, no S3 or S4, no murmur, click or rub, no peripheral edema and peripheral pulses strong  ABDOMEN: soft, nontender, no hepatosplenomegaly, no masses and bowel sounds normal  MS: no gross musculoskeletal defects noted, no edema  SKIN: no suspicious lesions or rashes  NEURO: Normal strength and tone, mentation intact and speech normal  PSYCH: mentation appears " normal, affect normal/bright    Diagnostic Test Results:  Results for orders placed or performed in visit on 11/06/20 (from the past 24 hour(s))   Prostate spec antigen screen   Result Value Ref Range    PSA 2.74 0 - 4 ug/L   Lipid panel reflex to direct LDL Fasting   Result Value Ref Range    Cholesterol 131 <200 mg/dL    Triglycerides 98 <150 mg/dL    HDL Cholesterol 61 >39 mg/dL    LDL Cholesterol Calculated 50 <100 mg/dL    Non HDL Cholesterol 70 <130 mg/dL   Comprehensive metabolic panel   Result Value Ref Range    Sodium 141 133 - 144 mmol/L    Potassium 4.4 3.4 - 5.3 mmol/L    Chloride 110 (H) 94 - 109 mmol/L    Carbon Dioxide 29 20 - 32 mmol/L    Anion Gap 2 (L) 3 - 14 mmol/L    Glucose 108 (H) 70 - 99 mg/dL    Urea Nitrogen 22 7 - 30 mg/dL    Creatinine 0.97 0.66 - 1.25 mg/dL    GFR Estimate 85 >60 mL/min/[1.73_m2]    GFR Estimate If Black >90 >60 mL/min/[1.73_m2]    Calcium 8.9 8.5 - 10.1 mg/dL    Bilirubin Total 2.1 (H) 0.2 - 1.3 mg/dL    Albumin 4.0 3.4 - 5.0 g/dL    Protein Total 7.0 6.8 - 8.8 g/dL    Alkaline Phosphatase 77 40 - 150 U/L    ALT 42 0 - 70 U/L    AST 36 0 - 45 U/L   Albumin Random Urine Quantitative with Creat Ratio   Result Value Ref Range    Creatinine Urine 93 mg/dL    Albumin Urine mg/L 7 mg/L    Albumin Urine mg/g Cr 7.07 0 - 17 mg/g Cr       ASSESSMENT/PLAN:       ICD-10-CM    1. Routine general medical examination at a health care facility  Z00.00    2. Hyperbilirubinemia  E80.6 US Abdomen Complete   3. Hyperlipidemia LDL goal <100  E78.5    4. Hypertension goal BP (blood pressure) < 140/90  I10    5. [facility]  Q24.9      -- stable chronic health issues. Medications refilled.    -- borderline elevated bilirubin, persistent over a few years. Obtain abdominal US.    Patient has been advised of split billing requirements and indicates understanding: No  COUNSELING:  Reviewed preventive health counseling, as  "reflected in patient instructions    Estimated body mass index is 26.12 kg/m  as calculated from the following:    Height as of this encounter: 1.753 m (5' 9\").    Weight as of this encounter: 80.2 kg (176 lb 14.4 oz).    Weight management plan: continue effort in weight loss    He reports that he quit smoking about 14 years ago. His smoking use included cigarettes and cigars. He quit after 10.00 years of use. He has never used smokeless tobacco.      Counseling Resources:  ATP IV Guidelines  Pooled Cohorts Equation Calculator  FRAX Risk Assessment  ICSI Preventive Guidelines  Dietary Guidelines for Americans, 2010  USDA's MyPlate  ASA Prophylaxis  Lung CA Screening    Livier Triplett MD PhD  [facility]  "

## 2020-11-08 ENCOUNTER — MYC MEDICAL ADVICE (OUTPATIENT)
Dept: PEDIATRICS | Facility: CLINIC | Age: 60
End: 2020-11-08

## 2020-11-09 NOTE — RESULT ENCOUNTER NOTE
Results discussed directly with patient while patient was present. Any further details documented in the note.   Livier Triplett MD PhD

## 2020-11-11 ENCOUNTER — ANCILLARY PROCEDURE (OUTPATIENT)
Dept: ULTRASOUND IMAGING | Facility: CLINIC | Age: 60
End: 2020-11-11
Attending: INTERNAL MEDICINE
Payer: COMMERCIAL

## 2020-11-11 DIAGNOSIS — E80.6 HYPERBILIRUBINEMIA: ICD-10-CM

## 2020-11-11 PROCEDURE — 76700 US EXAM ABDOM COMPLETE: CPT | Performed by: RADIOLOGY

## 2020-11-11 NOTE — RESULT ENCOUNTER NOTE
Singh Lo,   Abdominal ultrasound is normal.  No blood or biliary duct abnormalities.  Spleen not gallbladder are normal.  Kidneys are normal.  There are 3 simple cysts in the left kidney, but those are benign, no treatment is needed.    At this point we will not pursue further testing.  We will monitor liver function annually for now.    Please call or Mychart to our office if you have further questions.     Livier Triplett MD-PhD

## 2020-11-12 ENCOUNTER — APPOINTMENT (OUTPATIENT)
Dept: URBAN - METROPOLITAN AREA CLINIC 252 | Age: 60
Setting detail: DERMATOLOGY
End: 2020-11-12

## 2020-11-12 VITALS — RESPIRATION RATE: 15 BRPM | WEIGHT: 176 LBS | HEIGHT: 69 IN

## 2020-11-12 DIAGNOSIS — D22 MELANOCYTIC NEVI: ICD-10-CM

## 2020-11-12 DIAGNOSIS — Z85.820 PERSONAL HISTORY OF MALIGNANT MELANOMA OF SKIN: ICD-10-CM

## 2020-11-12 DIAGNOSIS — L81.4 OTHER MELANIN HYPERPIGMENTATION: ICD-10-CM

## 2020-11-12 DIAGNOSIS — Z87.2 PERSONAL HISTORY OF DISEASES OF THE SKIN AND SUBCUTANEOUS TISSUE: ICD-10-CM

## 2020-11-12 DIAGNOSIS — L82.1 OTHER SEBORRHEIC KERATOSIS: ICD-10-CM

## 2020-11-12 PROBLEM — D22.61 MELANOCYTIC NEVI OF RIGHT UPPER LIMB, INCLUDING SHOULDER: Status: ACTIVE | Noted: 2020-11-12

## 2020-11-12 PROBLEM — D22.72 MELANOCYTIC NEVI OF LEFT LOWER LIMB, INCLUDING HIP: Status: ACTIVE | Noted: 2020-11-12

## 2020-11-12 PROBLEM — D22.71 MELANOCYTIC NEVI OF RIGHT LOWER LIMB, INCLUDING HIP: Status: ACTIVE | Noted: 2020-11-12

## 2020-11-12 PROBLEM — D22.62 MELANOCYTIC NEVI OF LEFT UPPER LIMB, INCLUDING SHOULDER: Status: ACTIVE | Noted: 2020-11-12

## 2020-11-12 PROBLEM — D22.5 MELANOCYTIC NEVI OF TRUNK: Status: ACTIVE | Noted: 2020-11-12

## 2020-11-12 PROCEDURE — OTHER SUNSCREEN RECOMMENDATIONS: OTHER

## 2020-11-12 PROCEDURE — OTHER COUNSELING: OTHER

## 2020-11-12 PROCEDURE — 99214 OFFICE O/P EST MOD 30 MIN: CPT

## 2020-11-12 ASSESSMENT — LOCATION SIMPLE DESCRIPTION DERM
LOCATION SIMPLE: RIGHT FOREARM
LOCATION SIMPLE: LEFT FOREARM
LOCATION SIMPLE: LEFT LOWER BACK
LOCATION SIMPLE: RIGHT OCCIPITAL SCALP
LOCATION SIMPLE: UPPER BACK
LOCATION SIMPLE: RIGHT ELBOW
LOCATION SIMPLE: RIGHT PRETIBIAL REGION
LOCATION SIMPLE: RIGHT UPPER BACK
LOCATION SIMPLE: RIGHT UPPER ARM
LOCATION SIMPLE: LEFT THIGH
LOCATION SIMPLE: CHEST
LOCATION SIMPLE: LEFT UPPER ARM

## 2020-11-12 ASSESSMENT — LOCATION DETAILED DESCRIPTION DERM
LOCATION DETAILED: INFERIOR THORACIC SPINE
LOCATION DETAILED: RIGHT SUPERIOR UPPER BACK
LOCATION DETAILED: STERNUM
LOCATION DETAILED: RIGHT PROXIMAL DORSAL FOREARM
LOCATION DETAILED: RIGHT SUPERIOR OCCIPITAL SCALP
LOCATION DETAILED: RIGHT PROXIMAL PRETIBIAL REGION
LOCATION DETAILED: LEFT INFERIOR LATERAL MIDBACK
LOCATION DETAILED: LEFT ANTERIOR PROXIMAL UPPER ARM
LOCATION DETAILED: LEFT PROXIMAL DORSAL FOREARM
LOCATION DETAILED: RIGHT INFERIOR UPPER BACK
LOCATION DETAILED: RIGHT ANTERIOR DISTAL UPPER ARM
LOCATION DETAILED: RIGHT LATERAL ELBOW
LOCATION DETAILED: LEFT ANTERIOR DISTAL THIGH

## 2020-11-12 ASSESSMENT — LOCATION ZONE DERM
LOCATION ZONE: TRUNK
LOCATION ZONE: SCALP
LOCATION ZONE: ARM
LOCATION ZONE: LEG

## 2020-11-30 NOTE — RESULT ENCOUNTER NOTE
Letter sent Livier Triplett MD PhD 10/30/2019
Low Back Strain    WHAT YOU NEED TO KNOW:    Low back strain is an injury to your lower back muscles or tendons. Tendons are strong tissues that connect muscles to bones. The lower back supports most of your body weight and helps you move, twist, and bend.    DISCHARGE INSTRUCTIONS:    Return to the emergency department if:   •You hear or feel a pop in your lower back.      •You have increased swelling or pain in your lower back.      •You have trouble moving your legs.      •Your legs are numb.      Contact your healthcare provider if:   •You have a fever.      •Your pain does not go away, even after treatment.       •You have questions or concerns about your condition or care.       Medicines: The following medicines may be ordered by your healthcare provider:   •Acetaminophen decreases pain and fever. It is available without a doctor's order. Ask how much to take and how often to take it. Follow directions. Acetaminophen can cause liver damage if not taken correctly.      •NSAIDs, such as ibuprofen, help decrease swelling, pain, and fever. This medicine is available with or without a doctor's order. NSAIDs can cause stomach bleeding or kidney problems in certain people. If you take blood thinner medicine, always ask your healthcare provider if NSAIDs are safe for you. Always read the medicine label and follow directions.      •Muscle relaxers help decrease pain and muscle spasms.      •Prescription pain medicine may be given. Ask how to take this medicine safely.      •Take your medicine as directed. Contact your healthcare provider if you think your medicine is not helping or if you have side effects. Tell him or her if you are allergic to any medicine. Keep a list of the medicines, vitamins, and herbs you take. Include the amounts, and when and why you take them. Bring the list or the pill bottles to follow-up visits. Carry your medicine list with you in case of an emergency.      Self-care:   •Rest as directed. You may need to rest in bed for a period of time after your injury. Do not lift heavy objects.       •Apply ice on your back for 15 to 20 minutes every hour or as directed. Use an ice pack, or put crushed ice in a plastic bag. Cover it with a towel. Ice helps prevent tissue damage and decreases swelling and pain.      •Apply heat on your lower back for 20 to 30 minutes every 2 hours for as many days as directed. Heat helps decrease pain and muscle spasms.      •Slowly start to increase your activity as the pain decreases, or as directed.      Prevent another low back strain:   •Use correct body movements. ?Bend at the hips and knees when you  objects. Do not bend from the waist. Use your leg muscles as you lift the load. Do not use your back. Keep the object close to your chest as you lift it. Try not to twist or lift anything above your waist.      ?Change your position often when you stand for long periods of time. Rest one foot on a small box or footrest, and then switch to the other foot often.      ?Try not to sit for long periods of time. When you do, sit in a straight-backed chair with your feet flat on the floor.      ?Never reach, pull, or push while you are sitting.      •Warm up before you exercise. Do exercises that strengthen your back muscles. Ask your healthcare provider about the best exercise plan for you.      •Maintain a healthy weight. Ask your healthcare provider how much you should weigh. Ask him to help you create a weight loss plan if you are overweight

## 2021-02-08 DIAGNOSIS — E78.5 HYPERLIPIDEMIA LDL GOAL <130: ICD-10-CM

## 2021-02-08 RX ORDER — ATORVASTATIN CALCIUM 20 MG/1
20 TABLET, FILM COATED ORAL DAILY
Qty: 90 TABLET | Refills: 1 | Status: SHIPPED | OUTPATIENT
Start: 2021-02-08 | End: 2021-08-12

## 2021-02-08 NOTE — TELEPHONE ENCOUNTER
Patient failed protocol due to has not been seen in > 1 year.   Per last office visit 11/6/20 patient was evaluated and had labs done.  Prescription approved per [facility] Refill Protocol.  Jessica Lopez RN

## 2021-02-19 ENCOUNTER — APPOINTMENT (OUTPATIENT)
Dept: URBAN - METROPOLITAN AREA CLINIC 252 | Age: 61
Setting detail: DERMATOLOGY
End: 2021-02-19

## 2021-02-19 VITALS — HEIGHT: 67 IN | RESPIRATION RATE: 12 BRPM | WEIGHT: 160 LBS

## 2021-02-19 DIAGNOSIS — D22 MELANOCYTIC NEVI: ICD-10-CM

## 2021-02-19 DIAGNOSIS — Z71.89 OTHER SPECIFIED COUNSELING: ICD-10-CM

## 2021-02-19 DIAGNOSIS — Z85.820 PERSONAL HISTORY OF MALIGNANT MELANOMA OF SKIN: ICD-10-CM

## 2021-02-19 DIAGNOSIS — L81.4 OTHER MELANIN HYPERPIGMENTATION: ICD-10-CM

## 2021-02-19 PROBLEM — D22.5 MELANOCYTIC NEVI OF TRUNK: Status: ACTIVE | Noted: 2021-02-19

## 2021-02-19 PROCEDURE — 99213 OFFICE O/P EST LOW 20 MIN: CPT

## 2021-02-19 PROCEDURE — OTHER COUNSELING: OTHER

## 2021-02-19 PROCEDURE — OTHER ADDITIONAL NOTES: OTHER

## 2021-02-19 PROCEDURE — OTHER SUNSCREEN RECOMMENDATIONS: OTHER

## 2021-02-19 ASSESSMENT — LOCATION DETAILED DESCRIPTION DERM
LOCATION DETAILED: RIGHT CENTRAL FRONTAL SCALP
LOCATION DETAILED: RIGHT SUPERIOR OCCIPITAL SCALP
LOCATION DETAILED: EPIGASTRIC SKIN
LOCATION DETAILED: RIGHT SUPERIOR MEDIAL MIDBACK
LOCATION DETAILED: LEFT MEDIAL SUPERIOR CHEST
LOCATION DETAILED: RIGHT SUPERIOR UPPER BACK
LOCATION DETAILED: STERNUM
LOCATION DETAILED: RIGHT MEDIAL UPPER BACK
LOCATION DETAILED: RIGHT PROXIMAL DORSAL FOREARM
LOCATION DETAILED: LEFT PROXIMAL DORSAL FOREARM

## 2021-02-19 ASSESSMENT — LOCATION ZONE DERM
LOCATION ZONE: ARM
LOCATION ZONE: TRUNK
LOCATION ZONE: SCALP

## 2021-02-19 ASSESSMENT — LOCATION SIMPLE DESCRIPTION DERM
LOCATION SIMPLE: ABDOMEN
LOCATION SIMPLE: LEFT FOREARM
LOCATION SIMPLE: RIGHT FOREARM
LOCATION SIMPLE: RIGHT OCCIPITAL SCALP
LOCATION SIMPLE: RIGHT UPPER BACK
LOCATION SIMPLE: RIGHT SCALP
LOCATION SIMPLE: CHEST
LOCATION SIMPLE: RIGHT LOWER BACK

## 2021-02-19 NOTE — PROCEDURE: ADDITIONAL NOTES
Detail Level: Detailed
Additional Notes: Mm diagnosed December 2019.
Render Risk Assessment In Note?: no

## 2021-02-25 ENCOUNTER — MYC MEDICAL ADVICE (OUTPATIENT)
Dept: FAMILY MEDICINE | Facility: CLINIC | Age: 61
End: 2021-02-25

## 2021-02-25 DIAGNOSIS — I10 HYPERTENSION GOAL BP (BLOOD PRESSURE) < 140/90: Primary | ICD-10-CM

## 2021-02-25 RX ORDER — HYDROCHLOROTHIAZIDE 25 MG/1
25 TABLET ORAL DAILY
Qty: 30 TABLET | Refills: 0 | Status: SHIPPED | OUTPATIENT
Start: 2021-02-25 | End: 2021-03-05

## 2021-02-25 NOTE — TELEPHONE ENCOUNTER
Spoke with patient. Will add hydrochlorothiazide 25 mg daily. He will Mychart me in the next 1-2 weeks on his BP and/or side effects.

## 2021-03-05 ENCOUNTER — MYC MEDICAL ADVICE (OUTPATIENT)
Dept: FAMILY MEDICINE | Facility: CLINIC | Age: 61
End: 2021-03-05

## 2021-03-05 VITALS — SYSTOLIC BLOOD PRESSURE: 128 MMHG | DIASTOLIC BLOOD PRESSURE: 81 MMHG

## 2021-03-05 DIAGNOSIS — I10 HYPERTENSION GOAL BP (BLOOD PRESSURE) < 140/90: ICD-10-CM

## 2021-03-05 RX ORDER — HYDROCHLOROTHIAZIDE 25 MG/1
25 TABLET ORAL DAILY
Qty: 90 TABLET | Refills: 2 | Status: SHIPPED | OUTPATIENT
Start: 2021-03-05 | End: 2021-03-24

## 2021-03-22 DIAGNOSIS — I10 HYPERTENSION GOAL BP (BLOOD PRESSURE) < 140/90: ICD-10-CM

## 2021-03-23 ENCOUNTER — MYC MEDICAL ADVICE (OUTPATIENT)
Dept: FAMILY MEDICINE | Facility: CLINIC | Age: 61
End: 2021-03-23

## 2021-03-23 DIAGNOSIS — I10 HYPERTENSION GOAL BP (BLOOD PRESSURE) < 140/90: Primary | ICD-10-CM

## 2021-03-24 RX ORDER — HYDROCHLOROTHIAZIDE 25 MG/1
TABLET ORAL
Qty: 90 TABLET | Refills: 0 | Status: SHIPPED | OUTPATIENT
Start: 2021-03-24 | End: 2021-03-25

## 2021-03-25 RX ORDER — HYDROCHLOROTHIAZIDE 25 MG/1
25 TABLET ORAL DAILY
Qty: 90 TABLET | Refills: 2 | Status: SHIPPED | OUTPATIENT
Start: 2021-03-25 | End: 2021-12-21

## 2021-03-26 DIAGNOSIS — I10 HYPERTENSION GOAL BP (BLOOD PRESSURE) < 140/90: ICD-10-CM

## 2021-03-26 LAB
CREAT SERPL-MCNC: 1.25 MG/DL (ref 0.66–1.25)
GFR SERPL CREATININE-BSD FRML MDRD: 62 ML/MIN/{1.73_M2}
POTASSIUM SERPL-SCNC: 4 MMOL/L (ref 3.4–5.3)

## 2021-03-26 PROCEDURE — 82565 ASSAY OF CREATININE: CPT | Performed by: INTERNAL MEDICINE

## 2021-03-26 PROCEDURE — 84132 ASSAY OF SERUM POTASSIUM: CPT | Performed by: INTERNAL MEDICINE

## 2021-03-26 PROCEDURE — 36415 COLL VENOUS BLD VENIPUNCTURE: CPT | Performed by: INTERNAL MEDICINE

## 2021-04-01 ENCOUNTER — IMMUNIZATION (OUTPATIENT)
Dept: FAMILY MEDICINE | Facility: CLINIC | Age: 61
End: 2021-04-01
Payer: COMMERCIAL

## 2021-04-01 PROCEDURE — 0011A PR COVID VAC MODERNA 100 MCG/0.5 ML IM: CPT

## 2021-04-01 PROCEDURE — 91301 PR COVID VAC MODERNA 100 MCG/0.5 ML IM: CPT

## 2021-04-05 ENCOUNTER — MYC MEDICAL ADVICE (OUTPATIENT)
Dept: FAMILY MEDICINE | Facility: CLINIC | Age: 61
End: 2021-04-05

## 2021-04-05 DIAGNOSIS — Z29.8 * * * SBE PROPHYLAXIS * * *: ICD-10-CM

## 2021-04-05 DIAGNOSIS — Q21.0 VSD (VENTRICULAR SEPTAL DEFECT): Primary | ICD-10-CM

## 2021-04-06 RX ORDER — AMOXICILLIN 500 MG/1
2000 CAPSULE ORAL ONCE
Qty: 20 CAPSULE | Refills: 0 | Status: SHIPPED | OUTPATIENT
Start: 2021-04-06 | End: 2021-05-18

## 2021-04-29 ENCOUNTER — IMMUNIZATION (OUTPATIENT)
Dept: FAMILY MEDICINE | Facility: CLINIC | Age: 61
End: 2021-04-29
Attending: FAMILY MEDICINE
Payer: COMMERCIAL

## 2021-04-29 PROCEDURE — 91301 PR COVID VAC MODERNA 100 MCG/0.5 ML IM: CPT

## 2021-04-29 PROCEDURE — 0012A PR COVID VAC MODERNA 100 MCG/0.5 ML IM: CPT
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
## 2021-05-18 ENCOUNTER — OFFICE VISIT (OUTPATIENT)
Dept: FAMILY MEDICINE | Facility: CLINIC | Age: 61
End: 2021-05-18
Payer: COMMERCIAL

## 2021-05-18 VITALS
HEART RATE: 69 BPM | WEIGHT: 180.3 LBS | BODY MASS INDEX: 26.63 KG/M2 | OXYGEN SATURATION: 94 % | DIASTOLIC BLOOD PRESSURE: 70 MMHG | SYSTOLIC BLOOD PRESSURE: 110 MMHG

## 2021-05-18 DIAGNOSIS — D62 ANEMIA DUE TO BLOOD LOSS, ACUTE: ICD-10-CM

## 2021-05-18 DIAGNOSIS — Z29.8 * * * SBE PROPHYLAXIS * * *: ICD-10-CM

## 2021-05-18 DIAGNOSIS — K92.1 BLOOD IN STOOL: Primary | ICD-10-CM

## 2021-05-18 DIAGNOSIS — Q21.0 VSD (VENTRICULAR SEPTAL DEFECT): ICD-10-CM

## 2021-05-18 LAB
ERYTHROCYTE [DISTWIDTH] IN BLOOD BY AUTOMATED COUNT: 12.7 % (ref 10–15)
HCT VFR BLD AUTO: 37.5 % (ref 40–53)
HGB BLD-MCNC: 12.7 G/DL (ref 13.3–17.7)
MCH RBC QN AUTO: 31.5 PG (ref 26.5–33)
MCHC RBC AUTO-ENTMCNC: 33.9 G/DL (ref 31.5–36.5)
MCV RBC AUTO: 93 FL (ref 78–100)
PLATELET # BLD AUTO: 291 10E9/L (ref 150–450)
RBC # BLD AUTO: 4.03 10E12/L (ref 4.4–5.9)
WBC # BLD AUTO: 9 10E9/L (ref 4–11)

## 2021-05-18 PROCEDURE — 82270 OCCULT BLOOD FECES: CPT | Performed by: INTERNAL MEDICINE

## 2021-05-18 PROCEDURE — 36415 COLL VENOUS BLD VENIPUNCTURE: CPT | Performed by: INTERNAL MEDICINE

## 2021-05-18 PROCEDURE — 85027 COMPLETE CBC AUTOMATED: CPT | Performed by: INTERNAL MEDICINE

## 2021-05-18 PROCEDURE — 99214 OFFICE O/P EST MOD 30 MIN: CPT | Performed by: INTERNAL MEDICINE

## 2021-05-18 RX ORDER — AMOXICILLIN 500 MG/1
2000 CAPSULE ORAL ONCE
Qty: 20 CAPSULE | Refills: 0 | COMMUNITY
Start: 2021-05-18 | End: 2022-05-14

## 2021-05-18 NOTE — PROGRESS NOTES
Assessment & Plan     Wally was seen today for rectal problem.    Diagnoses and all orders for this visit:    Blood in stool  -     Occult blood stool 1-3 spec; Future  -     CBC with platelets  -     Cancel: GASTROENTEROLOGY ADULT REF PROCEDURE ONLY; Future  -     GASTROENTEROLOGY ADULT REF PROCEDURE ONLY; Future  -     Hemoglobin; Future  -     Ferritin; Future  -     Iron and iron binding capacity; Future    VSD (ventricular septal defect)    * * * SBE PROPHYLAXIS * * *    Anemia due to blood loss, acute  -     Hemoglobin; Future  -     Ferritin; Future  -     Iron and iron binding capacity; Future      We will check if the maroon color is blood. Check CBC. This came back showing a 2.5 gram hemoglobin drop from a year ago. I talked with pt again. Denies further maroom color stool. Denied light headedness or dizziness. No NSAIDS use for minimal of 6 weeks. No stomach discomfort.     We'll proceed with urgent diagnostic colonoscopy. He will monitor further maroon color stool or symptoms of light headedness or dizziness. If he has another maroon color stool or symptomatic, go to ED. Recheck CBC/ferritin/iron tomorrow if he remains asymptomatic.     Livier Triplett MD PhD  [facility]   Wally is a 60 year old who presents for the following health issues     HPI     Patient states 2 dark red BM concerns as of today.  They were maroon color.  Not bright red blood.  No constipation he read on the insert for hydrochlorothiazide that patient may have karey colored stool or blood in the stool.  Hydrochlorothiazide was added 2 months ago to help control his blood pressure.  He has noticed the stool being a lighter color for some time.  He otherwise feels well.  No lightheadedness or dizziness.    He denies any stomach pain.  No acid reflux.  In the past he has used ibuprofen occasionally for arthritic pain but has not used it recently.    His last colonoscopy was 10 years ago.  Completely  normal.    He has a history of VSD repair, require SBE prophylaxis.  Denies any cardiac issue at this time.    Review of Systems   Constitutional, HEENT, cardiovascular, pulmonary, gi and gu systems are negative, except as otherwise noted.      Objective    /70   Pulse 69   Wt 81.8 kg (180 lb 4.8 oz)   SpO2 94%   BMI 26.63 kg/m    Body mass index is 26.63 kg/m .  Physical Exam   GENERAL: healthy, alert and no distress  NEURO: Normal strength and tone, mentation intact and speech normal  PSYCH: mentation appears normal, affect normal/bright      Results for orders placed or performed in visit on 05/18/21   CBC with platelets     Status: Abnormal   Result Value Ref Range    WBC 9.0 4.0 - 11.0 10e9/L    RBC Count 4.03 (L) 4.4 - 5.9 10e12/L    Hemoglobin 12.7 (L) 13.3 - 17.7 g/dL    Hematocrit 37.5 (L) 40.0 - 53.0 %    MCV 93 78 - 100 fl    MCH 31.5 26.5 - 33.0 pg    MCHC 33.9 31.5 - 36.5 g/dL    RDW 12.7 10.0 - 15.0 %    Platelet Count 291 150 - 450 10e9/L

## 2021-05-19 ENCOUNTER — TELEPHONE (OUTPATIENT)
Dept: GASTROENTEROLOGY | Facility: CLINIC | Age: 61
End: 2021-05-19

## 2021-05-19 ENCOUNTER — TELEPHONE (OUTPATIENT)
Dept: GASTROENTEROLOGY | Facility: OUTPATIENT CENTER | Age: 61
End: 2021-05-19

## 2021-05-19 DIAGNOSIS — I10 HYPERTENSION GOAL BP (BLOOD PRESSURE) < 140/90: Primary | ICD-10-CM

## 2021-05-19 DIAGNOSIS — Z11.59 ENCOUNTER FOR SCREENING FOR OTHER VIRAL DISEASES: ICD-10-CM

## 2021-05-19 LAB
LABORATORY COMMENT REPORT: NORMAL
SARS-COV-2 RNA RESP QL NAA+PROBE: NEGATIVE
SARS-COV-2 RNA RESP QL NAA+PROBE: NORMAL
SPECIMEN SOURCE: NORMAL
SPECIMEN SOURCE: NORMAL

## 2021-05-19 PROCEDURE — 99207 PR NO CHARGE LOS: CPT

## 2021-05-19 PROCEDURE — U0003 INFECTIOUS AGENT DETECTION BY NUCLEIC ACID (DNA OR RNA); SEVERE ACUTE RESPIRATORY SYNDROME CORONAVIRUS 2 (SARS-COV-2) (CORONAVIRUS DISEASE [COVID-19]), AMPLIFIED PROBE TECHNIQUE, MAKING USE OF HIGH THROUGHPUT TECHNOLOGIES AS DESCRIBED BY CMS-2020-01-R: HCPCS | Performed by: INTERNAL MEDICINE

## 2021-05-19 PROCEDURE — U0005 INFEC AGEN DETEC AMPLI PROBE: HCPCS | Performed by: INTERNAL MEDICINE

## 2021-05-19 NOTE — TELEPHONE ENCOUNTER
Pt's procedure scheduled today.    Attempted to contact patient regarding upcoming colonoscopy procedure on 5.21.2021 for pre assessment questions.  No answer.  Unable to leave a message; tried calling twice which went to  and states all circuits are busy.      COVID test 5.19.2021      Lily Rivera RN

## 2021-05-19 NOTE — TELEPHONE ENCOUNTER
Scheduling Details    Procedure Scheduled: COLON  Provider/Surgeon: MARVIN  Date of Procedure: 5/21/2021  Location: [facility]  Caller (Please ask for phone number if not scheduled by patient): PATIENTS WIFE      Sedation Type: CS  Conscious Sedation- Needs  for 6 hours after the procedure  MAC/General-Needs  for 24 hours after procedure    Pre-op Required at [facility] and OR for  MAC sedation: N  (if yes advise patient they will need a pre-op prior to procedure)      Is patient on blood thinners? -N (If yes- inform patient to follow up with PCP or provider for follow up instructions)     Informed patient they will need an adult  Y  Cannot take any type of public or medical transportation alone    Informed Patient of COVID Test Requirement [id number]    Confirmed Nurse will call to complete assessment Y    Additional comments:

## 2021-05-20 ENCOUNTER — APPOINTMENT (OUTPATIENT)
Dept: URBAN - METROPOLITAN AREA CLINIC 252 | Age: 61
Setting detail: DERMATOLOGY
End: 2021-05-20

## 2021-05-20 VITALS — WEIGHT: 180 LBS | HEIGHT: 69 IN | RESPIRATION RATE: 16 BRPM

## 2021-05-20 DIAGNOSIS — Z71.89 OTHER SPECIFIED COUNSELING: ICD-10-CM

## 2021-05-20 DIAGNOSIS — L57.0 ACTINIC KERATOSIS: ICD-10-CM

## 2021-05-20 DIAGNOSIS — L81.4 OTHER MELANIN HYPERPIGMENTATION: ICD-10-CM

## 2021-05-20 DIAGNOSIS — K92.1 BLOOD IN STOOL: ICD-10-CM

## 2021-05-20 DIAGNOSIS — Z85.820 PERSONAL HISTORY OF MALIGNANT MELANOMA OF SKIN: ICD-10-CM

## 2021-05-20 DIAGNOSIS — D22 MELANOCYTIC NEVI: ICD-10-CM

## 2021-05-20 DIAGNOSIS — L82.1 OTHER SEBORRHEIC KERATOSIS: ICD-10-CM

## 2021-05-20 PROBLEM — D22.5 MELANOCYTIC NEVI OF TRUNK: Status: ACTIVE | Noted: 2021-05-20

## 2021-05-20 LAB
COLLECT DATE SP2 STL: ABNORMAL
COLLECT DATE SP3 STL: ABNORMAL
COLLECT DATE STL: ABNORMAL
HEMOCCULT SP1 STL QL: POSITIVE
HEMOCCULT SP2 STL QL: POSITIVE
HEMOCCULT SP3 STL QL: POSITIVE

## 2021-05-20 PROCEDURE — OTHER COUNSELING: OTHER

## 2021-05-20 PROCEDURE — 17000 DESTRUCT PREMALG LESION: CPT

## 2021-05-20 PROCEDURE — 99213 OFFICE O/P EST LOW 20 MIN: CPT | Mod: 25

## 2021-05-20 PROCEDURE — OTHER LIQUID NITROGEN: OTHER

## 2021-05-20 PROCEDURE — OTHER ADDITIONAL NOTES: OTHER

## 2021-05-20 PROCEDURE — OTHER SUNSCREEN RECOMMENDATIONS: OTHER

## 2021-05-20 ASSESSMENT — LOCATION DETAILED DESCRIPTION DERM
LOCATION DETAILED: RIGHT POSTERIOR PARIETAL SCALP
LOCATION DETAILED: RIGHT MEDIAL UPPER BACK
LOCATION DETAILED: RIGHT SUPERIOR OCCIPITAL SCALP
LOCATION DETAILED: RIGHT MEDIAL SUPERIOR CHEST
LOCATION DETAILED: EPIGASTRIC SKIN
LOCATION DETAILED: RIGHT SUPERIOR MEDIAL MIDBACK
LOCATION DETAILED: LEFT CENTRAL TEMPLE
LOCATION DETAILED: LEFT MEDIAL SUPERIOR CHEST

## 2021-05-20 ASSESSMENT — LOCATION SIMPLE DESCRIPTION DERM
LOCATION SIMPLE: RIGHT LOWER BACK
LOCATION SIMPLE: ABDOMEN
LOCATION SIMPLE: CHEST
LOCATION SIMPLE: POSTERIOR SCALP
LOCATION SIMPLE: LEFT TEMPLE
LOCATION SIMPLE: RIGHT UPPER BACK
LOCATION SIMPLE: RIGHT OCCIPITAL SCALP

## 2021-05-20 ASSESSMENT — LOCATION ZONE DERM
LOCATION ZONE: SCALP
LOCATION ZONE: TRUNK
LOCATION ZONE: FACE

## 2021-05-20 NOTE — RESULT ENCOUNTER NOTE
Dear Wally,   Your recent test results showed the following:  -- as we expected, this is GI bleed. Proceed with colonoscopy as planned tomorrow.     Please call or Mychart to our office if you have further questions.     Livier Triplett MD-PhD

## 2021-05-20 NOTE — PROCEDURE: LIQUID NITROGEN
Render Post-Care Instructions In Note?: yes
Detail Level: Detailed
Post-Care Instructions: - Patient was instructed to avoid picking at any of the treated lesions.\\n- Should any lesions treated today not be resolved within 4 weeks or if not certain, then return to clinic for re-evaluation.
Duration Of Freeze Thaw-Cycle (Seconds): 0
Consent: - Discussed that these are a result of cumulative sun exposure.\\n- Verbal and written consent was obtained, and risks were reviewed prior to procedure today. \\n- Risks discussed include but are not limited to pain, crusting, scabbing, blistering, scarring, temporary or permanent darker or lighter pigmentary change, recurrence, incomplete resolution, and infection.

## 2021-05-20 NOTE — PROCEDURE: ADDITIONAL NOTES
Render Risk Assessment In Note?: no
Detail Level: Detailed
Additional Notes: Diagnosed December 2019

## 2021-05-20 NOTE — PROCEDURE: COUNSELING
Detail Level: Generalized
Detail Level: Zone
Detail Level: Simple
Detail Level: Detailed
Quality 137: Melanoma: Continuity Of Care - Recall System: Patient information entered into a recall system that includes: target date for the next exam specified AND a process to follow up with patients regarding missed or unscheduled appointments

## 2021-05-21 ENCOUNTER — HOSPITAL ENCOUNTER (OUTPATIENT)
Facility: CLINIC | Age: 61
Discharge: HOME | End: 2021-05-21
Attending: INTERNAL MEDICINE | Admitting: INTERNAL MEDICINE
Payer: COMMERCIAL

## 2021-05-21 VITALS
SYSTOLIC BLOOD PRESSURE: 130 MMHG | TEMPERATURE: 99.1 F | HEART RATE: 60 BPM | RESPIRATION RATE: 19 BRPM | OXYGEN SATURATION: 97 % | DIASTOLIC BLOOD PRESSURE: 109 MMHG

## 2021-05-21 DIAGNOSIS — K63.3 COLON ULCER: Primary | ICD-10-CM

## 2021-05-21 LAB — COLONOSCOPY: NORMAL

## 2021-05-21 PROCEDURE — 88341 IMHCHEM/IMCYTCHM EA ADD ANTB: CPT | Mod: TC | Performed by: INTERNAL MEDICINE

## 2021-05-21 PROCEDURE — 88305 TISSUE EXAM BY PATHOLOGIST: CPT | Mod: 26 | Performed by: PATHOLOGY

## 2021-05-21 PROCEDURE — 250N000013 HC RX MED GY IP 250 OP 250 PS 637: Performed by: INTERNAL MEDICINE

## 2021-05-21 PROCEDURE — 88305 TISSUE EXAM BY PATHOLOGIST: CPT | Mod: TC | Performed by: INTERNAL MEDICINE

## 2021-05-21 PROCEDURE — 88341 IMHCHEM/IMCYTCHM EA ADD ANTB: CPT | Mod: 26 | Performed by: PATHOLOGY

## 2021-05-21 PROCEDURE — 250N000011 HC RX IP 250 OP 636: Performed by: INTERNAL MEDICINE

## 2021-05-21 PROCEDURE — 88342 IMHCHEM/IMCYTCHM 1ST ANTB: CPT | Mod: TC,59 | Performed by: INTERNAL MEDICINE

## 2021-05-21 PROCEDURE — G0500 MOD SEDAT ENDO SERVICE >5YRS: HCPCS | Performed by: INTERNAL MEDICINE

## 2021-05-21 PROCEDURE — 45385 COLONOSCOPY W/LESION REMOVAL: CPT

## 2021-05-21 PROCEDURE — 88342 IMHCHEM/IMCYTCHM 1ST ANTB: CPT | Mod: 26 | Performed by: PATHOLOGY

## 2021-05-21 PROCEDURE — 45380 COLONOSCOPY AND BIOPSY: CPT | Mod: XU | Performed by: INTERNAL MEDICINE

## 2021-05-21 RX ORDER — FLUMAZENIL 0.1 MG/ML
0.2 INJECTION, SOLUTION INTRAVENOUS
Status: CANCELLED | OUTPATIENT
Start: 2021-05-21 | End: 2021-05-22

## 2021-05-21 RX ORDER — ONDANSETRON 4 MG/1
4 TABLET, ORALLY DISINTEGRATING ORAL EVERY 6 HOURS PRN
Status: CANCELLED | OUTPATIENT
Start: 2021-05-21

## 2021-05-21 RX ORDER — SIMETHICONE 40MG/0.6ML
SUSPENSION, DROPS(FINAL DOSAGE FORM)(ML) ORAL PRN
Status: COMPLETED | OUTPATIENT
Start: 2021-05-21 | End: 2021-05-21

## 2021-05-21 RX ORDER — ONDANSETRON 2 MG/ML
4 INJECTION INTRAMUSCULAR; INTRAVENOUS
Status: DISCONTINUED | OUTPATIENT
Start: 2021-05-21 | End: 2021-05-21 | Stop reason: HOSPADM

## 2021-05-21 RX ORDER — FENTANYL CITRATE 50 UG/ML
INJECTION, SOLUTION INTRAMUSCULAR; INTRAVENOUS PRN
Status: COMPLETED | OUTPATIENT
Start: 2021-05-21 | End: 2021-05-21

## 2021-05-21 RX ORDER — ONDANSETRON 2 MG/ML
4 INJECTION INTRAMUSCULAR; INTRAVENOUS EVERY 6 HOURS PRN
Status: CANCELLED | OUTPATIENT
Start: 2021-05-21

## 2021-05-21 RX ORDER — NALOXONE HYDROCHLORIDE 0.4 MG/ML
0.4 INJECTION, SOLUTION INTRAMUSCULAR; INTRAVENOUS; SUBCUTANEOUS
Status: CANCELLED | OUTPATIENT
Start: 2021-05-21

## 2021-05-21 RX ORDER — LIDOCAINE 40 MG/G
CREAM TOPICAL
Status: DISCONTINUED | OUTPATIENT
Start: 2021-05-21 | End: 2021-05-21 | Stop reason: HOSPADM

## 2021-05-21 RX ORDER — NALOXONE HYDROCHLORIDE 0.4 MG/ML
0.2 INJECTION, SOLUTION INTRAMUSCULAR; INTRAVENOUS; SUBCUTANEOUS
Status: CANCELLED | OUTPATIENT
Start: 2021-05-21

## 2021-05-21 RX ORDER — PROCHLORPERAZINE MALEATE 10 MG
10 TABLET ORAL EVERY 6 HOURS PRN
Status: CANCELLED | OUTPATIENT
Start: 2021-05-21

## 2021-05-21 RX ADMIN — SIMETHICONE 133 MG: 63.3; 3.7 SOLUTION/ DROPS ORAL at 14:17

## 2021-05-21 RX ADMIN — MIDAZOLAM 1 MG: 1 INJECTION INTRAMUSCULAR; INTRAVENOUS at 14:38

## 2021-05-21 RX ADMIN — FENTANYL CITRATE 50 MCG: 50 INJECTION, SOLUTION INTRAMUSCULAR; INTRAVENOUS at 14:31

## 2021-05-21 RX ADMIN — MIDAZOLAM 2 MG: 1 INJECTION INTRAMUSCULAR; INTRAVENOUS at 14:31

## 2021-05-21 NOTE — OR NURSING
Patient had colonoscopy with polypectomies and biopsies.  Patient tolerated procedure well under conscious sedation and 2 liters nasal cannula.

## 2021-05-27 ENCOUNTER — ANCILLARY PROCEDURE (OUTPATIENT)
Dept: CT IMAGING | Facility: CLINIC | Age: 61
End: 2021-05-27
Attending: INTERNAL MEDICINE
Payer: COMMERCIAL

## 2021-05-27 DIAGNOSIS — K63.3 COLON ULCER: ICD-10-CM

## 2021-05-27 LAB — COPATH REPORT: NORMAL

## 2021-05-27 PROCEDURE — 74176 CT ABD & PELVIS W/O CONTRAST: CPT | Mod: GC | Performed by: RADIOLOGY

## 2021-06-17 ENCOUNTER — OFFICE VISIT (OUTPATIENT)
Dept: FAMILY MEDICINE | Facility: CLINIC | Age: 61
End: 2021-06-17
Payer: COMMERCIAL

## 2021-06-17 VITALS
WEIGHT: 178.5 LBS | HEART RATE: 68 BPM | DIASTOLIC BLOOD PRESSURE: 80 MMHG | BODY MASS INDEX: 26.36 KG/M2 | SYSTOLIC BLOOD PRESSURE: 123 MMHG | TEMPERATURE: 97.9 F | OXYGEN SATURATION: 98 % | RESPIRATION RATE: 16 BRPM

## 2021-06-17 DIAGNOSIS — R41.3 MEMORY LOSS: ICD-10-CM

## 2021-06-17 DIAGNOSIS — K63.3 COLON ULCER: ICD-10-CM

## 2021-06-17 DIAGNOSIS — K76.9 LIVER LESION: Primary | ICD-10-CM

## 2021-06-17 DIAGNOSIS — M17.0 PRIMARY OSTEOARTHRITIS OF BOTH KNEES: ICD-10-CM

## 2021-06-17 DIAGNOSIS — Z82.0 FAMILY HISTORY OF ALZHEIMER'S DISEASE: ICD-10-CM

## 2021-06-17 DIAGNOSIS — M18.11 OSTEOARTHRITIS OF RIGHT THUMB: ICD-10-CM

## 2021-06-17 DIAGNOSIS — C43.4 MELANOMA OF SCALP (H): ICD-10-CM

## 2021-06-17 DIAGNOSIS — R90.89 ABNORMAL FINDING ON MRI OF BRAIN: ICD-10-CM

## 2021-06-17 LAB
ERYTHROCYTE [DISTWIDTH] IN BLOOD BY AUTOMATED COUNT: 12.9 % (ref 10–15)
HCT VFR BLD AUTO: 39.1 % (ref 40–53)
HGB BLD-MCNC: 13 G/DL (ref 13.3–17.7)
MCH RBC QN AUTO: 31.7 PG (ref 26.5–33)
MCHC RBC AUTO-ENTMCNC: 33.2 G/DL (ref 31.5–36.5)
MCV RBC AUTO: 95 FL (ref 78–100)
PLATELET # BLD AUTO: 252 10E9/L (ref 150–450)
RBC # BLD AUTO: 4.1 10E12/L (ref 4.4–5.9)
WBC # BLD AUTO: 8.6 10E9/L (ref 4–11)

## 2021-06-17 PROCEDURE — 82607 VITAMIN B-12: CPT | Performed by: INTERNAL MEDICINE

## 2021-06-17 PROCEDURE — 36415 COLL VENOUS BLD VENIPUNCTURE: CPT | Performed by: INTERNAL MEDICINE

## 2021-06-17 PROCEDURE — 85027 COMPLETE CBC AUTOMATED: CPT | Performed by: INTERNAL MEDICINE

## 2021-06-17 PROCEDURE — 99214 OFFICE O/P EST MOD 30 MIN: CPT | Performed by: INTERNAL MEDICINE

## 2021-06-17 PROCEDURE — 84443 ASSAY THYROID STIM HORMONE: CPT | Performed by: INTERNAL MEDICINE

## 2021-06-17 NOTE — PROGRESS NOTES
Assessment & Plan     Wally was seen today for gastrointestinal problem.    Diagnoses and all orders for this visit:    Liver lesion  -     US Abdomen Complete; Future    Colon ulcer  -     GASTROENTEROLOGY ADULT REF PROCEDURE ONLY; Future    Melanoma of scalp (H)    Memory loss  -     NEUROPSYCHOLOGY REFERRAL  -     Vitamin B12  -     TSH with free T4 reflex  -     CBC with platelets  -     MR Brain w/o Contrast; Future    Primary osteoarthritis of both knees    Osteoarthritis of right thumb    Family history of Alzheimer's disease  Comments:  Strong family history.  8 out of 613 siblings in his father's family had dementia    Other orders  -     Cancel: GASTROENTEROLOGY ADULT REF PROCEDURE ONLY      I reviewed patient's colonoscopy report, abdominal CT.  There is a liver lesion that is indeterminant, ultrasound recommended.  I ordered this for him.    Colon ulcer: Repeat colonoscopy was recommended, today was indeterminant pending on biopsy report.  Also biopsy did not reveal malignancy or other pathology.  He had 2 hyperplastic polyps and one tubular adenoma.  By now it has been 5 weeks post his last colonoscopy.  I ordered another 1 for him to be done to evaluate ulcer healing.    He has a history of melanoma of the scalp, had Mohs procedure done over a year ago.  Has normal skin check.  Stable.  No new lesions.    Memory loss: We will do a few screening labs.  Refer to neuropsychology for diagnostic evaluation.  Obtain brain MRI to rule out brain pathology.    NSAID use: Sparingly would be okay.  I encouraged him to start with topical first.  Use NSAID as last resort.  I encourage patient to follow-up with orthopedist.  He may be able to get another set of injection for his knees or even his thumb to avoid the need to take NSAIDs.    Return in about 5 months (around 11/17/2021) for Physical Exam, Fasting Lab appointment.    Livier Triplett MD PhD  [facility]   Wally is a 60  year old who presents for the following health issues     Wally has Hyperlipidemia LDL goal <100; VSD (ventricular septal defect); * * * SBE PROPHYLAXIS * * *; Hypertension goal BP (blood pressure) < 140/90; NSAID long-term use; S/P infectious endocarditis; Superficial thrombophlebitis; Patient [facility] by [facility]; and Melanoma of scalp (H) on their pertinent problem list.      Patient came with his wife to discuss a number of issues.  Last month patient had a couple episodes of rectal bleeding, was seen in the [facility].  Had urgent colonoscopy found colonic ulcer in the ascending colon.  The plan was relayed to the patient verbally to have repeat colonoscopy in 4 weeks to check for healing.  There has been no colonoscopy ordered.  Since the ER visit, he has not had any rectal bleeding.  His bowel movements have been normal.    Patient was also told to get an abdominal CT.  He was not informed of the CT result.    He also wonder about ibuprofen use.  He has been developing arthritis in his knees and now on his right thumb.  He has been an avid .  He continue to play some.  And also works/volunteers at baseball games.  He has been challenging for his knees.  He has tried Tylenol without benefit.  He has diclofenac gel that he uses on his knees, helps slightly.  Ibuprofen helps the most.  He uses sparingly.  At most he had use 400 mg at a single dose.  He would like to asked if he can continue to use this.  In the past he has had orthopedic evaluation for his knees, has had steroid injections in the past.  He thinks it has been beneficial.    The most concerning thing for him is memory issue.  His wife has noticed this over the last year.  He came on gradually.  At first they thought it was him adjusting to long-term, his own health issues.  But in recent months this has gotten progressively worse.  He is having more trouble with  short-term memory.  Not remembering time and dates of appointments.    He has a strong family history of Alzheimer's disease.  His father had Alzheimer's disease.  His father is 1 of 13 siblings.  8 of the 13 siblings eventually developed dementia at some point.  The youngest age of onset of dementia is around age 60.    Patient has acknowledged recent health changes, may be causing some anxiety.  He has been in group therapy and individual counseling.  He has found that to be very helpful.           ED/UC Followup:    Facility:  [facility]  Date of visit: 05/19/2021 & 05/21/2021  Reason for visit: Rectal Bleeding, Colon ulcer  Current Status: Resolved           Review of Systems   Constitutional, HEENT, cardiovascular, pulmonary, gi and gu systems are negative, except as otherwise noted.      Objective    /80   Pulse 68   Temp 97.9  F (36.6  C) (Oral)   Resp 16   Wt 81 kg (178 lb 8 oz)   SpO2 98%   BMI 26.36 kg/m    Body mass index is 26.36 kg/m .  Physical Exam   GENERAL: healthy, alert and no distress. Patient referred to his wife for historical information.  A number of times he seemed uncertain with answers.  His right thumb has arthritic changes with hypertrophy at the MCP joint.  NEURO: Normal strength and tone, mentation intact and speech normal  PSYCH: mentation appears normal, affect normal/bright

## 2021-06-18 ENCOUNTER — TELEPHONE (OUTPATIENT)
Dept: GASTROENTEROLOGY | Facility: CLINIC | Age: 61
End: 2021-06-18

## 2021-06-18 DIAGNOSIS — Z11.59 ENCOUNTER FOR SCREENING FOR OTHER VIRAL DISEASES: ICD-10-CM

## 2021-06-18 PROBLEM — Q24.9 CONGENITAL ANOMALIES OF THE HEART: Status: ACTIVE | Noted: 2018-10-04

## 2021-06-18 LAB
TSH SERPL DL<=0.005 MIU/L-ACNC: 1.41 MU/L (ref 0.4–4)
VIT B12 SERPL-MCNC: 515 PG/ML (ref 193–986)

## 2021-06-18 NOTE — RESULT ENCOUNTER NOTE
Dear Wally,   Your recent test results showed the following:  -- hemoglobin up slightly but not in the normal range yet. We can recheck in 2-3 months.     Please call or Mychart to our office if you have further questions.     Livier Triplett MD-PhD

## 2021-06-18 NOTE — TELEPHONE ENCOUNTER
Screening Questions  1. What insurance is in the chart? [employer] on chart    2.  Ordering/Referring Provider: Livier Triplett MD PhD in BA FM/IM/PEDS    3. BMI 26.3    4. Are you on daily home oxygen? no    5. Do you have a history of difficult airway? no    6. Have you had a heart, lung, or liver transplant? no    7. Are you currently on dialysis? no    8. Have you had a stroke or Transient ischemic atttack (TIA) within 6 months? no    9. In the past 6 months, have you had any heart related issues including cardiomyopathy or heart attack?         If yes, did it require cardiac stenting or other implantable device?no    10. Do you have any implantable devices in your body (pacemaker, defib, LVAD)? no    11. Do you take nitroglycerin? If yes, how often? no    12. Are you currently taking any blood thinners?no, takes a baby asprin    13. Are you a diabetic? no    14. (Females) Are you currently pregnant? n/a  If yes, how many weeks?    15. Have you had a procedure in the past that was difficult to tolerate with conscious sedation? Any allergies to Fentanyl or Versed no    16. Are you taking any scheduled prescription narcotics more than once daily? no    17. Do you have any chemical dependencies such as alcohol, street drugs, or methadone? no    18. Do you have any history of post-traumatic stress syndrome or mental health issues? no    19. Do you transfer independently? yes    20.  Do you have any issues with constipation? no    21. Preferred Pharmacy for Pre Prescription On chart    Scheduling Details    Procedure Scheduled: Colonoscopy  Provider/Surgeon: Shayla  Date of Procedure: 8/2/21  Location: [facility]  Caller (Please ask for phone number if not scheduled by patient): Wally      Sedation Type: CS  Conscious Sedation- Needs  for 6 hours after the procedure  MAC/General-Needs  for 24 hours after procedure    Pre-op Required at [facility] and OR for  MAC sedation:   (if yes advise patient they will need a pre-op  prior to procedure)      Is patient on blood thinners? -no (If yes- inform patient to follow up with PCP or provider for follow up instructions)     Informed patient they will need an adult  yes  Cannot take any type of public or medical transportation alone    Informed Patient of COVID Test Requirement yes    Confirmed Nurse will call to complete assessment yes    Additional comments: n/a

## 2021-07-08 ENCOUNTER — HOSPITAL ENCOUNTER (OUTPATIENT)
Dept: ULTRASOUND IMAGING | Facility: CLINIC | Age: 61
End: 2021-07-08
Attending: INTERNAL MEDICINE
Payer: COMMERCIAL

## 2021-07-08 ENCOUNTER — HOSPITAL ENCOUNTER (OUTPATIENT)
Dept: MRI IMAGING | Facility: CLINIC | Age: 61
End: 2021-07-08
Attending: INTERNAL MEDICINE
Payer: COMMERCIAL

## 2021-07-08 DIAGNOSIS — R41.3 MEMORY LOSS: ICD-10-CM

## 2021-07-08 DIAGNOSIS — K76.9 LIVER LESION: ICD-10-CM

## 2021-07-08 PROCEDURE — 76705 ECHO EXAM OF ABDOMEN: CPT | Mod: 26 | Performed by: RADIOLOGY

## 2021-07-08 PROCEDURE — 76705 ECHO EXAM OF ABDOMEN: CPT

## 2021-07-08 PROCEDURE — 70551 MRI BRAIN STEM W/O DYE: CPT

## 2021-07-08 PROCEDURE — 70551 MRI BRAIN STEM W/O DYE: CPT | Mod: 26 | Performed by: RADIOLOGY

## 2021-07-09 NOTE — RESULT ENCOUNTER NOTE
Dear Wally,   Your recent test results showed the following:  -- ultrasound showed small liver cysts. These are benign. No follow up needed.     Please call or Mychart to our office if you have further questions.     Livier Triplett MD-PhD

## 2021-07-09 NOTE — RESULT ENCOUNTER NOTE
Dear Wally,   Your recent test results showed the following:  --MRI showed concern for possible posterior circulation aneurysm vs a vessel loop. MRA is recommended. I ordered this for you.  -- there is brain volume loss, likely from aging per radiologist.   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 Reducing the risk factors for heart disease will help slow down the progression of leukoaraiosis. Your are already optimized from heart risk reduction standpoint. As to the cause of leukoaraiosis, they may represent result of infections or inflammatory insults, vasculopathy or demyelination. There is no other finding to suggest demyelination. With your heart history, more likely vasculopathy. We'll get the MRA.   -- MRA uses gadolinium contrast. You have had iodine contrast allergy on the list. It shouldn't affect the MRA. If you think you have had gadolinium contrast in the past with reaction, let me know. I can send premedication for this.     Please call or Mychart to our office if you have further questions.     Livier Triplett MD-PhD

## 2021-07-22 ENCOUNTER — TELEPHONE (OUTPATIENT)
Dept: GASTROENTEROLOGY | Facility: CLINIC | Age: 61
End: 2021-07-22

## 2021-07-22 NOTE — TELEPHONE ENCOUNTER
Writer reviewed pre-assessment questions with patient prior to upcoming colonoscopy on 8.2.2021.  COVID test scheduled for 7.29.2021.    Pt had a colonoscopy in May 2021.  Pt has the miralax and magnesium citrate prep instructions and states he does not need RN to go through prep again.  Pt states that him and his wife have been going through the instructions and they are aware of the dietary changes beforehand.    Designated  policy reviewed with patient.     Patient verbalized understanding.  No further questions or concerns.    Lily Rivera RN

## 2021-07-26 ENCOUNTER — HOSPITAL ENCOUNTER (OUTPATIENT)
Dept: MRI IMAGING | Facility: CLINIC | Age: 61
End: 2021-07-26
Attending: INTERNAL MEDICINE
Payer: COMMERCIAL

## 2021-07-26 DIAGNOSIS — R90.89 ABNORMAL FINDING ON MRI OF BRAIN: ICD-10-CM

## 2021-07-26 DIAGNOSIS — R41.3 MEMORY LOSS: ICD-10-CM

## 2021-07-26 PROCEDURE — A9585 GADOBUTROL INJECTION: HCPCS | Performed by: INTERNAL MEDICINE

## 2021-07-26 PROCEDURE — 70544 MR ANGIOGRAPHY HEAD W/O DYE: CPT

## 2021-07-26 PROCEDURE — 70548 MR ANGIOGRAPHY NECK W/DYE: CPT

## 2021-07-26 PROCEDURE — 70544 MR ANGIOGRAPHY HEAD W/O DYE: CPT | Mod: 26 | Performed by: RADIOLOGY

## 2021-07-26 PROCEDURE — 255N000002 HC RX 255 OP 636: Performed by: INTERNAL MEDICINE

## 2021-07-26 PROCEDURE — 70548 MR ANGIOGRAPHY NECK W/DYE: CPT | Mod: 26 | Performed by: RADIOLOGY

## 2021-07-26 RX ORDER — GADOBUTROL 604.72 MG/ML
10 INJECTION INTRAVENOUS ONCE
Status: COMPLETED | OUTPATIENT
Start: 2021-07-26 | End: 2021-07-26

## 2021-07-26 RX ADMIN — GADOBUTROL 8 ML: 604.72 INJECTION INTRAVENOUS at 07:42

## 2021-07-29 ENCOUNTER — LAB (OUTPATIENT)
Dept: URGENT CARE | Facility: URGENT CARE | Age: 61
End: 2021-07-29
Attending: INTERNAL MEDICINE
Payer: COMMERCIAL

## 2021-07-29 DIAGNOSIS — Z11.59 ENCOUNTER FOR SCREENING FOR OTHER VIRAL DISEASES: ICD-10-CM

## 2021-07-29 LAB — SARS-COV-2 RNA RESP QL NAA+PROBE: NEGATIVE

## 2021-07-29 PROCEDURE — U0003 INFECTIOUS AGENT DETECTION BY NUCLEIC ACID (DNA OR RNA); SEVERE ACUTE RESPIRATORY SYNDROME CORONAVIRUS 2 (SARS-COV-2) (CORONAVIRUS DISEASE [COVID-19]), AMPLIFIED PROBE TECHNIQUE, MAKING USE OF HIGH THROUGHPUT TECHNOLOGIES AS DESCRIBED BY CMS-2020-01-R: HCPCS

## 2021-07-29 PROCEDURE — U0005 INFEC AGEN DETEC AMPLI PROBE: HCPCS

## 2021-08-02 ENCOUNTER — HOSPITAL ENCOUNTER (OUTPATIENT)
Facility: AMBULATORY SURGERY CENTER | Age: 61
Discharge: HOME | End: 2021-08-02
Attending: INTERNAL MEDICINE | Admitting: INTERNAL MEDICINE
Payer: COMMERCIAL

## 2021-08-02 VITALS
DIASTOLIC BLOOD PRESSURE: 77 MMHG | HEART RATE: 55 BPM | HEIGHT: 69 IN | BODY MASS INDEX: 26.07 KG/M2 | WEIGHT: 176 LBS | TEMPERATURE: 97.1 F | OXYGEN SATURATION: 98 % | RESPIRATION RATE: 12 BRPM | SYSTOLIC BLOOD PRESSURE: 119 MMHG

## 2021-08-02 LAB — COLONOSCOPY: NORMAL

## 2021-08-02 PROCEDURE — 45385 COLONOSCOPY W/LESION REMOVAL: CPT

## 2021-08-02 PROCEDURE — 45380 COLONOSCOPY AND BIOPSY: CPT | Mod: 59

## 2021-08-02 PROCEDURE — 88305 TISSUE EXAM BY PATHOLOGIST: CPT | Performed by: PATHOLOGY

## 2021-08-02 RX ORDER — ONDANSETRON 4 MG/1
4 TABLET, ORALLY DISINTEGRATING ORAL EVERY 6 HOURS PRN
Status: DISCONTINUED | OUTPATIENT
Start: 2021-08-02 | End: 2021-08-03 | Stop reason: HOSPADM

## 2021-08-02 RX ORDER — PROCHLORPERAZINE MALEATE 10 MG
10 TABLET ORAL EVERY 6 HOURS PRN
Status: DISCONTINUED | OUTPATIENT
Start: 2021-08-02 | End: 2021-08-03 | Stop reason: HOSPADM

## 2021-08-02 RX ORDER — LIDOCAINE 40 MG/G
CREAM TOPICAL
Status: DISCONTINUED | OUTPATIENT
Start: 2021-08-02 | End: 2021-08-02 | Stop reason: HOSPADM

## 2021-08-02 RX ORDER — ONDANSETRON 2 MG/ML
4 INJECTION INTRAMUSCULAR; INTRAVENOUS EVERY 6 HOURS PRN
Status: DISCONTINUED | OUTPATIENT
Start: 2021-08-02 | End: 2021-08-03 | Stop reason: HOSPADM

## 2021-08-02 RX ORDER — FENTANYL CITRATE 50 UG/ML
INJECTION, SOLUTION INTRAMUSCULAR; INTRAVENOUS PRN
Status: DISCONTINUED | OUTPATIENT
Start: 2021-08-02 | End: 2021-08-02 | Stop reason: HOSPADM

## 2021-08-02 RX ORDER — NALOXONE HYDROCHLORIDE 0.4 MG/ML
0.2 INJECTION, SOLUTION INTRAMUSCULAR; INTRAVENOUS; SUBCUTANEOUS
Status: DISCONTINUED | OUTPATIENT
Start: 2021-08-02 | End: 2021-08-03 | Stop reason: HOSPADM

## 2021-08-02 RX ORDER — NALOXONE HYDROCHLORIDE 0.4 MG/ML
0.4 INJECTION, SOLUTION INTRAMUSCULAR; INTRAVENOUS; SUBCUTANEOUS
Status: DISCONTINUED | OUTPATIENT
Start: 2021-08-02 | End: 2021-08-03 | Stop reason: HOSPADM

## 2021-08-02 RX ORDER — FLUMAZENIL 0.1 MG/ML
0.2 INJECTION, SOLUTION INTRAVENOUS
Status: DISCONTINUED | OUTPATIENT
Start: 2021-08-02 | End: 2021-08-03 | Stop reason: HOSPADM

## 2021-08-02 RX ORDER — ONDANSETRON 2 MG/ML
4 INJECTION INTRAMUSCULAR; INTRAVENOUS
Status: DISCONTINUED | OUTPATIENT
Start: 2021-08-02 | End: 2021-08-02 | Stop reason: HOSPADM

## 2021-08-02 ASSESSMENT — MIFFLIN-ST. JEOR: SCORE: 1598.71

## 2021-08-03 LAB
PATH REPORT.COMMENTS IMP SPEC: NORMAL
PATH REPORT.COMMENTS IMP SPEC: NORMAL
PATH REPORT.FINAL DX SPEC: NORMAL
PATH REPORT.GROSS SPEC: NORMAL
PATH REPORT.MICROSCOPIC SPEC OTHER STN: NORMAL
PATH REPORT.RELEVANT HX SPEC: NORMAL
PHOTO IMAGE: NORMAL

## 2021-08-12 DIAGNOSIS — E78.5 HYPERLIPIDEMIA LDL GOAL <130: ICD-10-CM

## 2021-08-12 RX ORDER — ATORVASTATIN CALCIUM 20 MG/1
TABLET, FILM COATED ORAL
Qty: 90 TABLET | Refills: 0 | Status: SHIPPED | OUTPATIENT
Start: 2021-08-12 | End: 2021-11-11

## 2021-08-12 NOTE — TELEPHONE ENCOUNTER
Prescription approved per [facility] Refill Protocol.        Talia Thomas RN  [facility]

## 2021-08-20 ENCOUNTER — APPOINTMENT (OUTPATIENT)
Dept: URBAN - METROPOLITAN AREA CLINIC 252 | Age: 61
Setting detail: DERMATOLOGY
End: 2021-08-20

## 2021-08-20 VITALS — WEIGHT: 176 LBS | HEIGHT: 69 IN | RESPIRATION RATE: 16 BRPM

## 2021-08-20 DIAGNOSIS — L82.1 OTHER SEBORRHEIC KERATOSIS: ICD-10-CM

## 2021-08-20 DIAGNOSIS — Z85.820 PERSONAL HISTORY OF MALIGNANT MELANOMA OF SKIN: ICD-10-CM

## 2021-08-20 DIAGNOSIS — Z71.89 OTHER SPECIFIED COUNSELING: ICD-10-CM

## 2021-08-20 DIAGNOSIS — D22 MELANOCYTIC NEVI: ICD-10-CM

## 2021-08-20 DIAGNOSIS — L81.4 OTHER MELANIN HYPERPIGMENTATION: ICD-10-CM

## 2021-08-20 PROBLEM — D22.5 MELANOCYTIC NEVI OF TRUNK: Status: ACTIVE | Noted: 2021-08-20

## 2021-08-20 PROCEDURE — OTHER COUNSELING: OTHER

## 2021-08-20 PROCEDURE — OTHER ADDITIONAL NOTES: OTHER

## 2021-08-20 PROCEDURE — 99213 OFFICE O/P EST LOW 20 MIN: CPT

## 2021-08-20 ASSESSMENT — LOCATION SIMPLE DESCRIPTION DERM
LOCATION SIMPLE: ABDOMEN
LOCATION SIMPLE: RIGHT OCCIPITAL SCALP
LOCATION SIMPLE: RIGHT LOWER BACK
LOCATION SIMPLE: CHEST
LOCATION SIMPLE: POSTERIOR SCALP
LOCATION SIMPLE: RIGHT UPPER BACK

## 2021-08-20 ASSESSMENT — LOCATION DETAILED DESCRIPTION DERM
LOCATION DETAILED: RIGHT POSTERIOR PARIETAL SCALP
LOCATION DETAILED: RIGHT SUPERIOR MEDIAL MIDBACK
LOCATION DETAILED: LEFT MEDIAL SUPERIOR CHEST
LOCATION DETAILED: RIGHT MEDIAL SUPERIOR CHEST
LOCATION DETAILED: EPIGASTRIC SKIN
LOCATION DETAILED: RIGHT SUPERIOR OCCIPITAL SCALP
LOCATION DETAILED: RIGHT MEDIAL UPPER BACK

## 2021-08-20 ASSESSMENT — LOCATION ZONE DERM
LOCATION ZONE: TRUNK
LOCATION ZONE: SCALP

## 2021-08-20 NOTE — PROCEDURE: ADDITIONAL NOTES
Detail Level: Detailed
Additional Notes: Diagnosed December 2019
Render Risk Assessment In Note?: no

## 2021-09-22 NOTE — PROGRESS NOTES
Wally Cano is a 60 year old male who is being evaluated via a billable video visit.      If the video visit is dropped, the invitation should be resent by: Send to e-mail at: [email or web address]  Will anyone else be joining your video visit? No    Video-Visit Details    Type of service:  Video Visit    Interview:  Video Start Time: 7:49 AM      Video End Time: 8:10 AM    Originating Location (pt. Location): Home    Distant Location (provider location):  [facility] NEUROPSYCHOLOGY [place]     Platform used for Video Visit: gamesGRABR     RE: Wally Cano  MR#: [id number]  : 1960  DOS: Sep 28, 2021  OMER:  2021      NEUROPSYCHOLOGICAL CONSULTATION      REASON FOR REFERRAL:  Wally Cano is a 60 year old male with 16 years of education. The patient was referred for a neuropsychological evaluation by Dr. Triplett to assess his cognitive and emotional functioning secondary to memory difficulties. The evaluation was requested in order to document his current level of functioning, assist with the differential diagnosis and provide appropriate recommendations to the patient, family and treatment team. The patient was informed that the evaluation included multiple measures of performance and symptom validity, and he was encouraged to provide his best effort at all times.  The nature of the neuropsychological evaluation was also discussed, including limits of confidentiality (for suspected child or elder abuse, potential homicide or suicide, and court orders). The patient was also informed that the report would be placed on the electronic medical records system.  The patient was also given an opportunity to ask questions. The patient indicated that he understood the information and consented to participate in the evaluation.    Due to circumstances that prevent in-person clinical visits, this assessment was conducted using telehealth methods (including remote  audiovisual presentation of test instructions and test stimuli). The standard administration of these procedures involves in-person, face-to-face methods. The impact of applying non-standard administration methods has been evaluated only in part by scientific research. While every effort was made to simulate standard assessment practices, the diagnostic conclusions and recommendations for treatment provided in this report are being advanced with these reservations.    PROCEDURES:   Review of records and clinical interview  Mental Status-orientation, Wide Range Achievement Test-4, Wechsler Adult Intelligence Scale-IV, Sierra Verbal Learning Test-Revised, Clock Drawing Test, Verbal Fluency Test, Oral Trailmaking Test, BDAE Complex Ideational Material, Test of Sustained Attention and Tracking, RBANS, ILS Health and Safety Questions, Rg Depression Inventory, Rg Anxiety Inventory, Personality Assessment Inventory and Dex Complex Figure Test    REVIEW OF RECORDS: Medical records from 6/17/21 noted that the  most concerning thing for him is memory issue.  His wife has noticed this over the last year.  He came on gradually.  At first they thought it was him adjusting to [facility], his own health issues.  But in recent months this has gotten progressively worse.  He is having more trouble with short-term memory.  Not remembering time and dates of appointments. He has a strong family history of Alzheimer's disease.  His father had Alzheimer's disease.  His father is 1 of 13 siblings.  8 of the 13 siblings eventually developed dementia at some point.  The youngest age of onset of dementia is around age 60.  Records noted other medical issues included liver lesion, colon ulcer, melanoma of the scalp, osteoarthritis. Laboratory records from 7/29/21 noted a negative COVID-19 PCR test. An MRI scan of brain report from 7/8/21 noted  Cortical volume loss likely age-related. Mild Leukoaraiosis. MRA recommended to rule out  "posterior circulation aneurysm.  MRA of the brain report from 7/26/21 noted  Head MRA demonstrates no definite aneurysm or stenosis of the major intracranial arteries. No evidence for posterior circulation aneurysm. Neck MRA demonstrates patent major cervical arteries.  Records noted that the patient is being treated with amoxicillin, Prevident, Hydrodiuril, lisinopril, atorvastatin, Kenalog-40, acetaminophen, aspirin, and ibuprofen.      CLINICAL INTERVIEW: The patient was interviewed via video platform for this telehealth neuropsychological evaluation, and he was interviewed alone.  On interview, the patient confirmed his family history of Alzheimer's disease.  He also confirmed that his father and 7 of his 12 siblings had Alzheimer's disease.  He also reported that he has had some difficulty with his short-term memory, which his wife has particularly noticed.  He indicated she has noticed memory difficulties for approximately the past 6-12 months and he is \"starting to key into this\" more recently.  He reported that he sometimes has difficulty remembering discussions and conversations, but at this time \"it is not a game changer.\"  He also noted occasional word finding difficulties, but said that if he stops and thinks he will eventually come up with the right word.  He denied difficulty with attention/concentration or problem-solving.  Functionally, he denied any difficulties with driving, financial management, medication management, or basic household chores.    In terms of his mood, he reported that it is \"okay.\"  He acknowledged anxiety about his health and the possibility of dementia.  He reported that he has been working with a counselor for the past 4-5 months to help him cope with the anxiety.  He also reported that his wife is quite supportive.  The patient denied significant difficulty with sleep or appetite.  He noted that he sleeps about 7-9 hours per night.  He denied working with a psychiatrist.  He " "also denied any current or previous suicidal or homicidal ideation, and denied any history of suicide attempts.  He also denied any hallucinations or delusions.  In terms of alcohol use, he reported that he will drink about one cocktail in the evening.  He said this is a significant reduction in his alcohol use from his 20s and 30s when he drank more heavily.  He reported that he participated in Alcoholics Anonymous for about 8 months which helped him to reduce his alcohol use.  He also reported that he stopped cigarette smoking \"decades ago\".  He reported that he tried marijuana once or twice in college but denied any use after that.    Medically, he noted a history of hypertension and hyperlipidemia for which he takes medication.  He reported that he had a minor concussion in his early 30s when he ran into another player while playing softball.  He also confirmed his history of a melanoma on his scalp, but indicated that this is not reoccurred.  He also reported a history of a congenital left ventricular septal defect and heart murmur, but indicated he has no symptoms related to this issue.  The patient denied any history of multiple sclerosis, stroke, seizures, Parkinson's disease, Covid-19, sleep apnea, hypothyroidism, liver disease, or kidney disease.  He reported that his medications were up-to-date in the electronic medical record.    Academically, he reported that he earned a Bachelor of Science in Nursing degree and was a good student in school.  He reported his grade point average was approximately 3.35, and he was never in special education classes or had to repeat a grade.  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  The patient indicated that he has been  for 21 years and this is his second marriage.  He noted that he has 2 children, daughter age 19 who is in college and a " son age 17.  He indicated that he lives at home with his wife and son, and his daughter will come home for visits.    BEHAVIORAL OBSERVATIONS:  On examination, the patient was casually but appropriately dressed and groomed. The patient was cooperative with the evaluation and was talkative.  The patient appeared to put forth his best effort on all tasks. Thus, the results of this evaluation are a reasonably valid reflection of the patient s current level of functioning. There were no indications of hallucinations, delusions or unusual thought processes. His speech was appropriate in rate, tone, and volume. There were no demonstrations of a practical memory problem in inevitable. The patient was a good historian, with a self-report consistent with medical records. The patient was generally well oriented and his affect was generally appropriate.    RESULTS: Formal performance validity measures were within expected limits and consistent with the above-noted observations.  Psychometric estimates of the patient's premorbid global cognitive functioning based upon single word reading ability were in the average range.  Likewise, measures of his current verbally mediated intellectual functioning were in the average range.  Thus, there was no evidence for significant change or decline in global cognitive functioning.    Measures of attention/concentration were mildly variable.  For example, a digit span task was in the high average range.  Further, an oral sequencing task that integrates attention was in the average range.  However, a timed measure of attention and processing speed was in the low average range and slower than expected, indicating evidence for mildly slowed speed of information processing.    Measures of the patient's verbal memory functioning were poorer than expected.  In particular, immediate recall on both a story memory task and a word list learning task were in the exceptionally low range.  Delayed  recall was generally consistent with his initial encoding, and in the low range to the below average range.  A verbal recognition format was better and in the average range.  Thus, while there was evidence for deficits with encoding and retrieval of previously learned information, there was no convincing evidence for rapid forgetting of previously learned verbal information.  Visual memory could not be assessed due to the telehealth platform for this evaluation.    Expressive language functioning was mildly variable.  Specifically, vocabulary ability was in the average range, but confrontation naming was in the low average range.  Semantic and phonemic fluency were in the average range.  Receptive language functioning, as assessed by complex ideational material task, was in the average range as well.    Visual-spatial and visual constructional abilities were generally within expected limits.  For example, motor-free line orientation judgment was in the average range.  Likewise, motor-free visual reasoning was in the average range.  Further, clock drawing and complex form copying tasks did not indicate evidence for significant visual-spatial distortions.    Measures of the patient's executive functioning were broadly within expected limits as well.  For example, verbal abstract reasoning was in the low average range, but visual reasoning was in the average range.  Further, a complex oral sequencing task that integrates cognitive flexibility was in the average range.  Additionally, clock drawing and complex figure drawing tasks did not indicate evidence for significant planning or organizational difficulties.  Further, as noted above, verbal fluency was generally in the average range.  A functional measure of basic health and safety awareness was at the lower end of expected limits.    Assessment of the patient's emotional functioning was completed utilizing the clinical interview and self-report measures of  depression and anxiety.  On the self-report measures, the patient did not endorse clinically significant symptoms of either depression or anxiety.    SUMMARY:  The following opinions and recommendations are based on the interview and formal testing data obtained via video platform, thus all results were interpreted with caution.  In summary, the neuropsychological assessment results indicated no evidence for significant change or decline in global cognitive functioning.  However, there was evidence for mild variability in attention and mildly slowed speed of information processing.  Further, there was evidence for significant deficits with encoding and retrieval of previously verbal learned information, but there was no evidence for rapid forgetting of previously learned information.  Expressive language functioning was variable, with poorer than expected confrontation naming.  Receptive language functioning, visual-spatial abilities, and executive functioning were all generally within expected limits.  The patient and his wife also indicated that his functional abilities were generally within expected limits.  Thus, the results indicated that the patient did not meet criteria for a dementia at this time.  However, the patient met criteria for mild cognitive impairment (MCI).  The specific etiology of the cognitive difficulties could not be completely determined based on this evaluation alone, but the pattern of results was not entirely consistent with an Alzheimer's type pattern.  Other factors, such as vascular changes, could be contributing.  Patients with MCI are at heightened risk for further decline in his cognitive functioning.  There was no evidence for significant emotional distress or depressive/anxiety symptoms, therefore this is unlikely to be contributing to the findings.    RECOMMENDATIONS:   1.  Given these results, referral for neurological consultation is recommended to help clarify the differential  diagnosis and assist with treatment planning.  2. A referral for cognitive rehabilitation therapy, such as with a speech therapist, is recommended. Options for this service include [employer] [facility] at [email or web address] or SHEEBA Zuñiga at [facility] at [email or web address]  3. A full medical evaluation of any treatable causes of cognitive decline is also recommended, if this has not already been accomplished.  Evaluation of any infectious processes, vitamin deficiencies or other metabolic abnormalities is recommended, to rule out these as possible exacerbations of his cognitive changes.   4. In order to promote learning and memorization of new verbal material, it is recommended that the patient add structure to the material he is learning to promote subsequent recall (e.g., use mnemonic devices, acronyms, make up a story or song). Additionally, he is encouraged to use visual aids, such as flash cards, diagrams, charts, etc.   5. The patient may find the following attention and organizational strategies helpful:     Use cell phone reminders to help monitor upcoming appointments and due dates as well as other important tasks (e.g., when to take medications). Set the reminder to go off several times prior to the event with advanced notice (e.g., one week before, two days before, the day before, and the morning of the event).     He should attempt to reduce distractions at home. Having a clutter-free work environment and removing or at least making it harder to access potential distractions would help optimize his attention. He might also consider facing away from high traffic areas and using earplugs or noise cancellation headphones when desiring a quiet work environment.    Taking short breaks during his day may help optimize and maintain his  concentration.     Make to-do lists and prioritize tasks. Break down large tasks into smaller steps and check off each small step when completed.   6.  The patient reported that he is currently engaged in psychotherapy, and he is encouraged to continue with this intervention.  A highly structured cognitive-behavioral intervention focused on coping and stress management likely would be the most helpful for him.  7. The results of the evaluation now constitute a baseline of the patient s cognitive functioning.  Given the evidence for memory deficits, a referral for a neuropsychological reevaluation in approximately one year is recommended in order to clarify the differential diagnosis, document any changes in cognitive functioning, and provide further recommendations and prognosis to the patient, family and treatment team.    Results and recommendations were provided to the patient and his wife via telephone on 9/28/2021 and their questions were answered.  Thank you for referring this interesting individual. If you have any questions, please feel free to contact me.      Rubén Sanches, PhD, ABPP, LP  Professor and Licensed Psychologist [id number]  Board Certified Clinical Neuropsychologist    Time Spent:      Minutes Date Code Units   Total Time-Neuropsychologist Professional 215 9/28/2021 43011 1     9/28/2021 [id number] 3   Neuropsychologist Admin/scoring 0 9/28/2021 [id number] 0     9/28/2021 33944 0   Diagnostic Interview  21 9/28/2021 [id number] 1   Psychometrician Time-Test Administration/Score 202 9/28/2021 [id number] 1     9/28/2021 [id number] 6   Diagnosis R41.3 G31.84

## 2021-09-28 ENCOUNTER — VIRTUAL VISIT (OUTPATIENT)
Dept: NEUROPSYCHOLOGY | Facility: CLINIC | Age: 61
End: 2021-09-28
Attending: INTERNAL MEDICINE
Payer: COMMERCIAL

## 2021-09-28 DIAGNOSIS — G31.84 MCI (MILD COGNITIVE IMPAIRMENT): Primary | ICD-10-CM

## 2021-09-28 DIAGNOSIS — R41.3 MEMORY LOSS: ICD-10-CM

## 2021-09-28 PROCEDURE — 96133 NRPSYC TST EVAL PHYS/QHP EA: CPT | Mod: 95 | Performed by: PSYCHOLOGIST

## 2021-09-28 PROCEDURE — 90791 PSYCH DIAGNOSTIC EVALUATION: CPT | Mod: 95 | Performed by: PSYCHOLOGIST

## 2021-09-28 PROCEDURE — 96139 PSYCL/NRPSYC TST TECH EA: CPT | Mod: 95 | Performed by: PSYCHOLOGIST

## 2021-09-28 PROCEDURE — 96132 NRPSYC TST EVAL PHYS/QHP 1ST: CPT | Mod: 95 | Performed by: PSYCHOLOGIST

## 2021-09-28 PROCEDURE — 96138 PSYCL/NRPSYC TECH 1ST: CPT | Mod: 95 | Performed by: PSYCHOLOGIST

## 2021-09-28 NOTE — LETTER
2021       RE: Wally Cano  [address] [address] [place] [id number]     Wally Cano is a 60 year old male who is being evaluated via a billable video visit.      If the video visit is dropped, the invitation should be resent by: Send to e-mail at: [email or web address]  Will anyone else be joining your video visit? No    Video-Visit Details    Type of service:  Video Visit    Interview:  Video Start Time: 7:49 AM      Video End Time: 8:10 AM    Originating Location (pt. Location): Home    Distant Location (provider location):  [facility] NEUROPSYCHOLOGY [place]     Platform used for Video Visit: DailyBooth     RE: Wally Cano  MR#: [id number]  : 1960  DOS: Sep 28, 2021  OMER:  2021      NEUROPSYCHOLOGICAL CONSULTATION      REASON FOR REFERRAL:  Wally Cano is a 60 year old male with 16 years of education. The patient was referred for a neuropsychological evaluation by Dr. Triplett to assess his cognitive and emotional functioning secondary to memory difficulties. The evaluation was requested in order to document his current level of functioning, assist with the differential diagnosis and provide appropriate recommendations to the patient, family and treatment team. The patient was informed that the evaluation included multiple measures of performance and symptom validity, and he was encouraged to provide his best effort at all times.  The nature of the neuropsychological evaluation was also discussed, including limits of confidentiality (for suspected child or elder abuse, potential homicide or suicide, and court orders). The patient was also informed that the report would be placed on the electronic medical records system.  The patient was also given an opportunity to ask questions. The patient indicated that he understood the information and consented to participate in the evaluation.    Due to circumstances that prevent in-person  clinical visits, this assessment was conducted using telehealth methods (including remote audiovisual presentation of test instructions and test stimuli). The standard administration of these procedures involves in-person, face-to-face methods. The impact of applying non-standard administration methods has been evaluated only in part by scientific research. While every effort was made to simulate standard assessment practices, the diagnostic conclusions and recommendations for treatment provided in this report are being advanced with these reservations.    PROCEDURES:   Review of records and clinical interview  Mental Status-orientation, Wide Range Achievement Test-4, Wechsler Adult Intelligence Scale-IV, Sierra Verbal Learning Test-Revised, Clock Drawing Test, Verbal Fluency Test, Oral Trailmaking Test, BDAE Complex Ideational Material, Test of Sustained Attention and Tracking, RBANS, ILS Health and Safety Questions, Rg Depression Inventory, Rg Anxiety Inventory, Personality Assessment Inventory and Dex Complex Figure Test    REVIEW OF RECORDS: Medical records from 6/17/21 noted that the  most concerning thing for him is memory issue.  His wife has noticed this over the last year.  He came on gradually.  At first they thought it was him adjusting to intermediate, his own health issues.  But in recent months this has gotten progressively worse.  He is having more trouble with short-term memory.  Not remembering time and dates of appointments. He has a strong family history of Alzheimer's disease.  His father had Alzheimer's disease.  His father is 1 of 13 siblings.  8 of the 13 siblings eventually developed dementia at some point.  The youngest age of onset of dementia is around age 60.  Records noted other medical issues included liver lesion, colon ulcer, melanoma of the scalp, osteoarthritis. Laboratory records from 7/29/21 noted a negative COVID-19 PCR test. An MRI scan of brain report from 7/8/21 noted  " Cortical volume loss likely age-related. Mild Leukoaraiosis. MRA recommended to rule out posterior circulation aneurysm.  MRA of the brain report from 7/26/21 noted  Head MRA demonstrates no definite aneurysm or stenosis of the major intracranial arteries. No evidence for posterior circulation aneurysm. Neck MRA demonstrates patent major cervical arteries.  Records noted that the patient is being treated with amoxicillin, Prevident, Hydrodiuril, lisinopril, atorvastatin, Kenalog-40, acetaminophen, aspirin, and ibuprofen.      CLINICAL INTERVIEW: The patient was interviewed via video platform for this telehealth neuropsychological evaluation, and he was interviewed alone.  On interview, the patient confirmed his family history of Alzheimer's disease.  He also confirmed that his father and 7 of his 12 siblings had Alzheimer's disease.  He also reported that he has had some difficulty with his short-term memory, which his wife has particularly noticed.  He indicated she has noticed memory difficulties for approximately the past 6-12 months and he is \"starting to key into this\" more recently.  He reported that he sometimes has difficulty remembering discussions and conversations, but at this time \"it is not a game changer.\"  He also noted occasional word finding difficulties, but said that if he stops and thinks he will eventually come up with the right word.  He denied difficulty with attention/concentration or problem-solving.  Functionally, he denied any difficulties with driving, financial management, medication management, or basic household chores.    In terms of his mood, he reported that it is \"okay.\"  He acknowledged anxiety about his health and the possibility of dementia.  He reported that he has been working with a counselor for the past 4-5 months to help him cope with the anxiety.  He also reported that his wife is quite supportive.  The patient denied significant difficulty with sleep or appetite.  He " "noted that he sleeps about 7-9 hours per night.  He denied working with a psychiatrist.  He also denied any current or previous suicidal or homicidal ideation, and denied any history of suicide attempts.  He also denied any hallucinations or delusions.  In terms of alcohol use, he reported that he will drink about one cocktail in the evening.  He said this is a significant reduction in his alcohol use from his 20s and 30s when he drank more heavily.  He reported that he participated in Alcoholics Anonymous for about 8 months which helped him to reduce his alcohol use.  He also reported that he stopped cigarette smoking \"decades ago\".  He reported that he tried marijuana once or twice in college but denied any use after that.    Medically, he noted a history of hypertension and hyperlipidemia for which he takes medication.  He reported that he had a minor concussion in his early 30s when he ran into another player while playing softball.  He also confirmed his history of a melanoma on his scalp, but indicated that this is not reoccurred.  He also reported a history of a congenital left ventricular septal defect and heart murmur, but indicated he has no symptoms related to this issue.  The patient denied any history of multiple sclerosis, stroke, seizures, Parkinson's disease, Covid-19, sleep apnea, hypothyroidism, liver disease, or kidney disease.  He reported that his medications were up-to-date in the electronic medical record.    Academically, he reported that he earned a Bachelor of Science in Nursing degree and was a good student in school.  He reported his grade point average was approximately 3.35, and he was never in special education classes or had to repeat a grade.  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  The patient indicated that he has been  for 21 years and this is his " second marriage.  He noted that he has 2 children, daughter age 19 who is in college and a son age 17.  He indicated that he lives at home with his wife and son, and his daughter will come home for visits.    BEHAVIORAL OBSERVATIONS:  On examination, the patient was casually but appropriately dressed and groomed. The patient was cooperative with the evaluation and was talkative.  The patient appeared to put forth his best effort on all tasks. Thus, the results of this evaluation are a reasonably valid reflection of the patient s current level of functioning. There were no indications of hallucinations, delusions or unusual thought processes. His speech was appropriate in rate, tone, and volume. There were no demonstrations of a practical memory problem in inevitable. The patient was a good historian, with a self-report consistent with medical records. The patient was generally well oriented and his affect was generally appropriate.    RESULTS: Formal performance validity measures were within expected limits and consistent with the above-noted observations.  Psychometric estimates of the patient's premorbid global cognitive functioning based upon single word reading ability were in the average range.  Likewise, measures of his current verbally mediated intellectual functioning were in the average range.  Thus, there was no evidence for significant change or decline in global cognitive functioning.    Measures of attention/concentration were mildly variable.  For example, a digit span task was in the high average range.  Further, an oral sequencing task that integrates attention was in the average range.  However, a timed measure of attention and processing speed was in the low average range and slower than expected, indicating evidence for mildly slowed speed of information processing.    Measures of the patient's verbal memory functioning were poorer than expected.  In particular, immediate recall on both a story  memory task and a word list learning task were in the exceptionally low range.  Delayed recall was generally consistent with his initial encoding, and in the low range to the below average range.  A verbal recognition format was better and in the average range.  Thus, while there was evidence for deficits with encoding and retrieval of previously learned information, there was no convincing evidence for rapid forgetting of previously learned verbal information.  Visual memory could not be assessed due to the telehealth platform for this evaluation.    Expressive language functioning was mildly variable.  Specifically, vocabulary ability was in the average range, but confrontation naming was in the low average range.  Semantic and phonemic fluency were in the average range.  Receptive language functioning, as assessed by complex ideational material task, was in the average range as well.    Visual-spatial and visual constructional abilities were generally within expected limits.  For example, motor-free line orientation judgment was in the average range.  Likewise, motor-free visual reasoning was in the average range.  Further, clock drawing and complex form copying tasks did not indicate evidence for significant visual-spatial distortions.    Measures of the patient's executive functioning were broadly within expected limits as well.  For example, verbal abstract reasoning was in the low average range, but visual reasoning was in the average range.  Further, a complex oral sequencing task that integrates cognitive flexibility was in the average range.  Additionally, clock drawing and complex figure drawing tasks did not indicate evidence for significant planning or organizational difficulties.  Further, as noted above, verbal fluency was generally in the average range.  A functional measure of basic health and safety awareness was at the lower end of expected limits.    Assessment of the patient's emotional  functioning was completed utilizing the clinical interview and self-report measures of depression and anxiety.  On the self-report measures, the patient did not endorse clinically significant symptoms of either depression or anxiety.    SUMMARY:  The following opinions and recommendations are based on the interview and formal testing data obtained via video platform, thus all results were interpreted with caution.  In summary, the neuropsychological assessment results indicated no evidence for significant change or decline in global cognitive functioning.  However, there was evidence for mild variability in attention and mildly slowed speed of information processing.  Further, there was evidence for significant deficits with encoding and retrieval of previously verbal learned information, but there was no evidence for rapid forgetting of previously learned information.  Expressive language functioning was variable, with poorer than expected confrontation naming.  Receptive language functioning, visual-spatial abilities, and executive functioning were all generally within expected limits.  The patient and his wife also indicated that his functional abilities were generally within expected limits.  Thus, the results indicated that the patient did not meet criteria for a dementia at this time.  However, the patient met criteria for mild cognitive impairment (MCI).  The specific etiology of the cognitive difficulties could not be completely determined based on this evaluation alone, but the pattern of results was not entirely consistent with an Alzheimer's type pattern.  Other factors, such as vascular changes, could be contributing.  Patients with MCI are at heightened risk for further decline in his cognitive functioning.  There was no evidence for significant emotional distress or depressive/anxiety symptoms, therefore this is unlikely to be contributing to the findings.    RECOMMENDATIONS:   1.  Given these results,  referral for neurological consultation is recommended to help clarify the differential diagnosis and assist with treatment planning.  2. A referral for cognitive rehabilitation therapy, such as with a speech therapist, is recommended. Options for this service include [employer] [facility] at [email or web address] or SHEEBA Zuñiga at [facility] at [email or web address]  3. A full medical evaluation of any treatable causes of cognitive decline is also recommended, if this has not already been accomplished.  Evaluation of any infectious processes, vitamin deficiencies or other metabolic abnormalities is recommended, to rule out these as possible exacerbations of his cognitive changes.   4. In order to promote learning and memorization of new verbal material, it is recommended that the patient add structure to the material he is learning to promote subsequent recall (e.g., use mnemonic devices, acronyms, make up a story or song). Additionally, he is encouraged to use visual aids, such as flash cards, diagrams, charts, etc.   5. The patient may find the following attention and organizational strategies helpful:     Use cell phone reminders to help monitor upcoming appointments and due dates as well as other important tasks (e.g., when to take medications). Set the reminder to go off several times prior to the event with advanced notice (e.g., one week before, two days before, the day before, and the morning of the event).     He should attempt to reduce distractions at home. Having a clutter-free work environment and removing or at least making it harder to access potential distractions would help optimize his attention. He might also consider facing away from high traffic areas and using earplugs or noise cancellation headphones when desiring a quiet work  environment.    Taking short breaks during his day may help optimize and maintain his concentration.     Make to-do lists and prioritize tasks. Break down large tasks into smaller steps and check off each small step when completed.   6.  The patient reported that he is currently engaged in psychotherapy, and he is encouraged to continue with this intervention.  A highly structured cognitive-behavioral intervention focused on coping and stress management likely would be the most helpful for him.  7. The results of the evaluation now constitute a baseline of the patient s cognitive functioning.  Given the evidence for memory deficits, a referral for a neuropsychological reevaluation in approximately one year is recommended in order to clarify the differential diagnosis, document any changes in cognitive functioning, and provide further recommendations and prognosis to the patient, family and treatment team.    Results and recommendations were provided to the patient and his wife via telephone on 9/28/2021 and their questions were answered.  Thank you for referring this interesting individual. If you have any questions, please feel free to contact me.      Rubén Sanches, PhD, ABPP, LP  Professor and Licensed Psychologist [id number]  Board Certified Clinical Neuropsychologist    Time Spent:      Minutes Date Code Units   Total Time-Neuropsychologist Professional 215 9/28/2021 35317 1     9/28/2021 48062 3   Neuropsychologist Admin/scoring 0 9/28/2021 96696 0     9/28/2021 44632 0   Diagnostic Interview  21 9/28/2021 52840 1   Psychometrician Time-Test Administration/Score 202 9/28/2021 63851 1     9/28/2021 23220 6   Diagnosis R41.3 G31.84

## 2021-09-28 NOTE — PROGRESS NOTES
The patient was seen for neuropsychological evaluation via telehealth at the request of Dr. Livier Triplett for the purposes of diagnostic clarification and treatment planning.  202 minutes of video test administration and scoring were provided by this writer.  Please see Dr. Rubén Sanches's report for a full interpretation of the findings.    Video Start Time 1: 7:45  Video End Time 1: 7:47    Video Start Time 2: 8:17  Video End Time 2: 10:24    Shellie Koo  Psychometrist

## 2021-09-28 NOTE — PROGRESS NOTES
Name  Wally Cano  GONZALEZ 2021       MRN  [id number]  Provider IRENE         1960   [employer]       Age  60   Station OP       Sex  Male   Visit Type Video       Education  16   Platform [place]       Handedness Left                        ORIENTATION     CLOCK DRAWING      Time  3    Command Normal     Personal Information    Copy  Normal     Place            Presidents    ORAL TRAILS              Raw z Errors   WRAT-4 READING     Trails A  6 0.50' 0   Raw  62    Trails B  23 0.76 1   SS  99           %ile  47    TSAT       Grade Equivalence 12.7      Raw z          Total Time 137 -1.19    WAIS-IV      Total Errors 3 -0.28      Raw SS RDS         Digit Span  32 13 11  RBANS       Vocabulary 39 10     Raw z SS/%ile   Matrix Reasoning 12 8   Line Orientation 15 -0.55 0   Similarities 18 7   Story Immediate 7 -3.26 0   EST VIQ   93   Story Delay 5 -2.05 0                [place]STON NAMING TEST    HVLT       Raw 49       Raw T    SS 7 %ile 0   Trial 1  3     T 34 MAS 0   Trial 2  4     Total Stim. Correct 3    Trial 3  6     Total Phon. Correct 9    Learning  3           Total Recall 13 <20    COWAT      Delayed Recall 4 <20    Form FAS     Percent Retention 67 32    Raw 36     True Positives 12     SS 9     False Positives 1     T 44 MAS 0   Discrimination Index 11 52    %ile 0 z 0          ANIMAL FLUENCY     ILS HEALTH & SAFETY QUESTIONNAIRE    Raw 16     Total  33     SS 8 z 0   Classification Moderate     T 40                  BDI-II       COMPLEX IDEATIONAL MATERIAL   Total  1     Raw  12    Interpretation Minimal     SS  12           T  56    SUSNA             Total  2     CUCA-O COMPLEX FIGURE    Interpretation Minimal       Raw %ile          Copy  28.5 2-5          Time to Copy 167 >16   Total  0

## 2021-10-06 ENCOUNTER — MYC MEDICAL ADVICE (OUTPATIENT)
Dept: FAMILY MEDICINE | Facility: CLINIC | Age: 61
End: 2021-10-06

## 2021-10-06 ENCOUNTER — TELEPHONE (OUTPATIENT)
Dept: FAMILY MEDICINE | Facility: CLINIC | Age: 61
End: 2021-10-06

## 2021-10-06 DIAGNOSIS — G31.84 MCI (MILD COGNITIVE IMPAIRMENT) WITH MEMORY LOSS: Primary | ICD-10-CM

## 2021-10-06 NOTE — TELEPHONE ENCOUNTER
"Patient calling asking if you would please address his Josehart request as follows and as soon as possible.Please state in referral this is for memory issues.    \"The Neurology clinic schedule is pushed out until January. Could you please place a referral for the [facility]? They have openings in the next couple of weeks but need a referral that specifically states memory issues. Thank you.\"    Thank you,  Jarrod Barclay       "

## 2021-10-06 NOTE — TELEPHONE ENCOUNTER
Patient's Wife Aaliyah calling back with information for Dr[facility]  [address]  [place]  [address]      Phone: [phone]  Fax: [phone]    Gina Nathan RN

## 2021-10-15 ENCOUNTER — OFFICE VISIT (OUTPATIENT)
Dept: NEUROLOGY | Facility: CLINIC | Age: 61
End: 2021-10-15
Payer: COMMERCIAL

## 2021-10-15 VITALS
HEART RATE: 73 BPM | SYSTOLIC BLOOD PRESSURE: 137 MMHG | DIASTOLIC BLOOD PRESSURE: 83 MMHG | BODY MASS INDEX: 27.02 KG/M2 | HEIGHT: 69 IN | WEIGHT: 182.4 LBS | TEMPERATURE: 97 F

## 2021-10-15 DIAGNOSIS — G31.84 MCI (MILD COGNITIVE IMPAIRMENT): ICD-10-CM

## 2021-10-15 DIAGNOSIS — H90.3 ASYMMETRICAL SENSORINEURAL HEARING LOSS: Primary | ICD-10-CM

## 2021-10-15 ASSESSMENT — MIFFLIN-ST. JEOR: SCORE: 1627.74

## 2021-10-15 NOTE — PROGRESS NOTES
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 He presents today because of insidious onset cognitive symptoms. Symptoms may have been present since 2016. Cognitive trouble was ascribed initially to psychosocial stressors; his mother passed away in 2016. His father passed away in 2018. 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 He had increasing trouble problem solving (not being able to figure out how to connect a hose to the water outlet), short term memory (he forgets why his wife is executing a task a day after he and his wife had an in-depth discussion of the plans) and word-finding difficulty. As a result, his wife made sure that he could retire. Despite [facility], his symptoms continue to worsen. He has trouble following up on household chores like bathroom cleaning and vacuuming. It seems he forgets the tasks assigned to him but he also struggles with troubleshooting if something isn't working properly. He did not remember the reason why they are coming to the physician today. When he suffered a GI bleed and had to take a colonoscopy prep, which required following scheduled steps, this was overwhelming for him. He had to set alarms to remind him to take the prep.    No getting lost although he can become hesitant about how to leave a parking lot. He may have trouble remembering where he went for a meal, but a reminder will \"jog\" his memory. He may have trouble remembering the details of what he ate.    No sleep apnea symptoms. No dream enactment behavior. He has muscle twitches during sleep. He also had more full blown limb movement; his ring can hit the head board or his leg kicks. No complex movements during sleep. No sedating " medications. He does not remember dreams when his limbs move and wake him up.    No hallucinations or paranormal thoughts, shadows at the corner of his vision, or an abnormal sense of presence. No abnormal sense of smell. No constipation, early satiety or urinary issues. No lightheadedness when standing.    He has left sided hearing loss for decades. This sometimes impacts his ability to understand people.      MEDS:  Current Outpatient Medications   Medication Sig Dispense Refill     Acetaminophen (TYLENOL PO) Take 500 mg by mouth once       amoxicillin (AMOXIL) 500 MG capsule Take 4 capsules (2,000 mg) by mouth once for 1 dose 30-60 minutes before dental procedure. 20 capsule 0     aspirin (ASA) 81 MG tablet Take 81 mg by mouth daily       atorvastatin (LIPITOR) 20 MG tablet TAKE 1 TABLET(20 MG) BY MOUTH DAILY 90 tablet 0     hydrochlorothiazide (HYDRODIURIL) 25 MG tablet Take 1 tablet (25 mg) by mouth daily 90 tablet 2     IBUPROFEN PO Take 400 mg by mouth every 8 hours as needed for moderate pain       lisinopril (ZESTRIL) 20 MG tablet Take 1 tablet (20 mg) by mouth 2 times daily 180 tablet 2     sodium fluoride dental gel (PREVIDENT) 1.1 % GEL topical gel Apply to affected area At Bedtime Apply to affected area At Bedtime 60 g 1        PROBLEM LIST:  Patient Active Problem List   Diagnosis     Hyperlipidemia LDL goal <100     DDD (degenerative disc disease), lumbar     VSD (ventricular septal defect)     * * * SBE PROPHYLAXIS * * *     External hemorrhoids     Varicose veins     Hypertension goal BP (blood pressure) < 140/90     Advanced directives, counseling/discussion     NSAID long-term use     S/P infectious endocarditis     Superficial thrombophlebitis     [facility]     Melanoma of scalp (H)        PMHx:  Past Medical History:   Diagnosis Date     * * * SBE PROPHYLAXIS * * *      DDD (degenerative disc disease), lumbar      Elevated PSA  7/2014     Hypertension      Intervertebral disc rupture 2002    chronic pain medication-off now.      Melanoma of scalp (H) 1/9/2020     PE (pulmonary embolism) 1995    due to the SBE      SBE (subacute bacterial endocarditis) 1995     Varicose veins      VSD (ventricular septal defect)     1 cm south of septum    There is a liver lesion that is indeterminant, ultrasound recommended  He has a history of melanoma of the scalp, had Mohs procedure done over a year ago  S/P infectious endocarditis    FHx:  He has a strong family history of Alzheimer's disease.  His father had Alzheimer's disease. His father is 1 of 13 siblings.  8 of the 13 siblings eventually developed dementia at some point.  The youngest age of onset of dementia is around age 60. He has 2 brothers and 1 sisters. He is the oldest. On his mother's side, there is cerebrovascular disease.    SHx:  He has been an avid .  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 He has run into the catcher chasing a foul ball, suffering a concussion. He was well known for frequently sliding into bases. No current marijuana, marijuana derivatives, CBD, smoking, 1 alcohol in the evening. He was exposed to second hand smoke as a child.    Physical Exam:  MMSE: -1 calendar day; -1 WORLD backwards; -1 naming (mouse, but he is able to accurately describe what it does); -3 delayed recall (+2 with delayed recall). 24/30.  Neuro: Normal gait (tip toe, heel, tandem), Rhomberg negative, normal saccadic initiation, velocity and amplitude. Normal finger-to-nose and heel-to-shin. Mild high amplitude, low frequency postural and terminal tremor. No hyperreflexia. Normal vibratory sense in the distal limbs. No ideomotor apraxia.    Objective Testing:  I personally reviewed the MRI from July 2018 that showed normal cerebral and cerebellar volumes. Medial temporal lobe sizes are within normal limits. Mild non-specific T2 signal in the  subcortical white matter. A moderately sized cavum septum pellucidum is present. No abnormal DWI or susceptibility artifact.    I also reviewed the neuropsychological testing conducted by Dr. Sanches. He has impairment in encoding and retrieval, but relative preservation of recognition memory. He scored in the mild cognitive impairment stage.    Normal TSH and B12.   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 He has a paternal history of dementia.     Concern for Alzheimer's disease, such as a dysexecutive variant, is raised. He has a mild degree of non-specific T2 signal in the subcortical white matter, which could be related to his hypertension, previous endocarditis and/or VSD. It is mild in severity and is not a departure from what can be seen in normal aging. His cavum septum pellucidum could suggest an element of previous head trauma as a contributing factor, but his head trauma history is likely not severe enough to fully account for his cognitive decline. His limb movements during sleep also raise the possibility of an untreated sleep-related movement disorder that prevents adequate sleep, but thus far this seems too mild to explain the entirety of his symptoms.     We will proceed with the following tests and then see Mr. Cano back in clinic to discuss next steps:  - CSF testing for Alzheimer's disease biomarkers and basic CSF parameters  - Audiology referral to evaluate hearing loss and offer remedies to improve hearing    Today, I spent 80 minutes reviewing the chart, personally assessing objective testing, direct patient care, completing documentation and billing.

## 2021-10-15 NOTE — LETTER
"10/15/2021       RE: Wally Cano  [address] [address] [place] [id number]     Dear Colleague,    Thank you for referring your patient, Wally Cano, to the [facility] at [facility]. Please see a copy of my visit note below.    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 He presents today because of insidious onset cognitive symptoms. Symptoms may have been present since 2016. Cognitive trouble was ascribed initially to psychosocial stressors; his mother passed away in 2016. His father passed away in 2018. 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 He had increasing trouble problem solving (not being able to figure out how to connect a hose to the water outlet), short term memory (he forgets why his wife is executing a task a day after he and his wife had an in-depth discussion of the plans) and word-finding difficulty. As a result, his wife made sure that he could retire. Despite nursing home, his symptoms continue to worsen. He has trouble following up on household chores like bathroom cleaning and vacuuming. It seems he forgets the tasks assigned to him but he also struggles with troubleshooting if something isn't working properly. He did not remember the reason why they are coming to the physician today. When he suffered a GI bleed and had to take a colonoscopy prep, which required following scheduled steps, this was overwhelming for him. He had to set alarms to remind him to take the prep.    No getting lost although he can become hesitant about how to leave a parking lot. He may have trouble remembering where he went for a meal, but a reminder will \"jog\" his " memory. He may have trouble remembering the details of what he ate.    No sleep apnea symptoms. No dream enactment behavior. He has muscle twitches during sleep. He also had more full blown limb movement; his ring can hit the head board or his leg kicks. No complex movements during sleep. No sedating medications. He does not remember dreams when his limbs move and wake him up.    No hallucinations or paranormal thoughts, shadows at the corner of his vision, or an abnormal sense of presence. No abnormal sense of smell. No constipation, early satiety or urinary issues. No lightheadedness when standing.    He has left sided hearing loss for decades. This sometimes impacts his ability to understand people.      MEDS:  Current Outpatient Medications   Medication Sig Dispense Refill     Acetaminophen (TYLENOL PO) Take 500 mg by mouth once       amoxicillin (AMOXIL) 500 MG capsule Take 4 capsules (2,000 mg) by mouth once for 1 dose 30-60 minutes before dental procedure. 20 capsule 0     aspirin (ASA) 81 MG tablet Take 81 mg by mouth daily       atorvastatin (LIPITOR) 20 MG tablet TAKE 1 TABLET(20 MG) BY MOUTH DAILY 90 tablet 0     hydrochlorothiazide (HYDRODIURIL) 25 MG tablet Take 1 tablet (25 mg) by mouth daily 90 tablet 2     IBUPROFEN PO Take 400 mg by mouth every 8 hours as needed for moderate pain       lisinopril (ZESTRIL) 20 MG tablet Take 1 tablet (20 mg) by mouth 2 times daily 180 tablet 2     sodium fluoride dental gel (PREVIDENT) 1.1 % GEL topical gel Apply to affected area At Bedtime Apply to affected area At Bedtime 60 g 1        PROBLEM LIST:  Patient Active Problem List   Diagnosis     Hyperlipidemia LDL goal <100     DDD (degenerative disc disease), lumbar     VSD (ventricular septal defect)     * * * SBE PROPHYLAXIS * * *     External hemorrhoids     Varicose veins     Hypertension goal BP (blood pressure) < 140/90     Advanced directives, counseling/discussion     NSAID long-term use     S/P infectious  endocarditis     Superficial thrombophlebitis     [facility]     Melanoma of scalp (H)        PMHx:  Past Medical History:   Diagnosis Date     * * * SBE PROPHYLAXIS * * *      DDD (degenerative disc disease), lumbar      Elevated PSA 7/2014     Hypertension      Intervertebral disc rupture 2002    chronic pain medication-off now.      Melanoma of scalp (H) 1/9/2020     PE (pulmonary embolism) 1995    due to the SBE      SBE (subacute bacterial endocarditis) 1995     Varicose veins      VSD (ventricular septal defect)     1 cm south of septum    There is a liver lesion that is indeterminant, ultrasound recommended  He has a history of melanoma of the scalp, had Mohs procedure done over a year ago  S/P infectious endocarditis    FHx:  He has a strong family history of Alzheimer's disease.  His father had Alzheimer's disease. His father is 1 of 13 siblings.  8 of the 13 siblings eventually developed dementia at some point.  The youngest age of onset of dementia is around age 60. He has 2 brothers and 1 sisters. He is the oldest. On his mother's side, there is cerebrovascular disease.    SHx:  He has been an avid .  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 He has run into the catcher chasing a foul ball, suffering a concussion. He was well known for frequently sliding into bases. No current marijuana, marijuana derivatives, CBD, smoking, 1 alcohol in the evening. He was exposed to second hand smoke as a child.    Physical Exam:  MMSE: -1 calendar day; -1 WORLD backwards; -1 naming (mouse, but he is able to accurately describe what it does); -3 delayed recall (+2 with delayed recall). 24/30.  Neuro: Normal gait (tip toe, heel, tandem), Rhomberg negative, normal saccadic initiation, velocity and amplitude. Normal finger-to-nose and heel-to-shin. Mild high amplitude, low frequency postural and terminal  tremor. No hyperreflexia. Normal vibratory sense in the distal limbs. No ideomotor apraxia.    Objective Testing:  I personally reviewed the MRI from July 2018 that showed normal cerebral and cerebellar volumes. Medial temporal lobe sizes are within normal limits. Mild non-specific T2 signal in the subcortical white matter. A moderately sized cavum septum pellucidum is present. No abnormal DWI or susceptibility artifact.    I also reviewed the neuropsychological testing conducted by Dr. Sanches. He has impairment in encoding and retrieval, but relative preservation of recognition memory. He scored in the mild cognitive impairment stage.    Normal TSH and B12.   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 He has a paternal history of dementia.     Concern for Alzheimer's disease, such as a dysexecutive variant, is raised. He has a mild degree of non-specific T2 signal in the subcortical white matter, which could be related to his hypertension, previous endocarditis and/or VSD. It is mild in severity and is not a departure from what can be seen in normal aging. His cavum septum pellucidum could suggest an element of previous head trauma as a contributing factor, but his head trauma history is likely not severe enough to fully account for his cognitive decline. His limb movements during sleep also raise the possibility of an untreated sleep-related movement disorder that prevents adequate sleep, but thus far this seems too mild to explain the entirety of his symptoms.     We will proceed with the following tests and then see Mr. Cano back in clinic to discuss next steps:  - CSF testing for Alzheimer's disease biomarkers and basic CSF parameters  - Audiology referral to evaluate hearing loss and offer remedies to improve hearing    Today, I spent  80 minutes reviewing the chart, personally assessing objective testing, direct patient care, completing documentation and billing.            Again, thank you for allowing me to participate in the care of your patient.      Sincerely,    Colt Lester MD

## 2021-10-20 ENCOUNTER — TELEPHONE (OUTPATIENT)
Dept: NEUROLOGY | Facility: CLINIC | Age: 61
End: 2021-10-20

## 2021-10-20 NOTE — TELEPHONE ENCOUNTER
IR was contacted to inquire on the concern reported by wife. Per IR ,  is not able to verify if the test send out can be done. I asked the  to call the wife tomorrow to schedule as this is a test the IR department has sent out for us many times in the past. Wife was notified to expect a call from scheduling tomorrow.

## 2021-10-20 NOTE — TELEPHONE ENCOUNTER
Received call from Patient spouse about setting up Lumbar puncture. They had called to schedule and were told they could schedule lumbar puncture but they are unable to process/do lab request for [place] [facility] Phos-Tau/Total-Tau/A Beta 42 Analysis to [place].     Routing to [facility] pool

## 2021-10-21 DIAGNOSIS — Z11.59 ENCOUNTER FOR SCREENING FOR OTHER VIRAL DISEASES: ICD-10-CM

## 2021-10-22 ENCOUNTER — LAB (OUTPATIENT)
Dept: URGENT CARE | Facility: URGENT CARE | Age: 61
End: 2021-10-22
Payer: COMMERCIAL

## 2021-10-22 DIAGNOSIS — Z11.59 ENCOUNTER FOR SCREENING FOR OTHER VIRAL DISEASES: ICD-10-CM

## 2021-10-22 PROCEDURE — U0003 INFECTIOUS AGENT DETECTION BY NUCLEIC ACID (DNA OR RNA); SEVERE ACUTE RESPIRATORY SYNDROME CORONAVIRUS 2 (SARS-COV-2) (CORONAVIRUS DISEASE [COVID-19]), AMPLIFIED PROBE TECHNIQUE, MAKING USE OF HIGH THROUGHPUT TECHNOLOGIES AS DESCRIBED BY CMS-2020-01-R: HCPCS

## 2021-10-22 PROCEDURE — 99207 PR NO CHARGE LOS: CPT

## 2021-10-22 PROCEDURE — U0005 INFEC AGEN DETEC AMPLI PROBE: HCPCS

## 2021-10-23 LAB — SARS-COV-2 RNA RESP QL NAA+PROBE: NEGATIVE

## 2021-10-25 ENCOUNTER — HOSPITAL ENCOUNTER (OUTPATIENT)
Dept: SPEECH THERAPY | Facility: CLINIC | Age: 61
Setting detail: THERAPIES SERIES
End: 2021-10-25
Attending: INTERNAL MEDICINE
Payer: COMMERCIAL

## 2021-10-25 DIAGNOSIS — F80.9 SPEECH AND LANGUAGE DEFICITS: ICD-10-CM

## 2021-10-25 DIAGNOSIS — G31.84 MCI (MILD COGNITIVE IMPAIRMENT): ICD-10-CM

## 2021-10-25 PROCEDURE — 92523 SPEECH SOUND LANG COMPREHEN: CPT | Mod: GN | Performed by: SPEECH-LANGUAGE PATHOLOGIST

## 2021-10-25 PROCEDURE — 92507 TX SP LANG VOICE COMM INDIV: CPT | Mod: GN | Performed by: SPEECH-LANGUAGE PATHOLOGIST

## 2021-10-25 NOTE — PROGRESS NOTES
Outpatient Speech Language Therapy Evaluation  PLAN OF TREATMENT FOR OUTPATIENT REHABILITATION  (COMPLETE FOR INITIAL CLAIMS ONLY)  Patient's Last Name, First Name, M.I.  YOB: 1960  Wally Cano                        Provider's Name  Gabriela Márquezcalf, SLP Medical Record No.  [id number]                               Onset Date:  9/28/2021   Start of Care Date: 10/25/2021     Type: Speech Language Therapy Medical Diagnosis: Mild Cognitive Impairment                        Therapy Diagnosis:  Mild expressive aphasia.   Visits from SOC:  1   _________________________________________________________________________________  Plan of Treatment:      Frequency/Duration: Every other week x 2 months.    Goals:   Goal Identifier Exercises   Goal Description Wally will report completion of expressive language home exercises at least 5/7 days a week with at least 90% accuracy for preservation of current communication function.   Target Date 12/25/21   Date Met      Progress (detail required for progress note):       Goal Identifier Functional Report   Goal Description Wally and his wife, Aaliyah, will report and improvement in functional communication of at least 25% using provided compensatory strategies and with completion of home exercises for improved functional communication.   Target Date 12/25/21   Date Met      Progress (detail required for progress note):       _________________________________________________________________________________    I CERTIFY THE NEED FOR THESE SERVICES FURNISHED UNDER        THIS PLAN OF TREATMENT AND WHILE UNDER MY CARE     (Physician co-signature of this document indicates review and certification of the therapy plan).                Certification date from: 10/25/2021  Certification date to: 12/25/2021    Referring Provider: Livier Triplett MD

## 2021-10-26 ENCOUNTER — ANCILLARY PROCEDURE (OUTPATIENT)
Dept: GENERAL RADIOLOGY | Facility: CLINIC | Age: 61
End: 2021-10-26
Attending: PSYCHIATRY & NEUROLOGY
Payer: COMMERCIAL

## 2021-10-26 ENCOUNTER — LAB (OUTPATIENT)
Dept: LAB | Facility: CLINIC | Age: 61
End: 2021-10-26

## 2021-10-26 VITALS
TEMPERATURE: 98 F | DIASTOLIC BLOOD PRESSURE: 73 MMHG | SYSTOLIC BLOOD PRESSURE: 123 MMHG | HEART RATE: 55 BPM | RESPIRATION RATE: 16 BRPM | OXYGEN SATURATION: 97 %

## 2021-10-26 DIAGNOSIS — R41.3 MEMORY LOSS: ICD-10-CM

## 2021-10-26 DIAGNOSIS — G31.84 MCI (MILD COGNITIVE IMPAIRMENT): ICD-10-CM

## 2021-10-26 DIAGNOSIS — R41.3 MEMORY LOSS: Primary | ICD-10-CM

## 2021-10-26 LAB
APPEARANCE CSF: CLEAR
COLOR CSF: COLORLESS
GLUCOSE CSF-MCNC: 58 MG/DL (ref 40–70)
HOLD SPECIMEN: NORMAL
INR PPP: 0.94 (ref 0.85–1.15)
PLATELET # BLD AUTO: 236 10E3/UL (ref 150–450)
PROT CSF-MCNC: 47 MG/DL (ref 15–60)
RBC # CSF MANUAL: 5 /UL (ref 0–2)
TUBE # CSF: 3
WBC # CSF MANUAL: 0 /UL (ref 0–5)

## 2021-10-26 PROCEDURE — 83520 IMMUNOASSAY QUANT NOS NONAB: CPT | Mod: 90 | Performed by: PATHOLOGY

## 2021-10-26 PROCEDURE — 82945 GLUCOSE OTHER FLUID: CPT | Mod: 90 | Performed by: PATHOLOGY

## 2021-10-26 PROCEDURE — 89050 BODY FLUID CELL COUNT: CPT | Mod: 90 | Performed by: PATHOLOGY

## 2021-10-26 PROCEDURE — 62328 DX LMBR SPI PNXR W/FLUOR/CT: CPT | Performed by: PHYSICIAN ASSISTANT

## 2021-10-26 PROCEDURE — 85610 PROTHROMBIN TIME: CPT | Performed by: PATHOLOGY

## 2021-10-26 PROCEDURE — 84157 ASSAY OF PROTEIN OTHER: CPT | Mod: 90 | Performed by: PATHOLOGY

## 2021-10-26 PROCEDURE — 85049 AUTOMATED PLATELET COUNT: CPT | Performed by: PATHOLOGY

## 2021-10-26 PROCEDURE — 36415 COLL VENOUS BLD VENIPUNCTURE: CPT | Performed by: PATHOLOGY

## 2021-10-26 RX ORDER — LIDOCAINE HYDROCHLORIDE 10 MG/ML
30 INJECTION, SOLUTION EPIDURAL; INFILTRATION; INTRACAUDAL; PERINEURAL ONCE
Status: COMPLETED | OUTPATIENT
Start: 2021-10-26 | End: 2021-10-26

## 2021-10-26 RX ADMIN — LIDOCAINE HYDROCHLORIDE 5 ML: 10 INJECTION, SOLUTION EPIDURAL; INFILTRATION; INTRACAUDAL; PERINEURAL at 12:00

## 2021-10-26 NOTE — PROGRESS NOTES
Wally Cano  [id number]    Completed lumbar puncture under fluoroscopic guidance.  A 22 gauge 3.5 inch spinal needle was advanced between L2-L3 with return of fluid.  A total of 14 mL was collected in 5 tubes for requested labs. Needle removed after return of stylet.  No immediate complication.  Plan: lie flat for 1 hour.  Dx: memory loss.  Shahzad.

## 2021-10-26 NOTE — DISCHARGE INSTRUCTIONS
Discharge instructions for Lumbar Puncture         AFTER YOU GO HOME      Relax and take it easy for 24 hours    You may resume normal activity tomorrow    You may remove the bandage on your back in the evening or next morning    You may resume bathing the next day    Drink at least 4 glasses of extra fluid today if not on a fluid restriction    DO NOT drive or operate machinery at home or at work for at least 24 hours    If you develop a headache you can take over the counter medicines and lay down.  You can try drinking caffeine-coffee, soda.                   CALL YOU PRIMARY PHYSICIAN IF:    If you start to leak a large amount of fluid from the puncture site, lie down flat on your back.     You develop a severe headache    You develop nausea or vomiting     You develop a temperature of 101 degrees or greater

## 2021-11-08 ENCOUNTER — OFFICE VISIT (OUTPATIENT)
Dept: AUDIOLOGY | Facility: CLINIC | Age: 61
End: 2021-11-08
Attending: PSYCHIATRY & NEUROLOGY
Payer: COMMERCIAL

## 2021-11-08 ENCOUNTER — HOSPITAL ENCOUNTER (OUTPATIENT)
Dept: SPEECH THERAPY | Facility: CLINIC | Age: 61
Setting detail: THERAPIES SERIES
End: 2021-11-08
Attending: INTERNAL MEDICINE
Payer: COMMERCIAL

## 2021-11-08 DIAGNOSIS — H90.3 ASYMMETRICAL SENSORINEURAL HEARING LOSS: Primary | ICD-10-CM

## 2021-11-08 PROCEDURE — 92507 TX SP LANG VOICE COMM INDIV: CPT | Mod: GN | Performed by: SPEECH-LANGUAGE PATHOLOGIST

## 2021-11-08 PROCEDURE — 92550 TYMPANOMETRY & REFLEX THRESH: CPT | Performed by: AUDIOLOGIST

## 2021-11-08 PROCEDURE — 99207 PR NO CHARGE LOS: CPT | Performed by: AUDIOLOGIST

## 2021-11-08 PROCEDURE — 92557 COMPREHENSIVE HEARING TEST: CPT | Performed by: AUDIOLOGIST

## 2021-11-08 NOTE — PROGRESS NOTES
Adult Outpatient Communication Evaluation   10/25/21 1000   General Information   Type of Evaluation Speech and Language   Type Of Visit Initial   Start Of Care Date 10/25/21   Referring Physician Livier Triplett MD   Orders Evaluate And Treat   Orders Comment Language Deficits   Medical Diagnosis Mild Cognitive Impairment.   Onset Of Illness/injury Or Date Of Surgery 10/05/21  (Order date. Mild cognitive impairment diagnosed on 9/28/2021)   Precautions/Limitations  no known precautions/limitations   Hearing Some hearing loss in left ear. No hearing aid at this time but has an audiology appointment.   Surgical/Medical history reviewed Yes   Pertinent History Of Current Problem Wally was accompanied to this evaluation by his wife, Aaliyah. Together they provided background information. They report cognitive changes that were first noticed by Aaliyah as far back as 2016. Due to these concerns, Wally was seen for a neuropsychological evaluation in September of 2021. The results of that evaluation indicate Mild Cognitive Impairment. There is not a specific concern for Alzheimer's Disease at this time, which is highly prevalent in Wally's family. As part of his cognitive changes, Wally is having changes to his communication, particularly word-finding difficulty. Due to this he was recommended to follow up with outpatient speech therapy.   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 Wally is highly motivated and both he and his wife describe him as a highly verbal person who enjoys talking with everyone.   Fall Risk Screen   Fall screen completed by SLP   Have you fallen 2 or more times in the past year? No   Have you fallen and had an injury in  "the past year? No   Is patient a fall risk? No   Abuse Screen (yes response referral indicated)   Feels Unsafe at Home or Work/School no   Feels Threatened by Someone no   Does Anyone Try to Keep You From Having Contact with Others or Doing Things Outside Your Home? no   Physical Signs of Abuse Present no   Speech   Speech Comments Speech was 100% intelligible throughout today's evaluation   Language: Auditory Comprehension (understanding of spoken language)   Comments (Auditory Comprehension) No significant deficits observed or reported. Wally followed all directions and responded appropriately in conversation.   Language: Verbal Expression (use of spoken language to express information)   Tests were administered at the following levels Basic (rote activities);Complex (vocation/community/social activities)   Baring Naming Test (out of 60 total) 53  (Able to name 7/7 errors with phonemic cue.)   Define Words; [place] Test for Differential Diagnosis Of Aphasia (out of 10 total) 9  (Partial information given in 2 targets (continue/material).)   Generative Naming Score; Cognitive Linguistic Quick Test 6  (Lower than likely would have performed 5 years ago.)   Generative Naming; Cognitive Linguistic Quick Test Result Below mean  (Average for his age range is 6.57.)   Conversation; Baring Diagnostic Aphasia Exam rating (out of 5 total) 4.5  (Wally has good days and bad days.)   Functional Assessment Scale (Verbal Expression) Minimal Impairment   Comments (Verbal Expression) Wally presents with a minimal impairment to his expressive language communication at this time. This is characterized by increased difficulty with word-finding, particularly confrontational naming. Wally currently compensates by using non-specific words such as \"the place\" or \"the thing\". He also will often pause and wait for the word to come when he is able. Although Wally's current deficits are on the more mild end, but he and his wife describe " him as a very verbal person, and these changes are very frustrating to him.   Cognitive Status Examination   Cognitive Status Exam Comments Wally was recently diagnosed with Mild Cognitive Impairment. See neuropsychological testing results for additional information in this area.   Education Assessment   Barriers to Learning Cognitive   Preferred Learning Style Reading;Other  (Prefers to have his wife present.)   General Therapy Interventions   Planned Therapy Interventions Language   Language Verbal expression   Intervention Comments Compensation strategies and home exercise training.   Clinical Impression, SLP Eval   Criteria for Skilled Therapeutic Interventions Met (SLP Eval) yes;treatment indicated   SLP Diagnosis Minimal to mild expressive language deficits.   Functional limitations due to impairments Decreased efficiency of communication and increased frustration for Wally.   Therapy Frequency 2 times;per month   Predicted Duration of Therapy Intervention (days/wks) 2 months   Risks and Benefits of Treatment have been explained. Yes   Patient, Family & other staff in agreement with plan of care Yes   Clinical Impression Comments Wally presents with minimal to mild expressive language deficits characterized by decreased word-finding. He benefits from phonemic cues and is usually able to provide a description if he thinks to do so. Both he and his wife report he has good and bad days, reporting that today he is having a good day.   Language/Cognition Goals   Language/Cognition Goals 1;2   Language/Cognition Goal 1   Goal Identifier Exercises   Goal Description Wally will report completion of expressive language home exercises at least 5/7 days a week with at least 90% accuracy for preservation of current communication function.   Target Date 12/25/21   Language/Cognition Goal 2   Goal Identifier Functional Report   Goal Description Wally and his wife, Aaliyah, will report and improvement in functional  communication of at least 25% using provided compensatory strategies and with completion of home exercises for improved functional communication.   Target Date 12/25/21   Total Session Time   Sound production with lang comprehension and expression minutes (63660) 50   Total Evaluation Time 50     Gabriela Singletary MA, [facility]-SLP  [facility]  [phone] (Phone)  [phone] (Fax)

## 2021-11-08 NOTE — PROGRESS NOTES
AUDIOLOGY REPORT    SUBJECTIVE:  Wally Cano is a 61 year old male who was seen in the Audiology Clinic at the [facility] for audiologic evaluation, referred by Colt Lester M.D. The patient reports a longstanding asymmetrical hearing loss loss with worse hearing in the left ear. The patient denies otalgia, aural fullness, otorrhea, tinnitus, and dizziness.  The patient notes difficulty with communication in a variety of listening situations.  They were accompanied today by their wife.    OBJECTIVE:  Abuse Screening:  Do you feel unsafe at home or work/school? No  Do you feel threatened by someone? No  Does anyone try to keep you from having contact with others, or doing things outside of your home? No  Physical signs of abuse present? No     Fall Risk Screen:  1. Have you fallen two or more times in the past year? No  2. Have you fallen and had an injury in the past year? No    Timed Up and Go Score (in seconds): not tested  Is patient a fall risk? No  Referral initiated: No  Fall Risk Assessment Completed by Audiology    Otoscopic exam indicates ears are clear of cerumen bilaterally     Pure Tone Thresholds assessed using conventional audiometry with good  reliability from 250-8000 Hz bilaterally using insert earphones     RIGHT:  normal sloping to mild sensorineural hearing loss    LEFT:    normal sloping to moderate sensorineural hearing loss    Tympanogram:    RIGHT: normal eardrum mobility    LEFT:   normal eardrum mobility    Reflexes (reported by stimulus ear):  RIGHT: Ipsilateral is present at normal levels  RIGHT: Contralateral is present at normal levels  LEFT:   Ipsilateral is present at normal levels  LEFT:   Contralateral is present at normal levels      Speech Reception Threshold:    RIGHT: 20 dB HL    LEFT:   20 dB HL  Word Recognition Score:     RIGHT: 100% at 60 dB HL using NU-6 recorded word list.    LEFT:   100% at 60 dB HL using NU-6 recorded word  list.      ASSESSMENT:     ICD-10-CM    1. Asymmetrical sensorineural hearing loss  H90.3 Adult Audiology Referral     [facility] Audiometry Thrshld Eval & Speech Recog ([id number])     Tymps / Reflex   ([id number])       Compared to patient's previous audiogram from 2011, hearing has remained stable overall. Today s results were discussed with the patient in detail.     PLAN:  Patient was counseled regarding hearing loss and impact on communication. Based on complaints and today's results the patient may benefit from amplification in his left ear. Pros and cons were discussed.  It is recommended that the patient follow-up with ENT. It is recommended that his hearing be retest in approximately 1 year or sooner if new concerns arise.  Please call this clinic with questions regarding these results or recommendations.        Melissa Montenegro.  Doctor of Audiology  [place] License # [id number]

## 2021-11-11 DIAGNOSIS — E78.5 HYPERLIPIDEMIA LDL GOAL <130: ICD-10-CM

## 2021-11-11 RX ORDER — ATORVASTATIN CALCIUM 20 MG/1
TABLET, FILM COATED ORAL
Qty: 90 TABLET | Refills: 0 | Status: SHIPPED | OUTPATIENT
Start: 2021-11-11 | End: 2022-02-17

## 2021-11-11 NOTE — TELEPHONE ENCOUNTER
Routing refill request to provider for review/approval because:  Labs not current      LDL on file in past 12 months        Recent Labs   Lab Test 11/06/20  0802   LDL 50     Ebony Benjamin RN, BSN   [facility] [place]

## 2021-11-12 LAB — SCANNED LAB RESULT: ABNORMAL

## 2021-11-17 ENCOUNTER — OFFICE VISIT (OUTPATIENT)
Dept: NEUROLOGY | Facility: CLINIC | Age: 61
End: 2021-11-17
Payer: COMMERCIAL

## 2021-11-17 VITALS
DIASTOLIC BLOOD PRESSURE: 83 MMHG | HEART RATE: 62 BPM | SYSTOLIC BLOOD PRESSURE: 126 MMHG | BODY MASS INDEX: 27.23 KG/M2 | TEMPERATURE: 97.8 F | WEIGHT: 184.4 LBS

## 2021-11-17 DIAGNOSIS — F02.80 EARLY ONSET ALZHEIMER'S DEMENTIA WITHOUT BEHAVIORAL DISTURBANCE (H): Primary | ICD-10-CM

## 2021-11-17 DIAGNOSIS — G30.0 EARLY ONSET ALZHEIMER'S DEMENTIA WITHOUT BEHAVIORAL DISTURBANCE (H): Primary | ICD-10-CM

## 2021-11-17 RX ORDER — DONEPEZIL HYDROCHLORIDE 10 MG/1
10 TABLET, FILM COATED ORAL DAILY
Qty: 10 TABLET | Refills: 4 | Status: SHIPPED | OUTPATIENT
Start: 2021-11-17 | End: 2021-12-06

## 2021-11-17 NOTE — LETTER
11/17/2021     RE: Wally Cano  [address] [address] [place] [id number]     Dear Colleague,    Thank you for referring your patient, Wally Cano, to the [facility] at [facility]. Please see a copy of my visit note below.    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 Relevant history of resolved endocarditis, VSD, HTN, HLD, chronic ulcer in the setting of NSAID use. He has a paternal history of dementia. MMSE during our initial visit 10/2021 was 24/30. B12, TSH, CMP and CBC all unremarkable. Neuropsychological testing Dr. Sanches showed impairment in encoding and retrieval, but relative preservation of recognition memory. He scored in the mild cognitive impairment stage. MRI from July 2018 showed normal cerebral and cerebellar volumes. Medial temporal lobe sizes are within normal limits. Mild non-specific T2 signal in the subcortical white matter. 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 CSF ADMark panel from [facility] labs showed high p-tau and low ATI, confirming a diagnosis of early onset Alzheimer's disease. 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 He is in the MCI stage.    We will start Aricept 10 mg daily.  Follow-up in 6 months.    Today, I spent 70 minutes reviewing the chart, discussing diagnosis, prognosis, treatment, and ancillary support services, completing documentation and billing.    Again, thank you for allowing me to participate in the care of your patient.      Sincerely,    Colt Lester MD

## 2021-11-17 NOTE — PATIENT INSTRUCTIONS
Plan:  Aricept 10 mg daily (can take before bed or in morning)    Exercise    No need for memory games -- they do not translate to better cognition/thinking    Stay socially and intellectually engaged    Continue to optimize cerebrovascular risk factors (blood pressure control, lipid control, glycemic control, healthy diet and exercise).    Eat healthy. A balanced diet with all major food groups is recommended. Other recommended diets include the Mediterranean diet.    Drugs/Clinical Trials  Aduhelm/aducanumab has very questionable benefits and is quite toxic. But other drugs are coming to market likely in 2023 or 2024 that may be (or at least early data show) more clearly beneficial. You can search clinical trials and the contact numbers:    [facility]:  [email or web address]  (Search Alzheimer's disease)    [facility]:  [email or web address]    We do not have any drug trials at [facility].  We do have non-drug observational trials at [facility].    Driving:  Because you are in the MCI stage, you do not need to stop driving. But we recommend pursuing a 's evaluation. [employer]sha Llanos offers these driving tests.    [employer] is a program through the [employer] that helps to plan for life post-driving. Their website is:  Https://www.sph.[employer].edu/research/projects/carfreeme/    Follow-up:  We will schedule you for a 6 month follow-up appointment    Community Resources:  The Alzheimer's Association has many resources, support groups, and FAQs for caregivers and patients living with dementia. I would highly recommend visiting their webpage: Alz.org

## 2021-11-17 NOTE — PROGRESS NOTES
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 Relevant history of resolved endocarditis, VSD, HTN, HLD, chronic ulcer in the setting of NSAID use. He has a paternal history of dementia. MMSE during our initial visit 10/2021 was 24/30. B12, TSH, CMP and CBC all unremarkable. Neuropsychological testing Dr. Sanches showed impairment in encoding and retrieval, but relative preservation of recognition memory. He scored in the mild cognitive impairment stage. MRI from July 2018 showed normal cerebral and cerebellar volumes. Medial temporal lobe sizes are within normal limits. Mild non-specific T2 signal in the subcortical white matter. 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 CSF ADMark panel from [facility] labs showed high p-tau and low ATI, confirming a diagnosis of early onset Alzheimer's disease. 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 He is in the MCI stage.    We will start Aricept 10 mg daily.  Follow-up in 6 months.    Today, I spent 70 minutes reviewing the chart, discussing diagnosis, prognosis, treatment, and ancillary support services, completing documentation and billing.

## 2021-11-18 ENCOUNTER — APPOINTMENT (OUTPATIENT)
Dept: URBAN - METROPOLITAN AREA CLINIC 252 | Age: 61
Setting detail: DERMATOLOGY
End: 2021-11-18

## 2021-11-18 VITALS — WEIGHT: 184 LBS | HEIGHT: 69 IN | RESPIRATION RATE: 14 BRPM

## 2021-11-18 DIAGNOSIS — L82.1 OTHER SEBORRHEIC KERATOSIS: ICD-10-CM

## 2021-11-18 DIAGNOSIS — Z71.89 OTHER SPECIFIED COUNSELING: ICD-10-CM

## 2021-11-18 DIAGNOSIS — Z85.820 PERSONAL HISTORY OF MALIGNANT MELANOMA OF SKIN: ICD-10-CM

## 2021-11-18 DIAGNOSIS — D17 BENIGN LIPOMATOUS NEOPLASM: ICD-10-CM

## 2021-11-18 DIAGNOSIS — D22 MELANOCYTIC NEVI: ICD-10-CM

## 2021-11-18 DIAGNOSIS — L81.4 OTHER MELANIN HYPERPIGMENTATION: ICD-10-CM

## 2021-11-18 PROBLEM — D17.0 BENIGN LIPOMATOUS NEOPLASM OF SKIN AND SUBCUTANEOUS TISSUE OF HEAD, FACE AND NECK: Status: ACTIVE | Noted: 2021-11-18

## 2021-11-18 PROBLEM — D22.5 MELANOCYTIC NEVI OF TRUNK: Status: ACTIVE | Noted: 2021-11-18

## 2021-11-18 PROCEDURE — OTHER COUNSELING: OTHER

## 2021-11-18 PROCEDURE — 99213 OFFICE O/P EST LOW 20 MIN: CPT

## 2021-11-18 PROCEDURE — OTHER ADDITIONAL NOTES: OTHER

## 2021-11-18 ASSESSMENT — LOCATION ZONE DERM
LOCATION ZONE: NECK
LOCATION ZONE: TRUNK
LOCATION ZONE: SCALP

## 2021-11-18 ASSESSMENT — LOCATION DETAILED DESCRIPTION DERM
LOCATION DETAILED: LEFT MEDIAL SUPERIOR CHEST
LOCATION DETAILED: RIGHT SUPERIOR MEDIAL MIDBACK
LOCATION DETAILED: RIGHT POSTERIOR PARIETAL SCALP
LOCATION DETAILED: LEFT SUPERIOR POSTERIOR NECK
LOCATION DETAILED: RIGHT MEDIAL SUPERIOR CHEST
LOCATION DETAILED: RIGHT SUPERIOR OCCIPITAL SCALP
LOCATION DETAILED: RIGHT MEDIAL UPPER BACK
LOCATION DETAILED: EPIGASTRIC SKIN

## 2021-11-18 ASSESSMENT — LOCATION SIMPLE DESCRIPTION DERM
LOCATION SIMPLE: POSTERIOR SCALP
LOCATION SIMPLE: RIGHT LOWER BACK
LOCATION SIMPLE: RIGHT UPPER BACK
LOCATION SIMPLE: CHEST
LOCATION SIMPLE: POSTERIOR NECK
LOCATION SIMPLE: ABDOMEN
LOCATION SIMPLE: RIGHT OCCIPITAL SCALP

## 2021-11-18 NOTE — HPI: FULL BODY SKIN EXAMINATION
What Is The Reason For Today's Visit?: Full Body Skin Examination
What Is The Reason For Today's Visit? (Being Monitored For X): the re-examination of lesions previously examined
Additional History: Mm was diagnosed December 2019.

## 2021-11-18 NOTE — PROCEDURE: ADDITIONAL NOTES
Additional Notes: Diagnosed December 2019
Render Risk Assessment In Note?: no
Detail Level: Detailed

## 2021-11-18 NOTE — PROCEDURE: COUNSELING
When Should The Patient Follow-Up For Their Next Full-Body Skin Exam?: 6 Months
Detail Level: Zone
Detail Level: Detailed
Detail Level: Generalized
Quality 137: Melanoma: Continuity Of Care - Recall System: Patient information entered into a recall system that includes: target date for the next exam specified AND a process to follow up with patients regarding missed or unscheduled appointments
Detail Level: Simple

## 2021-12-03 ENCOUNTER — OFFICE VISIT (OUTPATIENT)
Dept: OTOLARYNGOLOGY | Facility: CLINIC | Age: 61
End: 2021-12-03
Payer: COMMERCIAL

## 2021-12-03 VITALS — DIASTOLIC BLOOD PRESSURE: 85 MMHG | HEART RATE: 57 BPM | SYSTOLIC BLOOD PRESSURE: 139 MMHG | OXYGEN SATURATION: 98 %

## 2021-12-03 DIAGNOSIS — H90.3 SNHL (SENSORY-NEURAL HEARING LOSS), ASYMMETRICAL: Primary | ICD-10-CM

## 2021-12-03 PROCEDURE — 99214 OFFICE O/P EST MOD 30 MIN: CPT | Performed by: OTOLARYNGOLOGY

## 2021-12-03 ASSESSMENT — ENCOUNTER SYMPTOMS
DOUBLE VISION: 0
COUGH: 0
CONSTITUTIONAL NEGATIVE: 1
DIZZINESS: 0
HEARTBURN: 0
HEADACHES: 0
VOMITING: 0
TINGLING: 0
BLURRED VISION: 0
NAUSEA: 0
PHOTOPHOBIA: 0
TREMORS: 0
HEMOPTYSIS: 0
SPUTUM PRODUCTION: 0
BRUISES/BLEEDS EASILY: 0

## 2021-12-03 NOTE — LETTER
12/3/2021         RE: Wally Cano  [address] [address] [place] [id number]        Dear Colleague,    Thank you for referring your patient, Wally Cano, to the [facility]. Please see a copy of my visit note below.    HPI    Pt reports longstanding asymmetrical hearing loss with worse hearing in the left. Denies otalgia, aural fullness, otorrhea, tinnitus and dizziness.   Results: WNL to mild SNHL right. WNL to moderate SNHL left. 100% word rec. bilaterally. Tymps WNL. Present 1 kHz ipsi/contra reflexes.  Rec: F/U w ENT. Consider trial with amplification. Retest annually or sooner if new concerns arise.    MR BRAIN W/O CONTRAST 7/8/2021 2:20 PM     Provided History: Memory Loss; progressive short term memory loss in  the last year; Memory loss  ICD-10: Memory loss     Comparison:  None available      Technique: Sagittal T1-weighted and axial T2-weighted, turboFLAIR and  diffusion-weighted with ADC map images of the brain were obtained  without intravenous contrast.     Findings: Diffuse cortical volume loss without any asymmetry. The  ventricles and sulci appear normal for age. There are scattered T2  hyperintensities in the periventricular and deep subcortical white  matter more than expected for age, possibly representing sequela of  infectious or inflammatory insults, vasculopathy or demyelination.  These images reveal no intracranial mass lesion, mass effect, midline  shift or abnormal extraaxial fluid collection. No abnormality of  reduced diffusion.  There appears to be an abnormal dilatation in the  left AICA/SCA visualized on coronal T2 series #3 image 16. This may  represent a vessel loop or an aneurysm,  recommend further evaluation  with MRA to rule out posterior circulation aneurysm.  Fluid within the sphenoid sinus. Remaining visualized paranasal  sinuses and both mastoid air cells are clear.                                                                       Impression:   1. Cortical volume loss likely age-related.  2. Mild Leukoaraiosis.  3. MRA recommended to rule out posterior circulation aneurysm.     MRA of the head without contrast  Neck MRA without and with contrast     Provided History:  Dementia, vascular suspected; brain MRI abnormal,  recommend MRA to rule out posterior circulation aneurysm; Memory loss;  Abnormal finding on MRI of brain.  ICD-10: Memory loss; Abnormal finding on MRI of brain     Comparison:  MR brain 7/8/2021       Technique:      Head MRA: 3D time-of-flight MRA of the [place] of [place] was performed  without intravenous contrast.  Neck MRA:  Limited non contrast 2DTOF images were obtained of the  mid-cervical region. Following intravenous gadolinium-based contrast  administration, a contrast enhanced MRA of the neck/cervical vessels  was performed.  Three-dimensional reconstructions of the neck and head MRA were  created, which were reviewed by the radiologist.     Dose: 8 cc of Gadavist injected.      Findings:      Head MRA demonstrates no definite aneurysm or stenosis of the major  intracranial arteries. There is a small infundibulum at the origin of  the left posterior communicating artery. Left posterior communicating  artery is patent. The anterior communicating artery is patent. Right  posterior communicating artery is not well-visualized.     Neck MRA demonstrates patent major cervical arteries. The normal  distal right internal carotid artery measures 5 mm. The normal distal  left internal carotid artery measures 5 mm. Antegrade flow in the  major cervical vasculature.                                                                      Impression:  1. Head MRA demonstrates no definite aneurysm or stenosis of the major  intracranial arteries. No evidence for posterior circulation aneurysm.  2. Neck MRA demonstrates patent major cervical arteries.      Review of Systems   Constitutional: Negative.    HENT: Positive for  hearing loss. Negative for congestion, ear discharge, ear pain, nosebleeds and tinnitus.    Eyes: Negative for blurred vision, double vision and photophobia.   Respiratory: Negative for cough, hemoptysis and sputum production.    Gastrointestinal: Negative for heartburn, nausea and vomiting.   Skin: Negative.    Neurological: Negative for dizziness, tingling, tremors and headaches.   Endo/Heme/Allergies: Negative for environmental allergies. Does not bruise/bleed easily.         Physical Exam  Vitals reviewed.   Constitutional:       Appearance: Normal appearance.   HENT:      Head: Normocephalic and atraumatic.      Right Ear: Tympanic membrane, ear canal and external ear normal. Decreased hearing noted. No middle ear effusion.      Left Ear: Tympanic membrane, ear canal and external ear normal. Decreased hearing noted.  No middle ear effusion.      Nose: Nose normal. No mucosal edema.      Right Turbinates: Not enlarged or swollen.      Left Turbinates: Not enlarged or swollen.      Mouth/Throat:      Mouth: Mucous membranes are moist.      Pharynx: Oropharynx is clear. Uvula midline.   Neurological:      Mental Status: He is alert.     s/p tonsillectomy appearance.      A/P  This pleasant patient is having asymmetrical SNHL in his left ear. His hearing tests were compared. Options were discussed. He would like to go with hearing aid trial. His questions were answered.      Again, thank you for allowing me to participate in the care of your patient.        Sincerely,        Shae Lang MD

## 2021-12-03 NOTE — PROGRESS NOTES
HPI    Pt reports longstanding asymmetrical hearing loss with worse hearing in the left. Denies otalgia, aural fullness, otorrhea, tinnitus and dizziness.   Results: WNL to mild SNHL right. WNL to moderate SNHL left. 100% word rec. bilaterally. Tymps WNL. Present 1 kHz ipsi/contra reflexes.  Rec: F/U w ENT. Consider trial with amplification. Retest annually or sooner if new concerns arise.    MR BRAIN W/O CONTRAST 7/8/2021 2:20 PM     Provided History: Memory Loss; progressive short term memory loss in  the last year; Memory loss  ICD-10: Memory loss     Comparison:  None available      Technique: Sagittal T1-weighted and axial T2-weighted, turboFLAIR and  diffusion-weighted with ADC map images of the brain were obtained  without intravenous contrast.     Findings: Diffuse cortical volume loss without any asymmetry. The  ventricles and sulci appear normal for age. There are scattered T2  hyperintensities in the periventricular and deep subcortical white  matter more than expected for age, possibly representing sequela of  infectious or inflammatory insults, vasculopathy or demyelination.  These images reveal no intracranial mass lesion, mass effect, midline  shift or abnormal extraaxial fluid collection. No abnormality of  reduced diffusion.  There appears to be an abnormal dilatation in the  left AICA/SCA visualized on coronal T2 series #3 image 16. This may  represent a vessel loop or an aneurysm,  recommend further evaluation  with MRA to rule out posterior circulation aneurysm.  Fluid within the sphenoid sinus. Remaining visualized paranasal  sinuses and both mastoid air cells are clear.                                                                      Impression:   1. Cortical volume loss likely age-related.  2. Mild Leukoaraiosis.  3. MRA recommended to rule out posterior circulation aneurysm.     MRA of the head without contrast  Neck MRA without and with contrast     Provided History:  Dementia, vascular  suspected; brain MRI abnormal,  recommend MRA to rule out posterior circulation aneurysm; Memory loss;  Abnormal finding on MRI of brain.  ICD-10: Memory loss; Abnormal finding on MRI of brain     Comparison:  MR brain 7/8/2021       Technique:      Head MRA: 3D time-of-flight MRA of the [place] was performed  without intravenous contrast.  Neck MRA:  Limited non contrast 2DTOF images were obtained of the  mid-cervical region. Following intravenous gadolinium-based contrast  administration, a contrast enhanced MRA of the neck/cervical vessels  was performed.  Three-dimensional reconstructions of the neck and head MRA were  created, which were reviewed by the radiologist.     Dose: 8 cc of Gadavist injected.      Findings:      Head MRA demonstrates no definite aneurysm or stenosis of the major  intracranial arteries. There is a small infundibulum at the origin of  the left posterior communicating artery. Left posterior communicating  artery is patent. The anterior communicating artery is patent. Right  posterior communicating artery is not well-visualized.     Neck MRA demonstrates patent major cervical arteries. The normal  distal right internal carotid artery measures 5 mm. The normal distal  left internal carotid artery measures 5 mm. Antegrade flow in the  major cervical vasculature.                                                                      Impression:  1. Head MRA demonstrates no definite aneurysm or stenosis of the major  intracranial arteries. No evidence for posterior circulation aneurysm.  2. Neck MRA demonstrates patent major cervical arteries.      Review of Systems   Constitutional: Negative.    HENT: Positive for hearing loss. Negative for congestion, ear discharge, ear pain, nosebleeds and tinnitus.    Eyes: Negative for blurred vision, double vision and photophobia.   Respiratory: Negative for cough, hemoptysis and sputum production.    Gastrointestinal: Negative for heartburn,  nausea and vomiting.   Skin: Negative.    Neurological: Negative for dizziness, tingling, tremors and headaches.   Endo/Heme/Allergies: Negative for environmental allergies. Does not bruise/bleed easily.         Physical Exam  Vitals reviewed.   Constitutional:       Appearance: Normal appearance.   HENT:      Head: Normocephalic and atraumatic.      Right Ear: Tympanic membrane, ear canal and external ear normal. Decreased hearing noted. No middle ear effusion.      Left Ear: Tympanic membrane, ear canal and external ear normal. Decreased hearing noted.  No middle ear effusion.      Nose: Nose normal. No mucosal edema.      Right Turbinates: Not enlarged or swollen.      Left Turbinates: Not enlarged or swollen.      Mouth/Throat:      Mouth: Mucous membranes are moist.      Pharynx: Oropharynx is clear. Uvula midline.   Neurological:      Mental Status: He is alert.     s/p tonsillectomy appearance.      A/P  This pleasant patient is having asymmetrical SNHL in his left ear. His hearing tests were compared. Options were discussed. He would like to go with hearing aid trial. His questions were answered.

## 2021-12-03 NOTE — NURSING NOTE
Wally Cano's goals for this visit include:   Chief Complaint   Patient presents with     Consult     Decreased hearing, left ear.        He requests these members of his care team be copied on today's visit information: yes    PCP: Livier Triplett    Referring Provider:  Chalino Quevedo, Mitesh  [id number] [address] [place] [id number]    /85   Pulse 57   SpO2 98%     Do you need any medication refills at today's visit? no

## 2021-12-05 ENCOUNTER — MYC MEDICAL ADVICE (OUTPATIENT)
Dept: NEUROLOGY | Facility: CLINIC | Age: 61
End: 2021-12-05
Payer: COMMERCIAL

## 2021-12-05 DIAGNOSIS — G30.0 EARLY ONSET ALZHEIMER'S DEMENTIA WITHOUT BEHAVIORAL DISTURBANCE (H): ICD-10-CM

## 2021-12-05 DIAGNOSIS — F02.80 EARLY ONSET ALZHEIMER'S DEMENTIA WITHOUT BEHAVIORAL DISTURBANCE (H): ICD-10-CM

## 2021-12-08 RX ORDER — DONEPEZIL HYDROCHLORIDE 10 MG/1
10 TABLET, FILM COATED ORAL DAILY
Qty: 90 TABLET | Refills: 1 | Status: SHIPPED | OUTPATIENT
Start: 2021-12-08 | End: 2022-06-10

## 2021-12-19 ENCOUNTER — HEALTH MAINTENANCE LETTER (OUTPATIENT)
Age: 61
End: 2021-12-19

## 2021-12-20 ENCOUNTER — OFFICE VISIT (OUTPATIENT)
Dept: AUDIOLOGY | Facility: CLINIC | Age: 61
End: 2021-12-20
Payer: COMMERCIAL

## 2021-12-20 DIAGNOSIS — H90.3 SNHL (SENSORY-NEURAL HEARING LOSS), ASYMMETRICAL: Primary | ICD-10-CM

## 2021-12-20 PROCEDURE — 92590 PR HEARING AID EXAM MONAURAL: CPT | Performed by: AUDIOLOGIST

## 2021-12-20 PROCEDURE — 99207 PR NO CHARGE LOS: CPT | Performed by: AUDIOLOGIST

## 2021-12-20 NOTE — PROGRESS NOTES
AUDIOLOGY REPORT    SUBJECTIVE: Wally Cano is a 61 year old male was seen in the Audiology Clinic at  [facility] on 12/20/21 to discuss concerns with hearing and functional communication difficulties. The patient was accompanied by their wife. Wally has been seen previously on 11/08/21, and results revealed an asymmetrical sensorineural hearing loss with worse hearing in the left ear.  The patient was medically evaluated and determined to be cleared for binaural hearing aids by Brian Lang MD. Wally notes difficulty with communication in a variety of listening situations.    OBJECTIVE:  Abuse Screening:  Do you feel unsafe at home or work/school? No  Do you feel threatened by someone? No  Does anyone try to keep you from having contact with others, or doing things outside of your home? No  Physical signs of abuse present? No    Patient is a hearing aid candidate. Patient would like to move forward with a hearing aid evaluation today. Therefore, the patient was presented with different options for amplification to help aid in communication. Discussed styles, levels of technology and monaural vs. binaural fitting.     The hearing aid(s) mutually chosen were:  Left: Phonak Audep   COLOR: beige  BATTERY SIZE: 312  EARMOLD/TIPS: RITE Size 1  CANAL/ LENGTH: 1        ASSESSMENT:     ICD-10-CM    1. SNHL (sensory-neural hearing loss), asymmetrical  H90.3 Hearing Aid Exam, Monaural (26055)       Reviewed purchase information and warranty information with patient. The 45 day trial period was explained to patient. The patient was given a copy of the [place] Department [employer] consumer brochure on purchasing hearing instruments. Patient risk factors have been provided to the patient in writing prior to the sale of the hearing aid per FDA regulation. The risk factors are also available in the User Instructional Booklet to be presented on the day of the hearing aid  fitting. Hearing aid(s) ordered. Hearing aid evaluation completed.    PLAN: Wally is scheduled to return in 2-3 weeks for a hearing aid fitting and programming. Purchase agreement will be completed on that date. Please contact this clinic with any questions or concerns.      Melissa Montenegro.  Doctor of Audiology  [place] License # [id number]

## 2021-12-21 ENCOUNTER — OFFICE VISIT (OUTPATIENT)
Dept: FAMILY MEDICINE | Facility: CLINIC | Age: 61
End: 2021-12-21
Payer: COMMERCIAL

## 2021-12-21 VITALS
HEART RATE: 55 BPM | OXYGEN SATURATION: 98 % | SYSTOLIC BLOOD PRESSURE: 121 MMHG | HEIGHT: 68 IN | BODY MASS INDEX: 28.1 KG/M2 | TEMPERATURE: 98 F | WEIGHT: 185.4 LBS | RESPIRATION RATE: 16 BRPM | DIASTOLIC BLOOD PRESSURE: 77 MMHG

## 2021-12-21 DIAGNOSIS — Z79.1 NSAID LONG-TERM USE: ICD-10-CM

## 2021-12-21 DIAGNOSIS — Z71.89 ADVANCED DIRECTIVES, COUNSELING/DISCUSSION: ICD-10-CM

## 2021-12-21 DIAGNOSIS — Z00.00 ROUTINE GENERAL MEDICAL EXAMINATION AT A HEALTH CARE FACILITY: Primary | ICD-10-CM

## 2021-12-21 DIAGNOSIS — Z12.5 SCREENING FOR PROSTATE CANCER: ICD-10-CM

## 2021-12-21 DIAGNOSIS — E80.6 HYPERBILIRUBINEMIA: ICD-10-CM

## 2021-12-21 DIAGNOSIS — I10 HYPERTENSION GOAL BP (BLOOD PRESSURE) < 140/90: ICD-10-CM

## 2021-12-21 DIAGNOSIS — Q21.0 VSD (VENTRICULAR SEPTAL DEFECT): ICD-10-CM

## 2021-12-21 DIAGNOSIS — M18.11 OSTEOARTHRITIS OF THUMB, RIGHT: ICD-10-CM

## 2021-12-21 DIAGNOSIS — F02.80: ICD-10-CM

## 2021-12-21 DIAGNOSIS — G30.0: ICD-10-CM

## 2021-12-21 DIAGNOSIS — K29.01 GASTROINTESTINAL HEMORRHAGE ASSOCIATED WITH ACUTE GASTRITIS: ICD-10-CM

## 2021-12-21 DIAGNOSIS — E78.5 HYPERLIPIDEMIA LDL GOAL <100: ICD-10-CM

## 2021-12-21 DIAGNOSIS — M79.672 LEFT FOOT PAIN: ICD-10-CM

## 2021-12-21 DIAGNOSIS — M17.12 PRIMARY OSTEOARTHRITIS OF LEFT KNEE: ICD-10-CM

## 2021-12-21 LAB
ALBUMIN SERPL-MCNC: 4.1 G/DL (ref 3.4–5)
ALP SERPL-CCNC: 70 U/L (ref 40–150)
ALT SERPL W P-5'-P-CCNC: 45 U/L (ref 0–70)
ANION GAP SERPL CALCULATED.3IONS-SCNC: 6 MMOL/L (ref 3–14)
AST SERPL W P-5'-P-CCNC: 37 U/L (ref 0–45)
BILIRUB SERPL-MCNC: 2.2 MG/DL (ref 0.2–1.3)
BUN SERPL-MCNC: 15 MG/DL (ref 7–30)
CALCIUM SERPL-MCNC: 9.2 MG/DL (ref 8.5–10.1)
CHLORIDE BLD-SCNC: 100 MMOL/L (ref 94–109)
CHOLEST SERPL-MCNC: 168 MG/DL
CO2 SERPL-SCNC: 29 MMOL/L (ref 20–32)
CREAT SERPL-MCNC: 0.97 MG/DL (ref 0.66–1.25)
CREAT UR-MCNC: 68 MG/DL
ERYTHROCYTE [DISTWIDTH] IN BLOOD BY AUTOMATED COUNT: 12.4 % (ref 10–15)
FASTING STATUS PATIENT QL REPORTED: YES
GFR SERPL CREATININE-BSD FRML MDRD: 89 ML/MIN/1.73M2
GLUCOSE BLD-MCNC: 104 MG/DL (ref 70–99)
HCT VFR BLD AUTO: 44.1 % (ref 40–53)
HDLC SERPL-MCNC: 62 MG/DL
HGB BLD-MCNC: 14.6 G/DL (ref 13.3–17.7)
LDLC SERPL CALC-MCNC: 76 MG/DL
MCH RBC QN AUTO: 30.6 PG (ref 26.5–33)
MCHC RBC AUTO-ENTMCNC: 33.1 G/DL (ref 31.5–36.5)
MCV RBC AUTO: 93 FL (ref 78–100)
MICROALBUMIN UR-MCNC: <5 MG/L
MICROALBUMIN/CREAT UR: NORMAL MG/G{CREAT}
NONHDLC SERPL-MCNC: 106 MG/DL
PLATELET # BLD AUTO: 241 10E3/UL (ref 150–450)
POTASSIUM BLD-SCNC: 3.9 MMOL/L (ref 3.4–5.3)
PROT SERPL-MCNC: 7.1 G/DL (ref 6.8–8.8)
PSA SERPL-MCNC: 3.03 UG/L (ref 0–4)
RBC # BLD AUTO: 4.77 10E6/UL (ref 4.4–5.9)
SODIUM SERPL-SCNC: 135 MMOL/L (ref 133–144)
TRIGL SERPL-MCNC: 151 MG/DL
WBC # BLD AUTO: 8.3 10E3/UL (ref 4–11)

## 2021-12-21 PROCEDURE — 99396 PREV VISIT EST AGE 40-64: CPT | Performed by: INTERNAL MEDICINE

## 2021-12-21 PROCEDURE — 99214 OFFICE O/P EST MOD 30 MIN: CPT | Mod: 25 | Performed by: INTERNAL MEDICINE

## 2021-12-21 PROCEDURE — 82248 BILIRUBIN DIRECT: CPT | Performed by: INTERNAL MEDICINE

## 2021-12-21 PROCEDURE — 80053 COMPREHEN METABOLIC PANEL: CPT | Performed by: INTERNAL MEDICINE

## 2021-12-21 PROCEDURE — 85027 COMPLETE CBC AUTOMATED: CPT | Performed by: INTERNAL MEDICINE

## 2021-12-21 PROCEDURE — 82043 UR ALBUMIN QUANTITATIVE: CPT | Performed by: INTERNAL MEDICINE

## 2021-12-21 PROCEDURE — 36415 COLL VENOUS BLD VENIPUNCTURE: CPT | Performed by: INTERNAL MEDICINE

## 2021-12-21 PROCEDURE — G0103 PSA SCREENING: HCPCS | Performed by: INTERNAL MEDICINE

## 2021-12-21 PROCEDURE — 80061 LIPID PANEL: CPT | Performed by: INTERNAL MEDICINE

## 2021-12-21 RX ORDER — CELECOXIB 100 MG/1
100-200 CAPSULE ORAL 2 TIMES DAILY PRN
Qty: 60 CAPSULE | Refills: 0 | Status: SHIPPED | OUTPATIENT
Start: 2021-12-21 | End: 2022-12-30

## 2021-12-21 RX ORDER — LISINOPRIL 20 MG/1
20 TABLET ORAL 2 TIMES DAILY
Qty: 180 TABLET | Refills: 3 | Status: SHIPPED | OUTPATIENT
Start: 2021-12-21 | End: 2022-12-30

## 2021-12-21 RX ORDER — HYDROCHLOROTHIAZIDE 25 MG/1
25 TABLET ORAL DAILY
Qty: 90 TABLET | Refills: 3 | Status: SHIPPED | OUTPATIENT
Start: 2021-12-21 | End: 2022-12-30

## 2021-12-21 ASSESSMENT — MIFFLIN-ST. JEOR: SCORE: 1615.98

## 2021-12-21 ASSESSMENT — ENCOUNTER SYMPTOMS
PARESTHESIAS: 0
HEARTBURN: 0
CHILLS: 0
SORE THROAT: 0
WEAKNESS: 0
HEMATOCHEZIA: 0
NAUSEA: 0
ABDOMINAL PAIN: 0
DYSURIA: 0
COUGH: 0
EYE PAIN: 0
HEMATURIA: 0
JOINT SWELLING: 1
HEADACHES: 0
CONSTIPATION: 0
FREQUENCY: 0
DIZZINESS: 1
SHORTNESS OF BREATH: 0
FEVER: 0
DIARRHEA: 0
MYALGIAS: 0
PALPITATIONS: 0
ARTHRALGIAS: 1
NERVOUS/ANXIOUS: 1

## 2021-12-21 NOTE — PROGRESS NOTES
SUBJECTIVE:   CC: Wally Cano is an 61 year old male who presents for preventative health visit.       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 On Aricept 10 mg for the last 1-2 months. This is not a surprise to him as on his father's side of family 8/13 siblings had Alzheimer's disease, although his father did not.     A few questions:   Baby aspirin: Whether he should be taking it or not.    Contrast dye allergy on list. It was a one time thing 25 years ago where he developed low blood pressure. He had since had contrast dye without side effects. Wanted this removed. -- done.     Going to get hearing aid for the left ear.     Muscle twitches 3-4 nights a week, in the lower leg. Not painful. Can't feel it but can see the calf muscle twitching.  Started after being diagnosed with early onset Alzheimer's disease and put on Aricept.  His images did say that some of the side effects of Aricept will work itself out over several months.    At baseline patient has heart rate in the 60 or so.  More recently they have noted heart rate sometimes in the high 40s and low 50s.  Patient has had some occasional dizziness.  They have not yet correlated this with either pressure or heart rate.    Per [employer], Aricept can cause decreased heart rate as well as other conduction abnormalities.  It can also cause dizziness, tremor.  There is no mention about fasciculation.    Left knee oA: In the past had had cortisone injections.  The first injection worked quite well.  2 additional injections did not work as well.  He was using high-dose NSAIDs chronically for knee pain, leading to stomach ulcer with GI bleed.  That was about 6 months ago.  Since then he has been avoiding NSAIDs.  There were a couple of days or the left knee pain was so severe that he could not go up the stairs.  He took Advil 400 mg each time, with relief of pain.  In the past knee surgery was discussed, but  thought that he was not quite ready at the time.  He is wondering how much NSAIDs he could take to be safe in the setting of prior GI bleed.    Right thumb RA: Intermittent pain, locking.  If he rubs it and feels better.    New Health care directive completed.  Put his wife and his children on power of .  I reviewed the healthcare directive.  Full resuscitation unless terminal condition.    Also noted if only protrusion of the left foot.  It hurts when he walks on it.  He is an avid .  He hopes to get back into the game next summer.    Patient has been advised of split billing requirements and indicates understanding: Yes  Healthy Habits:     Getting at least 3 servings of Calcium per day:  Yes    Bi-annual eye exam:  Yes    Dental care twice a year:  Yes    Sleep apnea or symptoms of sleep apnea:  None    Diet:  Regular (no restrictions)    Frequency of exercise:  2-3 days/week    Duration of exercise:  30-45 minutes    Taking medications regularly:  Yes    Medication side effects:  Not applicable    PHQ-2 Total Score: 0    Additional concerns today:  Yes        Today's PHQ-2 Score:   PHQ-2 ( 1999 [employer]) 12/21/2021   Q1: Little interest or pleasure in doing things 0   Q2: Feeling down, depressed or hopeless 0   PHQ-2 Score 0   PHQ-2 Total Score (12-17 Years)- Positive if 3 or more points; Administer PHQ-A if positive -   Q1: Little interest or pleasure in doing things Not at all   Q2: Feeling down, depressed or hopeless Not at all   PHQ-2 Score 0       Abuse: Current or Past(Physical, Sexual or Emotional)- No  Do you feel safe in your environment? Yes        Social History     Tobacco Use     Smoking status: Former Smoker     Packs/day: 0.00     Years: 10.00     Pack years: 0.00     Types: Cigarettes, Cigars     Quit date: 10/28/2006     Years since quitting: 15.1     Smokeless tobacco: Never Used   Substance Use Topics     Alcohol use: Yes     Comment: a glass of wine - 5 or 6 per week  "    If you drink alcohol do you typically have >3 drinks per day or >7 drinks per week? No    Alcohol Use 12/21/2021   Prescreen: >3 drinks/day or >7 drinks/week? No   Prescreen: >3 drinks/day or >7 drinks/week? -   No flowsheet data found.    Last PSA:   PSA   Date Value Ref Range Status   11/06/2020 2.74 0 - 4 ug/L Final     Comment:     Assay Method:  Chemiluminescence using Siemens Vista analyzer       Reviewed orders with patient. Reviewed health maintenance and updated orders accordingly - Yes      Reviewed and updated as needed this visit by clinical staff  Tobacco  Allergies  Meds             Reviewed and updated as needed this visit by Provider                   Review of Systems  Constitutional, HEENT, cardiovascular, pulmonary, gi and gu systems are negative, except as otherwise noted.     OBJECTIVE:   /77 (BP Location: Right arm, Patient Position: Sitting, Cuff Size: Adult Regular)   Pulse 55   Temp 98  F (36.7  C) (Oral)   Resp 16   Ht 1.72 m (5' 7.72\")   Wt 84.1 kg (185 lb 6.4 oz)   SpO2 98%   BMI 28.43 kg/m      Physical Exam  GENERAL: healthy, alert and no distress  NECK: no adenopathy, no asymmetry, masses, or scars and thyroid normal to palpation  RESP: lungs clear to auscultation - no rales, rhonchi or wheezes  CV: regular rate and rhythm, normal S1 S2, no S3 or S4, no murmur, click or rub, no peripheral edema and peripheral pulses strong  ABDOMEN: soft, nontender, no hepatosplenomegaly, no masses and bowel sounds normal  MS: no gross musculoskeletal defects noted, no edema.  Left foot there is prominence and tenderness at the plantar side of left fourth metatarsal head.  There is a callus formed over the prominence area.  NEURO: Normal strength and tone, mentation intact and speech normal    Diagnostic Test Results:  Labs pending today    ASSESSMENT/PLAN:   Wally was seen today for physical.    Diagnoses and all orders for this visit:    Routine general medical examination at a " health care facility    Screening for prostate cancer  -     PROSTATE SPEC ANTIGEN SCREEN; Future    Osteoarthritis of thumb, right    Advanced directives, counseling/discussion  Comments:  Noted new healthcare directive, full code.  Will scan document to file.    Hyperlipidemia LDL goal <100    VSD (ventricular septal defect)    Hypertension goal BP (blood pressure) < 140/90  Comments:  Continue with baby aspirin for secondary prevention  Orders:  -     Lipid panel reflex to direct LDL Fasting; Future  -     Albumin Random Urine Quantitative with Creat Ratio; Future  -     Comprehensive metabolic panel (BMP + Alb, Alk Phos, ALT, AST, Total. Bili, TP); Future  -     lisinopril (ZESTRIL) 20 MG tablet; Take 1 tablet (20 mg) by mouth 2 times daily Please put on file. Do not fill until patient calls.  -     hydrochlorothiazide (HYDRODIURIL) 25 MG tablet; Take 1 tablet (25 mg) by mouth daily  -     Lipid panel reflex to direct LDL Fasting  -     Albumin Random Urine Quantitative with Creat Ratio  -     Comprehensive metabolic panel (BMP + Alb, Alk Phos, ALT, AST, Total. Bili, TP)    NSAID long-term use  Comments:  Severe DJD, history of NSAID induced GI bleed.  Consider using Celebrex sparingly, with PPI.  A small quantity was prescribed for patient to try    Gastrointestinal hemorrhage associated with acute gastritis  Comments:  Induced by NSAIDs.  We will recheck CBC.  He is not on PPI.  Will benefit from taking a daily PPI  Orders:  -     CBC with platelets; Future  -     CBC with platelets    Primary osteoarthritis of left knee  Comments:  Use Celebrex sparingly with PPI.  May consider revisit with orthopedics regarding knee replacement surgery  Orders:  -     celecoxib (CELEBREX) 100 MG capsule; Take 1-2 capsules (100-200 mg) by mouth 2 times daily as needed for moderate pain  -     Orthopedic  Referral; Future    Left foot pain  Comments:  Offered to obtain foot x-ray.  His wife opted to see podiatry for  "more definitive evaluation.  Referral made.  Orders:  -     Orthopedic  Referral; Future    Early onset Alzheimer's disease without behavioral disturbance (H)    Other orders  -     REVIEW OF HEALTH MAINTENANCE PROTOCOL ORDERS  -     Full Code  -     PROSTATE SPEC ANTIGEN SCREEN        Patient has been advised of split billing requirements and indicates understanding: Yes  COUNSELING:   Reviewed preventive health counseling, as reflected in patient instructions    Estimated body mass index is 28.43 kg/m  as calculated from the following:    Height as of this encounter: 1.72 m (5' 7.72\").    Weight as of this encounter: 84.1 kg (185 lb 6.4 oz).         He reports that he quit smoking about 15 years ago. His smoking use included cigarettes and cigars. He smoked 0.00 packs per day for 10.00 years. He has never used smokeless tobacco.      Counseling Resources:  ATP IV Guidelines  Pooled Cohorts Equation Calculator  FRAX Risk Assessment  ICSI Preventive Guidelines  Dietary Guidelines for Americans, 2010  USDA's MyPlate  ASA Prophylaxis  Lung CA Screening    Livier Triplett MD PhD  [facility]  "

## 2021-12-22 LAB — BILIRUB DIRECT SERPL-MCNC: 0.4 MG/DL (ref 0–0.2)

## 2021-12-24 NOTE — RESULT ENCOUNTER NOTE
Dear Wally,   Your recent test results showed the following:  -- lipid panel acceptable  -- comprehensive metabolic panel normal except for glucose in the prediabetes range and bilirubin slightly elevated.   -- I will send you info about prediabetes, mainly life style modification. Prescription medication is not needed.  -- for the bilirubin, I'm not clear the etiology. It is trending up over the last 3 years (from 1.5 to 2.2). You have liver ultrasound done in July that is unremarkable besides a few small cyst. So I am thinking we'll monitor this. Recheck labs in 6 months. We can check sooner if your health changes or if you notices visible jaundice.   -- lab ordered. You can schedule lab only visit in 6 months.     Please call or Mychart to our office if you have further questions.     Livier Triplett MD-PhD

## 2021-12-29 ENCOUNTER — OFFICE VISIT (OUTPATIENT)
Dept: PODIATRY | Facility: CLINIC | Age: 61
End: 2021-12-29
Attending: INTERNAL MEDICINE
Payer: COMMERCIAL

## 2021-12-29 ENCOUNTER — ANCILLARY PROCEDURE (OUTPATIENT)
Dept: GENERAL RADIOLOGY | Facility: CLINIC | Age: 61
End: 2021-12-29
Attending: PODIATRIST
Payer: COMMERCIAL

## 2021-12-29 VITALS — SYSTOLIC BLOOD PRESSURE: 128 MMHG | DIASTOLIC BLOOD PRESSURE: 82 MMHG | HEART RATE: 57 BPM

## 2021-12-29 DIAGNOSIS — M77.8 CAPSULITIS OF FOOT, UNSPECIFIED LATERALITY: Primary | ICD-10-CM

## 2021-12-29 DIAGNOSIS — M77.8 CAPSULITIS OF FOOT, UNSPECIFIED LATERALITY: ICD-10-CM

## 2021-12-29 PROCEDURE — 99203 OFFICE O/P NEW LOW 30 MIN: CPT | Performed by: PODIATRIST

## 2021-12-29 PROCEDURE — 73630 X-RAY EXAM OF FOOT: CPT | Mod: RT | Performed by: RADIOLOGY

## 2021-12-29 PROCEDURE — 73630 X-RAY EXAM OF FOOT: CPT | Mod: LT | Performed by: RADIOLOGY

## 2021-12-29 NOTE — LETTER
12/29/2021         RE: Wally Cano  [address] [address] [place] [id number]        Dear Colleague,    Thank you for referring your patient, Wally Cano, to the [facility]. Please see a copy of my visit note below.    S:  Patient seen today in consult from Dr. Triplett and complains of right and left foot pain.  Points to plantar fourth metatarsal head left>R.  Describes as a burning pain.  aggravated by activity and relieved by rest.  Has had this for several months. Patient is retired. Wears socks or slippers around the house.  Denies erythema, edema, weakness, numbness.  Denies arthralgias anywhere else.      ROS: See above       Allergies   Allergen Reactions     Fludeoxyglucose        Current Outpatient Medications   Medication Sig Dispense Refill     Acetaminophen (TYLENOL PO) Take 500 mg by mouth once       amoxicillin (AMOXIL) 500 MG capsule Take 4 capsules (2,000 mg) by mouth once for 1 dose 30-60 minutes before dental procedure. 20 capsule 0     aspirin (ASA) 81 MG tablet Take 81 mg by mouth daily       atorvastatin (LIPITOR) 20 MG tablet TAKE 1 TABLET(20 MG) BY MOUTH DAILY 90 tablet 0     celecoxib (CELEBREX) 100 MG capsule Take 1-2 capsules (100-200 mg) by mouth 2 times daily as needed for moderate pain 60 capsule 0     donepezil (ARICEPT) 10 MG tablet Take 1 tablet (10 mg) by mouth daily 90 tablet 1     hydrochlorothiazide (HYDRODIURIL) 25 MG tablet Take 1 tablet (25 mg) by mouth daily 90 tablet 3     lisinopril (ZESTRIL) 20 MG tablet Take 1 tablet (20 mg) by mouth 2 times daily Please put on file. Do not fill until patient calls. 180 tablet 3     sodium fluoride dental gel (PREVIDENT) 1.1 % GEL topical gel Apply to affected area At Bedtime Apply to affected area At Bedtime 60 g 1       Patient Active Problem List   Diagnosis     Hyperlipidemia LDL goal <100     DDD (degenerative disc disease), lumbar     VSD (ventricular septal defect)     * * * SBE  PROPHYLAXIS * * *     External hemorrhoids     Varicose veins     Hypertension goal BP (blood pressure) < 140/90     Advanced directives, counseling/discussion     NSAID long-term use     S/P infectious endocarditis     Superficial thrombophlebitis     [facility]     Melanoma of scalp (H)     Early onset Alzheimer's disease without behavioral disturbance (H)     Primary osteoarthritis of left knee     Gastrointestinal hemorrhage associated with acute gastritis     Osteoarthritis of thumb, right       Past Medical History:   Diagnosis Date     * * * SBE PROPHYLAXIS * * *      DDD (degenerative disc disease), lumbar      Elevated PSA 7/2014     Hypertension      Intervertebral disc rupture 2002    chronic pain medication-off now.      Melanoma of scalp (H) 1/9/2020     PE (pulmonary embolism) 1995    due to the SBE      SBE (subacute bacterial endocarditis) 1995     Varicose veins      VSD (ventricular septal defect)     1 cm south of septum        Past Surgical History:   Procedure Laterality Date     ARTHROSCOPY KNEE RT/LT  1979    removal of bone chips LT knee      COLONOSCOPY N/A 5/21/2021    Procedure: COLONOSCOPY, WITH POLYPECTOMY AND BIOPSY;  Surgeon: Fab Mcguire MD;  Location:  GI     COLONOSCOPY N/A 8/2/2021    Procedure: COLONOSCOPY, WITH POLYPECTOMY;  Surgeon: Fab Mcguire MD;  [place] [facility] [place]     EXCISE LESION FACE Right 1/16/2020    Procedure: Right vertex scalp melanoma excision;  Surgeon: Colt Santiago MD;  Location:  OR     EYE SURGERY  2016    Bilateral Cataract removal     HEMORRHOIDECTOMY  1/10     HERNIA REPAIR, INGUINAL RT/LT      Left     IMPLANT SHUNT VENTRICULOATRIAL       MOUTH SURGERY      wisdom teeth     REPAIR MOHS Right 1/30/2020    Procedure: Stage 1 right vertex scalp reconstruction with wound preparation, local flaps, complex closure;  Surgeon: Colt Santiago MD;  Location:  OR      TONSILLECTOMY  1997       Family History   Problem Relation Age of Onset     Heart Disease Father 50        MI     Diabetes Father      Cerebrovascular Disease Maternal Grandmother 70     Cerebrovascular Disease Maternal Grandfather 80     Cerebrovascular Disease Paternal Grandmother 80     Heart Disease Paternal Grandfather 62        MI     Hypertension Brother      Obesity Sister      Circulatory Mother         varicose veins     Prostate Cancer Maternal Uncle         1/2 brother to mother     Cancer - colorectal No family hx of        Social History     Tobacco Use     Smoking status: Former Smoker     Packs/day: 0.00     Years: 10.00     Pack years: 0.00     Types: Cigarettes, Cigars     Quit date: 10/28/2006     Years since quitting: 15.1     Smokeless tobacco: Never Used   Substance Use Topics     Alcohol use: Yes     Comment: a glass of wine - 5 or 6 per week         O:   /82   Pulse 57 .      Constitutional/ general:  Pt is in no apparent distress, appears well-nourished.  Cooperative with history and physical exam. Patient seen with wife today    Psych:  The patient answered questions appropriately.  Normal affect.  Seems to have reasonable expectations, in terms of treatment.     Lungs:  Non labored breathing, non labored speech. No cough.  No audible wheezing. Even, quiet breathing.       Vascular:  Pedal pulses are palpable bilaterally for both the DP and PT arteries.  CFT < 3 sec.  No edema.  Pedal hair growth noted.     Neuro:  Alert and oriented x 3. Coordinated gait.  Light touch sensation is intact     Derm: Normal texture and turgor.  No erythema, ecchymosis, or cyanosis.  No open lesions.     Musculoskeletal:    Lower extremity muscle strength is normal.  Patient is ambulatory without an assistive device or brace.  Normal arch with weightbearing.  No forefoot or rear foot deformities noted.  MS 5/5 all compartments.  Normal ROM all fore foot and rearfoot joints.  No equinus.  Pain under  bilateral fourth metatarsal head plantar. Fat pad atrophic. Negative Lachmans test.  Negative Mulders click.  No pain anywhere else.  No erythema, edema, ecchymosis, or subcutaneous masses noted.      Radiographic Exam:   Moderate metatarsus adductus. Short fifth metatarsal.    A:  Subfourth capsulitis right and left foot     P:  X rays taken today of both feet. Discussed how atrophic fat pad and short fifth metatarsal cause pain here. Good stiff shoes at all times both inside and outside and I made suggestions. Avoid activities that will bother this and discussed will bother this. Ice twice daily. Give metatarsal pad to offload this.  RETURN TO CLINIC PRN.  Thank you for allowing me participate in the care of this patient.        Rajan Perry DPM, FACFAS         Again, thank you for allowing me to participate in the care of your patient.        Sincerely,        Rajan Perry DPM

## 2021-12-29 NOTE — PATIENT INSTRUCTIONS
We wish you continued good healing. If you have any questions or concerns, please do not hesitate to contact us at  [phone]    [employer]It (secure e-mail communication and access to your chart) to send a message or to make an appointment.    Please remember to call and schedule a follow up appointment if one was recommended at your earliest convenience.     PODIATRY CLINIC HOURS  TELEPHONE NUMBER    Dr. Rajan KAURPPATRICIA [facility]        Clinics:  Tremaine Gomes CMA   Tuesday 1PM-6PM  [place]  Wednesday 745AM-330PM  [place]  Thursday/Friday 745AM-230PM  [facility] APPOINTMENTS  [phone]    [place] APPOINTMENTS  [phone]          If you need a medication refill, please contact us you may need lab work and/or a follow up visit prior to your refill (i.e. Antifungal medications).    If MRI needed please call Imaging at [phone] or [phone]    HOW DO I GET MY KNEE SCOOTER? Knee scooters can be picked up at ANY Medical Supply stores with your knee scooter Prescription.  OR    Bring your signed prescription to an [facility] showroom.

## 2021-12-30 NOTE — PROGRESS NOTES
S:  Patient seen today in consult from Dr. Triplett and complains of right and left foot pain.  Points to plantar fourth metatarsal head left>R.  Describes as a burning pain.  aggravated by activity and relieved by rest.  Has had this for several months. Patient is retired. Wears socks or slippers around the house.  Denies erythema, edema, weakness, numbness.  Denies arthralgias anywhere else.      ROS: See above       Allergies   Allergen Reactions     Fludeoxyglucose        Current Outpatient Medications   Medication Sig Dispense Refill     Acetaminophen (TYLENOL PO) Take 500 mg by mouth once       amoxicillin (AMOXIL) 500 MG capsule Take 4 capsules (2,000 mg) by mouth once for 1 dose 30-60 minutes before dental procedure. 20 capsule 0     aspirin (ASA) 81 MG tablet Take 81 mg by mouth daily       atorvastatin (LIPITOR) 20 MG tablet TAKE 1 TABLET(20 MG) BY MOUTH DAILY 90 tablet 0     celecoxib (CELEBREX) 100 MG capsule Take 1-2 capsules (100-200 mg) by mouth 2 times daily as needed for moderate pain 60 capsule 0     donepezil (ARICEPT) 10 MG tablet Take 1 tablet (10 mg) by mouth daily 90 tablet 1     hydrochlorothiazide (HYDRODIURIL) 25 MG tablet Take 1 tablet (25 mg) by mouth daily 90 tablet 3     lisinopril (ZESTRIL) 20 MG tablet Take 1 tablet (20 mg) by mouth 2 times daily Please put on file. Do not fill until patient calls. 180 tablet 3     sodium fluoride dental gel (PREVIDENT) 1.1 % GEL topical gel Apply to affected area At Bedtime Apply to affected area At Bedtime 60 g 1       Patient Active Problem List   Diagnosis     Hyperlipidemia LDL goal <100     DDD (degenerative disc disease), lumbar     VSD (ventricular septal defect)     * * * SBE PROPHYLAXIS * * *     External hemorrhoids     Varicose veins     Hypertension goal BP (blood pressure) < 140/90     Advanced directives, counseling/discussion     NSAID long-term use     S/P infectious endocarditis     Superficial thrombophlebitis     [facility] by  [facility]     Melanoma of scalp (H)     Early onset Alzheimer's disease without behavioral disturbance (H)     Primary osteoarthritis of left knee     Gastrointestinal hemorrhage associated with acute gastritis     Osteoarthritis of thumb, right       Past Medical History:   Diagnosis Date     * * * SBE PROPHYLAXIS * * *      DDD (degenerative disc disease), lumbar      Elevated PSA 7/2014     Hypertension      Intervertebral disc rupture 2002    chronic pain medication-off now.      Melanoma of scalp (H) 1/9/2020     PE (pulmonary embolism) 1995    due to the SBE      SBE (subacute bacterial endocarditis) 1995     Varicose veins      VSD (ventricular septal defect)     1 cm south of septum        Past Surgical History:   Procedure Laterality Date     ARTHROSCOPY KNEE RT/LT  1979    removal of bone chips LT knee      COLONOSCOPY N/A 5/21/2021    Procedure: COLONOSCOPY, WITH POLYPECTOMY AND BIOPSY;  Surgeon: Fab Mcguire MD;  Location:  GI     COLONOSCOPY N/A 8/2/2021    Procedure: COLONOSCOPY, WITH POLYPECTOMY;  Surgeon: Fab Mcguire MD;  Location: [facility] OR     EXCISE LESION FACE Right 1/16/2020    Procedure: Right vertex scalp melanoma excision;  Surgeon: Colt Santiago MD;  Location:  OR     EYE SURGERY  2016    Bilateral Cataract removal     HEMORRHOIDECTOMY  1/10     HERNIA REPAIR, INGUINAL RT/LT      Left     IMPLANT SHUNT VENTRICULOATRIAL       MOUTH SURGERY      wisdom teeth     REPAIR MOHS Right 1/30/2020    Procedure: Stage 1 right vertex scalp reconstruction with wound preparation, local flaps, complex closure;  Surgeon: Colt Santiago MD;  Location:  OR     TONSILLECTOMY  1997       Family History   Problem Relation Age of Onset     Heart Disease Father 50        MI     Diabetes Father      Cerebrovascular Disease Maternal Grandmother 70     Cerebrovascular Disease Maternal Grandfather 80     Cerebrovascular Disease Paternal  Grandmother 80     Heart Disease Paternal Grandfather 62        MI     Hypertension Brother      Obesity Sister      Circulatory Mother         varicose veins     Prostate Cancer Maternal Uncle         1/2 brother to mother     Cancer - colorectal No family hx of        Social History     Tobacco Use     Smoking status: Former Smoker     Packs/day: 0.00     Years: 10.00     Pack years: 0.00     Types: Cigarettes, Cigars     Quit date: 10/28/2006     Years since quitting: 15.1     Smokeless tobacco: Never Used   Substance Use Topics     Alcohol use: Yes     Comment: a glass of wine - 5 or 6 per week         O:   /82   Pulse 57 .      Constitutional/ general:  Pt is in no apparent distress, appears well-nourished.  Cooperative with history and physical exam. Patient seen with wife today    Psych:  The patient answered questions appropriately.  Normal affect.  Seems to have reasonable expectations, in terms of treatment.     Lungs:  Non labored breathing, non labored speech. No cough.  No audible wheezing. Even, quiet breathing.       Vascular:  Pedal pulses are palpable bilaterally for both the DP and PT arteries.  CFT < 3 sec.  No edema.  Pedal hair growth noted.     Neuro:  Alert and oriented x 3. Coordinated gait.  Light touch sensation is intact     Derm: Normal texture and turgor.  No erythema, ecchymosis, or cyanosis.  No open lesions.     Musculoskeletal:    Lower extremity muscle strength is normal.  Patient is ambulatory without an assistive device or brace.  Normal arch with weightbearing.  No forefoot or rear foot deformities noted.  MS 5/5 all compartments.  Normal ROM all fore foot and rearfoot joints.  No equinus.  Pain under bilateral fourth metatarsal head plantar. Fat pad atrophic. Negative Lachmans test.  Negative Mulders click.  No pain anywhere else.  No erythema, edema, ecchymosis, or subcutaneous masses noted.      Radiographic Exam:   Moderate metatarsus adductus. Short fifth  metatarsal.    A:  Subfourth capsulitis right and left foot     P:  X rays taken today of both feet. Discussed how atrophic fat pad and short fifth metatarsal cause pain here. Good stiff shoes at all times both inside and outside and I made suggestions. Avoid activities that will bother this and discussed will bother this. Ice twice daily. Give metatarsal pad to offload this.  RETURN TO CLINIC PRN.  Thank you for allowing me participate in the care of this patient.        Rajan Perry, JOVI, FACFAS

## 2022-01-05 ENCOUNTER — OFFICE VISIT (OUTPATIENT)
Dept: ORTHOPEDICS | Facility: CLINIC | Age: 62
End: 2022-01-05
Attending: INTERNAL MEDICINE
Payer: COMMERCIAL

## 2022-01-05 DIAGNOSIS — M17.12 PRIMARY OSTEOARTHRITIS OF LEFT KNEE: ICD-10-CM

## 2022-01-05 DIAGNOSIS — M79.644 CHRONIC PAIN OF RIGHT THUMB: Primary | ICD-10-CM

## 2022-01-05 DIAGNOSIS — G89.29 CHRONIC PAIN OF RIGHT THUMB: Primary | ICD-10-CM

## 2022-01-05 PROCEDURE — 99203 OFFICE O/P NEW LOW 30 MIN: CPT | Performed by: FAMILY MEDICINE

## 2022-01-05 NOTE — PROGRESS NOTES
[facility]  SPORTS MEDICINE CLINIC VISIT     Jan 5, 2022        ASSESSMENT & PLAN    61-year-old with right thumb pain at the MCP joint that is likely secondary to degenerative disease    Reviewed imaging and assessment with patient in detail  Discussed treatment with OTC medications such as Voltaren gel.  He may consider OTC thumb brace for use during baseball activities.  If he is unable to find a comfortable in a properly fitting and supportive brace we may consider referral to hand therapy for custom bracing.  He will contact us if he like to pursue this option.  Additionally if his pain becomes more persistent or debilitating may consider steroid injection    Yousif Bernabe MD  [facility] SPORTS MEDICINE [facility]    -----  Chief Complaint   Patient presents with     Consult     Right MCP joint pain        SUBJECTIVE  Wally Dima Cano is a/an 61 year old male who is seen as a self referral for evaluation of  Right MCP joint pain.     The patient is seen with their wife.  The patient is Left handed    Onset: 1 years(s) ago. Reports insidious onset without acute precipitating event.  Location of Pain: right MCP joint of the thumb  Worsened by: use movement, particularly swinging a baseball bat  Better with: rest,   Treatments tried: rest/activity avoidance and Voltaren gel  Associated symptoms: no distal numbness or tingling; denies swelling or warmth    Orthopedic/Surgical history: NO  Social History/Occupation: retired       REVIEW OF SYSTEMS:    Do you have fever, chills, weight loss? No    Do you have any vision problems? No    Do you have any chest pain or edema? No    Do you have any shortness of breath or wheezing?  No    Do you have stomach problems? No    Do you have any numbness or focal weakness? No    Do you have diabetes? No    Do you have problems with bleeding or clotting? No    Do you have an rashes or other skin lesions? No    OBJECTIVE:      General  - alert,  pleasant, no distress  CV  - normal radial pulse, cap refill brisk  Musculoskeletal -right thumb  - inspection: no atrophy, normal joint alignment, no swelling  - palpation: Mild CMC tenderness.  Most tender over the ulnar aspect of the MCP.  - ROM: Symmetric  - strength: 5/5  strength, 5/5 wrist abduction, 5/5 flexion, extension, pronation, supination, adduction  - special tests:  Pain and laxity with ulnar stress at the MCP.  Neuro  - no numbness, no motor deficit, grossly normal coordination, normal muscle tone  Skin  - no ecchymosis, erythema, warmth, or induration, no obvious rash            RADIOLOGY:    Three-view x-rays of the right thumb are performed and reviewed independently demonstrating DJD at the MCP and CMC joints.  No acute findings.  See EMR for full radiology report.

## 2022-01-05 NOTE — LETTER
1/5/2022         RE: Wally Cano  [address] [address] [place] [id number]        Dear Colleague,    Thank you for referring your patient, Wally Cano, to the [facility] SPORTS MEDICINE [facility]. Please see a copy of my visit note below.      [facility]  SPORTS MEDICINE CLINIC VISIT     Jan 5, 2022        ASSESSMENT & PLAN    61-year-old with right thumb pain at the MCP joint that is likely secondary to degenerative disease    Reviewed imaging and assessment with patient in detail  Discussed treatment with OTC medications such as Voltaren gel.  He may consider OTC thumb brace for use during baseball activities.  If he is unable to find a comfortable in a properly fitting and supportive brace we may consider referral to hand therapy for custom bracing.  He will contact us if he like to pursue this option.  Additionally if his pain becomes more persistent or debilitating may consider steroid injection    Yousif Bernabe MD  [facility] SPORTS [facility]    -----  Chief Complaint   Patient presents with     Consult     Right MCP joint pain        SUBJECTIVE  Wally Cano is a/an 61 year old male who is seen as a self referral for evaluation of  Right MCP joint pain.     The patient is seen with their wife.  The patient is Left handed    Onset: 1 years(s) ago. Reports insidious onset without acute precipitating event.  Location of Pain: right MCP joint of the thumb  Worsened by: use movement, particularly swinging a baseball bat  Better with: rest,   Treatments tried: rest/activity avoidance and Voltaren gel  Associated symptoms: no distal numbness or tingling; denies swelling or warmth    Orthopedic/Surgical history: NO  Social History/Occupation: retired       REVIEW OF SYSTEMS:    Do you have fever, chills, weight loss? No    Do you have any vision problems? No    Do you have any chest pain or edema? No    Do you have any shortness  of breath or wheezing?  No    Do you have stomach problems? No    Do you have any numbness or focal weakness? No    Do you have diabetes? No    Do you have problems with bleeding or clotting? No    Do you have an rashes or other skin lesions? No    OBJECTIVE:      General  - alert, pleasant, no distress  CV  - normal radial pulse, cap refill brisk  Musculoskeletal -right thumb  - inspection: no atrophy, normal joint alignment, no swelling  - palpation: Mild CMC tenderness.  Most tender over the ulnar aspect of the MCP.  - ROM: Symmetric  - strength: 5/5  strength, 5/5 wrist abduction, 5/5 flexion, extension, pronation, supination, adduction  - special tests:  Pain and laxity with ulnar stress at the MCP.  Neuro  - no numbness, no motor deficit, grossly normal coordination, normal muscle tone  Skin  - no ecchymosis, erythema, warmth, or induration, no obvious rash            RADIOLOGY:    Three-view x-rays of the right thumb are performed and reviewed independently demonstrating DJD at the MCP and CMC joints.  No acute findings.  See EMR for full radiology report.            Again, thank you for allowing me to participate in the care of your patient.        Sincerely,        Yousif Bernabe MD

## 2022-01-07 ENCOUNTER — DOCUMENTATION ONLY (OUTPATIENT)
Dept: OTHER | Facility: CLINIC | Age: 62
End: 2022-01-07
Payer: COMMERCIAL

## 2022-01-12 ENCOUNTER — OFFICE VISIT (OUTPATIENT)
Dept: AUDIOLOGY | Facility: CLINIC | Age: 62
End: 2022-01-12
Payer: COMMERCIAL

## 2022-01-12 DIAGNOSIS — H90.3 SNHL (SENSORY-NEURAL HEARING LOSS), ASYMMETRICAL: Primary | ICD-10-CM

## 2022-01-12 PROCEDURE — 99207 PR NO CHARGE LOS: CPT | Performed by: AUDIOLOGIST

## 2022-01-12 PROCEDURE — V5020 CONFORMITY EVALUATION: HCPCS | Performed by: AUDIOLOGIST

## 2022-01-12 PROCEDURE — V5241 DISPENSING FEE, MONAURAL: HCPCS | Mod: LT | Performed by: AUDIOLOGIST

## 2022-01-12 PROCEDURE — 92592 PR HEARING AID CHECK, MONAURAL: CPT | Performed by: AUDIOLOGIST

## 2022-01-12 PROCEDURE — V5011 HEARING AID FITTING/CHECKING: HCPCS | Performed by: AUDIOLOGIST

## 2022-01-12 PROCEDURE — V5257 HEARING AID, DIGIT, MON, BTE: HCPCS | Mod: NU | Performed by: AUDIOLOGIST

## 2022-01-12 NOTE — PROGRESS NOTES
AUDIOLOGY REPORT    SUBJECTIVE: Wally Cano, a 61 year old male, was seen in the Audiology Clinic at [facility] today for a Left hearing aid fitting. Previous results have revealed an asymmetrical sensorineural hearing loss with worse hearing in the left ear. The patient was given medical clearance to pursue amplification by  Brian Lang MD.      OBJECTIVE:  Prior to fitting, a hearing aid check was performed to ensure device functionality. The hearing aid conformity evaluation was completed.The hearing aids were placed and they provided a good fit. Real-ear-probe-microphone measurements were completed on the Vitrinepix system and were a good match to NAL-NL2 target with soft sounds audible, moderate sounds comfortable, and loud sounds below discomfort. UCLs are verified through maximum power output measures and demonstrate appropriate limiting of loud inputs. Mr. Cano was oriented to proper hearing aid use, care, cleaning (no water, dry brush), batteries (size 312, insertion/removal, toxicity, low-battery signal), aid insertion/removal, user booklet, warranty information, storage cases, and other hearing aid details. The patient confirmed understanding of hearing aid use and care, and showed proper insertion of hearing aid and batteries while in the office today. Mr. Cano reported good volume and sound quality today.    EAR(S) FIT: Left  MA HEARING AID MAKE: Right:  ; Left: Phonak  MA HEARING AID MODEL #: Right:  ; Left: [id number]  HEARING AID STYLE: Right:  ; Left: BTE     LENGTH: Right:   ; Left:  1    SERIAL NUMBERS: Right:  ; Left: [id number]  WARRANTY END DATE: Left:: 3/20/2025       CHARGES:   Hearing Aid Check: Monaural, 35557, $49.00  Dispensing Fee: Monaural, , $400.00, LT (Left)  Fit/Orientation: Monaural, , $208.00  Hearing Aid Conformity Evaluation: 1, , $87.00LT  Hearing Aid Digital: Monaural, BTE, .,LT, NU  $2056  Total: $2800        ASSESSMENT::Left hearing aid fitting completed today. Verification measures were performed. The 45 day trial period was explained to patient, and they expressed understanding. Mr. Cano signed the Hearing Aid Purchase Agreement and was given a copy, as well as details on his hearing aids. Patient was counseled that exact out of pocket amounts cannot be determined for hearing aid claims being sent to insurance. Any insurance coverage information presented to the patient is an estimate only, and is not a guarantee of payment. Patient has been advised to check with their own insurance.    PLAN: Mr. Cano will return for follow-up in 2-3 weeks for a hearing aid review appointment. Please call this clinic with questions regarding today s appointment.    Melissa Montenegro.  Doctor of Audiology  [place] License # [id number]

## 2022-01-12 NOTE — PATIENT INSTRUCTIONS

## 2022-02-01 ENCOUNTER — OFFICE VISIT (OUTPATIENT)
Dept: AUDIOLOGY | Facility: CLINIC | Age: 62
End: 2022-02-01
Payer: COMMERCIAL

## 2022-02-01 DIAGNOSIS — H90.3 SNHL (SENSORY-NEURAL HEARING LOSS), ASYMMETRICAL: Primary | ICD-10-CM

## 2022-02-01 PROCEDURE — 99207 PR NO CHARGE LOS: CPT | Performed by: AUDIOLOGIST

## 2022-02-01 PROCEDURE — V5010 ASSESSMENT FOR HEARING AID: HCPCS | Performed by: AUDIOLOGIST

## 2022-02-01 NOTE — PROGRESS NOTES
AUDIOLOGY REPORT    SUBJECTIVE:Wally Cano is a 61 year old male who was seen in the Audiology Clinic at the [facility] on 2/1/2022  for a follow-up check regarding the fitting of new hearing aids. Previous results have revealed an asymmetrical sensorineural hearing loss with worse hearing in the left ear.  The patient has been seen previously in this clinic and was fit with a Phonak Audeo  hearing aid in his left ear on 1/12/2022.  Wally reports good sound quality with the hearing aid(s).    OBJECTIVE:   The International Outcome Inventory-Hearing Aids (IOI-HA) was administered today.The patient s responses to the 7 questions can be compared to normative data relative to how others are performing with their hearing aids, as well as focusing audiologic care and counseling.This patient s Quality of Life score (Question 7) was 4, which is above normative average.     Reviewed 45 day trial period, care, cleaning (no water, dry brush), batteries (size 312) insertion/removal, toxicity, low-battery signal), aid insertion/removal, volume adjustment (if applicable), user booklet, warranty information, storage cases, and other hearing aid details.     No charge visit today (in warranty hearing aid check).     ASSESSMENT: A follow-up appointment for hearing aid fitting was completed today. IOI-HA administered today. Changes to hearing aid was completed as outlined above.     PLAN:Wally will return for follow-up as needed, or at least every 4-6 months for cleaning and assessment of hearing aid.  . Please call this clinic with any questions regarding today s appointment.    Melissa Montenegro.  Doctor of Audiology  [place] License # [id number]

## 2022-04-26 NOTE — PROGRESS NOTES
[facility]    Outpatient Speech Language Pathology Discharge Note  Patient: Wally Cano  : 1960    Beginning/End Dates of Reporting Period:  10/25/2021 to 2021    Referring Provider: Livier Triplett MD    Therapy Diagnosis: Cognitive linguistic deficits.    Client Self Report: Edy report completing home exercises 1-2x/week.     Goals:  Goal Identifier Exercises   Goal Description Wally will report completion of expressive language home exercises at least 5/7 days a week with at least 90% accuracy for preservation of current communication function.   Target Date 21   Date Met      Progress (detail required for progress note): Wally and Aaliyah report they completed home exercises 1-2x/day. They both reported some frustration with tasks where Wally had difficulty listing different items, rather than the same in different examples (a screen for bugs on a window/a screen for bugs on a door). When these errors occurred, Wally would become frustrated and had difficulty coming up with all words. Given this, additional, more convergent naming tasks were trialed. Wally was able to accurately complete 4 analogies and 5/6 answers for specific questions. He stated they were medium difficulty, but did not cause as much frustration as previous tasks.     Goal Identifier Functional Report   Goal Description Wally and his wife, Aaliyah, will report and improvement in functional communication of at least 25% using provided compensatory strategies and with completion of home exercises for improved functional communication.   Target Date 21   Date Met      Progress (detail required for progress note): No notable changes in function reported by Wally or Aaliyah.     Plan:  Discharge from therapy.    Discharge:    Reason for Discharge: Patient has failed to schedule further appointments.  Patient was seen for one follow up treatment and did not return for additional sessions.    Discharge Plan: No planning completed.    Gabriela Singletary MA, CCC-SLP  [facility]  [phone] (Phone)  [phone] (Fax)

## 2022-05-03 ENCOUNTER — TELEPHONE (OUTPATIENT)
Dept: NEUROLOGY | Facility: CLINIC | Age: 62
End: 2022-05-03
Payer: COMMERCIAL

## 2022-05-03 ENCOUNTER — MYC MEDICAL ADVICE (OUTPATIENT)
Dept: AUDIOLOGY | Facility: CLINIC | Age: 62
End: 2022-05-03
Payer: COMMERCIAL

## 2022-05-03 DIAGNOSIS — H90.3 SNHL (SENSORY-NEURAL HEARING LOSS), ASYMMETRICAL: Primary | ICD-10-CM

## 2022-05-03 PROCEDURE — 99207 PR NO CHARGE LOS: CPT | Performed by: AUDIOLOGIST

## 2022-05-03 PROCEDURE — V5266 BATTERY FOR HEARING DEVICE: HCPCS | Performed by: AUDIOLOGIST

## 2022-05-03 NOTE — TELEPHONE ENCOUNTER
At the patient's request, a 90-day supply of hearing aid batteries was left at check in [place]  Doctor of Audiology  [place] License # [id number]

## 2022-05-03 NOTE — TELEPHONE ENCOUNTER
What is the concern that needs to be addressed by a nurse? Patient's wife is calling to request a letter stating that patient's diagnosis is preventing him from working.  Son's college is requesting this in order to justify change of circumstance. Please mail signed letter to patient's home address.     May a detailed message be left on voicemail? Yes    Date of last office visit: 11/17/21    Message routed to: [facility] RN Pool

## 2022-05-03 NOTE — LETTER
5/3/2022       RE: Wally Cano  [address] [address] [place] [id number]          To Whom It May Concern:      Wally Cano is a patient under my care.     has a recently diagnosed progressive medical condition that has reached the stage that he is  unable to perform gainful employment or competitive work.      Sincerely,            Colt Lester M.D.

## 2022-05-04 ENCOUNTER — DOCUMENTATION ONLY (OUTPATIENT)
Dept: OTHER | Facility: CLINIC | Age: 62
End: 2022-05-04
Payer: COMMERCIAL

## 2022-05-13 ENCOUNTER — OFFICE VISIT (OUTPATIENT)
Dept: NEUROLOGY | Facility: CLINIC | Age: 62
End: 2022-05-13
Payer: COMMERCIAL

## 2022-05-13 VITALS
HEART RATE: 52 BPM | DIASTOLIC BLOOD PRESSURE: 82 MMHG | TEMPERATURE: 97.2 F | WEIGHT: 187.2 LBS | SYSTOLIC BLOOD PRESSURE: 126 MMHG | HEIGHT: 69 IN | BODY MASS INDEX: 27.73 KG/M2

## 2022-05-13 DIAGNOSIS — G30.0 EARLY ONSET ALZHEIMER'S DEMENTIA WITHOUT BEHAVIORAL DISTURBANCE (H): Primary | ICD-10-CM

## 2022-05-13 DIAGNOSIS — F02.80 EARLY ONSET ALZHEIMER'S DEMENTIA WITHOUT BEHAVIORAL DISTURBANCE (H): Primary | ICD-10-CM

## 2022-05-13 NOTE — LETTER
"5/13/2022     RE: Wally Cano  [address] [address] [place] [id number]     Dear Colleague,    Thank you for referring your patient, Wally Cano, to the [facility] at [facility]. Please see a copy of my visit note below.    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 Relevant history of resolved endocarditis, VSD, HTN, HLD, chronic ulcer in the setting of NSAID use. He has a paternal history of dementia. MMSE during our initial visit 10/2021 was 24/30. B12, TSH, CMP and CBC all unremarkable. Neuropsychological testing Dr. Snaches showed impairment in encoding and retrieval, but relative preservation of recognition memory. He scored in the mild cognitive impairment stage. MRI from July 2018 showed normal cerebral and cerebellar volumes. Medial temporal lobe sizes are within normal limits. Mild non-specific T2 signal in the subcortical white matter. 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 CSF ADMark panel from [employer] labs showed high p-tau and low ATI, confirming a diagnosis of early onset Alzheimer's disease. 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 He is in the MCI stage. He was started on Aricept 10 mg daily and is tolerating it well other than rhinorrhea.    Today, he presents with his wife.    Cognitive: Feels that things are stable. Reads \"voraciously\". Can forget that a conversation from 3 days ago happened (such as a chore they need to do). Continued word-finding difficulty. They adopted a 15 year old cat. They discussed feeding the cat last night and how this would " "happen, and then he forgot that this conversation happened the next morning. He still volunteers at [employer] and goes to [employer] every morning. He goes to [employer] to acheive exercise. His wife is performing bill pay.  Mood/behavior: He feels \"OK with this\". Appears fidgety and anxious. Seems that he needs to chec on things. He repetitively twirls his ring. He does not seem unhappy or depressed.  Sleep: Normally he can get to bed at 9 PM. He can have anxiety prior to bedtime and feels there is a need to get to bed at the right time. No EDUARDO symptoms or dream enactment.  Motor: No falls. But he appears a bit more \"wobbly\". They have cleared away charging cables and expanded the space between furniture. He can accidentally trip over cords.  Background medical problems: No new medical diagnoses.      Exam:  MMSE: 26/30 (-2 WORLD backwards, spelled it DLOUW; -2 delayed recall, +2 with multiple choice). Good insight into condition. Fluent speech with no dysarthria. Normal expression. Stays on topic and has logical speech. No bradykinesia. Normal gait including tandem walking. Rhomberg negative.     A/P:  Alzheimer's disease, MCI versus early dementia stage    - Driving skills evaluation with OT  - We will consider Lexapro 10 mg daily. They are also considering CBD, which I explained can be sedating and cause confusion but can help with anxiety.  - 9 month follow-up    Today, I spent 39 minutes discussing CBD, his interest in clinical trials, anxiety, caregiver needs, social work, disease progression, performing direct patient care, completing documentation and billing.    Again, thank you for allowing me to participate in the care of your patient.      Sincerely,    Colt Lester MD    "

## 2022-05-13 NOTE — PROGRESS NOTES
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 Relevant history of resolved endocarditis, VSD, HTN, HLD, chronic ulcer in the setting of NSAID use. He has a paternal history of dementia. MMSE during our initial visit 10/2021 was 24/30. B12, TSH, CMP and CBC all unremarkable. Neuropsychological testing Dr. Sanches showed impairment in encoding and retrieval, but relative preservation of recognition memory. He scored in the mild cognitive impairment stage. MRI from July 2018 showed normal cerebral and cerebellar volumes. Medial temporal lobe sizes are within normal limits. Mild non-specific T2 signal in the subcortical white matter. 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 CSF ADMark panel from [facility] showed high p-tau and low ATI, confirming a diagnosis of early onset Alzheimer's disease. 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 He is in the MCI stage. He was started on Aricept 10 mg daily and is tolerating it well other than rhinorrhea.    Today, he presents with his wife.    Cognitive: Feels that things are stable. Reads \"voraciously\". Can forget that a conversation from 3 days ago happened (such as a chore they need to do). Continued word-finding difficulty. They adopted a 15 year old cat. They discussed feeding the cat last night and how this would happen, and then he forgot that this conversation happened the next morning. He still volunteers at [employer] and goes to [employer] every morning. He goes to [employer] to acheive exercise. His wife is performing bill pay.  Mood/behavior: He feels \"OK with this\". Appears fidgety and anxious. Seems that " "he needs to chec on things. He repetitively twirls his ring. He does not seem unhappy or depressed.  Sleep: Normally he can get to bed at 9 PM. He can have anxiety prior to bedtime and feels there is a need to get to bed at the right time. No EDUARDO symptoms or dream enactment.  Motor: No falls. But he appears a bit more \"wobbly\". They have cleared away charging cables and expanded the space between furniture. He can accidentally trip over cords.  Background medical problems: No new medical diagnoses.      Exam:  MMSE: 26/30 (-2 WORLD backwards, spelled it DLOUW; -2 delayed recall, +2 with multiple choice). Good insight into condition. Fluent speech with no dysarthria. Normal expression. Stays on topic and has logical speech. No bradykinesia. Normal gait including tandem walking. Rhomberg negative.     A/P:  Alzheimer's disease, MCI versus early dementia stage    - Driving skills evaluation with OT  - We will consider Lexapro 10 mg daily. They are also considering CBD, which I explained can be sedating and cause confusion but can help with anxiety.  - 9 month follow-up    Today, I spent 39 minutes discussing CBD, his interest in clinical trials, anxiety, caregiver needs, social work, disease progression, performing direct patient care, completing documentation and billing.          "

## 2022-05-14 ENCOUNTER — MYC REFILL (OUTPATIENT)
Dept: FAMILY MEDICINE | Facility: CLINIC | Age: 62
End: 2022-05-14
Payer: COMMERCIAL

## 2022-05-14 DIAGNOSIS — Q21.0 VSD (VENTRICULAR SEPTAL DEFECT): ICD-10-CM

## 2022-05-14 DIAGNOSIS — Z29.8 * * * SBE PROPHYLAXIS * * *: ICD-10-CM

## 2022-05-17 RX ORDER — AMOXICILLIN 500 MG/1
2000 CAPSULE ORAL ONCE
Qty: 20 CAPSULE | Refills: 0 | Status: SHIPPED | OUTPATIENT
Start: 2022-05-17 | End: 2022-05-17

## 2022-05-17 NOTE — TELEPHONE ENCOUNTER
"Routing refill request to provider for review/approval because:  Requested Prescriptions   Pending Prescriptions Disp Refills    amoxicillin (AMOXIL) 500 MG capsule 20 capsule 0     Sig: Take 4 capsules (2,000 mg) by mouth once for 1 dose 30-60 minutes before dental procedure.        Oral Acne/Rosacea Medications Protocol Failed - 5/14/2022 11:52 AM        Failed - Confirmation of diagnosis is required     Please confirm diagnosis is acne or rosacea.     If NOT acne or rosacea; refer request to provider for further evaluation.    If diagnosis IS acne or rosacea, OK to refill BASED ON PREVIOUS REFILL CLINICAL NOTE RECOMMENDATION.            Passed - Patient is 12 years of age or older        Passed - Recent (12 mo) or future (30 days) visit within the authorizing provider's specialty     Patient has had an office visit with the authorizing provider or a provider within the authorizing providers department within the previous 12 mos or has a future within next 30 days. See \"Patient Info\" tab in inbasket, or \"Choose Columns\" in Meds & Orders section of the refill encounter.              Passed - Medication is active on med list              "

## 2022-06-02 DIAGNOSIS — G30.0 EARLY ONSET ALZHEIMER'S DEMENTIA WITHOUT BEHAVIORAL DISTURBANCE (H): ICD-10-CM

## 2022-06-02 DIAGNOSIS — F02.80 EARLY ONSET ALZHEIMER'S DEMENTIA WITHOUT BEHAVIORAL DISTURBANCE (H): ICD-10-CM

## 2022-06-10 ENCOUNTER — APPOINTMENT (OUTPATIENT)
Dept: URBAN - METROPOLITAN AREA CLINIC 252 | Age: 62
Setting detail: DERMATOLOGY
End: 2022-06-10

## 2022-06-10 VITALS — HEIGHT: 69 IN | WEIGHT: 176 LBS

## 2022-06-10 DIAGNOSIS — Z71.89 OTHER SPECIFIED COUNSELING: ICD-10-CM

## 2022-06-10 DIAGNOSIS — L82.1 OTHER SEBORRHEIC KERATOSIS: ICD-10-CM

## 2022-06-10 DIAGNOSIS — D22 MELANOCYTIC NEVI: ICD-10-CM

## 2022-06-10 DIAGNOSIS — D18.0 HEMANGIOMA: ICD-10-CM

## 2022-06-10 DIAGNOSIS — Z85.820 PERSONAL HISTORY OF MALIGNANT MELANOMA OF SKIN: ICD-10-CM

## 2022-06-10 DIAGNOSIS — L81.4 OTHER MELANIN HYPERPIGMENTATION: ICD-10-CM

## 2022-06-10 PROBLEM — D18.01 HEMANGIOMA OF SKIN AND SUBCUTANEOUS TISSUE: Status: ACTIVE | Noted: 2022-06-10

## 2022-06-10 PROBLEM — D22.5 MELANOCYTIC NEVI OF TRUNK: Status: ACTIVE | Noted: 2022-06-10

## 2022-06-10 PROCEDURE — OTHER COUNSELING: OTHER

## 2022-06-10 PROCEDURE — OTHER ADDITIONAL NOTES: OTHER

## 2022-06-10 PROCEDURE — 99213 OFFICE O/P EST LOW 20 MIN: CPT

## 2022-06-10 RX ORDER — DONEPEZIL HYDROCHLORIDE 10 MG/1
10 TABLET, FILM COATED ORAL DAILY
Qty: 90 TABLET | Refills: 3 | Status: SHIPPED | OUTPATIENT
Start: 2022-06-10 | End: 2023-05-17

## 2022-06-10 ASSESSMENT — LOCATION SIMPLE DESCRIPTION DERM
LOCATION SIMPLE: ABDOMEN
LOCATION SIMPLE: RIGHT OCCIPITAL SCALP
LOCATION SIMPLE: RIGHT LOWER BACK
LOCATION SIMPLE: POSTERIOR SCALP
LOCATION SIMPLE: RIGHT UPPER BACK
LOCATION SIMPLE: CHEST

## 2022-06-10 ASSESSMENT — LOCATION DETAILED DESCRIPTION DERM
LOCATION DETAILED: RIGHT MEDIAL UPPER BACK
LOCATION DETAILED: RIGHT SUPERIOR MEDIAL MIDBACK
LOCATION DETAILED: RIGHT SUPERIOR OCCIPITAL SCALP
LOCATION DETAILED: RIGHT MEDIAL SUPERIOR CHEST
LOCATION DETAILED: EPIGASTRIC SKIN
LOCATION DETAILED: RIGHT POSTERIOR PARIETAL SCALP
LOCATION DETAILED: RIGHT LATERAL ABDOMEN
LOCATION DETAILED: LEFT MEDIAL SUPERIOR CHEST

## 2022-06-10 ASSESSMENT — LOCATION ZONE DERM
LOCATION ZONE: TRUNK
LOCATION ZONE: SCALP

## 2022-06-10 NOTE — PROCEDURE: COUNSELING
Detail Level: Detailed
Detail Level: Simple
Detail Level: Zone
When Should The Patient Follow-Up For Their Next Full-Body Skin Exam?: 6 Months
Quality 137: Melanoma: Continuity Of Care - Recall System: Patient information entered into a recall system that includes: target date for the next exam specified AND a process to follow up with patients regarding missed or unscheduled appointments
Detail Level: Generalized

## 2022-06-10 NOTE — TELEPHONE ENCOUNTER
donepezil (ARICEPT) 10 MG tablet  Last Written Prescription Date:  12/28/21  Last Fill Quantity: 90,   # refills: 1  Last Office Visit : 5/13/22  Future Office visit:  9 months> 2/17/23 Trevon.

## 2022-06-27 ENCOUNTER — LAB (OUTPATIENT)
Dept: LAB | Facility: CLINIC | Age: 62
End: 2022-06-27
Payer: COMMERCIAL

## 2022-06-27 DIAGNOSIS — E80.6 HYPERBILIRUBINEMIA: ICD-10-CM

## 2022-06-27 LAB
ALBUMIN SERPL-MCNC: 3.9 G/DL (ref 3.4–5)
ALP SERPL-CCNC: 71 U/L (ref 40–150)
ALT SERPL W P-5'-P-CCNC: 36 U/L (ref 0–70)
AST SERPL W P-5'-P-CCNC: 40 U/L (ref 0–45)
BILIRUB DIRECT SERPL-MCNC: 0.3 MG/DL (ref 0–0.2)
BILIRUB SERPL-MCNC: 1.9 MG/DL (ref 0.2–1.3)
PROT SERPL-MCNC: 7.1 G/DL (ref 6.8–8.8)

## 2022-06-27 PROCEDURE — 80076 HEPATIC FUNCTION PANEL: CPT

## 2022-06-27 PROCEDURE — 36415 COLL VENOUS BLD VENIPUNCTURE: CPT

## 2022-07-01 NOTE — RESULT ENCOUNTER NOTE
Dear Wally,   Your recent test results showed the following:  --bilirubin is about the same as before. Since you had normal liver ultrasound in 2020 with similar levels, we'll not pursue further testing unless the levels go up significantly.     Please call or Mychart to our office if you have further questions.     Livier Triplett MD-PhD

## 2022-08-16 DIAGNOSIS — E78.5 HYPERLIPIDEMIA LDL GOAL <130: ICD-10-CM

## 2022-08-17 RX ORDER — ATORVASTATIN CALCIUM 20 MG/1
TABLET, FILM COATED ORAL
Qty: 90 TABLET | Refills: 0 | Status: SHIPPED | OUTPATIENT
Start: 2022-08-17 | End: 2022-11-14

## 2022-08-17 NOTE — TELEPHONE ENCOUNTER
Prescription approved per [facility] Refill Protocol.    Sharonda Kerr, RN, BSN

## 2022-09-02 ENCOUNTER — HOSPITAL ENCOUNTER (OUTPATIENT)
Dept: OCCUPATIONAL THERAPY | Facility: CLINIC | Age: 62
Setting detail: THERAPIES SERIES
Discharge: HOME | End: 2022-09-02
Attending: PSYCHIATRY & NEUROLOGY
Payer: COMMERCIAL

## 2022-09-02 DIAGNOSIS — F02.80 EARLY ONSET ALZHEIMER'S DEMENTIA WITHOUT BEHAVIORAL DISTURBANCE (H): ICD-10-CM

## 2022-09-02 DIAGNOSIS — G30.0 EARLY ONSET ALZHEIMER'S DEMENTIA WITHOUT BEHAVIORAL DISTURBANCE (H): ICD-10-CM

## 2022-09-02 PROCEDURE — 97535 SELF CARE MNGMENT TRAINING: CPT | Mod: GO

## 2022-09-02 PROCEDURE — 97166 OT EVAL MOD COMPLEX 45 MIN: CPT | Mod: GO

## 2022-09-02 ASSESSMENT — ACTIVITIES OF DAILY LIVING (ADL): IADL_QUICK_ADDS: MEAL PLANNING/PREPARATION;HOME/FINANCIAL/MANAGEMENT;COMMUNICATION/COMPUTER USE;COMMUNITY MOBILITY

## 2022-09-18 NOTE — PROGRESS NOTES
09/02/22 0800   Quick Adds   Type of Visit Initial Outpatient Occupational Therapy Evaluation       Present No   General Information   Start Of Care Date 09/02/22   Referring Physician Colt Lester MD   Orders Evaluate and treat as indicated   Other Orders Continues to follow with neurology   Orders Date 05/13/22   Medical Diagnosis Early onset Alzheimer's dementia without behavioral disturbance (H)   Onset of Illness/Injury or Date of Surgery 05/13/22   Precautions/Limitations No known precautions/limitations   Special Instructions Driving evaluation for Alzheimer's disease and MCI or early dementia stage   Surgical/Medical History Reviewed Yes   Additional Occupational Profile Info/Pertinent History of Current Problem Wally, age 61, spends time participating in community activities and leads an active lifestyle. He presents today with spouse, who later reveals concerns for continued driving with diagnosis and progression of Alzheimer's disease/early dementia. Client is very receptive to coaching and assessment to ensure he is being safe and optimizing health and quality of life. Client has very supportive family, continues to follow with neurology   Role/Living Environment   Patient role/Employment history Retired;Other/comments  (Volunteering at [employer])   Community/Avocational Activities Water aerobics, baseball league, volunteering   Current Living Environment [facility]   Prior Level - Transfers Independent   Prior Level - Ambulation Independent   Prior Level - ADLS Independent   Fall Risk Screen   Fall screen completed by OT   Have you fallen 2 or more times in the past year? No   Have you fallen and had an injury in the past year? No   Is patient a fall risk? No   Abuse Screen (yes response referral indicated)   Feels Unsafe at Home or Work/School no   Feels Threatened by Someone no   Does Anyone Try to Keep You From Having Contact with Others or Doing Things Outside Your Home? no    Physical Signs of Abuse Present no   Cognitive Status Examination   Orientation Orientation to person, place and time  (Not oriented to exact date)   Level of Consciousness Alert   Follows Commands and Answers Questions 100% of the time;Able to follow single-step instructions   Personal Safety and Judgment Intact   Memory Impaired   Memory Comments See MoCA below   Attention Sustained attention impaired;Selective attention impaired, difficulty ignoring irrelevant stimuli   Organization/Problem Solving Reports problems with organization;Reports problems with problem solving during evaluation;Problem solving impaired;Categorization of information impaired   Executive Function Working memory impaired, decreased storage of information for performing tasks   Cognitive Comment MocA 8.1 19/30 BNL (norm 26+/30) with deficits noted in trailmaking -1, cube copy -1, animal naming -1, serial subtraction -2, word generation -1, sentence repetition -1, delayed recall -3, date -1. Tendency to use humor to mask difficulty, requires repetition of instructions throughout, extra time for processing and task initiation, encouragement and cueing as able. Scores below safe driving norms   Visual Perception   Visual Acuity Bifocal lenses, wears for driving, last exam was last fall   Sensation   Sensation Comments No concerns reported or identified in the context of clinic tasks   Posture   Posture Not impaired   Bathing   Level of Jewett - Bathing independent   Upper Body Dressing   Level of Jewett: Dress Upper Body independent   Lower Body Dressing   Level of Jewett: Dress Lower Body independent   Toileting   Level of Jewett: Toilet independent   Grooming   Level of Jewett: Grooming independent   Eating/Self-Feeding   Level of Jewett: Eating independent   Instrumental Activities of Daily Living Assessment   IADL Assessment/Observations Cleans kitchen, dishes, laundry, litter box; Requires reminders  as is sometimes forgetful, does well with prompts/cueing. Difficulties with wordfinding sometimes impairing task planning and coordination.   IADL Quick Adds Meal Planning/Preparation;Home/Financial/Management;Communication/Computer Use;Community Mobility   Meal Planning/Preparation Typically managed by spouse, though reports difficulty remembering familiar recipes and disorganized following steps sometimes even with written instructions, especially for new tasks   Home/Financial Management Finances managed by spouse, which has historically been role deliniation. Reports difficulty going over finances and tracking information with monthly overviews from spouse. Concerns that if bill paying was left up to him things would be missed with some disorientation. With homemaking tasks, becoming more forgetful of where items are.   Planned Therapy Interventions   Planned Therapy Interventions IADL training;Self care/Home management   Adult OT Eval Goals   OT Eval Goals (Adult) 1    OT Goal 1   Goal Identifier IADL safety   Goal Description Client and spouse will verbalize understanding of recommendations related to driving and at least one strategy to continue monitoring disease progression to maximize supports and quality of life.   Goal Progress Goal met, verbalize and agree with driving cessation, plan to participate in CPT testing at future date.   Target Date 11/30/22   Date Met 09/02/22   Clinical Impression   Criteria for Skilled Therapeutic Interventions Met Yes, treatment indicated   OT Diagnosis Impaired IADLs with cognitive changes   Influenced by the following impairments memory, executive funcitoning, attention, language   Assessment of Occupational Performance 3-5 Performance Deficits   Identified Performance Deficits IADL safety, task planning and organization, functional cognition and problem solving, driving, cooking   Clinical Decision Making (Complexity) Moderate complexity   Therapy Frequency 1 visit    Predicted Duration of Therapy Intervention (days/wks) discharge after today's visit   Risks and Benefits of Treatment have been explained. Yes   Patient, Family & other staff in agreement with plan of care Yes   Clinical Impression Comments Client benefits from skilled OT services to educate on safety with community mobility and planning for IADLs for client and family, whom represent good potential for goals listed above   Education Assessment   Barriers To Learning Cognitive   Preferred Learning Style Listening;Reading;Demonstration;Pictures/video;Other  (Caregiver reinforcement and training)   Total Evaluation Time   OT Nishant, Moderate Complexity Minutes (26731) 35

## 2022-09-18 NOTE — PROGRESS NOTES
[facility]    Outpatient Occupational Therapy Discharge Note  Patient: Wally Cano  : 1960    Beginning/End Dates of Reporting Period:  22 to 22    Referring Provider: Colt Lester MD    Therapy Diagnosis: Impaired IADLs with cognitive changes    Client Self Report: See eval report    Objective Measurements:  MoCA  BNL (norm 26/30, see eval report for additional details, below safe driving limit)    Goals:     Goal Identifier IADL safety   Goal Description Client and spouse will verbalize understanding of recommendations related to driving and at least one strategy to continue monitoring disease progression to maximize supports and quality of life.   Target Date 22   Date Met  22   Progress (detail required for progress note): Goal met, verbalize and agree with driving cessation, plan to participate in CPT testing at future date.     Plan:  Discharge from therapy.    Discharge:    Reason for Discharge: Patient has met all goals. See detailed summary below    Equipment Issued: N/A    Discharge Plan: Other services: CPT testing at future date, driving cessation today.       22 0800   Signing Clinician's Name / Credentials   Signing clinician's name / credentials JUAN Bowling/L   Session Number   Session Number 1       Present No   Adult OT Eval Goals   OT Eval Goals (Adult) 1    OT Goal 1   Goal Identifier IADL safety   Goal Description Client and spouse will verbalize understanding of recommendations related to driving and at least one strategy to continue monitoring disease progression to maximize supports and quality of life.   Goal Progress Goal met, verbalize and agree with driving cessation, plan to participate in CPT testing at future date.   Target Date 22   Date Met 22   Subjective Report   Subjective  Report See eval report   Therapeutic Interventions   Therapeutic Interventions Self Care/Home Management   Self Care/Home Management   Self-Care/Home Mgmt/ADL, Compensatory, Meal Prep Minutes (82581) 10 Minutes   Skilled Intervention Education on driving recommendations, assessment results, and CPT testing   Patient Response Client and spouse verbalize undrstanding of recommendations, handouts for reinforcement   Treatment Detail To improve access to cares and knowledge of treatment/assessment options, skilled education on behind the wheel evaluation, though driving is not indicated at this time based on screening. Client and spouse verbalize understanding of recommendation for driving cessation and state availability of family/friends to aid in rides to maintain participation in community and social events. Encouraged continued participation in variety of activities client enjoys with facilitation to maximize safety and quality of life. Edcuation on CPT testing to further inform appropriate supports/supervision, handout provided. Client and familiy decline further testing at this time though verbalize plan to engage at future date. Client agrees to cease driving at this time.   Progress Goal met, d/c today   Education   Learner Patient;Family   Readiness Eager   Method Booklet/handout;Explanation   Response Verbalizes understanding   Plan   Homework Driving cessation. CPT testing at future date   Plan for next session D/C   Comments   Comments DISCHARGE SUMMARY: Client has been seen for 1 visit and has met 1/1 goal today. Client and spouse in agreement with driving cessation at this time. Plan to pursue CPT testing at future date to further inform supports/supervision and track progression of functional cognition. Client is in agreement with discharge today   Total Session Time   Timed Code Treatment Minutes 10   Total Treatment Time (sum of timed and untimed services) 45   AMBULATORY CLINICS ONLY-MEDICAL AND  TREATMENT DIAGNOSIS   Medical Diagnosis Early onset Alzheimer's dementia without behavioral disturbance (H)   OT Diagnosis Impaired IADLs with cognitive changes

## 2022-09-19 ENCOUNTER — IMMUNIZATION (OUTPATIENT)
Dept: FAMILY MEDICINE | Facility: CLINIC | Age: 62
End: 2022-09-19
Payer: COMMERCIAL

## 2022-09-19 DIAGNOSIS — Z23 NEED FOR PROPHYLACTIC VACCINATION AND INOCULATION AGAINST INFLUENZA: ICD-10-CM

## 2022-09-19 DIAGNOSIS — Z23 HIGH PRIORITY FOR 2019-NCOV VACCINE: Primary | ICD-10-CM

## 2022-09-19 PROCEDURE — 90471 IMMUNIZATION ADMIN: CPT

## 2022-09-19 PROCEDURE — 99207 PR NO CHARGE NURSE ONLY: CPT

## 2022-09-19 PROCEDURE — 91313 COVID-19,PF,MODERNA BIVALENT: CPT

## 2022-09-19 PROCEDURE — 0134A COVID-19,PF,MODERNA BIVALENT: CPT

## 2022-09-19 PROCEDURE — 90682 RIV4 VACC RECOMBINANT DNA IM: CPT

## 2022-10-13 ENCOUNTER — MYC MEDICAL ADVICE (OUTPATIENT)
Dept: FAMILY MEDICINE | Facility: CLINIC | Age: 62
End: 2022-10-13

## 2022-10-13 DIAGNOSIS — Z12.5 PROSTATE CANCER SCREENING: ICD-10-CM

## 2022-10-13 DIAGNOSIS — E78.5 HYPERLIPIDEMIA LDL GOAL <100: Primary | ICD-10-CM

## 2022-10-13 DIAGNOSIS — I10 HYPERTENSION GOAL BP (BLOOD PRESSURE) < 140/90: ICD-10-CM

## 2022-11-08 ENCOUNTER — LAB (OUTPATIENT)
Dept: LAB | Facility: CLINIC | Age: 62
End: 2022-11-08
Payer: COMMERCIAL

## 2022-11-08 DIAGNOSIS — Z12.5 PROSTATE CANCER SCREENING: ICD-10-CM

## 2022-11-08 DIAGNOSIS — I10 HYPERTENSION GOAL BP (BLOOD PRESSURE) < 140/90: ICD-10-CM

## 2022-11-08 DIAGNOSIS — E78.5 HYPERLIPIDEMIA LDL GOAL <100: ICD-10-CM

## 2022-11-08 LAB
ALBUMIN SERPL-MCNC: 4.1 G/DL (ref 3.4–5)
ALP SERPL-CCNC: 73 U/L (ref 40–150)
ALT SERPL W P-5'-P-CCNC: 40 U/L (ref 0–70)
ANION GAP SERPL CALCULATED.3IONS-SCNC: 3 MMOL/L (ref 3–14)
AST SERPL W P-5'-P-CCNC: 41 U/L (ref 0–45)
BILIRUB SERPL-MCNC: 1.5 MG/DL (ref 0.2–1.3)
BUN SERPL-MCNC: 15 MG/DL (ref 7–30)
CALCIUM SERPL-MCNC: 8.8 MG/DL (ref 8.5–10.1)
CHLORIDE BLD-SCNC: 99 MMOL/L (ref 94–109)
CHOLEST SERPL-MCNC: 144 MG/DL
CO2 SERPL-SCNC: 30 MMOL/L (ref 20–32)
CREAT SERPL-MCNC: 0.99 MG/DL (ref 0.66–1.25)
CREAT UR-MCNC: 88 MG/DL
ERYTHROCYTE [DISTWIDTH] IN BLOOD BY AUTOMATED COUNT: 12.4 % (ref 10–15)
FASTING STATUS PATIENT QL REPORTED: YES
GFR SERPL CREATININE-BSD FRML MDRD: 86 ML/MIN/1.73M2
GLUCOSE BLD-MCNC: 115 MG/DL (ref 70–99)
HCT VFR BLD AUTO: 40.3 % (ref 40–53)
HDLC SERPL-MCNC: 69 MG/DL
HGB BLD-MCNC: 13.6 G/DL (ref 13.3–17.7)
LDLC SERPL CALC-MCNC: 53 MG/DL
MCH RBC QN AUTO: 31.3 PG (ref 26.5–33)
MCHC RBC AUTO-ENTMCNC: 33.7 G/DL (ref 31.5–36.5)
MCV RBC AUTO: 93 FL (ref 78–100)
MICROALBUMIN UR-MCNC: 6 MG/L
MICROALBUMIN/CREAT UR: 6.82 MG/G CR (ref 0–17)
NONHDLC SERPL-MCNC: 75 MG/DL
PLATELET # BLD AUTO: 239 10E3/UL (ref 150–450)
POTASSIUM BLD-SCNC: 4.2 MMOL/L (ref 3.4–5.3)
PROT SERPL-MCNC: 7 G/DL (ref 6.8–8.8)
PSA SERPL-MCNC: 4.9 UG/L (ref 0–4)
RBC # BLD AUTO: 4.35 10E6/UL (ref 4.4–5.9)
SODIUM SERPL-SCNC: 132 MMOL/L (ref 133–144)
TRIGL SERPL-MCNC: 110 MG/DL
WBC # BLD AUTO: 7.4 10E3/UL (ref 4–11)

## 2022-11-08 PROCEDURE — G0103 PSA SCREENING: HCPCS

## 2022-11-08 PROCEDURE — 82043 UR ALBUMIN QUANTITATIVE: CPT

## 2022-11-08 PROCEDURE — 36415 COLL VENOUS BLD VENIPUNCTURE: CPT

## 2022-11-08 PROCEDURE — 85027 COMPLETE CBC AUTOMATED: CPT

## 2022-11-08 PROCEDURE — 80053 COMPREHEN METABOLIC PANEL: CPT

## 2022-11-08 PROCEDURE — 80061 LIPID PANEL: CPT

## 2022-11-14 DIAGNOSIS — R97.20 ELEVATED PROSTATE SPECIFIC ANTIGEN (PSA): Primary | ICD-10-CM

## 2022-11-14 DIAGNOSIS — E87.1 HYPONATREMIA: Primary | ICD-10-CM

## 2022-11-15 NOTE — TELEPHONE ENCOUNTER
MEDICAL RECORDS REQUEST   [facility]  Urology Clinic  [address] [place] [address]  PHONE: [phone]  Fax: [phone]        FUTURE VISIT INFORMATION                                                   Wally Cano, : 1960 scheduled for future visit at [facility]    APPOINTMENT INFORMATION:    Date: 2022    Provider: Juli Pineda MD    Reason for Visit/Diagnosis: Elevated PSA    REFERRAL INFORMATION:    Referring provider:  Livier Triplett MD PhD in BA FM/IM/PEDS    RECORDS REQUESTED FOR VISIT                                                     NOTES  STATUS/DETAILS   OFFICE NOTE from referring provider  yes   MEDICATION LIST  yes   LABS     PSA (LAB)  yes     PRE-VISIT CHECKLIST      Record collection complete Yes   Appointment appropriately scheduled           (right time/right provider) Yes   Joint diagnostic appointment coordinated correctly          (ensure right order & amount of time) Yes   MyChart activation Yes   Questionnaire complete If no, please explain pending

## 2022-11-15 NOTE — RESULT ENCOUNTER NOTE
Dear Wally,   Your recent test results showed the following:  -- PSA is elevated. This needs urology evaluation. I made a referral for you.   -- blood count is normal with minor change in RBC count. Not clinically significant.   -- comprehensive metabolic panel showed glucose in the prediabetes range, bilirubin borderline elevated but improved from before. Sodium borderline low (1 point below normal). This may be from hydrochlorothiazide. We can recheck in 1 month to make sure it doesn't get lower. For the time being, do not need to change meds.     Please call or Mychart to our office if you have further questions.     Livier Triplett MD-PhD  [Negative] : Integumentary

## 2022-11-16 ENCOUNTER — PRE VISIT (OUTPATIENT)
Dept: UROLOGY | Facility: CLINIC | Age: 62
End: 2022-11-16

## 2022-11-16 NOTE — CONFIDENTIAL NOTE
Reason for visit: consult    Relevant information: elevated psa    Records/imaging/labs/orders: in epic    At Rooming: video    Destiny Kaye  11/16/2022  9:17 AM

## 2022-12-08 ENCOUNTER — VIRTUAL VISIT (OUTPATIENT)
Dept: UROLOGY | Facility: CLINIC | Age: 62
End: 2022-12-08
Attending: INTERNAL MEDICINE
Payer: COMMERCIAL

## 2022-12-08 ENCOUNTER — PRE VISIT (OUTPATIENT)
Dept: UROLOGY | Facility: CLINIC | Age: 62
End: 2022-12-08

## 2022-12-08 DIAGNOSIS — R97.20 ELEVATED PROSTATE SPECIFIC ANTIGEN (PSA): ICD-10-CM

## 2022-12-08 PROCEDURE — 99204 OFFICE O/P NEW MOD 45 MIN: CPT | Mod: 95 | Performed by: UROLOGY

## 2022-12-08 NOTE — PROGRESS NOTES
Wally is a 62 year old who is being evaluated via a billable video visit.      How would you like to obtain your AVS? MyChart  If the video visit is dropped, the invitation should be resent by: Text to cell phone: [phone]  Will anyone else be joining your video visit? No        Video-Visit Details    Video Start Time: 3 PM     Type of service:  Video Visit          Chief Complaint:   Elevated PSA          Consult or Referral:     Mr. Wally aCno is a 62 year old male seen in consultation from Dr. Triplett.         History of Present Illness:    Wally Cano is a very pleasant 62 year old male who presents with elevated PSA. He denies  lower urinary symptoms. He denies  family history of prostate cancer     ECOG status 0          Past Medical History:     Past Medical History:   Diagnosis Date     * * * SBE PROPHYLAXIS * * *      DDD (degenerative disc disease), lumbar      Elevated PSA 7/2014     Hypertension      Intervertebral disc rupture 2002    chronic pain medication-off now.      Melanoma of scalp (H) 1/9/2020     PE (pulmonary embolism) 1995    due to the SBE      SBE (subacute bacterial endocarditis) 1995     Varicose veins      VSD (ventricular septal defect)     1 cm south of septum             Past Surgical History:     Past Surgical History:   Procedure Laterality Date     ARTHROSCOPY KNEE RT/LT  1979    removal of bone chips LT knee      COLONOSCOPY N/A 5/21/2021    Procedure: COLONOSCOPY, WITH POLYPECTOMY AND BIOPSY;  Surgeon: Fab Mcguire MD;  [facility]     COLONOSCOPY N/A 8/2/2021    Procedure: COLONOSCOPY, WITH POLYPECTOMY;  Surgeon: Fab Mcguire MD;  Location: [facility] OR     EXCISE LESION FACE Right 1/16/2020    Procedure: Right vertex scalp melanoma excision;  Surgeon: Colt Santiago MD;  Location:  OR     EYE SURGERY  2016    Bilateral Cataract removal     HEMORRHOIDECTOMY  1/10     HERNIA REPAIR, INGUINAL RT/LT      Left     IMPLANT SHUNT  VENTRICULOATRIAL       MOUTH SURGERY      wisdom teeth     REPAIR MOHS Right 2020    Procedure: Stage 1 right vertex scalp reconstruction with wound preparation, local flaps, complex closure;  Surgeon: Colt Santiago MD;  Location: [facility] OR     TONSILLECTOMY              Medications     Current Outpatient Medications   Medication     Acetaminophen (TYLENOL PO)     aspirin (ASA) 81 MG tablet     atorvastatin (LIPITOR) 20 MG tablet     celecoxib (CELEBREX) 100 MG capsule     donepezil (ARICEPT) 10 MG tablet     hydrochlorothiazide (HYDRODIURIL) 25 MG tablet     lisinopril (ZESTRIL) 20 MG tablet     sodium fluoride dental gel (PREVIDENT) 1.1 % GEL topical gel     Current Facility-Administered Medications   Medication     triamcinolone acetonide (KENALOG-40) injection 40 mg            Family History:     Family History   Problem Relation Age of Onset     Heart Disease Father 50        MI     Diabetes Father      Cerebrovascular Disease Maternal Grandmother 70     Cerebrovascular Disease Maternal Grandfather 80     Cerebrovascular Disease Paternal Grandmother 80     Heart Disease Paternal Grandfather 62        MI     Hypertension Brother      Obesity Sister      Circulatory Mother         varicose veins     Prostate Cancer Maternal Uncle         1/2 brother to mother     Cancer - colorectal No family hx of             Social History:     Social History     Socioeconomic History     Marital status:      Spouse name: Not on file     Number of children: Not on file     Years of education: Not on file     Highest education level: Not on file   Occupational History     Occupation: Nurse Clinician      Employer: [employer]     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   Other Topics Concern     Parent/sibling w/ CABG, MI or angioplasty before 65F 55M? 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 He is  with 2 children age 9 and 11. He also has an older son from a previous marriage. His wife is a respiratory therapist at [employer]. (2013)     Social Determinants of Health     Financial Resource Strain: Not on file   Food Insecurity: Not on file   Transportation Needs: Not on file   Physical Activity: Not on file   Stress: Not on file   Social Connections: Not on file   Intimate Partner Violence: Not on file   Housing Stability: Not on file            Allergies:   Patient has no known allergies.         Review of Systems     10 point ROS of systems including Constitutional, Eyes, Respiratory, Cardiovascular, Gastroenterology, Genitourinary, Integumentary, Muscularskeletal, Psychiatric were all negative except for pertinent positives noted in my HPI.          Physical Exam:     Vitals:  No vitals were obtained today due to virtual visit.    Physical Exam   GENERAL: Healthy, alert and no distress  EYES: Eyes grossly normal to inspection.  No discharge or erythema, or obvious scleral/conjunctival abnormalities.  RESP: No audible wheeze, cough, or visible cyanosis.  No visible retractions or increased work of breathing.    SKIN: Visible skin clear. No significant  rash, abnormal pigmentation or lesions.  NEURO: Cranial nerves grossly intact.  Mentation and speech appropriate for age.  PSYCH: Mentation appears normal, affect normal/bright, judgement and insight intact, normal speech and appearance well-groomed.          Labs and Pathology:    I reviewed all applicable laboratory and pathology data and went over findings with patient  Significant for     Lab Results   Component Value Date/Time    PSA 4.90 (H) 11/08/2022 08:54 AM    PSA 3.03 12/21/2021 01:20 PM    PSA 2.74 11/06/2020 08:02 AM    PSA 2.98 10/30/2019 10:02 AM    PSA 2.20 10/03/2018 08:13 AM    PSA 2.09 07/14/2017 10:46 AM    PSA 1.61 08/02/2016 08:01 AM    PSA 2.35 07/28/2015 08:21 AM    PSA 2.06 10/17/2014 11:44 AM    PSA 4.19 (H) 07/17/2014 04:28 PM    PSA 1.67 07/12/2013 08:44 AM    PSA 1.56 05/20/2011 09:15 AM               Imaging:    No relevant imaging to review today       Outside and Past Medical records:    I spent 10 minutes reviewing outside and past medical records.         Assessment and Plan:     Assessment:  62 year old male with elevated PSA     We had a long discussion about the utility of PSA screening.  We talked about the Pros and Cons.  We discussed the findings of the PLCO and EORTC studies.  We discussed the results of the Scandin[place] trial of treatment vs. observation in clinically detected prostate cancer as well as the results of the PIVOT trial in the context of screen-detected cancer.  We discussed the recommendations by the AUA, and USPSTF. We also discussed the significant (2-6%) and rising risk of biopsy associated sepsis.    We also discussed the emerging role of MRI in the diagnosis of prostate cancer and the potential for performing MRI-Ultrasound Fusion biopsy if suspicious lesions are noted.     Patient has decided he would like to proceed with prostate MRI       Plan:  Schedule for prostate MRI   Call with the results to discuss the next steps     45 total minutes spent on  the date of the encounter including direct interaction with the patient, performing chart review, documentation and further activities as noted above.        Juli Pineda MD   Department of Urology   [facility]       Video End Time:3:34 PM    Originating Location (pt. Location): Home        Distant Location (provider location):  On-site    Platform used for Video Visit: Well

## 2022-12-08 NOTE — LETTER
12/8/2022       RE: Wally Cano  [address] [address] [place] [address]     Dear Colleague,    Thank you for referring your patient, Wally Cano, to the [facility] [place] at [facility]. Please see a copy of my visit note below.    Wally is a 62 year old who is being evaluated via a billable video visit.      How would you like to obtain your AVS? MyChart  If the video visit is dropped, the invitation should be resent by: Text to cell phone: [phone]  Will anyone else be joining your video visit? No        Video-Visit Details    Video Start Time: 3 PM     Type of service:  Video Visit          Chief Complaint:   Elevated PSA          Consult or Referral:     Mr. Wally Cano is a 62 year old male seen in consultation from Dr. Triplett.         History of Present Illness:    Wally Cano is a very pleasant 62 year old male who presents with elevated PSA. He denies  lower urinary symptoms. He denies  family history of prostate cancer     ECOG status 0          Past Medical History:     Past Medical History:   Diagnosis Date     * * * SBE PROPHYLAXIS * * *      DDD (degenerative disc disease), lumbar      Elevated PSA 7/2014     Hypertension      Intervertebral disc rupture 2002    chronic pain medication-off now.      Melanoma of scalp (H) 1/9/2020     PE (pulmonary embolism) 1995    due to the SBE      SBE (subacute bacterial endocarditis) 1995     Varicose veins      VSD (ventricular septal defect)     1 cm south of septum             Past Surgical History:     Past Surgical History:   Procedure Laterality Date     ARTHROSCOPY KNEE RT/LT  1979    removal of bone chips LT knee      COLONOSCOPY N/A 5/21/2021    Procedure: COLONOSCOPY, WITH POLYPECTOMY AND BIOPSY;  Surgeon: Fab Mcguire MD;  Location:  GI     COLONOSCOPY N/A 8/2/2021    Procedure: COLONOSCOPY, WITH POLYPECTOMY;  Surgeon:  Fab Mcguire MD;  Location: [facility] OR     EXCISE LESION FACE Right 1/16/2020    Procedure: Right vertex scalp melanoma excision;  Surgeon: Colt Santiago MD;  Location: [facility]     EYE SURGERY  2016    Bilateral Cataract removal     HEMORRHOIDECTOMY  1/10     HERNIA REPAIR, INGUINAL RT/LT      Left     IMPLANT SHUNT VENTRICULOATRIAL       MOUTH SURGERY      wisdom teeth     REPAIR MOHS Right 1/30/2020    Procedure: Stage 1 right vertex scalp reconstruction with wound preparation, local flaps, complex closure;  Surgeon: Colt Santiago MD;  Location: [facility]     TONSILLECTOMY  1997            Medications     Current Outpatient Medications   Medication     Acetaminophen (TYLENOL PO)     aspirin (ASA) 81 MG tablet     atorvastatin (LIPITOR) 20 MG tablet     celecoxib (CELEBREX) 100 MG capsule     donepezil (ARICEPT) 10 MG tablet     hydrochlorothiazide (HYDRODIURIL) 25 MG tablet     lisinopril (ZESTRIL) 20 MG tablet     sodium fluoride dental gel (PREVIDENT) 1.1 % GEL topical gel     Current Facility-Administered Medications   Medication     triamcinolone acetonide (KENALOG-40) injection 40 mg            Family History:     Family History   Problem Relation Age of Onset     Heart Disease Father 50        MI     Diabetes Father      Cerebrovascular Disease Maternal Grandmother 70     Cerebrovascular Disease Maternal Grandfather 80     Cerebrovascular Disease Paternal Grandmother 80     Heart Disease Paternal Grandfather 62        MI     Hypertension Brother      Obesity Sister      Circulatory Mother         varicose veins     Prostate Cancer Maternal Uncle         1/2 brother to mother     Cancer - colorectal No family hx of             Social History:     Social History     Socioeconomic History     Marital status:      Spouse name: Not on file     Number of children: Not on file     Years of education: Not on file     Highest education level: Not on file   Occupational History     Occupation: Nurse  Clinician      Employer: [employer]     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    Tobacco Use     Smoking status: Former     Packs/day: 0.00     Years: 10.00     Pack years: 0.00     Types: Cigarettes, Cigars     Quit date: 10/28/2006     Years since quittin.1     Smokeless tobacco: Never   Substance and Sexual Activity     Alcohol use: Yes     Comment: a glass of wine - 5 or 6 per week     Drug use: No     Sexual activity: Yes     Partners: Female     Birth control/protection: I.U.D.   Other Topics Concern     Parent/sibling w/ CABG, MI or angioplasty before 65F 55M? 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 He also has an older son from a previous marriage. His wife is a respiratory therapist at [employer]. (2013)     Social Determinants of Health     Financial Resource Strain: Not on file   Food Insecurity: Not on file   Transportation Needs: Not on file   Physical Activity: Not on file   Stress: Not on file   Social Connections: Not on file   Intimate Partner Violence: Not on file   Housing Stability: Not on file            Allergies:   Patient has no known allergies.         Review of Systems     10 point ROS of systems including Constitutional, Eyes, Respiratory, Cardiovascular, Gastroenterology, Genitourinary, Integumentary, Muscularskeletal, Psychiatric were all negative except for pertinent positives noted in my HPI.          Physical Exam:     Vitals:  No vitals were obtained today due to virtual  visit.    Physical Exam   GENERAL: Healthy, alert and no distress  EYES: Eyes grossly normal to inspection.  No discharge or erythema, or obvious scleral/conjunctival abnormalities.  RESP: No audible wheeze, cough, or visible cyanosis.  No visible retractions or increased work of breathing.    SKIN: Visible skin clear. No significant rash, abnormal pigmentation or lesions.  NEURO: Cranial nerves grossly intact.  Mentation and speech appropriate for age.  PSYCH: Mentation appears normal, affect normal/bright, judgement and insight intact, normal speech and appearance well-groomed.          Labs and Pathology:    I reviewed all applicable laboratory and pathology data and went over findings with patient  Significant for     Lab Results   Component Value Date/Time    PSA 4.90 (H) 11/08/2022 08:54 AM    PSA 3.03 12/21/2021 01:20 PM    PSA 2.74 11/06/2020 08:02 AM    PSA 2.98 10/30/2019 10:02 AM    PSA 2.20 10/03/2018 08:13 AM    PSA 2.09 07/14/2017 10:46 AM    PSA 1.61 08/02/2016 08:01 AM    PSA 2.35 07/28/2015 08:21 AM    PSA 2.06 10/17/2014 11:44 AM    PSA 4.19 (H) 07/17/2014 04:28 PM    PSA 1.67 07/12/2013 08:44 AM    PSA 1.56 05/20/2011 09:15 AM               Imaging:    No relevant imaging to review today       Outside and Past Medical records:    I spent 10 minutes reviewing outside and past medical records.         Assessment and Plan:     Assessment:  62 year old male with elevated PSA     We had a long discussion about the utility of PSA screening.  We talked about the Pros and Cons.  We discussed the findings of the PLCO and EORTC studies.  We discussed the results of the Scandin[place] trial of treatment vs. observation in clinically detected prostate cancer as well as the results of the PIVOT trial in the context of screen-detected cancer.  We discussed the recommendations by the AUA, and USPSTF. We also discussed the significant (2-6%) and rising risk of biopsy associated sepsis.    We also discussed the  emerging role of MRI in the diagnosis of prostate cancer and the potential for performing MRI-Ultrasound Fusion biopsy if suspicious lesions are noted.     Patient has decided he would like to proceed with prostate MRI       Plan:  Schedule for prostate MRI   Call with the results to discuss the next steps     45 total minutes spent on the date of the encounter including direct interaction with the patient, performing chart review, documentation and further activities as noted above.        Juli Pineda MD   Department of Urology   [facility]       Video End Time:3:34 PM    Originating Location (pt. Location): Home        Distant Location (provider location):  On-site    Platform used for Video Visit: Chris

## 2022-12-13 ENCOUNTER — APPOINTMENT (OUTPATIENT)
Dept: URBAN - METROPOLITAN AREA CLINIC 252 | Age: 62
Setting detail: DERMATOLOGY
End: 2022-12-13

## 2022-12-13 ENCOUNTER — LAB (OUTPATIENT)
Dept: LAB | Facility: CLINIC | Age: 62
End: 2022-12-13
Payer: COMMERCIAL

## 2022-12-13 VITALS — WEIGHT: 186 LBS | HEIGHT: 69 IN

## 2022-12-13 DIAGNOSIS — Z71.89 OTHER SPECIFIED COUNSELING: ICD-10-CM

## 2022-12-13 DIAGNOSIS — E87.1 HYPONATREMIA: ICD-10-CM

## 2022-12-13 DIAGNOSIS — D18.0 HEMANGIOMA: ICD-10-CM

## 2022-12-13 DIAGNOSIS — L82.1 OTHER SEBORRHEIC KERATOSIS: ICD-10-CM

## 2022-12-13 DIAGNOSIS — L81.4 OTHER MELANIN HYPERPIGMENTATION: ICD-10-CM

## 2022-12-13 DIAGNOSIS — D22 MELANOCYTIC NEVI: ICD-10-CM

## 2022-12-13 DIAGNOSIS — Z85.820 PERSONAL HISTORY OF MALIGNANT MELANOMA OF SKIN: ICD-10-CM

## 2022-12-13 PROBLEM — D22.5 MELANOCYTIC NEVI OF TRUNK: Status: ACTIVE | Noted: 2022-12-13

## 2022-12-13 PROBLEM — D18.01 HEMANGIOMA OF SKIN AND SUBCUTANEOUS TISSUE: Status: ACTIVE | Noted: 2022-12-13

## 2022-12-13 LAB
ANION GAP SERPL CALCULATED.3IONS-SCNC: 4 MMOL/L (ref 3–14)
BUN SERPL-MCNC: 17 MG/DL (ref 7–30)
CALCIUM SERPL-MCNC: 9.2 MG/DL (ref 8.5–10.1)
CHLORIDE BLD-SCNC: 106 MMOL/L (ref 94–109)
CO2 SERPL-SCNC: 29 MMOL/L (ref 20–32)
CREAT SERPL-MCNC: 1.08 MG/DL (ref 0.66–1.25)
GFR SERPL CREATININE-BSD FRML MDRD: 78 ML/MIN/1.73M2
GLUCOSE BLD-MCNC: 173 MG/DL (ref 70–99)
POTASSIUM BLD-SCNC: 3.8 MMOL/L (ref 3.4–5.3)
SODIUM SERPL-SCNC: 139 MMOL/L (ref 133–144)

## 2022-12-13 PROCEDURE — OTHER ADDITIONAL NOTES: OTHER

## 2022-12-13 PROCEDURE — OTHER COUNSELING: OTHER

## 2022-12-13 PROCEDURE — 36415 COLL VENOUS BLD VENIPUNCTURE: CPT

## 2022-12-13 PROCEDURE — 99213 OFFICE O/P EST LOW 20 MIN: CPT

## 2022-12-13 PROCEDURE — 80048 BASIC METABOLIC PNL TOTAL CA: CPT

## 2022-12-13 PROCEDURE — OTHER MIPS QUALITY: OTHER

## 2022-12-13 ASSESSMENT — LOCATION DETAILED DESCRIPTION DERM
LOCATION DETAILED: RIGHT MEDIAL UPPER BACK
LOCATION DETAILED: RIGHT MEDIAL SUPERIOR CHEST
LOCATION DETAILED: LEFT MEDIAL SUPERIOR CHEST
LOCATION DETAILED: EPIGASTRIC SKIN
LOCATION DETAILED: LEFT SUPERIOR PARIETAL SCALP
LOCATION DETAILED: RIGHT LATERAL ABDOMEN
LOCATION DETAILED: RIGHT SUPERIOR OCCIPITAL SCALP
LOCATION DETAILED: RIGHT SUPERIOR MEDIAL MIDBACK
LOCATION DETAILED: RIGHT POSTERIOR PARIETAL SCALP
LOCATION DETAILED: RIGHT SUPERIOR MEDIAL UPPER BACK

## 2022-12-13 ASSESSMENT — LOCATION SIMPLE DESCRIPTION DERM
LOCATION SIMPLE: RIGHT OCCIPITAL SCALP
LOCATION SIMPLE: CHEST
LOCATION SIMPLE: SCALP
LOCATION SIMPLE: RIGHT LOWER BACK
LOCATION SIMPLE: ABDOMEN
LOCATION SIMPLE: POSTERIOR SCALP
LOCATION SIMPLE: RIGHT UPPER BACK

## 2022-12-13 ASSESSMENT — LOCATION ZONE DERM
LOCATION ZONE: TRUNK
LOCATION ZONE: SCALP

## 2022-12-13 NOTE — PROCEDURE: COUNSELING
Detail Level: Zone
When Should The Patient Follow-Up For Their Next Full-Body Skin Exam?: 6 Months
Detail Level: Generalized
Quality 137: Melanoma: Continuity Of Care - Recall System: Patient information entered into a recall system that includes: target date for the next exam specified AND a process to follow up with patients regarding missed or unscheduled appointments
Detail Level: Detailed

## 2022-12-13 NOTE — HPI: FULL BODY SKIN EXAMINATION
What Is The Reason For Today's Visit?: Full Body Skin Examination
What Is The Reason For Today's Visit? (Being Monitored For X): the risk of recurrence of previously treated lesion(s)
Additional History: Mm diagnosed on scalp December 2019.

## 2022-12-19 NOTE — RESULT ENCOUNTER NOTE
Dear Wally,   Your recent test results showed the following:  -- Electrolyte panel is normal.  Glucose is elevated.  Did you fast for this lab?  If you fasted for lab, this was in the diabetes range.  If you did not fast, no need to worry about this number.    Please call or Mychart to our office if you have further questions.     Livier Triplett MD-PhD

## 2022-12-23 ASSESSMENT — ENCOUNTER SYMPTOMS
JOINT SWELLING: 0
HEADACHES: 0
ABDOMINAL PAIN: 0
HEMATOCHEZIA: 0
CHILLS: 0
MYALGIAS: 0
EYE PAIN: 0
DYSURIA: 0
FREQUENCY: 0
PARESTHESIAS: 0
HEARTBURN: 0
FEVER: 0
DIZZINESS: 0
COUGH: 0
HEMATURIA: 0
ARTHRALGIAS: 1
CONSTIPATION: 0
NERVOUS/ANXIOUS: 1
SORE THROAT: 0
DIARRHEA: 0
PALPITATIONS: 0
WEAKNESS: 0
SHORTNESS OF BREATH: 0
NAUSEA: 0

## 2022-12-28 ENCOUNTER — ANCILLARY PROCEDURE (OUTPATIENT)
Dept: MRI IMAGING | Facility: CLINIC | Age: 62
End: 2022-12-28
Attending: UROLOGY
Payer: COMMERCIAL

## 2022-12-28 DIAGNOSIS — R97.20 ELEVATED PROSTATE SPECIFIC ANTIGEN (PSA): ICD-10-CM

## 2022-12-28 PROCEDURE — 72197 MRI PELVIS W/O & W/DYE: CPT | Performed by: RADIOLOGY

## 2022-12-28 PROCEDURE — A9585 GADOBUTROL INJECTION: HCPCS | Performed by: RADIOLOGY

## 2022-12-28 RX ORDER — GADOBUTROL 604.72 MG/ML
10 INJECTION INTRAVENOUS ONCE
Status: COMPLETED | OUTPATIENT
Start: 2022-12-28 | End: 2022-12-28

## 2022-12-28 RX ADMIN — GADOBUTROL 8.5 ML: 604.72 INJECTION INTRAVENOUS at 16:16

## 2022-12-28 NOTE — DISCHARGE INSTRUCTIONS
MRI Contrast Discharge Instructions    The IV contrast you received today will pass out of your body in your  urine. This will happen in the next 24 hours. You will not feel this process.  Your urine will not change color.    Drink at least 4 extra glasses of water or juice today (unless your doctor  has restricted your fluids). This reduces the stress on your kidneys.  You may take your regular medicines.    If you are on dialysis: It is best to have dialysis today.    If you have a reaction: Most reactions happen right away. If you have  any new symptoms after leaving the hospital (such as hives or swelling),  call your hospital at the correct number below. Or call your family doctor.  If you have breathing distress or wheezing, call 911.    Special instructions: ***    I have read and understand the above information.    Signature:______________________________________ Date:___________    Staff:__________________________________________ Date:___________     Time:__________    [facility] Departments:    ___[place]: [phone]  ___[facility]: [phone]  ___[place]: [phone] ___[facility]: [phone]  ___[facility]: [phone]  ___[facility]: [phone]  ___[phone]  ___[facility]: [phone]  ___[phone]

## 2022-12-30 ENCOUNTER — OFFICE VISIT (OUTPATIENT)
Dept: FAMILY MEDICINE | Facility: CLINIC | Age: 62
End: 2022-12-30
Payer: COMMERCIAL

## 2022-12-30 VITALS
TEMPERATURE: 97.7 F | OXYGEN SATURATION: 100 % | BODY MASS INDEX: 27.9 KG/M2 | WEIGHT: 188.38 LBS | RESPIRATION RATE: 14 BRPM | HEART RATE: 63 BPM | SYSTOLIC BLOOD PRESSURE: 131 MMHG | HEIGHT: 69 IN | DIASTOLIC BLOOD PRESSURE: 76 MMHG

## 2022-12-30 DIAGNOSIS — I10 HYPERTENSION GOAL BP (BLOOD PRESSURE) < 140/90: ICD-10-CM

## 2022-12-30 DIAGNOSIS — Z00.00 ROUTINE GENERAL MEDICAL EXAMINATION AT A HEALTH CARE FACILITY: Primary | ICD-10-CM

## 2022-12-30 DIAGNOSIS — R73.03 PREDIABETES: ICD-10-CM

## 2022-12-30 DIAGNOSIS — G30.0: ICD-10-CM

## 2022-12-30 DIAGNOSIS — E78.5 HYPERLIPIDEMIA LDL GOAL <100: ICD-10-CM

## 2022-12-30 DIAGNOSIS — R97.20 ELEVATED PROSTATE SPECIFIC ANTIGEN (PSA): ICD-10-CM

## 2022-12-30 DIAGNOSIS — Q21.0 VSD (VENTRICULAR SEPTAL DEFECT): ICD-10-CM

## 2022-12-30 DIAGNOSIS — F02.80: ICD-10-CM

## 2022-12-30 PROCEDURE — 90471 IMMUNIZATION ADMIN: CPT | Performed by: INTERNAL MEDICINE

## 2022-12-30 PROCEDURE — 99214 OFFICE O/P EST MOD 30 MIN: CPT | Mod: 25 | Performed by: INTERNAL MEDICINE

## 2022-12-30 PROCEDURE — 90677 PCV20 VACCINE IM: CPT | Performed by: INTERNAL MEDICINE

## 2022-12-30 PROCEDURE — 99396 PREV VISIT EST AGE 40-64: CPT | Mod: 25 | Performed by: INTERNAL MEDICINE

## 2022-12-30 RX ORDER — HYDROCHLOROTHIAZIDE 25 MG/1
25 TABLET ORAL DAILY
Qty: 90 TABLET | Refills: 3 | Status: SHIPPED | OUTPATIENT
Start: 2022-12-30 | End: 2023-08-24

## 2022-12-30 RX ORDER — ATORVASTATIN CALCIUM 20 MG/1
20 TABLET, FILM COATED ORAL DAILY
Qty: 90 TABLET | Refills: 3 | Status: SHIPPED | OUTPATIENT
Start: 2022-12-30 | End: 2023-03-15

## 2022-12-30 RX ORDER — LISINOPRIL 20 MG/1
20 TABLET ORAL 2 TIMES DAILY
Qty: 180 TABLET | Refills: 3 | Status: SHIPPED | OUTPATIENT
Start: 2022-12-30 | End: 2023-09-18

## 2022-12-30 ASSESSMENT — ENCOUNTER SYMPTOMS
DYSURIA: 0
ABDOMINAL PAIN: 0
FREQUENCY: 0
JOINT SWELLING: 0
EYE PAIN: 0
CONSTIPATION: 0
ARTHRALGIAS: 1
NAUSEA: 0
HEMATURIA: 0
DIZZINESS: 0
PALPITATIONS: 0
CHILLS: 0
HEADACHES: 0
HEMATOCHEZIA: 0
SHORTNESS OF BREATH: 0
PARESTHESIAS: 0
MYALGIAS: 0
WEAKNESS: 0
COUGH: 0
DIARRHEA: 0
NERVOUS/ANXIOUS: 1
FEVER: 0
SORE THROAT: 0
HEARTBURN: 0

## 2022-12-30 ASSESSMENT — PAIN SCALES - GENERAL: PAINLEVEL: NO PAIN (0)

## 2022-12-30 NOTE — PATIENT INSTRUCTIONS
Additional instructions:  -- fasting lab in 3-6 months.      Preventive Health Recommendations  Male Ages 50 - 64    Yearly exam:             See your health care provider every year in order to  o   Review health changes.   o   Discuss preventive care.    o   Review your medicines if your doctor has prescribed any.   Have a cholesterol test every 5 years, or more frequently if you are at risk for high cholesterol/heart disease.   Have a diabetes test (fasting glucose) every three years. If you are at risk for diabetes, you should have this test more often.   Have a colonoscopy at age 50, or have a yearly FIT test (stool test). These exams will check for colon cancer.    Talk with your health care provider about whether or not a prostate cancer screening test (PSA) is right for you.  You should be tested each year for STDs (sexually transmitted diseases), if you re at risk.     Shots: Get a flu shot each year. Get a tetanus shot every 10 years.     Nutrition:  Eat at least 5 servings of fruits and vegetables daily.   Eat whole-grain bread, whole-wheat pasta and brown rice instead of white grains and rice.   Get adequate Calcium and Vitamin D.     Lifestyle  Exercise for at least 150 minutes a week (30 minutes a day, 5 days a week). This will help you control your weight and prevent disease.   Limit alcohol to one drink per day.   No smoking.   Wear sunscreen to prevent skin cancer.   See your dentist every six months for an exam and cleaning.   See your eye doctor every 1 to 2 years.

## 2022-12-30 NOTE — PROGRESS NOTES
SUBJECTIVE:     Healthy Habits:     Getting at least 3 servings of Calcium per day:  NO    Bi-annual eye exam:  Yes    Dental care twice a year:  Yes    Sleep apnea or symptoms of sleep apnea:  None    Diet:  Regular (no restrictions)    Frequency of exercise:  2-3 days/week    Duration of exercise:  15-30 minutes    Taking medications regularly:  Yes    Medication side effects:  None    PHQ-2 Total Score: 0    Additional concerns today:  No    Patient came with his wife Aaliyah.  He was diagnosed with early onset Alzheimer's.  Over the last year, his memory has declined.  He remains active in volunteering in the hospital and participating in choir and other activities.  He remains to have a positive attitude.    Recently he had elevated PSA, had prostate MRI that showed a suspicious nodule.  In the process of planning to get a prostate biopsy.    Today's PHQ-2 Score:   PHQ-2 (  [employer]) 2022   Q1: Little interest or pleasure in doing things 0   Q2: Feeling down, depressed or hopeless 0   PHQ-2 Score 0   PHQ-2 Total Score (12-17 Years)- Positive if 3 or more points; Administer PHQ-A if positive -   Q1: Little interest or pleasure in doing things Not at all   Q2: Feeling down, depressed or hopeless Not at all   PHQ-2 Score 0           Social History     Tobacco Use     Smoking status: Former     Packs/day: 1.00     Years: 10.00     Pack years: 10.00     Types: Cigarettes, Cigars     Start date: 1986     Quit date: 10/28/2006     Years since quittin.1     Smokeless tobacco: Never   Substance Use Topics     Alcohol use: Yes     Comment: a glass of wine - 5 or 6 per week         Alcohol Use 2022   Prescreen: >3 drinks/day or >7 drinks/week? No   Prescreen: >3 drinks/day or >7 drinks/week? -     Last PSA:   PSA   Date Value Ref Range Status   2020 2.74 0 - 4 ug/L Final     Comment:     Assay Method:  Chemiluminescence using Siemens Vista analyzer     Prostate Specific Antigen Screen   Date  "Value Ref Range Status   11/08/2022 4.90 (H) 0.00 - 4.00 ug/L Final       Reviewed orders with patient. Reviewed health maintenance and updated orders accordingly - Yes      Reviewed and updated as needed this visit by clinical staff    Allergies  Meds              Reviewed and updated as needed this visit by Provider      Review of Systems   Constitutional: Negative for chills and fever.   HENT: Negative for congestion, ear pain, hearing loss and sore throat.    Eyes: Negative for pain and visual disturbance.   Respiratory: Negative for cough and shortness of breath.    Cardiovascular: Negative for chest pain, palpitations and peripheral edema.   Gastrointestinal: Negative for abdominal pain, constipation, diarrhea, heartburn, hematochezia and nausea.   Genitourinary: Negative for dysuria, frequency, genital sores, hematuria, impotence, penile discharge and urgency.   Musculoskeletal: Positive for arthralgias. Negative for joint swelling and myalgias.   Skin: Negative for rash.   Neurological: Negative for dizziness, weakness, headaches and paresthesias.   Psychiatric/Behavioral: Negative for mood changes. The patient is nervous/anxious.        OBJECTIVE:   /76 (BP Location: Right arm, Patient Position: Sitting, Cuff Size: Adult Large)   Pulse 63   Temp 97.7  F (36.5  C) (Temporal)   Resp 14   Ht 1.753 m (5' 9\")   Wt 85.4 kg (188 lb 6 oz)   SpO2 100%   BMI 27.82 kg/m      Physical Exam  GENERAL: healthy, alert and no distress  EYES: Eyes grossly normal to inspection, PERRL and conjunctivae and sclerae normal  HENT: ear canals and TM's normal, nose and mouth without ulcers or lesions  NECK: no adenopathy, no asymmetry, masses, or scars and thyroid normal to palpation  RESP: lungs clear to auscultation - no rales, rhonchi or wheezes  CV: regular rate and rhythm, normal S1 S2, soft 2/6 systolic murmur, LLSB, no peripheral edema  ABDOMEN: soft, nontender, no hepatosplenomegaly, no masses and bowel sounds " normal  MS: no gross musculoskeletal defects noted, no edema  NEURO: Normal strength and tone, mentation intact and speech normal.   PSYCH: mentation appears normal, affect normal/bright    Diagnostic Test Results:  Lab on 12/13/2022   Component Date Value Ref Range Status     Sodium 12/13/2022 139  133 - 144 mmol/L Final     Potassium 12/13/2022 3.8  3.4 - 5.3 mmol/L Final     Chloride 12/13/2022 106  94 - 109 mmol/L Final     Carbon Dioxide (CO2) 12/13/2022 29  20 - 32 mmol/L Final     Anion Gap 12/13/2022 4  3 - 14 mmol/L Final     Urea Nitrogen 12/13/2022 17  7 - 30 mg/dL Final     Creatinine 12/13/2022 1.08  0.66 - 1.25 mg/dL Final     Calcium 12/13/2022 9.2  8.5 - 10.1 mg/dL Final     Glucose 12/13/2022 173 (H)  70 - 99 mg/dL Final     GFR Estimate 12/13/2022 78  >60 mL/min/1.73m2 Final    Effective December 21, 2021 eGFRcr in adults is calculated using the 2021 CKD-EPI creatinine equation which includes age and gender (Kj rasheed al., [place], DOI: 10.1056/GHHLfe0250310)     Component      Latest Ref Rng & Units 11/8/2022   Sodium      133 - 144 mmol/L 132 (L)   Potassium      3.4 - 5.3 mmol/L 4.2   Chloride      94 - 109 mmol/L 99   Carbon Dioxide      20 - 32 mmol/L 30   Anion Gap      3 - 14 mmol/L 3   Urea Nitrogen      7 - 30 mg/dL 15   Creatinine      0.66 - 1.25 mg/dL 0.99   Calcium      8.5 - 10.1 mg/dL 8.8   Glucose      70 - 99 mg/dL 115 (H)   Alkaline Phosphatase      40 - 150 U/L 73   AST      0 - 45 U/L 41   ALT      0 - 70 U/L 40   Protein Total      6.8 - 8.8 g/dL 7.0   Albumin      3.4 - 5.0 g/dL 4.1   Bilirubin Total      0.2 - 1.3 mg/dL 1.5 (H)   GFR Estimate      >60 mL/min/1.73m2 86   WBC      4.0 - 11.0 10e3/uL 7.4   RBC Count      4.40 - 5.90 10e6/uL 4.35 (L)   Hemoglobin      13.3 - 17.7 g/dL 13.6   Hematocrit      40.0 - 53.0 % 40.3   MCV      78 - 100 fL 93   MCH      26.5 - 33.0 pg 31.3   MCHC      31.5 - 36.5 g/dL 33.7   RDW      10.0 - 15.0 % 12.4   Platelet Count      150 - 450 10e3/uL  "239   Cholesterol      <200 mg/dL 144   Triglycerides      <150 mg/dL 110   HDL Cholesterol      >=40 mg/dL 69   LDL Cholesterol Calculated      <=100 mg/dL 53   Non HDL Cholesterol      <130 mg/dL 75   Patient Fasting > 8hrs?       Yes   Creatinine Urine      mg/dL 88   Albumin Urine mg/L      mg/L 6   Albumin Urine mg/g Cr      0.00 - 17.00 mg/g Cr 6.82   PSA      0.00 - 4.00 ug/L 4.90 (H)       ASSESSMENT/PLAN:   Wally was seen today for physical.    Diagnoses and all orders for this visit:    Routine general medical examination at a health care facility  -     Pneumococcal 20 Valent Conjugate (Prevnar 20)  -     REVIEW OF HEALTH MAINTENANCE PROTOCOL ORDERS    Hypertension goal BP (blood pressure) < 140/90  Comments:  Continue with baby aspirin for secondary prevention  Orders:  -     lisinopril (ZESTRIL) 20 MG tablet; Take 1 tablet (20 mg) by mouth 2 times daily  -     hydrochlorothiazide (HYDRODIURIL) 25 MG tablet; Take 1 tablet (25 mg) by mouth daily    Prediabetes  Comments:  his wife is has begun to implement dietary changes for this for herself and the patient. will plan for recheck in 3-6 months.   Orders:  -     Glucose; Future  -     Hemoglobin A1c; Future    Early onset Alzheimer's disease without behavioral disturbance (H)  Comments:  Follows with neurology    Hyperlipidemia LDL goal <100  -     atorvastatin (LIPITOR) 20 MG tablet; Take 1 tablet (20 mg) by mouth daily    VSD (ventricular septal defect)  Comments:  Follows with cardiology.    Elevated prostate specific antigen (PSA)  Comments:  follows with urology, prostate biopsy being scheduled.         Patient has been advised of split billing requirements and indicates understanding: Yes      COUNSELING:   Reviewed preventive health counseling, as reflected in patient instructions      BMI:   Estimated body mass index is 27.82 kg/m  as calculated from the following:    Height as of this encounter: 1.753 m (5' 9\").    Weight as of this encounter: " 85.4 kg (188 lb 6 oz).         He reports that he quit smoking about 16 years ago. His smoking use included cigarettes and cigars. He started smoking about 37 years ago. He has a 10.00 pack-year smoking history. He has never used smokeless tobacco.        Livier Triplett MD PhD  [facility]

## 2023-01-05 ENCOUNTER — TELEPHONE (OUTPATIENT)
Dept: UROLOGY | Facility: CLINIC | Age: 63
End: 2023-01-05

## 2023-01-05 NOTE — TELEPHONE ENCOUNTER
[facility] Call Center    Phone Message    May a detailed message be left on voicemail: yes     Reason for Call: Other: pt wife calling and thought they were gonna hear from somebody about setting up a biopsy of prostate, please advise with pt and wife     Action Taken: Other: urology    Travel Screening: Not Applicable

## 2023-02-22 ENCOUNTER — PRE VISIT (OUTPATIENT)
Dept: UROLOGY | Facility: CLINIC | Age: 63
End: 2023-02-22
Payer: COMMERCIAL

## 2023-02-22 DIAGNOSIS — R97.20 ELEVATED PROSTATE SPECIFIC ANTIGEN (PSA): Primary | ICD-10-CM

## 2023-02-22 RX ORDER — CIPROFLOXACIN 500 MG/1
500 TABLET, FILM COATED ORAL 2 TIMES DAILY
Qty: 6 TABLET | Refills: 0 | Status: SHIPPED | OUTPATIENT
Start: 2023-02-22 | End: 2023-02-25

## 2023-02-22 NOTE — CONFIDENTIAL NOTE
Reason for visit: fusion bx     Relevant information: elevated psa, pirads 4, 2 lesions    Records/imaging/labs/orders: in epic    At Rooming: verify procedure prep, give gent      Called patient to go over prep for biopsy including:      No blood thinning medications for 7 days before procedure, including aspirin, anticoagulants, ibuprofen and fish oil.       prophylactic antibiotics sent to primary pharmacy listed in chart:   -take the day before, the day of and the day after the procedure, one tablet, two times daily.      Complete a Fleets enema approximately 2 hours before the scheduled procedure.      Do not come to the appointment fasted.      Destiny Kaye  2/22/2023  9:18 AM

## 2023-02-24 ENCOUNTER — OFFICE VISIT (OUTPATIENT)
Dept: NEUROLOGY | Facility: CLINIC | Age: 63
End: 2023-02-24
Payer: COMMERCIAL

## 2023-02-24 VITALS — SYSTOLIC BLOOD PRESSURE: 122 MMHG | TEMPERATURE: 98.2 F | DIASTOLIC BLOOD PRESSURE: 77 MMHG | HEART RATE: 60 BPM

## 2023-02-24 DIAGNOSIS — G30.0 EARLY ONSET ALZHEIMER'S DEMENTIA WITHOUT BEHAVIORAL DISTURBANCE (H): Primary | ICD-10-CM

## 2023-02-24 DIAGNOSIS — F02.80 EARLY ONSET ALZHEIMER'S DEMENTIA WITHOUT BEHAVIORAL DISTURBANCE (H): Primary | ICD-10-CM

## 2023-02-24 NOTE — PROGRESS NOTES
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 Relevant history of resolved endocarditis, VSD, HTN, HLD, chronic ulcer in the setting of NSAID use. He has a paternal history of dementia. MMSE during our initial visit 10/2021 was 24/30. B12, TSH, CMP and CBC all unremarkable. Neuropsychological testing Dr. Sanches showed impairment in encoding and retrieval, but relative preservation of recognition memory. He scored in the mild cognitive impairment stage. MRI from July 2018 showed normal cerebral and cerebellar volumes. Medial temporal lobe sizes are within normal limits. Mild non-specific T2 signal in the subcortical white matter. 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 CSF ADMark panel from [facility] showed high p-tau and low ATI, confirming a diagnosis of early onset Alzheimer's disease. 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 During our last visit 5/13/22 he was MCI stage vs early dementia stage with an MMSE of 26/30.    Today, Mr. Cano is here for a routine follow-up visit. He presents with his wife who is an independent historian and provides collateral information included below.     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 They are worried about suspected prostate cancer. They are looking forward to going to a nearby breakfast bar.    Sleep: He gets to bed religiously at 9pm. One of their kids is living at home with them during their gap semester. He can wake up and get confused  "about what time it is and then start making coffee at 3 am. This is a rare instance, though. No dream enactment behavior.  ADLs: Independent with the following ADLs: Complete [place] (6) Dependent with the following ADLs: none (6).Total ORONA score (x/6): 6 .  Safety: Patient does not drive. He had problem on the driving test with \"ignoring irrelevant stimuli\" and gave his car to his daughter who is down at [facility]. Patient is currently taking medications as prescribed, There are safety concerns in the home, that are minor but he didn't realize that there was broken glass on the ground and kept trying to enter the kitchen, where the broken glass was, to make a coffee. In general safety is good. Patient has not had problems with getting lost in familiar places or wandered , No: Patient denies, The patient denies any unsteadiness or sustained falls , living with Spouse and Children.  Decision Making Capacity: Able to make own basic decisions, but concrete thinking.  Caregiver needs: Caregiver knowledge and needs assessment was completed during this visit and the following needs were identified: Caregiver denies any needs at this time. She has help from her son is currently living with them. She can leave for hours and not have safety.  Palliative care/Advance Care Planning needs: Discussed and recommended [email or web address]    I reviewed external records in the medical chart since their last visit, which show COVID vaccination along with a visit with urology regarding elevated PSA resulting in an order for a prostate MRI which raised suspicion for prostate cancer (results below). An order for MRI fusion prostate biopsy has been placed (3/1/23). Mr. Cano also had a preventative health visit with Dr. Triplett (PCP).      I reviewed lab results since their last visit, which include:    Latest Reference Range & Units 11/08/22 08:54   Albumin 3.4 - 5.0 g/dL 4.1   Protein Total 6.8 - 8.8 g/dL 7.0   Alkaline " Phosphatase 40 - 150 U/L 73   ALT 0 - 70 U/L 40   AST 0 - 45 U/L 41   Albumin Urine mg/g Cr 0.00 - 17.00 mg/g Cr 6.82   Albumin Urine mg/L mg/L 6   Bilirubin Total 0.2 - 1.3 mg/dL 1.5 (H)   Cholesterol <200 mg/dL 144   Creatinine Urine mg/dL 88   Patient Fasting > 8hrs?  Yes   HDL Cholesterol >=40 mg/dL 69   LDL Cholesterol Calculated <=100 mg/dL 53   Non HDL Cholesterol <130 mg/dL 75   PSA 0.00 - 4.00 ug/L 4.90 (H)   Triglycerides <150 mg/dL 110   WBC 4.0 - 11.0 10e3/uL 7.4   Hemoglobin 13.3 - 17.7 g/dL 13.6   Hematocrit 40.0 - 53.0 % 40.3   Platelet Count 150 - 450 10e3/uL 239   RBC Count 4.40 - 5.90 10e6/uL 4.35 (L)   MCV 78 - 100 fL 93   MCH 26.5 - 33.0 pg 31.3   MCHC 31.5 - 36.5 g/dL 33.7   RDW 10.0 - 15.0 % 12.4   (H): Data is abnormally high  (L): Data is abnormally low     Latest Reference Range & Units 12/13/22 07:41   Sodium 133 - 144 mmol/L 139   Potassium 3.4 - 5.3 mmol/L 3.8   Chloride 94 - 109 mmol/L 106   Carbon Dioxide 20 - 32 mmol/L 29   Urea Nitrogen 7 - 30 mg/dL 17   Creatinine 0.66 - 1.25 mg/dL 1.08   GFR Estimate >60 mL/min/1.73m2 78   Calcium 8.5 - 10.1 mg/dL 9.2   Anion Gap 3 - 14 mmol/L 4   Glucose 70 - 99 mg/dL 173 (H)   (H): Data is abnormally high    I personally reviewed prior imaging including:     Prostate MRI 12/8/22: IMPRESSION:  1. Based on the most suspicious abnormality, this exam is  characterized as PIRADS 4 - Clinically significant cancer is likely to  be present. The most suspicious abnormality is is a 6mm nodule  located at the right mid gland peripheral zone and there is no  evidence of extraprostatic extension.  2. Additional 6mm PIRADS4 lesion as detailed above.  3. No suspicious adenopathy or evidence of pelvic metastases.  4. Nodular soft tissue at the left inguinal canal consistent with  prominent scar from left hernia repair. Additional soft tissue  associated with pubic symphysis inflammation may be related to hernia  mesh anchor site scarring versus osteitis pubis.  This soft tissue  indents the urinary bladder.    Updates during today's visit:   Cognitive:   Mood/behavior:   PHQ-2 Score:     PHQ-2 ( 1999 [employer]) 12/23/2022 1/5/2022   Q1: Little interest or pleasure in doing things 0 0   Q2: Feeling down, depressed or hopeless 0 0   PHQ-2 Score 0 0   PHQ-2 Total Score (12-17 Years)- Positive if 3 or more points; Administer PHQ-A if positive - -   Q1: Little interest or pleasure in doing things Not at all -   Q2: Feeling down, depressed or hopeless Not at all -   PHQ-2 Score 0 -       Review of Psychotropic and High-Risk Medications:  Donepezil 10mg     Current Outpatient Medications   Medication     Acetaminophen (TYLENOL PO)     aspirin (ASA) 81 MG tablet     atorvastatin (LIPITOR) 20 MG tablet     ciprofloxacin (CIPRO) 500 MG tablet     donepezil (ARICEPT) 10 MG tablet     hydrochlorothiazide (HYDRODIURIL) 25 MG tablet     lisinopril (ZESTRIL) 20 MG tablet     sodium fluoride dental gel (PREVIDENT) 1.1 % GEL topical gel     Current Facility-Administered Medications   Medication     triamcinolone acetonide (KENALOG-40) injection 40 mg       Exam:  Mini-Cog: Word recall 1/3, clock drawing 0/3. Total 1/5.  Neuro: Alert, awake, normal conversant and logical speech. No dysarthria. Pleasant, courteous and relatively good insight into disease.    Assessment and Care Plan:     Stage of Dementia: 1-Mild Dementia (Alzheimer's disease)    Resources Provided to the Patient Today:     Continue going to [facility] for [employer]    Brain donation resources provided at the [facility]    Exercise is the only known therapy that can delay cognitive decline. We recommend you perform at least 30 minutes of exercise (preferably aerobic, but other more mild forms are OK -- the main idea is to stay physically active) for 5 days a week.    Continue to optimize cerebrovascular risk factors (blood pressure control, lipid control, glycemic control, healthy diet and exercise).    Stay  "socially and intellectually active as much as possible. Participate in activities that are enjoyable to you. There is no need to perform mental quizzes or \"mind games\" to boost memory; studies have demonstrated that these specialized memory games do not actually boost the patient's cognitive performance in activities outside the game.     Eat healthy. A balanced diet with all major food groups is recommended. Other recommended diets include the Mediterranean diet.    Remove dangerous objects or fall hazards from the home environment.    The Alzheimer's Association has many resources, support groups, and FAQs for caregivers and patients living with dementia. I would highly recommend visiting their webpage: Alz.org    Community Resources:  https://mngwep.[facility]/dementia-resources    Caregiver Support Program that helps to line up the right services for your and your loved one:  https://[facility].org/services/caregiver-assurance  Speak with an Advisor to learn how Caregiver Assurance can help you: 788-211-CARE(8898)    Caregiving reading materials  1. I recommend materials published from the NIH.   i. https://order.robby.nih.gov/publication/caring-for-a-person-with-alzheimers-disease-your-easy-to-use-guide  ii. https://www.robby.nih.gov/health/caregiving  iii. https://www.robby.nih.gov/health/alzheimers/caregiving  2. Another well regarded book is The 36-Hour Day: A Family Guide to Caring for People Who Have Alzheimer Disease, Other Dementias, and Memory Loss    Adult rehabilitative mental health services (ARMHS) is a range of services that helps an individual develop and enhance psychiatric stability, social competencies, personal and emotional adjustment, and independent living and community skills. You can find out more at:  https://[place].gov/dhs/people-we-serve/people-with-disabilities/health-care/adult-mental-health/programs-services/armhs.jsp  Or [facility] [phone]      Code: 90801  "

## 2023-02-24 NOTE — LETTER
2/24/2023     RE: Wally Cano  [address] [address] [facility] [address]     Dear Colleague,    Thank you for referring your patient, Wally Cano, to the [facility]UR[facility] at [facility]. Please see a copy of my visit note below.    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 Relevant history of resolved endocarditis, VSD, HTN, HLD, chronic ulcer in the setting of NSAID use. He has a paternal history of dementia. MMSE during our initial visit 10/2021 was 24/30. B12, TSH, CMP and CBC all unremarkable. Neuropsychological testing Dr. Sanches showed impairment in encoding and retrieval, but relative preservation of recognition memory. He scored in the mild cognitive impairment stage. MRI from July 2018 showed normal cerebral and cerebellar volumes. Medial temporal lobe sizes are within normal limits. Mild non-specific T2 signal in the subcortical white matter. 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 CSF ADMark panel from [facility] showed high p-tau and low ATI, confirming a diagnosis of early onset Alzheimer's disease. 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 During our last visit 5/13/22 he was MCI stage vs early dementia stage with an MMSE of 26/30.    Today, Mr. Cano is here for a routine follow-up visit. He presents with his wife who is an independent historian and provides collateral information included below.     Wally reports that he volunteers at [facility] and  "helps with hospital discharges from noon - 4pm. They are worried about suspected prostate cancer. They are looking forward to going to a nearby breakfast bar.    Sleep: He gets to bed religiously at 9pm. One of their kids is living at home with them during their gap semester. He can wake up and get confused about what time it is and then start making coffee at 3 am. This is a rare instance, though. No dream enactment behavior.  ADLs: Independent with the following ADLs: Complete [place] (6) Dependent with the following ADLs: none (6).Total ORONA score (x/6): 6 .  Safety: Patient does not drive. He had problem on the driving test with \"ignoring irrelevant stimuli\" and gave his car to his daughter who is down at [facility]. Patient is currently taking medications as prescribed, There are safety concerns in the home, that are minor but he didn't realize that there was broken glass on the ground and kept trying to enter the kitchen, where the broken glass was, to make a coffee. In general safety is good. Patient has not had problems with getting lost in familiar places or wandered , No: Patient denies, The patient denies any unsteadiness or sustained falls , living with Spouse and Children.  Decision Making Capacity: Able to make own basic decisions, but concrete thinking.  Caregiver needs: Caregiver knowledge and needs assessment was completed during this visit and the following needs were identified: Caregiver denies any needs at this time. She has help from her son is currently living with them. She can leave for hours and not have safety.  Palliative care/Advance Care Planning needs: Discussed and recommended [email or web address]    I reviewed external records in the medical chart since their last visit, which show COVID vaccination along with a visit with urology regarding elevated PSA resulting in an order for a prostate MRI which raised suspicion for prostate cancer (results below). An order for MRI " fusion prostate biopsy has been placed (3/1/23). Mr. Cano also had a preventative health visit with Dr. Triplett (PCP).      I reviewed lab results since their last visit, which include:    Latest Reference Range & Units 11/08/22 08:54   Albumin 3.4 - 5.0 g/dL 4.1   Protein Total 6.8 - 8.8 g/dL 7.0   Alkaline Phosphatase 40 - 150 U/L 73   ALT 0 - 70 U/L 40   AST 0 - 45 U/L 41   Albumin Urine mg/g Cr 0.00 - 17.00 mg/g Cr 6.82   Albumin Urine mg/L mg/L 6   Bilirubin Total 0.2 - 1.3 mg/dL 1.5 (H)   Cholesterol <200 mg/dL 144   Creatinine Urine mg/dL 88   Patient Fasting > 8hrs?  Yes   HDL Cholesterol >=40 mg/dL 69   LDL Cholesterol Calculated <=100 mg/dL 53   Non HDL Cholesterol <130 mg/dL 75   PSA 0.00 - 4.00 ug/L 4.90 (H)   Triglycerides <150 mg/dL 110   WBC 4.0 - 11.0 10e3/uL 7.4   Hemoglobin 13.3 - 17.7 g/dL 13.6   Hematocrit 40.0 - 53.0 % 40.3   Platelet Count 150 - 450 10e3/uL 239   RBC Count 4.40 - 5.90 10e6/uL 4.35 (L)   MCV 78 - 100 fL 93   MCH 26.5 - 33.0 pg 31.3   MCHC 31.5 - 36.5 g/dL 33.7   RDW 10.0 - 15.0 % 12.4   (H): Data is abnormally high  (L): Data is abnormally low     Latest Reference Range & Units 12/13/22 07:41   Sodium 133 - 144 mmol/L 139   Potassium 3.4 - 5.3 mmol/L 3.8   Chloride 94 - 109 mmol/L 106   Carbon Dioxide 20 - 32 mmol/L 29   Urea Nitrogen 7 - 30 mg/dL 17   Creatinine 0.66 - 1.25 mg/dL 1.08   GFR Estimate >60 mL/min/1.73m2 78   Calcium 8.5 - 10.1 mg/dL 9.2   Anion Gap 3 - 14 mmol/L 4   Glucose 70 - 99 mg/dL 173 (H)   (H): Data is abnormally high    I personally reviewed prior imaging including:     Prostate MRI 12/8/22: IMPRESSION:  1. Based on the most suspicious abnormality, this exam is  characterized as PIRADS 4 - Clinically significant cancer is likely to  be present. The most suspicious abnormality is is a 6mm nodule  located at the right mid gland peripheral zone and there is no  evidence of extraprostatic extension.  2. Additional 6mm PIRADS4 lesion as detailed above.  3. No  suspicious adenopathy or evidence of pelvic metastases.  4. Nodular soft tissue at the left inguinal canal consistent with  prominent scar from left hernia repair. Additional soft tissue  associated with pubic symphysis inflammation may be related to hernia  mesh anchor site scarring versus osteitis pubis. This soft tissue  indents the urinary bladder.    Updates during today's visit:   Cognitive:   Mood/behavior:   PHQ-2 Score:     PHQ-2 ( 1999 [employer]) 12/23/2022 1/5/2022   Q1: Little interest or pleasure in doing things 0 0   Q2: Feeling down, depressed or hopeless 0 0   PHQ-2 Score 0 0   PHQ-2 Total Score (12-17 Years)- Positive if 3 or more points; Administer PHQ-A if positive - -   Q1: Little interest or pleasure in doing things Not at all -   Q2: Feeling down, depressed or hopeless Not at all -   PHQ-2 Score 0 -       Review of Psychotropic and High-Risk Medications:  Donepezil 10mg     Current Outpatient Medications   Medication     Acetaminophen (TYLENOL PO)     aspirin (ASA) 81 MG tablet     atorvastatin (LIPITOR) 20 MG tablet     ciprofloxacin (CIPRO) 500 MG tablet     donepezil (ARICEPT) 10 MG tablet     hydrochlorothiazide (HYDRODIURIL) 25 MG tablet     lisinopril (ZESTRIL) 20 MG tablet     sodium fluoride dental gel (PREVIDENT) 1.1 % GEL topical gel     Current Facility-Administered Medications   Medication     triamcinolone acetonide (KENALOG-40) injection 40 mg       Exam:  Mini-Cog: Word recall 1/3, clock drawing 0/3. Total 1/5.  Neuro: Alert, awake, normal conversant and logical speech. No dysarthria. Pleasant, courteous and relatively good insight into disease.    Assessment and Care Plan:     Stage of Dementia: 1-Mild Dementia (Alzheimer's disease)    Resources Provided to the Patient Today:     Continue going to [place] [employer] for [employer]    Brain donation resources provided at the [facility]    Exercise is the only known therapy that can delay cognitive decline. We recommend you  "perform at least 30 minutes of exercise (preferably aerobic, but other more mild forms are OK -- the main idea is to stay physically active) for 5 days a week.    Continue to optimize cerebrovascular risk factors (blood pressure control, lipid control, glycemic control, healthy diet and exercise).    Stay socially and intellectually active as much as possible. Participate in activities that are enjoyable to you. There is no need to perform mental quizzes or \"mind games\" to boost memory; studies have demonstrated that these specialized memory games do not actually boost the patient's cognitive performance in activities outside the game.     Eat healthy. A balanced diet with all major food groups is recommended. Other recommended diets include the Mediterranean diet.    Remove dangerous objects or fall hazards from the home environment.    The Alzheimer's Association has many resources, support groups, and FAQs for caregivers and patients living with dementia. I would highly recommend visiting their webpage: Alz.org    Community Resources:  https://[facility]/dementia-resources    Caregiver Support Program that helps to line up the right services for your and your loved one:  https://[facility].org/services/caregiver-assurance  Speak with an Advisor to learn how Caregiver Assurance can help you: 113-683-JUYQ([phone])    Caregiving reading materials  1. I recommend materials published from the NIH.   i. https://order.robby.nih.gov/publication/caring-for-a-person-with-alzheimers-disease-your-easy-to-use-guide  ii. https://www.robby.nih.gov/health/caregiving  iii. https://www.robby.nih.gov/health/alzheimers/caregiving  2. Another well regarded book is The 36-Hour Day: A Family Guide to Caring for People Who Have Alzheimer Disease, Other Dementias, and Memory Loss    Adult rehabilitative mental health services (ARMHS) is a range of services that helps an individual develop and enhance psychiatric stability, social " competencies, personal and emotional adjustment, and independent living and community skills. You can find out more at:  https://[place].gov/dhs/people-we-serve/people-with-disabilities/health-care/adult-mental-health/programs-services/[facility].jsp  Or [facility] [phone]    Code: 95974    Again, thank you for allowing me to participate in the care of your patient.      Sincerely,    Colt Lester MD

## 2023-03-01 ENCOUNTER — OFFICE VISIT (OUTPATIENT)
Dept: UROLOGY | Facility: CLINIC | Age: 63
End: 2023-03-01
Payer: COMMERCIAL

## 2023-03-01 VITALS
WEIGHT: 192 LBS | HEIGHT: 69 IN | BODY MASS INDEX: 28.44 KG/M2 | SYSTOLIC BLOOD PRESSURE: 168 MMHG | DIASTOLIC BLOOD PRESSURE: 77 MMHG | HEART RATE: 86 BPM

## 2023-03-01 DIAGNOSIS — R97.20 ELEVATED PROSTATE SPECIFIC ANTIGEN (PSA): Primary | ICD-10-CM

## 2023-03-01 PROCEDURE — 76872 US TRANSRECTAL: CPT | Performed by: UROLOGY

## 2023-03-01 PROCEDURE — 88305 TISSUE EXAM BY PATHOLOGIST: CPT | Mod: TC | Performed by: UROLOGY

## 2023-03-01 PROCEDURE — 55700 PR BIOPSY OF PROSTATE,NEEDLE/PUNCH: CPT | Performed by: UROLOGY

## 2023-03-01 PROCEDURE — 88305 TISSUE EXAM BY PATHOLOGIST: CPT | Mod: 26 | Performed by: PATHOLOGY

## 2023-03-01 PROCEDURE — 76999 ECHO EXAMINATION PROCEDURE: CPT | Performed by: UROLOGY

## 2023-03-01 RX ORDER — GENTAMICIN 40 MG/ML
80 INJECTION, SOLUTION INTRAMUSCULAR; INTRAVENOUS ONCE
Status: COMPLETED | OUTPATIENT
Start: 2023-03-01 | End: 2023-03-01

## 2023-03-01 RX ADMIN — GENTAMICIN 80 MG: 40 INJECTION, SOLUTION INTRAMUSCULAR; INTRAVENOUS at 08:45

## 2023-03-01 ASSESSMENT — PAIN SCALES - GENERAL: PAINLEVEL: NO PAIN (0)

## 2023-03-01 NOTE — LETTER
"3/1/2023       RE: Wally Cano  [address] [address] [place] [id number]     Dear Colleague,    Thank you for referring your patient, Wally Cano, to the [facility]OLOGY [facility]IC [place] at [facility]. Please see a copy of my visit note below.    Chief Complaint   Patient presents with     Prostate Biopsy       Blood pressure (!) 168/77, pulse 86, height 1.753 m (5' 9\"), weight 87.1 kg (192 lb). Body mass index is 28.35 kg/m .    Patient Active Problem List   Diagnosis     Hyperlipidemia LDL goal <100     DDD (degenerative disc disease), lumbar     VSD (ventricular septal defect)     * * * SBE PROPHYLAXIS * * *     External hemorrhoids     Varicose veins     Hypertension goal BP (blood pressure) < 140/90     Advanced directives, counseling/discussion     S/P infectious endocarditis     Superficial thrombophlebitis     [facility]     Melanoma of scalp (H)     Early onset Alzheimer's disease without behavioral disturbance (H)     Primary osteoarthritis of left knee     Gastrointestinal hemorrhage associated with acute gastritis     Osteoarthritis of thumb, right       No Known Allergies    Current Outpatient Medications   Medication Sig Dispense Refill     Acetaminophen (TYLENOL PO) Take 500 mg by mouth every 8 hours as needed       aspirin (ASA) 81 MG tablet Take 81 mg by mouth daily       atorvastatin (LIPITOR) 20 MG tablet Take 1 tablet (20 mg) by mouth daily 90 tablet 3     donepezil (ARICEPT) 10 MG tablet Take 1 tablet (10 mg) by mouth daily 90 tablet 3     hydrochlorothiazide (HYDRODIURIL) 25 MG tablet Take 1 tablet (25 mg) by mouth daily 90 tablet 3     lisinopril (ZESTRIL) 20 MG tablet Take 1 tablet (20 mg) by mouth 2 times daily 180 tablet 3     sodium fluoride dental gel (PREVIDENT) 1.1 % GEL topical gel Apply to affected area At Bedtime Apply to affected " area At Bedtime 60 g 1       Social History     Tobacco Use     Smoking status: Former     Packs/day: 1.00     Years: 10.00     Pack years: 10.00     Types: Cigarettes, Cigars     Start date: 1986     Quit date: 10/28/2006     Years since quittin.3     Smokeless tobacco: Never   Substance Use Topics     Alcohol use: Yes     Comment: a glass of wine - 5 or 6 per week     Drug use: No       Invasive Procedure Safety Checklist:    Procedure: MRI fusion TRUS prostate biopsy    Action: Complete sections and checkboxes as appropriate.    Pre-procedure:  1. Patient ID Verified with 2 identifiers (Barbie and  or MRN) : YES    2. Procedure and site verified with patient/designee (when able) : YES    3. Accurate consent documentation in medical record : YES    4. H&P (or appropriate assessment) documented in medical record : N/A  H&P must be up to 30 days prior to procedure an updated within 24 hours of                 Procedure as applicable.     5. Relevant diagnostic and radiology test results appropriately labeled and displayed as applicable : YES    6. Blood products, implants, devices, and/or special equipment available for the procedure as applicable : YES    7. Procedure site(s) marked with provider initials [Exclusions: none] : NO    8. Marking not required. Reason : Yes  Procedure does not require site marking    Time Out:     Time-Out performed immediately prior to starting procedure, including verbal and active participation of all team members addressing: YES    1. Correct patient identity.  2. Confirmed that the correct side and site are marked.  3. An accurate procedure to be done.  4. Agreement on the procedure to be done.  5. Correct patient position.  6. Relevant images and results are properly labeled and appropriately displayed.  7. The need to administer antibiotics or fluids for irrigation purposes during the procedure as applicable.  8. Safety precautions based on patient history or medication  use.    During Procedure: Verification of correct person, site, and procedure occurs any time the responsibility for care of the patient is transferred to another member of the care team.    The following medication was given:     MEDICATION:  Gentamicin  ROUTE: IM  SITE: Ventrogluteal - Right  DOSE: 80 mg  LOT #: [id number]  : [employer]  EXPIRATION DATE: 12/23  NDC#: 76932-009-47   Was there drug waste? No    Prior to injection, verified patient identity using patient's name and date of birth.  Due to injection administration, patient instructed to remain in clinic for 15 minutes  afterwards, and to report any adverse reaction to me immediately.    Drug Amount Wasted:  None.  Vial/Syringe: Single dose vial    The following medication was given:     MEDICATION:  Lidocaine without epinephrine 1%  ROUTE: administered by physician - prostate nerve block   SITE: administered by physician - prostate nerve block    DOSE: 10 mL  LOT #: [id number]  : [employer]  EXPIRATION DATE: 04/26  NDC#: 86220-906-80   Was there drug waste? Yes  Amount of drug waste (mL): 20 mL.  Reason for waste:  Single use vial    Prior to injection, verified patient identity using patient's name and date of birth.  Due to injection administration, patient instructed to remain in clinic for 15 minutes  afterwards, and to report any adverse reaction to me immediately.    Drug Amount Wasted:  Yes: 20 mL (10 mg/ml )  Vial/Syringe: Single dose vial    Destiny Kaye  3/1/2023  8:21 AM    PREPROCEDURE DIAGNOSIS: Elevated PSA   POSTPROCEDURE DIAGNOSIS: Elevated PSA  PROCEDURE: Transrectal ultrasound sizing and transrectal ultrasound guided prostatic needle biopsy, including MRI fusion targeting  for Wally Cano who is a 62 year old male.   SURGEON: Juli Pineda MD   ANESTHESIA: 5 mL of 1% periprostatic block bilaterally   We previously obtained an MRI of the prostate and identified all PIRADS 3-5 lesions for  targeting    DESCRIPTION OF PROCEDURE: The procedure, the outcome, the anesthesia, and the risks were discussed with the patient. Informed consent was obtained and signed and a timeout was completed prior to the procedure. Digital rectal examination was performed with the below findings noted. Anesthesia was administered as noted above and the transrectal ultrasound probe was inserted, sizing was performed, and the below findings were noted. 2 core biopsies were taken from each of the MRI targets, and 12 core biopsies were taken as described below. The probe was then withdrawn. Patient tolerated the procedure well.   FINDINGS: Digital rectal exam reveals normal prostate. US images were obtained and then fused with the MRI images.  The fused images were then used to guide the biopsy of the targeted lesions with 2 cores taken of each lesion.  We then performed random biopsies.  12 cores were taken with 6 on each side, base, mid and apex.      Juli Pineda MD   Department of Urology   [facility]

## 2023-03-01 NOTE — PROGRESS NOTES
"Chief Complaint   Patient presents with     Prostate Biopsy       Blood pressure (!) 168/77, pulse 86, height 1.753 m (5' 9\"), weight 87.1 kg (192 lb). Body mass index is 28.35 kg/m .    Patient Active Problem List   Diagnosis     Hyperlipidemia LDL goal <100     DDD (degenerative disc disease), lumbar     VSD (ventricular septal defect)     * * * SBE PROPHYLAXIS * * *     External hemorrhoids     Varicose veins     Hypertension goal BP (blood pressure) < 140/90     Advanced directives, counseling/discussion     S/P infectious endocarditis     Superficial thrombophlebitis     [facility]     Melanoma of scalp (H)     Early onset Alzheimer's disease without behavioral disturbance (H)     Primary osteoarthritis of left knee     Gastrointestinal hemorrhage associated with acute gastritis     Osteoarthritis of thumb, right       No Known Allergies    Current Outpatient Medications   Medication Sig Dispense Refill     Acetaminophen (TYLENOL PO) Take 500 mg by mouth every 8 hours as needed       aspirin (ASA) 81 MG tablet Take 81 mg by mouth daily       atorvastatin (LIPITOR) 20 MG tablet Take 1 tablet (20 mg) by mouth daily 90 tablet 3     donepezil (ARICEPT) 10 MG tablet Take 1 tablet (10 mg) by mouth daily 90 tablet 3     hydrochlorothiazide (HYDRODIURIL) 25 MG tablet Take 1 tablet (25 mg) by mouth daily 90 tablet 3     lisinopril (ZESTRIL) 20 MG tablet Take 1 tablet (20 mg) by mouth 2 times daily 180 tablet 3     sodium fluoride dental gel (PREVIDENT) 1.1 % GEL topical gel Apply to affected area At Bedtime Apply to affected area At Bedtime 60 g 1       Social History     Tobacco Use     Smoking status: Former     Packs/day: 1.00     Years: 10.00     Pack years: 10.00     Types: Cigarettes, Cigars     Start date: 1986     Quit date: 10/28/2006     Years since quittin.3     Smokeless tobacco: Never   Substance Use Topics     Alcohol use: Yes     " Comment: a glass of wine - 5 or 6 per week     Drug use: No       Invasive Procedure Safety Checklist:    Procedure: MRI fusion TRUS prostate biopsy    Action: Complete sections and checkboxes as appropriate.    Pre-procedure:  1. Patient ID Verified with 2 identifiers (Barbie and  or MRN) : YES    2. Procedure and site verified with patient/designee (when able) : YES    3. Accurate consent documentation in medical record : YES    4. H&P (or appropriate assessment) documented in medical record : N/A  H&P must be up to 30 days prior to procedure an updated within 24 hours of                 Procedure as applicable.     5. Relevant diagnostic and radiology test results appropriately labeled and displayed as applicable : YES    6. Blood products, implants, devices, and/or special equipment available for the procedure as applicable : YES    7. Procedure site(s) marked with provider initials [Exclusions: none] : NO    8. Marking not required. Reason : Yes  Procedure does not require site marking    Time Out:     Time-Out performed immediately prior to starting procedure, including verbal and active participation of all team members addressing: YES    1. Correct patient identity.  2. Confirmed that the correct side and site are marked.  3. An accurate procedure to be done.  4. Agreement on the procedure to be done.  5. Correct patient position.  6. Relevant images and results are properly labeled and appropriately displayed.  7. The need to administer antibiotics or fluids for irrigation purposes during the procedure as applicable.  8. Safety precautions based on patient history or medication use.    During Procedure: Verification of correct person, site, and procedure occurs any time the responsibility for care of the patient is transferred to another member of the care team.    The following medication was given:     MEDICATION:  Gentamicin  ROUTE: IM  SITE: Ventrogluteal - Right  DOSE: 80 mg  LOT #:  [id number]  : [employer]  EXPIRATION DATE: 12/23  NDC#: 53096-266-74   Was there drug waste? No    Prior to injection, verified patient identity using patient's name and date of birth.  Due to injection administration, patient instructed to remain in clinic for 15 minutes  afterwards, and to report any adverse reaction to me immediately.    Drug Amount Wasted:  None.  Vial/Syringe: Single dose vial    The following medication was given:     MEDICATION:  Lidocaine without epinephrine 1%  ROUTE: administered by physician - prostate nerve block   SITE: administered by physician - prostate nerve block    DOSE: 10 mL  LOT #: [id number]  : [employer]  EXPIRATION DATE: 04/26  NDC#: 25527-562-66   Was there drug waste? Yes  Amount of drug waste (mL): 20 mL.  Reason for waste:  Single use vial    Prior to injection, verified patient identity using patient's name and date of birth.  Due to injection administration, patient instructed to remain in clinic for 15 minutes  afterwards, and to report any adverse reaction to me immediately.    Drug Amount Wasted:  Yes: 20 mL (10 mg/ml )  Vial/Syringe: Single dose vial    Destiny Kaye  3/1/2023  8:21 AM    PREPROCEDURE DIAGNOSIS: Elevated PSA   POSTPROCEDURE DIAGNOSIS: Elevated PSA  PROCEDURE: Transrectal ultrasound sizing and transrectal ultrasound guided prostatic needle biopsy, including MRI fusion targeting  for Wally Cano who is a 62 year old male.   SURGEON: Juli Pineda MD   ANESTHESIA: 5 mL of 1% periprostatic block bilaterally   We previously obtained an MRI of the prostate and identified all PIRADS 3-5 lesions for targeting    DESCRIPTION OF PROCEDURE: The procedure, the outcome, the anesthesia, and the risks were discussed with the patient. Informed consent was obtained and signed and a timeout was completed prior to the procedure. Digital rectal examination was performed with the below findings noted. Anesthesia was administered  as noted above and the transrectal ultrasound probe was inserted, sizing was performed, and the below findings were noted. 2 core biopsies were taken from each of the MRI targets, and 12 core biopsies were taken as described below. The probe was then withdrawn. Patient tolerated the procedure well.   FINDINGS: Digital rectal exam reveals normal prostate. US images were obtained and then fused with the MRI images.  The fused images were then used to guide the biopsy of the targeted lesions with 2 cores taken of each lesion.  We then performed random biopsies.  12 cores were taken with 6 on each side, base, mid and apex.      Juli Pineda MD   Department of Urology   [facility]

## 2023-03-01 NOTE — PATIENT INSTRUCTIONS
[facility]  Precautions Following a Prostate Biopsy    There are four conditions that you should watch for after a prostate biopsy:    Excessive pain  Bleeding irregularities (passing numerous  dime sized  clots or if your urine looks like cranberry juice)  Fever of 100 degrees or more  If you are unable to urinate      If any of these occur, call the Urology Clinic during normal business hours (M-F, 8:00-4:30) at [phone].  If you experience a problem after normal business hours, call our 24-hour phone number at [phone] and ask for the Urology Resident on call to be paged.    If you experience any discomfort following the biopsy, you may take Tylenol.  DO NOT TAKE ASPIRIN unless specified by your physician.   If the discomfort becomes severe or uncontrolled by medication, contact the Urology Clinic or Urology Resident (after normal business hours).    Do not be alarmed if you have some blood in your stool, in your urine, or ejaculate (semen).  This occurrence is normal and may last up to three (3) or four (4) days, usually intermittently.  Blood in the ejaculate (semen) may last several weeks, up to about a dozen ejaculations.  The blood in your ejaculate may appear as brown streaks, blood tinged, and immediately following a biopsy, it may appear bright red.    If you run a fever above 100 degrees, call the Urology Clinic or Urology Resident (after normal business hours) immediately.  If you are unable to reach your physician or the Resident on call, go to the nearest emergency room.  Explain that you have had a transrectal biopsy of your prostate and what problems you are experiencing.      You should attempt to urinate following your biopsy before you leave the clinic.  If you are unable to urinate four (4) to six (6) hours after you leave the clinic, you will need to contact the Urology Clinic or the Resident on call.  If you are unable to reach your physician or the  Resident on call, go to the nearest emergency room.      If you have any questions or concerns after your biopsy, feel free to contact the Urology Clinic at [phone] during M-F, 8:00-5pm business hours.  If you need to speak with someone after normal business hours, call [phone] and ask for the Resident on call to be paged.

## 2023-03-08 ENCOUNTER — VIRTUAL VISIT (OUTPATIENT)
Dept: UROLOGY | Facility: CLINIC | Age: 63
End: 2023-03-08
Payer: COMMERCIAL

## 2023-03-08 DIAGNOSIS — R97.20 ELEVATED PROSTATE SPECIFIC ANTIGEN (PSA): Primary | ICD-10-CM

## 2023-03-08 PROCEDURE — 99214 OFFICE O/P EST MOD 30 MIN: CPT | Mod: VID | Performed by: UROLOGY

## 2023-03-08 NOTE — NURSING NOTE
Is the patient currently in the state of [place]? YES    Visit mode:VIDEO    If the visit is dropped, the patient can be reconnected by: VIDEO VISIT: Text to cell phone: [phone]    Will anyone else be joining the visit? NO      How would you like to obtain your AVS? MyChart    Are changes needed to the allergy or medication list? NO    Reason for visit: follow up

## 2023-03-08 NOTE — LETTER
3/8/2023       RE: Wally Cano  [address] [address] [place] [id number]     Dear Colleague,    Thank you for referring your patient, Wally Cano, to the [facility] [place] at [facility]. Please see a copy of my visit note below.    Video-Visit Details    Type of service:  Video Visit    Video Start Time (time video started): 445 PM     Video End Time (time video stopped): 5:07 PM     Originating Location (pt. Location): Home        Distant Location (provider location):  On-site    Mode of Communication:  Video Conference via [facility]      Urology [facility]     HPI  Wally Cano is a 62 year old male with newly diagnosed prostate cancer, here for follow-up after his prostate biopsy.      The patient denies any major issues after the biopsy     No changes to health, hospitalizations or new diagnoses in the interim    PHYSICAL EXAM  Vitals:  No vitals were obtained today due to virtual visit.    Physical Exam   GENERAL: Healthy, alert and no distress  EYES: Eyes grossly normal to inspection.  No discharge or erythema, or obvious scleral/conjunctival abnormalities.  RESP: No audible wheeze, cough, or visible cyanosis.  No visible retractions or increased work of breathing.    SKIN: Visible skin clear. No significant rash, abnormal pigmentation or lesions.  NEURO: Cranial nerves grossly intact.  Mentation and speech appropriate for age.  PSYCH: Mentation appears normal, affect normal/bright, judgement and insight intact, normal speech and appearance well-groomed.      Labs  Lab Results   Component Value Date    WBC 7.4 11/08/2022    WBC 8.6 06/17/2021     Lab Results   Component Value Date    RBC 4.35 11/08/2022    RBC 4.10 06/17/2021     Lab Results   Component Value Date    HGB 13.6 11/08/2022    HGB 13.0 06/17/2021     Lab Results   Component Value Date    HCT 40.3 11/08/2022    HCT 39.1 06/17/2021     No  components found for: MCT  Lab Results   Component Value Date    MCV 93 11/08/2022    MCV 95 06/17/2021     Lab Results   Component Value Date    MCH 31.3 11/08/2022    MCH 31.7 06/17/2021     Lab Results   Component Value Date    MCHC 33.7 11/08/2022    MCHC 33.2 06/17/2021     Lab Results   Component Value Date    RDW 12.4 11/08/2022    RDW 12.9 06/17/2021     Lab Results   Component Value Date     11/08/2022     06/17/2021        Last Comprehensive Metabolic Panel:  Sodium   Date Value Ref Range Status   12/13/2022 139 133 - 144 mmol/L Final   11/06/2020 141 133 - 144 mmol/L Final     Potassium   Date Value Ref Range Status   12/13/2022 3.8 3.4 - 5.3 mmol/L Final   03/26/2021 4.0 3.4 - 5.3 mmol/L Final     Chloride   Date Value Ref Range Status   12/13/2022 106 94 - 109 mmol/L Final   11/06/2020 110 (H) 94 - 109 mmol/L Final     Carbon Dioxide   Date Value Ref Range Status   11/06/2020 29 20 - 32 mmol/L Final     Carbon Dioxide (CO2)   Date Value Ref Range Status   12/13/2022 29 20 - 32 mmol/L Final     Anion Gap   Date Value Ref Range Status   12/13/2022 4 3 - 14 mmol/L Final   11/06/2020 2 (L) 3 - 14 mmol/L Final     Glucose   Date Value Ref Range Status   12/13/2022 173 (H) 70 - 99 mg/dL Final   11/06/2020 108 (H) 70 - 99 mg/dL Final     Comment:     Fasting specimen     Urea Nitrogen   Date Value Ref Range Status   12/13/2022 17 7 - 30 mg/dL Final   11/06/2020 22 7 - 30 mg/dL Final     Creatinine   Date Value Ref Range Status   12/13/2022 1.08 0.66 - 1.25 mg/dL Final   03/26/2021 1.25 0.66 - 1.25 mg/dL Final     GFR Estimate   Date Value Ref Range Status   12/13/2022 78 >60 mL/min/1.73m2 Final     Comment:     Effective December 21, 2021 eGFRcr in adults is calculated using the 2021 CKD-EPI creatinine equation which includes age and gender ( et al., NEJM, DOI: 10.1056/QJFIdw0784304)   03/26/2021 62 >60 mL/min/[1.73_m2] Final     Comment:     Non  GFR Calc  Starting  12/18/2018, serum creatinine based estimated GFR (eGFR) will be   calculated using the Chronic Kidney Disease Epidemiology Collaboration   (CKD-EPI) equation.       Calcium   Date Value Ref Range Status   12/13/2022 9.2 8.5 - 10.1 mg/dL Final   11/06/2020 8.9 8.5 - 10.1 mg/dL Final     Bilirubin Total   Date Value Ref Range Status   11/08/2022 1.5 (H) 0.2 - 1.3 mg/dL Final   11/06/2020 2.1 (H) 0.2 - 1.3 mg/dL Final     Alkaline Phosphatase   Date Value Ref Range Status   11/08/2022 73 40 - 150 U/L Final   11/06/2020 77 40 - 150 U/L Final     ALT   Date Value Ref Range Status   11/08/2022 40 0 - 70 U/L Final   11/06/2020 42 0 - 70 U/L Final     AST   Date Value Ref Range Status   11/08/2022 41 0 - 45 U/L Final   11/06/2020 36 0 - 45 U/L Final       PSA   Date Value Ref Range Status   11/06/2020 2.74 0 - 4 ug/L Final     Comment:     Assay Method:  Chemiluminescence using Siemens Vista analyzer   10/30/2019 2.98 0 - 4 ug/L Final     Comment:     Assay Method:  Chemiluminescence using Siemens Vista analyzer   10/03/2018 2.20 0 - 4 ug/L Final     Comment:     Assay Method:  Chemiluminescence using Siemens Vista analyzer   07/14/2017 2.09 0 - 4 ug/L Final     Comment:     Assay Method:  Chemiluminescence using Siemens Vista analyzer   08/02/2016 1.61 0 - 4 ug/L Final     Comment:     Assay Method:  Chemiluminescence using Siemens Vista analyzer   07/28/2015 2.35 0 - 4 ug/L Final   10/17/2014 2.06 0 - 4 ug/L Final   07/17/2014 4.19 (H) 0 - 4 ug/L Final   07/12/2013 1.67 0 - 4 ug/L Final   05/20/2011 1.56 0 - 4 ug/L Final     Prostate Specific Antigen Screen   Date Value Ref Range Status   11/08/2022 4.90 (H) 0.00 - 4.00 ug/L Final   12/21/2021 3.03 0.00 - 4.00 ug/L Final        Surgical pathology    Final Diagnosis   A.  Prostate, left base, biopsy:  - Benign prostate tissue    B.  Prostate, left mid, biopsy:  - Benign prostate tissue    C.  Prostate, left apex, biopsy:  - Benign prostate tissue    D.  Prostate, right  base, biopsy:  - Benign prostate tissue    E.  Prostate, right mid, biopsy:  - Benign prostate tissue    F.  Prostate, right apex, biopsy:  - Benign prostatic tissue    G.  Prostate, target 1, biopsy:  - Benign prostatic tissue    H.  Prostate, target 2, biopsy:  - Benign prostatic tissue         Imaging   MR prostate     FINDINGS:  Size: 5.1 x 5.2 x 4.1 cm, 57 grams  Hemorrhage: Mild in the mid gland peripheral zone  Peripheral zone: Heterogeneous on T2-weighted images. Suspicious  lesions as detailed below.  Transition zone: Enlarged with BPH changes. No suspicious lesions  identified.     Lesion(s) in rank order of severity (highest score- to lowest score,  then by size)      Lesion 1:  Location: Right mid gland peripheral zone at the 8 o'clock position  relative to the urethra. Series 7001 image 65.   Additional prostate regions involved: None  Size: 5 x 4 x 6 mm  T2 description: Moderately T2 hypointense  T2 numerical assessment: 4  DWI description: Markedly diffusion restricting/ADC hypointense  DWI numerical assessment: 4  DCE assessment: Positive    Prostate margin: No capsular abutment  Lesion overall PI-RADS category: 4     Lesion 2:  Location: Right mid gland peripheral zone at the 7 o'clock position  relative to the urethra. Series 7001 image 53.   Additional prostate regions involved: None  Size: 5.5 x 6 mm  T2 description: Heterogeneous hypointense  T2 numerical assessment: 3  DWI description: Moderately diffusion restricting and mildly ADC  hypointense  DWI numerical assessment: 3  DCE assessment: Positive    Prostate margin: Capsular abutment 6-15 mm with smooth contour  Lesion overall PI-RADS category: 4     Neurovascular bundles: No neurovascular bundle involvement by  malignancy.  Seminal vesicles: No seminal vesicle involvement by malignancy.  Lymph nodes: No lymph node involvement  Bones: No suspicious lesions. T2 hyperintensity in the pubic symphysis  with marked soft tissue thickening  suggestive of osteitis pubis.  Other pelvic organs: Multiple tiny cysts along the left spermatic  cord/inguinal canal in in the region of the epididymis.  Postsurgical changes of left inguinal hernia repair with nodular,  progressively enhancing soft tissue at the expected location of the  medial mesh anchor and at the expected location of lateral mesh anchor  (series 19,001, image 31 and 25). Mass effect upon the urinary  bladder.                                                                      IMPRESSION:  1. Based on the most suspicious abnormality, this exam is  characterized as PIRADS 4 - Clinically significant cancer is likely to  be present.  The most suspicious abnormality is is a 6mm nodule  located at the right mid gland peripheral zone and there is no  evidence of extraprostatic extension.  2. Additional 6mm PIRADS4 lesion as detailed above.  3. No suspicious adenopathy or evidence of pelvic metastases.  4. Nodular soft tissue at the left inguinal canal consistent with  prominent scar from left hernia repair. Additional soft tissue  associated with pubic symphysis inflammation may be related to hernia  mesh anchor site scarring versus osteitis pubis. This soft tissue  indents the urinary bladder. Consider outpatient follow-up with  general surgery.     ASSESSMENT AND PLAN  62 year old male with benign prostate biopsy     Plan   Annual PSA check with PCP       30 total minutes spent on the date of the encounter including direct interaction with the patient, performing chart review, documentation and further activities as noted above    Juli Pineda MD   Department of Urology   [facility]

## 2023-03-08 NOTE — PROGRESS NOTES
Video-Visit Details    Type of service:  Video Visit    Video Start Time (time video started): 445 PM     Video End Time (time video stopped): 5:07 PM     Originating Location (pt. Location): Home        Distant Location (provider location):  On-site    Mode of Communication:  Video Conference via [facility]      Urology Clinic     HPI  Wally Cano is a 62 year old male with newly diagnosed prostate cancer, here for follow-up after his prostate biopsy.      The patient denies any major issues after the biopsy     No changes to health, hospitalizations or new diagnoses in the interim    PHYSICAL EXAM  Vitals:  No vitals were obtained today due to virtual visit.    Physical Exam   GENERAL: Healthy, alert and no distress  EYES: Eyes grossly normal to inspection.  No discharge or erythema, or obvious scleral/conjunctival abnormalities.  RESP: No audible wheeze, cough, or visible cyanosis.  No visible retractions or increased work of breathing.    SKIN: Visible skin clear. No significant rash, abnormal pigmentation or lesions.  NEURO: Cranial nerves grossly intact.  Mentation and speech appropriate for age.  PSYCH: Mentation appears normal, affect normal/bright, judgement and insight intact, normal speech and appearance well-groomed.      Labs  Lab Results   Component Value Date    WBC 7.4 11/08/2022    WBC 8.6 06/17/2021     Lab Results   Component Value Date    RBC 4.35 11/08/2022    RBC 4.10 06/17/2021     Lab Results   Component Value Date    HGB 13.6 11/08/2022    HGB 13.0 06/17/2021     Lab Results   Component Value Date    HCT 40.3 11/08/2022    HCT 39.1 06/17/2021     No components found for: MCT  Lab Results   Component Value Date    MCV 93 11/08/2022    MCV 95 06/17/2021     Lab Results   Component Value Date    MCH 31.3 11/08/2022    MCH 31.7 06/17/2021     Lab Results   Component Value Date    MCHC 33.7 11/08/2022    MCHC 33.2 06/17/2021     Lab Results   Component Value Date    RDW 12.4  11/08/2022    RDW 12.9 06/17/2021     Lab Results   Component Value Date     11/08/2022     06/17/2021        Last Comprehensive Metabolic Panel:  Sodium   Date Value Ref Range Status   12/13/2022 139 133 - 144 mmol/L Final   11/06/2020 141 133 - 144 mmol/L Final     Potassium   Date Value Ref Range Status   12/13/2022 3.8 3.4 - 5.3 mmol/L Final   03/26/2021 4.0 3.4 - 5.3 mmol/L Final     Chloride   Date Value Ref Range Status   12/13/2022 106 94 - 109 mmol/L Final   11/06/2020 110 (H) 94 - 109 mmol/L Final     Carbon Dioxide   Date Value Ref Range Status   11/06/2020 29 20 - 32 mmol/L Final     Carbon Dioxide (CO2)   Date Value Ref Range Status   12/13/2022 29 20 - 32 mmol/L Final     Anion Gap   Date Value Ref Range Status   12/13/2022 4 3 - 14 mmol/L Final   11/06/2020 2 (L) 3 - 14 mmol/L Final     Glucose   Date Value Ref Range Status   12/13/2022 173 (H) 70 - 99 mg/dL Final   11/06/2020 108 (H) 70 - 99 mg/dL Final     Comment:     Fasting specimen     Urea Nitrogen   Date Value Ref Range Status   12/13/2022 17 7 - 30 mg/dL Final   11/06/2020 22 7 - 30 mg/dL Final     Creatinine   Date Value Ref Range Status   12/13/2022 1.08 0.66 - 1.25 mg/dL Final   03/26/2021 1.25 0.66 - 1.25 mg/dL Final     GFR Estimate   Date Value Ref Range Status   12/13/2022 78 >60 mL/min/1.73m2 Final     Comment:     Effective December 21, 2021 eGFRcr in adults is calculated using the 2021 CKD-EPI creatinine equation which includes age and gender (Kj et al., NEJM, DOI: 10.1056/WGMMld4014264)   03/26/2021 62 >60 mL/min/[1.73_m2] Final     Comment:     Non  GFR Calc  Starting 12/18/2018, serum creatinine based estimated GFR (eGFR) will be   calculated using the Chronic Kidney Disease Epidemiology Collaboration   (CKD-EPI) equation.       Calcium   Date Value Ref Range Status   12/13/2022 9.2 8.5 - 10.1 mg/dL Final   11/06/2020 8.9 8.5 - 10.1 mg/dL Final     Bilirubin Total   Date Value Ref Range Status    11/08/2022 1.5 (H) 0.2 - 1.3 mg/dL Final   11/06/2020 2.1 (H) 0.2 - 1.3 mg/dL Final     Alkaline Phosphatase   Date Value Ref Range Status   11/08/2022 73 40 - 150 U/L Final   11/06/2020 77 40 - 150 U/L Final     ALT   Date Value Ref Range Status   11/08/2022 40 0 - 70 U/L Final   11/06/2020 42 0 - 70 U/L Final     AST   Date Value Ref Range Status   11/08/2022 41 0 - 45 U/L Final   11/06/2020 36 0 - 45 U/L Final       PSA   Date Value Ref Range Status   11/06/2020 2.74 0 - 4 ug/L Final     Comment:     Assay Method:  Chemiluminescence using Siemens Vista analyzer   10/30/2019 2.98 0 - 4 ug/L Final     Comment:     Assay Method:  Chemiluminescence using Siemens Vista analyzer   10/03/2018 2.20 0 - 4 ug/L Final     Comment:     Assay Method:  Chemiluminescence using Siemens Vista analyzer   07/14/2017 2.09 0 - 4 ug/L Final     Comment:     Assay Method:  Chemiluminescence using Siemens Vista analyzer   08/02/2016 1.61 0 - 4 ug/L Final     Comment:     Assay Method:  Chemiluminescence using Siemens Vista analyzer   07/28/2015 2.35 0 - 4 ug/L Final   10/17/2014 2.06 0 - 4 ug/L Final   07/17/2014 4.19 (H) 0 - 4 ug/L Final   07/12/2013 1.67 0 - 4 ug/L Final   05/20/2011 1.56 0 - 4 ug/L Final     Prostate Specific Antigen Screen   Date Value Ref Range Status   11/08/2022 4.90 (H) 0.00 - 4.00 ug/L Final   12/21/2021 3.03 0.00 - 4.00 ug/L Final        Surgical pathology    Final Diagnosis   A.  Prostate, left base, biopsy:  - Benign prostate tissue    B.  Prostate, left mid, biopsy:  - Benign prostate tissue    C.  Prostate, left apex, biopsy:  - Benign prostate tissue    D.  Prostate, right base, biopsy:  - Benign prostate tissue    E.  Prostate, right mid, biopsy:  - Benign prostate tissue    F.  Prostate, right apex, biopsy:  - Benign prostatic tissue    G.  Prostate, target 1, biopsy:  - Benign prostatic tissue    H.  Prostate, target 2, biopsy:  - Benign prostatic tissue         Imaging   MR prostate      FINDINGS:  Size: 5.1 x 5.2 x 4.1 cm, 57 grams  Hemorrhage: Mild in the mid gland peripheral zone  Peripheral zone: Heterogeneous on T2-weighted images. Suspicious  lesions as detailed below.  Transition zone: Enlarged with BPH changes. No suspicious lesions  identified.     Lesion(s) in rank order of severity (highest score- to lowest score,  then by size)      Lesion 1:  Location: Right mid gland peripheral zone at the 8 o'clock position  relative to the urethra. Series 7001 image 65.   Additional prostate regions involved: None  Size: 5 x 4 x 6 mm  T2 description: Moderately T2 hypointense  T2 numerical assessment: 4  DWI description: Markedly diffusion restricting/ADC hypointense  DWI numerical assessment: 4  DCE assessment: Positive    Prostate margin: No capsular abutment  Lesion overall PI-RADS category: 4     Lesion 2:  Location: Right mid gland peripheral zone at the 7 o'clock position  relative to the urethra. Series 7001 image 53.   Additional prostate regions involved: None  Size: 5.5 x 6 mm  T2 description: Heterogeneous hypointense  T2 numerical assessment: 3  DWI description: Moderately diffusion restricting and mildly ADC  hypointense  DWI numerical assessment: 3  DCE assessment: Positive    Prostate margin: Capsular abutment 6-15 mm with smooth contour  Lesion overall PI-RADS category: 4     Neurovascular bundles: No neurovascular bundle involvement by  malignancy.  Seminal vesicles: No seminal vesicle involvement by malignancy.  Lymph nodes: No lymph node involvement  Bones: No suspicious lesions. T2 hyperintensity in the pubic symphysis  with marked soft tissue thickening suggestive of osteitis pubis.  Other pelvic organs: Multiple tiny cysts along the left spermatic  cord/inguinal canal in in the region of the epididymis.  Postsurgical changes of left inguinal hernia repair with nodular,  progressively enhancing soft tissue at the expected location of the  medial mesh anchor and at the  expected location of lateral mesh anchor  (series 19,001, image 31 and 25). Mass effect upon the urinary  bladder.                                                                      IMPRESSION:  1. Based on the most suspicious abnormality, this exam is  characterized as PIRADS 4 - Clinically significant cancer is likely to  be present.  The most suspicious abnormality is is a 6mm nodule  located at the right mid gland peripheral zone and there is no  evidence of extraprostatic extension.  2. Additional 6mm PIRADS4 lesion as detailed above.  3. No suspicious adenopathy or evidence of pelvic metastases.  4. Nodular soft tissue at the left inguinal canal consistent with  prominent scar from left hernia repair. Additional soft tissue  associated with pubic symphysis inflammation may be related to hernia  mesh anchor site scarring versus osteitis pubis. This soft tissue  indents the urinary bladder. Consider outpatient follow-up with  general surgery.     ASSESSMENT AND PLAN  62 year old male with benign prostate biopsy     Plan   Annual PSA check with PCP       30 total minutes spent on the date of the encounter including direct interaction with the patient, performing chart review, documentation and further activities as noted above    Juli Pineda MD   Department of Urology   [facility]

## 2023-03-15 DIAGNOSIS — E78.5 HYPERLIPIDEMIA LDL GOAL <100: ICD-10-CM

## 2023-03-15 RX ORDER — ATORVASTATIN CALCIUM 20 MG/1
TABLET, FILM COATED ORAL
Qty: 90 TABLET | Refills: 3 | Status: SHIPPED | OUTPATIENT
Start: 2023-03-15 | End: 2024-05-20

## 2023-05-17 DIAGNOSIS — F02.80 EARLY ONSET ALZHEIMER'S DEMENTIA WITHOUT BEHAVIORAL DISTURBANCE (H): ICD-10-CM

## 2023-05-17 DIAGNOSIS — G30.0 EARLY ONSET ALZHEIMER'S DEMENTIA WITHOUT BEHAVIORAL DISTURBANCE (H): ICD-10-CM

## 2023-05-17 RX ORDER — DONEPEZIL HYDROCHLORIDE 10 MG/1
10 TABLET, FILM COATED ORAL DAILY
Qty: 90 TABLET | Refills: 2 | Status: SHIPPED | OUTPATIENT
Start: 2023-05-17 | End: 2024-02-16

## 2023-05-17 NOTE — TELEPHONE ENCOUNTER
donepezil (ARICEPT) 10 MG tablet  Last Written Prescription Date:  6/10/22  Last Fill Quantity: 90,   # refills: 3  Last Office Visit : 2/24/23  Future Office visit:  None    Creatinine   Date Value Ref Range Status   12/13/2022 1.08 0.66 - 1.25 mg/dL Final   03/26/2021 1.25 0.66 - 1.25 mg/dL Final         Routing refill request to provider for review/approval because:

## 2023-05-22 ENCOUNTER — MYC MEDICAL ADVICE (OUTPATIENT)
Dept: FAMILY MEDICINE | Facility: CLINIC | Age: 63
End: 2023-05-22

## 2023-05-22 ENCOUNTER — LAB (OUTPATIENT)
Dept: LAB | Facility: CLINIC | Age: 63
End: 2023-05-22
Payer: COMMERCIAL

## 2023-05-22 DIAGNOSIS — R73.03 PREDIABETES: ICD-10-CM

## 2023-05-22 DIAGNOSIS — R73.03 PREDIABETES: Primary | ICD-10-CM

## 2023-05-22 LAB
FASTING STATUS PATIENT QL REPORTED: YES
GLUCOSE BLD-MCNC: 124 MG/DL (ref 70–99)
HBA1C MFR BLD: 6.2 % (ref 0–5.6)

## 2023-05-22 PROCEDURE — 36415 COLL VENOUS BLD VENIPUNCTURE: CPT

## 2023-05-22 PROCEDURE — 83036 HEMOGLOBIN GLYCOSYLATED A1C: CPT

## 2023-05-22 PROCEDURE — 82947 ASSAY GLUCOSE BLOOD QUANT: CPT

## 2023-05-22 NOTE — TELEPHONE ENCOUNTER
Routed to provider to review and advise regarding recent lab results and medication question.    Tamar Bennett, BSN, RN

## 2023-05-23 RX ORDER — METFORMIN HCL 500 MG
500 TABLET, EXTENDED RELEASE 24 HR ORAL 2 TIMES DAILY WITH MEALS
Qty: 180 TABLET | Refills: 0 | Status: SHIPPED | OUTPATIENT
Start: 2023-05-23 | End: 2023-08-16

## 2023-05-31 ENCOUNTER — APPOINTMENT (OUTPATIENT)
Dept: URBAN - METROPOLITAN AREA CLINIC 252 | Age: 63
Setting detail: DERMATOLOGY
End: 2023-06-05

## 2023-05-31 VITALS — HEIGHT: 69 IN | WEIGHT: 184 LBS | RESPIRATION RATE: 18 BRPM

## 2023-05-31 DIAGNOSIS — Z71.89 OTHER SPECIFIED COUNSELING: ICD-10-CM

## 2023-05-31 DIAGNOSIS — Z85.820 PERSONAL HISTORY OF MALIGNANT MELANOMA OF SKIN: ICD-10-CM

## 2023-05-31 DIAGNOSIS — L82.1 OTHER SEBORRHEIC KERATOSIS: ICD-10-CM

## 2023-05-31 DIAGNOSIS — L81.4 OTHER MELANIN HYPERPIGMENTATION: ICD-10-CM

## 2023-05-31 DIAGNOSIS — D18.0 HEMANGIOMA: ICD-10-CM

## 2023-05-31 DIAGNOSIS — D22 MELANOCYTIC NEVI: ICD-10-CM

## 2023-05-31 PROBLEM — D18.01 HEMANGIOMA OF SKIN AND SUBCUTANEOUS TISSUE: Status: ACTIVE | Noted: 2023-05-31

## 2023-05-31 PROBLEM — D22.5 MELANOCYTIC NEVI OF TRUNK: Status: ACTIVE | Noted: 2023-05-31

## 2023-05-31 PROCEDURE — 99213 OFFICE O/P EST LOW 20 MIN: CPT

## 2023-05-31 PROCEDURE — OTHER COUNSELING: OTHER

## 2023-05-31 PROCEDURE — OTHER ADDITIONAL NOTES: OTHER

## 2023-05-31 ASSESSMENT — LOCATION SIMPLE DESCRIPTION DERM
LOCATION SIMPLE: CHEST
LOCATION SIMPLE: SCALP
LOCATION SIMPLE: RIGHT OCCIPITAL SCALP
LOCATION SIMPLE: ABDOMEN
LOCATION SIMPLE: RIGHT UPPER BACK
LOCATION SIMPLE: POSTERIOR SCALP
LOCATION SIMPLE: RIGHT LOWER BACK

## 2023-05-31 ASSESSMENT — LOCATION DETAILED DESCRIPTION DERM
LOCATION DETAILED: LEFT MEDIAL SUPERIOR CHEST
LOCATION DETAILED: RIGHT LATERAL ABDOMEN
LOCATION DETAILED: RIGHT SUPERIOR MEDIAL MIDBACK
LOCATION DETAILED: RIGHT SUPERIOR MEDIAL UPPER BACK
LOCATION DETAILED: RIGHT MEDIAL UPPER BACK
LOCATION DETAILED: LEFT SUPERIOR PARIETAL SCALP
LOCATION DETAILED: RIGHT SUPERIOR OCCIPITAL SCALP
LOCATION DETAILED: EPIGASTRIC SKIN
LOCATION DETAILED: RIGHT POSTERIOR PARIETAL SCALP
LOCATION DETAILED: RIGHT MEDIAL SUPERIOR CHEST

## 2023-05-31 ASSESSMENT — LOCATION ZONE DERM
LOCATION ZONE: SCALP
LOCATION ZONE: TRUNK

## 2023-06-24 ENCOUNTER — MYC MEDICAL ADVICE (OUTPATIENT)
Dept: FAMILY MEDICINE | Facility: CLINIC | Age: 63
End: 2023-06-24
Payer: COMMERCIAL

## 2023-06-26 ENCOUNTER — VIRTUAL VISIT (OUTPATIENT)
Dept: FAMILY MEDICINE | Facility: CLINIC | Age: 63
End: 2023-06-26
Payer: MEDICARE

## 2023-06-26 DIAGNOSIS — R00.1 SINUS BRADYCARDIA: ICD-10-CM

## 2023-06-26 DIAGNOSIS — R55 SYNCOPE, UNSPECIFIED SYNCOPE TYPE: ICD-10-CM

## 2023-06-26 DIAGNOSIS — R79.89 ELEVATED SERUM CREATININE: Primary | ICD-10-CM

## 2023-06-26 PROCEDURE — 99214 OFFICE O/P EST MOD 30 MIN: CPT | Mod: 95 | Performed by: INTERNAL MEDICINE

## 2023-06-26 NOTE — PROGRESS NOTES
"Wally is a 62 year old who is being evaluated via a billable video visit.      How would you like to obtain your AVS? MyChart  If the video visit is dropped, the invitation should be resent by: Text to cell phone: [phone]  Will anyone else be joining your video visit? No      Assessment & Plan     Elevated serum creatinine  Had a syncopal episode on the 24th of this month when his blood pressure dropped  At that point he was taken to the hospital where his creatinine was 1.47  His blood pressure is lower back to normal and he feels fine  He was given some fluids in the ER  He also had some hyponatremia which was probably from combination of hydrochlorothiazide and the fact that he had a lot of sweating  I suspect that his creatinine is back to normal  We will recheck today  We will not change any of his blood pressure medications  - Basic metabolic panel  (Ca, Cl, CO2, Creat, Gluc, K, Na, BUN); Future    Syncope, unspecified syncope type  This most probably vasovagal in nature due to volume depletion as he also give platelets this morning    Sinus bradycardia  His heart rate is 48  This most probably from a combination of his physical activity and donepezil  His heart rate was in the 50s before        30 minutes spent by me on the date of the encounter doing chart review, history and exam, documentation and further activities per the note     MED REC REQUIRED  Post Medication Reconciliation Status: discharge medications reconciled, continue medications without change  BMI:   Estimated body mass index is 28.35 kg/m  as calculated from the following:    Height as of 3/1/23: 1.753 m (5' 9\").    Weight as of 3/1/23: 87.1 kg (192 lb).           Lul Mcconnell MD  [facility]    Bryan Lo is a 62 year old, presenting for the following health issues:  No chief complaint on file.    [facility]     ED/UC Followup:    Facility:  [facility]  Date of visit: 6/24/2023  Reason for visit: " Dehydration - Hypotensive, sweating, dizziness, and confusion   Current Status: Patient states he is feeling like he is back to normal.         Review of Systems   Constitutional, HEENT, cardiovascular, pulmonary, gi and gu systems are negative, except as otherwise noted.      Objective         Vitals:  No vitals were obtained today due to virtual visit.    Physical Exam   GENERAL: Healthy, alert and no distress  EYES: Eyes grossly normal to inspection.  No discharge or erythema, or obvious scleral/conjunctival abnormalities.  RESP: No audible wheeze, cough, or visible cyanosis.  No visible retractions or increased work of breathing.    SKIN: Visible skin clear. No significant rash, abnormal pigmentation or lesions.  NEURO: Cranial nerves grossly intact.  Mentation and speech appropriate for age.  PSYCH: Mentation appears normal, affect normal/bright, judgement and insight intact, normal speech and appearance well-groomed.                Video-Visit Details    Type of service:  Video Visit   Video Start Time: 5 00PM  Video End Time:530 PM    Originating Location (pt. Location): Home    Distant Location (provider location):  Off-site  Platform used for Video Visit: [employer]

## 2023-06-27 ENCOUNTER — LAB (OUTPATIENT)
Dept: LAB | Facility: CLINIC | Age: 63
End: 2023-06-27
Payer: MEDICARE

## 2023-06-27 DIAGNOSIS — R79.89 ELEVATED SERUM CREATININE: ICD-10-CM

## 2023-06-27 LAB
ANION GAP SERPL CALCULATED.3IONS-SCNC: 11 MMOL/L (ref 7–15)
BUN SERPL-MCNC: 12.6 MG/DL (ref 8–23)
CALCIUM SERPL-MCNC: 9.4 MG/DL (ref 8.8–10.2)
CHLORIDE SERPL-SCNC: 95 MMOL/L (ref 98–107)
CREAT SERPL-MCNC: 1.01 MG/DL (ref 0.67–1.17)
DEPRECATED HCO3 PLAS-SCNC: 26 MMOL/L (ref 22–29)
GFR SERPL CREATININE-BSD FRML MDRD: 84 ML/MIN/1.73M2
GLUCOSE SERPL-MCNC: 108 MG/DL (ref 70–99)
POTASSIUM SERPL-SCNC: 4.1 MMOL/L (ref 3.4–5.3)
SODIUM SERPL-SCNC: 132 MMOL/L (ref 136–145)

## 2023-06-27 PROCEDURE — 36415 COLL VENOUS BLD VENIPUNCTURE: CPT

## 2023-06-27 PROCEDURE — 80048 BASIC METABOLIC PNL TOTAL CA: CPT

## 2023-06-29 ENCOUNTER — MYC MEDICAL ADVICE (OUTPATIENT)
Dept: FAMILY MEDICINE | Facility: CLINIC | Age: 63
End: 2023-06-29
Payer: COMMERCIAL

## 2023-06-29 DIAGNOSIS — R79.89 ELEVATED SERUM CREATININE: Primary | ICD-10-CM

## 2023-06-29 NOTE — TELEPHONE ENCOUNTER
Routing to provider to review and advise.     Amairani Motta, KARYN, RN   [facility]

## 2023-07-06 DIAGNOSIS — Q21.0 VSD (VENTRICULAR SEPTAL DEFECT): Primary | ICD-10-CM

## 2023-07-06 DIAGNOSIS — Z86.79 S/P INFECTIOUS ENDOCARDITIS: ICD-10-CM

## 2023-07-06 DIAGNOSIS — I10 HYPERTENSION GOAL BP (BLOOD PRESSURE) < 140/90: ICD-10-CM

## 2023-07-18 ENCOUNTER — LAB (OUTPATIENT)
Dept: LAB | Facility: CLINIC | Age: 63
End: 2023-07-18
Payer: MEDICARE

## 2023-07-18 DIAGNOSIS — R79.89 ELEVATED SERUM CREATININE: ICD-10-CM

## 2023-07-18 LAB
ANION GAP SERPL CALCULATED.3IONS-SCNC: 8 MMOL/L (ref 7–15)
BUN SERPL-MCNC: 13.1 MG/DL (ref 8–23)
CALCIUM SERPL-MCNC: 9.4 MG/DL (ref 8.8–10.2)
CHLORIDE SERPL-SCNC: 104 MMOL/L (ref 98–107)
CREAT SERPL-MCNC: 1.09 MG/DL (ref 0.67–1.17)
DEPRECATED HCO3 PLAS-SCNC: 26 MMOL/L (ref 22–29)
GFR SERPL CREATININE-BSD FRML MDRD: 77 ML/MIN/1.73M2
GLUCOSE SERPL-MCNC: 95 MG/DL (ref 70–99)
POTASSIUM SERPL-SCNC: 4.4 MMOL/L (ref 3.4–5.3)
SODIUM SERPL-SCNC: 138 MMOL/L (ref 136–145)

## 2023-07-18 PROCEDURE — 80048 BASIC METABOLIC PNL TOTAL CA: CPT

## 2023-07-18 PROCEDURE — 36415 COLL VENOUS BLD VENIPUNCTURE: CPT

## 2023-07-30 ENCOUNTER — MYC MEDICAL ADVICE (OUTPATIENT)
Dept: NEUROLOGY | Facility: CLINIC | Age: 63
End: 2023-07-30

## 2023-07-30 DIAGNOSIS — F41.9 ANXIETY: Primary | ICD-10-CM

## 2023-08-01 RX ORDER — ESCITALOPRAM OXALATE 5 MG/1
TABLET ORAL
Qty: 214 TABLET | Refills: 0 | Status: SHIPPED | OUTPATIENT
Start: 2023-08-01 | End: 2023-11-21

## 2023-08-15 ENCOUNTER — LAB (OUTPATIENT)
Dept: LAB | Facility: CLINIC | Age: 63
End: 2023-08-15
Payer: MEDICARE

## 2023-08-15 DIAGNOSIS — R73.03 PREDIABETES: ICD-10-CM

## 2023-08-15 LAB
FASTING STATUS PATIENT QL REPORTED: YES
GLUCOSE SERPL-MCNC: 85 MG/DL (ref 70–99)
HBA1C MFR BLD: 5.9 % (ref 0–5.6)

## 2023-08-15 PROCEDURE — 36415 COLL VENOUS BLD VENIPUNCTURE: CPT

## 2023-08-15 PROCEDURE — 83036 HEMOGLOBIN GLYCOSYLATED A1C: CPT

## 2023-08-15 PROCEDURE — 82947 ASSAY GLUCOSE BLOOD QUANT: CPT

## 2023-08-16 DIAGNOSIS — R73.03 PREDIABETES: ICD-10-CM

## 2023-08-16 RX ORDER — METFORMIN HCL 500 MG
500 TABLET, EXTENDED RELEASE 24 HR ORAL 2 TIMES DAILY WITH MEALS
Qty: 180 TABLET | Refills: 1 | Status: SHIPPED | OUTPATIENT
Start: 2023-08-16 | End: 2023-08-23

## 2023-08-23 ENCOUNTER — MYC MEDICAL ADVICE (OUTPATIENT)
Dept: FAMILY MEDICINE | Facility: CLINIC | Age: 63
End: 2023-08-23
Payer: MEDICARE

## 2023-08-23 DIAGNOSIS — R73.03 PREDIABETES: ICD-10-CM

## 2023-08-23 RX ORDER — METFORMIN HCL 500 MG
500 TABLET, EXTENDED RELEASE 24 HR ORAL 2 TIMES DAILY WITH MEALS
Qty: 180 TABLET | Refills: 1 | Status: SHIPPED | OUTPATIENT
Start: 2023-08-23 | End: 2024-02-26

## 2023-08-23 NOTE — TELEPHONE ENCOUNTER
See also [employer] message 6/29/2023. Please advise if you would like a visit for further evaluation.    CARLITO Vaca  [facility] Triage

## 2023-08-24 ENCOUNTER — VIRTUAL VISIT (OUTPATIENT)
Dept: FAMILY MEDICINE | Facility: CLINIC | Age: 63
End: 2023-08-24
Payer: MEDICARE

## 2023-08-24 DIAGNOSIS — R73.03 PREDIABETES: ICD-10-CM

## 2023-08-24 DIAGNOSIS — Q21.0 VSD (VENTRICULAR SEPTAL DEFECT): ICD-10-CM

## 2023-08-24 DIAGNOSIS — I10 HYPERTENSION GOAL BP (BLOOD PRESSURE) < 130/80: Primary | ICD-10-CM

## 2023-08-24 PROCEDURE — 99213 OFFICE O/P EST LOW 20 MIN: CPT | Mod: VID | Performed by: INTERNAL MEDICINE

## 2023-08-24 RX ORDER — HYDROCHLOROTHIAZIDE 12.5 MG/1
12.5 TABLET ORAL DAILY
Qty: 90 TABLET | Refills: 0 | Status: SHIPPED | OUTPATIENT
Start: 2023-08-24 | End: 2023-09-18 | Stop reason: SINTOL

## 2023-08-24 NOTE — PROGRESS NOTES
Wally is a 62 year old who is being evaluated via a billable video visit.      How would you like to obtain your AVS? MyChart  If the video visit is dropped, the invitation should be resent by: Send to e-mail at: [email or web address]  Will anyone else be joining your video visit? No          Assessment & Plan     Wally was seen today for hypertension.    Diagnoses and all orders for this visit:    Hypertension goal BP (blood pressure) < 130/80  Comments:  We will add low dose higher for thyroid 12.5 mg daily.  Check BMP in 7 to 10 days.  Orders:  -     hydrochlorothiazide (HYDRODIURIL) 12.5 MG tablet; Take 1 tablet (12.5 mg) by mouth daily  -     Basic metabolic panel  (Ca, Cl, CO2, Creat, Gluc, K, Na, BUN); Future    Prediabetes  Comments:  A1c improved, continue with metformin    VSD (ventricular septal defect)  Comments:  History of congenital heart disease.  Reasonable to have blood pressure goal of less than 130/80       If recheck electrolyte panel is abnormal, in particular hyponatremia develops, will change hydrochlorothiazide to amlodipine 2.5 mg daily.    Return in December as scheduled    Livier Triplett MD PhD  [facility]    Bryan Lo is a 62 year old, presenting for the following health issues:  Hypertension        8/24/2023     8:27 AM   Additional Questions   Roomed by Yoly       History of Present Illness       Hypertension: He presents for follow up of hypertension.  He does check blood pressure  regularly outside of the clinic. Outpatient blood pressures have not been over 140/90. He follows a low salt diet.     He eats 2-3 servings of fruits and vegetables daily.He consumes 0 sweetened beverage(s) daily.He exercises with enough effort to increase his heart rate 30 to 60 minutes per day.  He exercises with enough effort to increase his heart rate 3 or less days per week.   He is taking medications regularly.     Early July, had dehydration and low BP, went to ER.  Hydrochlorothiazide was discontinued.   The trigger was not drinking enough fluids.  Since then patient's wife has been making sure that he is on top of hydration.  Over the last few weeks, blood pressure starts to rise into the mid 130 range.    In the past patient has been on hydrochlorothiazide 25 mg with lisinopril 40 mg for a number of years without issue of hyponatremia.  Patient currently does not have significant lower extremity edema or fluid retention.    We discussed option of putting him back on hydrochlorothiazide (may affect his electrolytes) versus putting him on amlodipine (mucosal lower extremity edema).       Review of Systems   Constitutional, HEENT, cardiovascular, pulmonary, gi and gu systems are negative, except as otherwise noted.  Constitutional, HEENT, cardiovascular, pulmonary, GI, , musculoskeletal, neuro, skin, endocrine and psych systems are negative, except as otherwise noted.      Objective    Vitals - Patient Reported  Pain Score: No Pain (0)      Vitals:  No vitals were obtained today due to virtual visit.    Physical Exam   GENERAL: Healthy, alert and no distress  EYES: Eyes grossly normal to inspection.  No discharge or erythema, or obvious scleral/conjunctival abnormalities.  RESP: No audible wheeze, cough, or visible cyanosis.  No visible retractions or increased work of breathing.    SKIN: Visible skin clear. No significant rash, abnormal pigmentation or lesions.  NEURO: Cranial nerves grossly intact.  Mentation and speech appropriate for age.  PSYCH: Mentation appears normal, affect normal/bright, judgement and insight intact, normal speech and appearance well-groomed.    Lab on 08/15/2023   Component Date Value Ref Range Status    Hemoglobin A1C 08/15/2023 5.9 (H)  0.0 - 5.6 % Final    Normal <5.7%   Prediabetes 5.7-6.4%    Diabetes 6.5% or higher     Note: Adopted from ADA consensus guidelines.    Glucose 08/15/2023 85  70 - 99 mg/dL Final    Patient Fasting > 8hrs?  08/15/2023 Yes   Final         Video-Visit Details    Type of service:  Video Visit   Video Start Time:  10:27 AM  Video End Time:10:40 AM    Originating Location (pt. Location): Home    Distant Location (provider location):  Off-site  Platform used for Video Visit: Well

## 2023-09-05 ENCOUNTER — LAB (OUTPATIENT)
Dept: LAB | Facility: CLINIC | Age: 63
End: 2023-09-05
Payer: MEDICARE

## 2023-09-05 ENCOUNTER — MYC MEDICAL ADVICE (OUTPATIENT)
Dept: FAMILY MEDICINE | Facility: CLINIC | Age: 63
End: 2023-09-05

## 2023-09-05 DIAGNOSIS — I10 HYPERTENSION GOAL BP (BLOOD PRESSURE) < 130/80: Primary | ICD-10-CM

## 2023-09-05 DIAGNOSIS — I10 HYPERTENSION GOAL BP (BLOOD PRESSURE) < 130/80: ICD-10-CM

## 2023-09-05 LAB
ANION GAP SERPL CALCULATED.3IONS-SCNC: 11 MMOL/L (ref 7–15)
BUN SERPL-MCNC: 12.9 MG/DL (ref 8–23)
CALCIUM SERPL-MCNC: 9.3 MG/DL (ref 8.8–10.2)
CHLORIDE SERPL-SCNC: 93 MMOL/L (ref 98–107)
CREAT SERPL-MCNC: 0.99 MG/DL (ref 0.67–1.17)
DEPRECATED HCO3 PLAS-SCNC: 26 MMOL/L (ref 22–29)
GFR SERPL CREATININE-BSD FRML MDRD: 86 ML/MIN/1.73M2
GLUCOSE SERPL-MCNC: 102 MG/DL (ref 70–99)
POTASSIUM SERPL-SCNC: 4.4 MMOL/L (ref 3.4–5.3)
SODIUM SERPL-SCNC: 130 MMOL/L (ref 136–145)

## 2023-09-05 PROCEDURE — 36415 COLL VENOUS BLD VENIPUNCTURE: CPT

## 2023-09-05 PROCEDURE — 80048 BASIC METABOLIC PNL TOTAL CA: CPT

## 2023-09-05 RX ORDER — AMLODIPINE BESYLATE 5 MG/1
5 TABLET ORAL DAILY
Qty: 30 TABLET | Refills: 0 | Status: SHIPPED | OUTPATIENT
Start: 2023-09-05 | End: 2023-10-02

## 2023-09-05 NOTE — TELEPHONE ENCOUNTER
Routing to provider to review and advise.    See mychart message, is a medication change needed.    Kristina Kjellberg, MSN, RN  [facility] Triage

## 2023-09-18 ENCOUNTER — OFFICE VISIT (OUTPATIENT)
Dept: FAMILY MEDICINE | Facility: CLINIC | Age: 63
End: 2023-09-18
Payer: MEDICARE

## 2023-09-18 VITALS
DIASTOLIC BLOOD PRESSURE: 61 MMHG | HEART RATE: 65 BPM | BODY MASS INDEX: 26.9 KG/M2 | RESPIRATION RATE: 17 BRPM | OXYGEN SATURATION: 99 % | TEMPERATURE: 99 F | SYSTOLIC BLOOD PRESSURE: 111 MMHG | HEIGHT: 67 IN | WEIGHT: 171.4 LBS

## 2023-09-18 DIAGNOSIS — E87.1 HYPONATREMIA: Primary | ICD-10-CM

## 2023-09-18 DIAGNOSIS — F02.80: ICD-10-CM

## 2023-09-18 DIAGNOSIS — R55 SYNCOPE, UNSPECIFIED SYNCOPE TYPE: ICD-10-CM

## 2023-09-18 DIAGNOSIS — D64.9 LOW HEMOGLOBIN: ICD-10-CM

## 2023-09-18 DIAGNOSIS — G30.0: ICD-10-CM

## 2023-09-18 DIAGNOSIS — I10 HYPERTENSION GOAL BP (BLOOD PRESSURE) < 140/90: ICD-10-CM

## 2023-09-18 PROCEDURE — 99204 OFFICE O/P NEW MOD 45 MIN: CPT | Performed by: FAMILY MEDICINE

## 2023-09-18 RX ORDER — LISINOPRIL 20 MG/1
20 TABLET ORAL DAILY
Qty: 180 TABLET | Refills: 3 | COMMUNITY
Start: 2023-09-18 | End: 2023-12-26

## 2023-09-18 NOTE — PROGRESS NOTES
Assessment & Plan     Hypertension goal BP (blood pressure) < 140/90  Low blood pressure and dehydration noted at his emergency department visit.  At this point time going to adjust his blood pressure medication decreases his lisinopril to 20 mg once daily.  Continue amlodipine at 5 mg daily.  Monitor blood pressure at home.  Return to clinic for repeat labs in 1 week.  If blood pressure increasing on the lower dose of lisinopril could resume 40 mg of that and decrease amlodipine to 2.5 daily.  He has had significant weight loss in the recent months which may be related to his decrease in blood pressure.  - lisinopril (ZESTRIL) 20 MG tablet; Take 1 tablet (20 mg) by mouth daily  - Basic metabolic panel  (Ca, Cl, CO2, Creat, Gluc, K, Na, BUN); Future    Hyponatremia  Sodium was normal on his recent labs off the hydrochlorothiazide.  Potassium was low at the emergency department and this will be reassessed at follow-up lab.  - Basic metabolic panel  (Ca, Cl, CO2, Creat, Gluc, K, Na, BUN); Future    Low hemoglobin  Following 2 L of IV fluid, his hemoglobin was 12.8 which is lower than his baseline.  This could be somewhat delusional in nature.  Reassessment in a week with iron store assessment as well.  - CBC with platelets; Future  - Ferritin; Future    Syncope, unspecified syncope type  It was a global gradually occurring syncope rather than a sudden episode per wife's report.  We did discuss Zio patch testing.  She is concerned she will be unable to keep it on him due to his dementia.  Encouraged increased fluid intake as that is an issue they have dealt with in the past.  If any recurrent episodes occur then a trial of Zio patch for at least 24 hours would be recommended.    Early onset Alzheimer's disease without behavioral disturbance (H)  Constant care by wife and family.  She will monitor his fluid intake and dietary habits.  No current safety concerns noted.    Review of prior external note(s) from -  "CareEverywhere information from [facility] reviewed  Review of the result(s) of each unique test - EKG, CBC, BMP, UA  Ordering of each unique test  Prescription drug management       MED REC REQUIRED  Post Medication Reconciliation Status: discharge medications reconciled and changed, per note/orders  BMI:   Estimated body mass index is 26.85 kg/m  as calculated from the following:    Height as of this encounter: 1.702 m (5' 7\").    Weight as of this encounter: 77.7 kg (171 lb 6.4 oz).   Weight management plan: Discussed healthy diet and exercise guidelines    Patient Instructions   Adjust BP medications as follows:  Decrease the Lisinopril to 20 mg daily.  Continue the amlodipine at 5 mg daily.    Fluid intake 48-64 oz minimum recommended.    Labs in 1 week.     Georgie Esquivel MD  [facility] MAKAYLA Lo is a 62 year old, presenting for the following health issues:  Hospital F/U        9/18/23     8:27 AM   Additional Questions   Roomed by [facility] Follow-up Visit:    Hospital/Nursing Home/ Rehab Facility:  [facility]  Date of Admission: 9/17/2023  Date of Discharge: 9/17/2023  Reason(s) for Admission: Syncope    Was your hospitalization related to COVID-19? No   Problems taking medications regularly:  None  Medication changes since discharge: None  Problems adhering to non-medication therapy:  None    Summary of hospitalization:  CareEverywhere information obtained and reviewed  Diagnostic Tests/Treatments reviewed.  Follow up needed: none  Other Healthcare Providers Involved in Patient s Care:         None  Update since discharge: No recurrent symptoms         Plan of care communicated with patient and family           Hypertension Follow-up    Do you check your blood pressure regularly outside of the clinic? No   Are you following a low salt diet? No  Are your blood pressures ever more than 140 on " the top number (systolic) OR more   than 90 on the bottom number (diastolic), for example 140/90? No blood pressure readings over the last week reviewed and range in the 118-130/60-70 range.  Wife has noted that he does seem to have symptoms if he is down below 120 for blood pressure.  They recently discontinued the hydrochlorothiazide and added amlodipine along with his chronic lisinopril 20 mg twice daily.  Over the last 6 months he has lost about 20 pounds due to dietary changes related to his dementia.  He does not drink water regularly although they have set up a system since his emergency room visit with an alarm on the phone to encourage fluid intake every 2 hours.  For the episode that occurred on 917, he was eating at a restaurant and gradually became more confused to the point of his syncopal episode.  His family was unable to get him up and to the car so he was brought in via EMS to [facility].  Blood pressure in the ambulance was 90 systolic.  Following 2 and half liters of IV fluid discharge blood pressure was 121/75.  Urine testing showed trace ketones after the 2 and half liters of fluid EKG was unchanged from previous although heart rate of 48 was noted.  Potassium was borderline low and replaced during the emergency department visit.  Wt Readings from Last 5 Encounters:   09/18/23 77.7 kg (171 lb 6.4 oz)   03/01/23 87.1 kg (192 lb)   12/30/22 85.4 kg (188 lb 6 oz)   05/13/22 84.9 kg (187 lb 3.2 oz)   12/21/21 84.1 kg (185 lb 6.4 oz)       Dementia: Wife reports she is with him 24/7.  She assists with encouraging his fluid and food intake.  Zio patch was discussed in the emergency department and we discussed this as well.  She does not feel that he would tolerate having that on because of his confusion and would likely not be able to keep it on for any length of time        Review of Systems   Constitutional, HEENT, cardiovascular, pulmonary, gi and gu systems are negative, except as  "otherwise noted.      Objective    /61 (BP Location: Right arm, Patient Position: Sitting, Cuff Size: Adult Regular)   Pulse 65   Temp 99  F (37.2  C) (Oral)   Resp 17   Ht 1.702 m (5' 7\")   Wt 77.7 kg (171 lb 6.4 oz)   SpO2 99%   BMI 26.85 kg/m    Body mass index is 26.85 kg/m .  Physical Exam   GENERAL: alert, no distress, and over weight  EYES: Eyes grossly normal to inspection, PERRL and conjunctivae and sclerae normal  NECK: no adenopathy, no asymmetry, masses, or scars and thyroid normal to palpation  RESP: lungs clear to auscultation - no rales, rhonchi or wheezes  CV: regular rate and rhythm, normal S1 S2, no S3 or S4, no murmur, click or rub, no peripheral edema and peripheral pulses strong  MS: no gross musculoskeletal defects noted, no edema  NEURO: Normal strength and tone, sensory exam grossly normal, and pleasant, talkative but confused as to details of his recent emergency department visit    Basic Metabolic Profile  Order: [id number]   Ref Range & Units 2 d ago   SODIUM 136 - 145 mmol/L 136   POTASSIUM 3.5 - 5.1 mmol/L 3.3 Low    CHLORIDE 98 - 107 mmol/L 102   CARBON DIOXIDE 21 - 32 mmol/L 26   BUN (UREA NITRO) 7 - 18 mg/dL 16   CREATININE 0.70 - 1.30 mg/dL 0.88   EST GFR (CKD-EPI) >60.00 mL/min/1.73m2 >60.00   Comment: Calculation based on the Chronic Kidney Disease Epidemiology Collaboration (CKD-EPI) equation refit without adjustment for race.   GLUCOSE 70 - 110 mg/dL 111 High    CALCIUM, SERUM 8.5 - 10.1 mg/dL 8.5   ANION GAP 0.0 - 15.0 mmol/L 8.0   Resulting Agency  [facility]   CBC w/diff  Order: [id number]   Ref Range & Units 2 d ago   WBC 4.3 - 10.8 K/uL 6.9   RBC 4.60 - 6.20 M/uL 4.17 Low    HEMOGLOBIN 14.0 - 18.0 gm/dL 12.8 Low    HEMATOCRIT 40.0 - 54.0 % 37.7 Low    MCV 80 - 100 fL 90   MCH 27 - 33 pg 31   MCHC 33 - 36 gm/dL 34   RDW 11.5 - 14.5 % 12.6   PLATELET COUNT 150 - 400 K/   MPV 6.5 - 12 fL 9.2   PMN % % 69.9   IG% <=1.0 % 0.1   LYMPH % % 19.0   MONO % " % 7.4   EOS % % 3.0   BASO % % 0.6   PMN ABSOLUTE 1.80 - 7.80 K/uL 4.81   IG ABSOLUTE 0.00 - 0.00 K/uL 0.01 High    LYMPH ABSOLUTE 1.00 - 4.00 K/uL 1.31   MONO ABSOLUTE 0.00 - 1.00 K/uL 0.51   EOS ABSOLUTE 0.00 - 0.45 K/uL 0.21   BASO ABSOLUTE 0.00 - 0.20 K/uL 0.04   NUCL RBC % 0.0 - 0.0 /100 WBC 0.0   Resulting [facility]     Specimen Collected: 09/17/23  8:55 PM   Urinalysis Macroscopic w/ Microscopy, if indicated (Does not inc culture)  Order: [id number]   Ref Range & Units 2 d ago   GLUCOSE, UA Negative mg/dL Negative   KETONE, UA Negative mg/dL Trace Abnormal    BILIRUBIN, UA Negative Negative   PROTEIN, UA Negative mg/dL Negative   OCCULT BLOOD, UA Negative, Trace, TRACE-LYSED, TRACE-INTACT Negative   WBC ESTERASE, UA Negative, Trace Negative   NITRITE, UA Negative Negative   pH Urine 5.0 - 8.0 6.0   Specific Gravity, UA 1.015 - 1.025 1.015   Urobilinogen, UA 0.2 - 1.0 EU/dL 0.2   Resulting [facility]                   This chart was documented by provider using a voice activated software called Dragon in addition to manual typing. There may be vocabulary errors or other grammatical errors due to this.

## 2023-09-18 NOTE — PATIENT INSTRUCTIONS
Adjust BP medications as follows:  Decrease the Lisinopril to 20 mg daily.  Continue the amlodipine at 5 mg daily.    Fluid intake 48-64 oz minimum recommended.    Labs in 1 week.

## 2023-09-26 ENCOUNTER — LAB (OUTPATIENT)
Dept: LAB | Facility: CLINIC | Age: 63
End: 2023-09-26
Payer: MEDICARE

## 2023-09-26 DIAGNOSIS — D64.9 LOW HEMOGLOBIN: ICD-10-CM

## 2023-09-26 DIAGNOSIS — E87.1 HYPONATREMIA: ICD-10-CM

## 2023-09-26 DIAGNOSIS — I10 HYPERTENSION GOAL BP (BLOOD PRESSURE) < 140/90: ICD-10-CM

## 2023-09-26 LAB
ANION GAP SERPL CALCULATED.3IONS-SCNC: 10 MMOL/L (ref 7–15)
BUN SERPL-MCNC: 12.8 MG/DL (ref 8–23)
CALCIUM SERPL-MCNC: 9.4 MG/DL (ref 8.8–10.2)
CHLORIDE SERPL-SCNC: 100 MMOL/L (ref 98–107)
CREAT SERPL-MCNC: 1.04 MG/DL (ref 0.67–1.17)
DEPRECATED HCO3 PLAS-SCNC: 25 MMOL/L (ref 22–29)
EGFRCR SERPLBLD CKD-EPI 2021: 81 ML/MIN/1.73M2
ERYTHROCYTE [DISTWIDTH] IN BLOOD BY AUTOMATED COUNT: 12.8 % (ref 10–15)
FERRITIN SERPL-MCNC: 49 NG/ML (ref 31–409)
GLUCOSE SERPL-MCNC: 112 MG/DL (ref 70–99)
HCT VFR BLD AUTO: 43.5 % (ref 40–53)
HGB BLD-MCNC: 14.4 G/DL (ref 13.3–17.7)
MCH RBC QN AUTO: 29.9 PG (ref 26.5–33)
MCHC RBC AUTO-ENTMCNC: 33.1 G/DL (ref 31.5–36.5)
MCV RBC AUTO: 90 FL (ref 78–100)
PLATELET # BLD AUTO: 226 10E3/UL (ref 150–450)
POTASSIUM SERPL-SCNC: 4.5 MMOL/L (ref 3.4–5.3)
RBC # BLD AUTO: 4.81 10E6/UL (ref 4.4–5.9)
SODIUM SERPL-SCNC: 135 MMOL/L (ref 136–145)
WBC # BLD AUTO: 5.5 10E3/UL (ref 4–11)

## 2023-09-26 PROCEDURE — 36415 COLL VENOUS BLD VENIPUNCTURE: CPT

## 2023-09-26 PROCEDURE — 85027 COMPLETE CBC AUTOMATED: CPT

## 2023-09-26 PROCEDURE — 82728 ASSAY OF FERRITIN: CPT

## 2023-09-26 PROCEDURE — 80048 BASIC METABOLIC PNL TOTAL CA: CPT

## 2023-09-26 NOTE — RESULT ENCOUNTER NOTE
Wally Kaufman,  Dr. Esquivel is out of the office this week but I saw your results.  That sodium has definitely come up a bit.  And the hemoglobin and iron levels look normal.  So I think things have improved and I will talk to Dr. Esquivel about further planning.  RAMONE Malik M.D.

## 2023-10-14 ENCOUNTER — IMMUNIZATION (OUTPATIENT)
Dept: FAMILY MEDICINE | Facility: CLINIC | Age: 63
End: 2023-10-14
Payer: MEDICARE

## 2023-10-14 DIAGNOSIS — Z23 NEED FOR PROPHYLACTIC VACCINATION AND INOCULATION AGAINST INFLUENZA: Primary | ICD-10-CM

## 2023-10-14 PROCEDURE — G0008 ADMIN INFLUENZA VIRUS VAC: HCPCS

## 2023-10-14 PROCEDURE — 90682 RIV4 VACC RECOMBINANT DNA IM: CPT

## 2023-10-14 PROCEDURE — 99207 PR NO CHARGE NURSE ONLY: CPT

## 2023-10-26 ENCOUNTER — MYC MEDICAL ADVICE (OUTPATIENT)
Dept: NEUROLOGY | Facility: CLINIC | Age: 63
End: 2023-10-26

## 2023-10-26 DIAGNOSIS — F41.9 ANXIETY: Primary | ICD-10-CM

## 2023-10-27 RX ORDER — GABAPENTIN 100 MG/1
100 CAPSULE ORAL EVERY EVENING
Qty: 30 CAPSULE | Refills: 2 | Status: SHIPPED | OUTPATIENT
Start: 2023-10-27 | End: 2023-11-16 | Stop reason: DRUGHIGH

## 2023-11-15 ENCOUNTER — MYC MEDICAL ADVICE (OUTPATIENT)
Dept: NEUROLOGY | Facility: CLINIC | Age: 63
End: 2023-11-15

## 2023-11-15 DIAGNOSIS — F41.9 ANXIETY: Primary | ICD-10-CM

## 2023-11-16 RX ORDER — GABAPENTIN 300 MG/1
300 CAPSULE ORAL DAILY
Qty: 90 CAPSULE | Refills: 1 | Status: SHIPPED | OUTPATIENT
Start: 2023-11-16 | End: 2023-12-18

## 2023-11-18 ENCOUNTER — MYC MEDICAL ADVICE (OUTPATIENT)
Dept: FAMILY MEDICINE | Facility: CLINIC | Age: 63
End: 2023-11-18
Payer: MEDICARE

## 2023-11-18 DIAGNOSIS — E78.5 HYPERLIPIDEMIA LDL GOAL <100: Primary | ICD-10-CM

## 2023-11-18 DIAGNOSIS — I10 HYPERTENSION GOAL BP (BLOOD PRESSURE) < 140/90: ICD-10-CM

## 2023-11-18 DIAGNOSIS — R97.20 ELEVATED PROSTATE SPECIFIC ANTIGEN (PSA): ICD-10-CM

## 2023-11-21 ENCOUNTER — MYC REFILL (OUTPATIENT)
Dept: NEUROLOGY | Facility: CLINIC | Age: 63
End: 2023-11-21

## 2023-11-21 DIAGNOSIS — F41.9 ANXIETY: ICD-10-CM

## 2023-11-22 RX ORDER — ESCITALOPRAM OXALATE 10 MG/1
10 TABLET ORAL DAILY
Qty: 30 TABLET | Refills: 3 | Status: SHIPPED | OUTPATIENT
Start: 2023-11-22 | End: 2024-03-08

## 2023-12-05 ENCOUNTER — APPOINTMENT (OUTPATIENT)
Dept: URBAN - METROPOLITAN AREA CLINIC 252 | Age: 63
Setting detail: DERMATOLOGY
End: 2023-12-05

## 2023-12-05 VITALS — WEIGHT: 180 LBS | HEIGHT: 70 IN

## 2023-12-05 DIAGNOSIS — L82.1 OTHER SEBORRHEIC KERATOSIS: ICD-10-CM

## 2023-12-05 DIAGNOSIS — L81.4 OTHER MELANIN HYPERPIGMENTATION: ICD-10-CM

## 2023-12-05 DIAGNOSIS — D22 MELANOCYTIC NEVI: ICD-10-CM

## 2023-12-05 DIAGNOSIS — D18.0 HEMANGIOMA: ICD-10-CM

## 2023-12-05 DIAGNOSIS — Z71.89 OTHER SPECIFIED COUNSELING: ICD-10-CM

## 2023-12-05 DIAGNOSIS — Z85.820 PERSONAL HISTORY OF MALIGNANT MELANOMA OF SKIN: ICD-10-CM

## 2023-12-05 PROBLEM — D18.01 HEMANGIOMA OF SKIN AND SUBCUTANEOUS TISSUE: Status: ACTIVE | Noted: 2023-12-05

## 2023-12-05 PROBLEM — D22.5 MELANOCYTIC NEVI OF TRUNK: Status: ACTIVE | Noted: 2023-12-05

## 2023-12-05 PROCEDURE — 99213 OFFICE O/P EST LOW 20 MIN: CPT

## 2023-12-05 PROCEDURE — OTHER COUNSELING: OTHER

## 2023-12-05 PROCEDURE — OTHER ADDITIONAL NOTES: OTHER

## 2023-12-05 ASSESSMENT — LOCATION SIMPLE DESCRIPTION DERM
LOCATION SIMPLE: CHEST
LOCATION SIMPLE: POSTERIOR SCALP
LOCATION SIMPLE: RIGHT UPPER BACK
LOCATION SIMPLE: SCALP
LOCATION SIMPLE: ABDOMEN
LOCATION SIMPLE: RIGHT OCCIPITAL SCALP
LOCATION SIMPLE: RIGHT LOWER BACK

## 2023-12-05 ASSESSMENT — LOCATION DETAILED DESCRIPTION DERM
LOCATION DETAILED: RIGHT SUPERIOR MEDIAL UPPER BACK
LOCATION DETAILED: RIGHT LATERAL ABDOMEN
LOCATION DETAILED: RIGHT MEDIAL SUPERIOR CHEST
LOCATION DETAILED: LEFT SUPERIOR PARIETAL SCALP
LOCATION DETAILED: EPIGASTRIC SKIN
LOCATION DETAILED: RIGHT SUPERIOR MEDIAL MIDBACK
LOCATION DETAILED: RIGHT SUPERIOR OCCIPITAL SCALP
LOCATION DETAILED: RIGHT POSTERIOR PARIETAL SCALP

## 2023-12-05 ASSESSMENT — LOCATION ZONE DERM
LOCATION ZONE: SCALP
LOCATION ZONE: TRUNK

## 2023-12-08 ASSESSMENT — ENCOUNTER SYMPTOMS
EYE PAIN: 0
DYSURIA: 0
CHILLS: 0
FREQUENCY: 0
HEMATOCHEZIA: 0
SHORTNESS OF BREATH: 0
NAUSEA: 0
HEMATURIA: 0
COUGH: 0
SORE THROAT: 0
HEARTBURN: 0
CONSTIPATION: 0
HEADACHES: 0
DIARRHEA: 0
ARTHRALGIAS: 1
PALPITATIONS: 0
JOINT SWELLING: 1
WEAKNESS: 0
NERVOUS/ANXIOUS: 1
PARESTHESIAS: 0
DIZZINESS: 0
MYALGIAS: 0
FEVER: 0
ABDOMINAL PAIN: 0

## 2023-12-08 ASSESSMENT — ACTIVITIES OF DAILY LIVING (ADL)
CURRENT_FUNCTION: HOUSEWORK REQUIRES ASSISTANCE
CURRENT_FUNCTION: TRANSPORTATION REQUIRES ASSISTANCE
CURRENT_FUNCTION: PREPARING MEALS REQUIRES ASSISTANCE
CURRENT_FUNCTION: SHOPPING REQUIRES ASSISTANCE
CURRENT_FUNCTION: TELEPHONE REQUIRES ASSISTANCE
CURRENT_FUNCTION: MONEY MANAGEMENT REQUIRES ASSISTANCE
CURRENT_FUNCTION: MEDICATION ADMINISTRATION REQUIRES ASSISTANCE
CURRENT_FUNCTION: LAUNDRY REQUIRES ASSISTANCE

## 2023-12-12 ENCOUNTER — LAB (OUTPATIENT)
Dept: LAB | Facility: CLINIC | Age: 63
End: 2023-12-12
Payer: MEDICARE

## 2023-12-12 DIAGNOSIS — I10 HYPERTENSION GOAL BP (BLOOD PRESSURE) < 140/90: ICD-10-CM

## 2023-12-12 DIAGNOSIS — Z12.5 SCREENING FOR PROSTATE CANCER: Primary | ICD-10-CM

## 2023-12-12 DIAGNOSIS — R97.20 ELEVATED PROSTATE SPECIFIC ANTIGEN (PSA): ICD-10-CM

## 2023-12-12 DIAGNOSIS — E78.5 HYPERLIPIDEMIA LDL GOAL <100: ICD-10-CM

## 2023-12-12 LAB
CREAT UR-MCNC: 122 MG/DL
ERYTHROCYTE [DISTWIDTH] IN BLOOD BY AUTOMATED COUNT: 12.8 % (ref 10–15)
HCT VFR BLD AUTO: 45.3 % (ref 40–53)
HGB BLD-MCNC: 14.2 G/DL (ref 13.3–17.7)
MCH RBC QN AUTO: 29.8 PG (ref 26.5–33)
MCHC RBC AUTO-ENTMCNC: 31.3 G/DL (ref 31.5–36.5)
MCV RBC AUTO: 95 FL (ref 78–100)
MICROALBUMIN UR-MCNC: <12 MG/L
MICROALBUMIN/CREAT UR: NORMAL MG/G{CREAT}
PLATELET # BLD AUTO: 224 10E3/UL (ref 150–450)
RBC # BLD AUTO: 4.77 10E6/UL (ref 4.4–5.9)
WBC # BLD AUTO: 5.6 10E3/UL (ref 4–11)

## 2023-12-12 PROCEDURE — 82043 UR ALBUMIN QUANTITATIVE: CPT

## 2023-12-12 PROCEDURE — 36415 COLL VENOUS BLD VENIPUNCTURE: CPT

## 2023-12-12 PROCEDURE — G0103 PSA SCREENING: HCPCS

## 2023-12-12 PROCEDURE — 80053 COMPREHEN METABOLIC PANEL: CPT

## 2023-12-12 PROCEDURE — 85027 COMPLETE CBC AUTOMATED: CPT

## 2023-12-12 PROCEDURE — 80061 LIPID PANEL: CPT

## 2023-12-12 PROCEDURE — 82570 ASSAY OF URINE CREATININE: CPT

## 2023-12-12 PROCEDURE — 84153 ASSAY OF PSA TOTAL: CPT

## 2023-12-13 LAB
ALBUMIN SERPL BCG-MCNC: 4.3 G/DL (ref 3.5–5.2)
ALP SERPL-CCNC: 76 U/L (ref 40–150)
ALT SERPL W P-5'-P-CCNC: 32 U/L (ref 0–70)
ANION GAP SERPL CALCULATED.3IONS-SCNC: 8 MMOL/L (ref 7–15)
AST SERPL W P-5'-P-CCNC: 33 U/L (ref 0–45)
BILIRUB SERPL-MCNC: 2.4 MG/DL
BUN SERPL-MCNC: 13 MG/DL (ref 8–23)
CALCIUM SERPL-MCNC: 9.3 MG/DL (ref 8.8–10.2)
CHLORIDE SERPL-SCNC: 102 MMOL/L (ref 98–107)
CHOLEST SERPL-MCNC: 127 MG/DL
CREAT SERPL-MCNC: 0.94 MG/DL (ref 0.67–1.17)
DEPRECATED HCO3 PLAS-SCNC: 28 MMOL/L (ref 22–29)
EGFRCR SERPLBLD CKD-EPI 2021: >90 ML/MIN/1.73M2
FASTING STATUS PATIENT QL REPORTED: YES
GLUCOSE SERPL-MCNC: 105 MG/DL (ref 70–99)
HDLC SERPL-MCNC: 66 MG/DL
LDLC SERPL CALC-MCNC: 44 MG/DL
NONHDLC SERPL-MCNC: 61 MG/DL
POTASSIUM SERPL-SCNC: 4.5 MMOL/L (ref 3.4–5.3)
PROT SERPL-MCNC: 6.7 G/DL (ref 6.4–8.3)
PSA SERPL DL<=0.01 NG/ML-MCNC: 2.49 NG/ML (ref 0–4.5)
PSA SERPL DL<=0.01 NG/ML-MCNC: 2.49 NG/ML (ref 0–4.5)
SODIUM SERPL-SCNC: 138 MMOL/L (ref 135–145)
TRIGL SERPL-MCNC: 87 MG/DL

## 2023-12-14 ENCOUNTER — OFFICE VISIT (OUTPATIENT)
Dept: FAMILY MEDICINE | Facility: CLINIC | Age: 63
End: 2023-12-14
Payer: MEDICARE

## 2023-12-14 VITALS
OXYGEN SATURATION: 99 % | TEMPERATURE: 97.2 F | HEIGHT: 68 IN | WEIGHT: 171 LBS | RESPIRATION RATE: 14 BRPM | BODY MASS INDEX: 25.91 KG/M2 | SYSTOLIC BLOOD PRESSURE: 137 MMHG | HEART RATE: 15 BPM | DIASTOLIC BLOOD PRESSURE: 85 MMHG

## 2023-12-14 DIAGNOSIS — R73.03 PREDIABETES: ICD-10-CM

## 2023-12-14 DIAGNOSIS — F41.9 ANXIETY: ICD-10-CM

## 2023-12-14 DIAGNOSIS — I10 HYPERTENSION GOAL BP (BLOOD PRESSURE) < 140/90: ICD-10-CM

## 2023-12-14 DIAGNOSIS — G30.0: ICD-10-CM

## 2023-12-14 DIAGNOSIS — Z29.89 NEED FOR SBE (SUBACUTE BACTERIAL ENDOCARDITIS) PROPHYLAXIS: ICD-10-CM

## 2023-12-14 DIAGNOSIS — F02.80: ICD-10-CM

## 2023-12-14 DIAGNOSIS — Z00.00 WELCOME TO MEDICARE PREVENTIVE VISIT: Primary | ICD-10-CM

## 2023-12-14 DIAGNOSIS — Q21.0 VSD (VENTRICULAR SEPTAL DEFECT): ICD-10-CM

## 2023-12-14 DIAGNOSIS — E78.5 HYPERLIPIDEMIA LDL GOAL <100: ICD-10-CM

## 2023-12-14 PROCEDURE — 99214 OFFICE O/P EST MOD 30 MIN: CPT | Mod: 25 | Performed by: INTERNAL MEDICINE

## 2023-12-14 PROCEDURE — G0402 INITIAL PREVENTIVE EXAM: HCPCS | Performed by: INTERNAL MEDICINE

## 2023-12-14 RX ORDER — RESPIRATORY SYNCYTIAL VIRUS VACCINE 120MCG/0.5
0.5 KIT INTRAMUSCULAR ONCE
Qty: 1 EACH | Refills: 0 | Status: CANCELLED | OUTPATIENT
Start: 2023-12-14 | End: 2023-12-14

## 2023-12-14 ASSESSMENT — PAIN SCALES - GENERAL: PAINLEVEL: NO PAIN (0)

## 2023-12-14 NOTE — PROGRESS NOTES
SUBJECTIVE:   Wally is a 63 year old, presenting for the following:  Medicare Visit        12/14/2023     4:03 PM   Additional Questions   Roomed by Lubna   Accompanied by Aaliyah - spouse       Are you in the first 12 months of your Medicare coverage?  Yes,  Visual Acuity:  Right Eye: 20/25   Left Eye: 20/30  Both Eyes: 20/20      Wally Cano is a 63 year old male who has Hyperlipidemia LDL goal <100; VSD (ventricular septal defect); * * * SBE PROPHYLAXIS * * *; Hypertension goal BP (blood pressure) < 140/90; S/P infectious endocarditis; Superficial thrombophlebitis; [facility]; Melanoma of scalp (H); and Early onset Alzheimer's disease without behavioral disturbance (H) on their pertinent problem list.      History of early onset Alzheimer's diease. He sees neurology for management.   Had two ED visits from dehydration. He doesn't have thirst drive and doesn't remember to drink fluids. His wife now has a timer for reminder to drink fluids. Has not had issue with this in the last few months.     They have purchased a triplex where their adult children will each have a unit above and below the main level, where they will be living. The adult children can help provider care and supervision.     He also sees cardiology for history of VSD and need of SBE prophylaxis.         Today's PHQ-2 Score:       12/13/2023     8:50 AM   PHQ-2 ( 1999 [employer])   Q1: Little interest or pleasure in doing things 0   Q2: Feeling down, depressed or hopeless 0   PHQ-2 Score 0   Q1: Little interest or pleasure in doing things Not at all   Q2: Feeling down, depressed or hopeless Not at all   PHQ-2 Score 0           Have you ever done Advance Care Planning? (For example, a Health Directive, POLST, or a discussion with a medical provider or your loved ones about your wishes): Yes, advance care planning is on file.       Fall risk  Fallen 2 or more times in the past  year?: No  Any fall with injury in the past year?: No    Cognitive Screening   1) Repeat 3 items (Leader, Season, Table)    2) Clock draw: ABNORMAL    3) 3 item recall: Recalls NO objects   Results: 0 items recalled: PROBABLE COGNITIVE IMPAIRMENT, **INFORM PROVIDER**    Mini-CogTM Copyright S B[employer]. Licensed by the author for use in [facility]; reprinted with permission ([email or web address]). All rights reserved.      Do you have sleep apnea, excessive snoring or daytime drowsiness? : yes    Reviewed and updated as needed this visit by clinical staff   Tobacco  Allergies  Meds              Reviewed and updated as needed this visit by Provider                 Social History     Tobacco Use    Smoking status: Former     Packs/day: 1.00     Years: 10.00     Additional pack years: 0.00     Total pack years: 10.00     Types: Cigarettes, Cigars     Start date: 1986     Quit date: 10/28/2006     Years since quittin.1    Smokeless tobacco: Never   Substance Use Topics    Alcohol use: Yes     Comment: minimal, changed over to nonalcoholic beer and spirits             2023    10:24 AM   Alcohol Use   Prescreen: >3 drinks/day or >7 drinks/week? No     Do you have a current opioid prescription? No  Do you use any other controlled substances or medications that are not prescribed by a provider? None        Current providers sharing in care for this patient include:   Patient Care Team:  Livier Triplett MD PhD as PCP - General (Internal Medicine)  Herminio Quevedo RN (Inactive) as Nurse Coordinator (Cardiology)  Karen Lemus MD as MD (Cardiology)  Livier Triplett MD PhD as Assigned PCP  Elisa Felix RN as Specialty Care Coordinator (Cardiology)  Colt Lester MD as Assigned Neuroscience Provider  Juli Pineda MD as MD (Urology)  Juli Pineda MD as Assigned Surgical Provider    The following health maintenance items are reviewed in Epic and correct as of today:  Health Maintenance   Topic Date  "Due    RSV VACCINE (Pregnancy & 60+) (1 - 1-dose 60+ series) Never done    BMP  12/12/2024    LIPID  12/12/2024    MICROALBUMIN  12/12/2024    PSA  12/12/2024    MEDICARE ANNUAL WELLNESS VISIT  12/14/2024    ANNUAL REVIEW OF HM ORDERS  12/19/2024    DTAP/TDAP/TD IMMUNIZATION (4 - Td or Tdap) 11/21/2027    COLORECTAL CANCER SCREENING  08/02/2028    ADVANCE CARE PLANNING  12/19/2028    HEPATITIS C SCREENING  Completed    PHQ-2 (once per calendar year)  Completed    INFLUENZA VACCINE  Completed    Pneumococcal Vaccine: Pediatrics (0 to 5 Years) and At-Risk Patients (6 to 64 Years)  Completed    ZOSTER IMMUNIZATION  Completed    COVID-19 Vaccine  Completed    HIV SCREENING  Addressed    IPV IMMUNIZATION  Aged Out    HPV IMMUNIZATION  Aged Out    MENINGITIS IMMUNIZATION  Aged Out    RSV MONOCLONAL ANTIBODY  Aged Out       Review of Systems  Constitutional, HEENT, cardiovascular, pulmonary, gi and gu systems are negative, except as otherwise noted.    OBJECTIVE:   /85 (BP Location: Right arm, Patient Position: Sitting, Cuff Size: Adult Regular)   Pulse (!) 15   Temp 97.2  F (36.2  C) (Temporal)   Resp 14   Ht 1.715 m (5' 7.5\")   Wt 77.6 kg (171 lb)   SpO2 99%   BMI 26.39 kg/m   Estimated body mass index is 26.39 kg/m  as calculated from the following:    Height as of this encounter: 1.715 m (5' 7.5\").    Weight as of this encounter: 77.6 kg (171 lb).  Physical Exam  GENERAL: healthy, alert and no distress  EYES: Eyes grossly normal to inspection, PERRL and conjunctivae and sclerae normal  HENT: ear canals and TM's normal, nose and mouth without ulcers or lesions  NECK: no adenopathy, no asymmetry, masses, or scars and thyroid normal to palpation  RESP: lungs clear to auscultation - no rales, rhonchi or wheezes  CV: regular rates and rhythm, no murmur, click or rub, no peripheral edema, and 2/6 systolic murmur across precordium  ABDOMEN: soft, nontender, no hepatosplenomegaly, no masses and bowel sounds " normal  MS: no gross musculoskeletal defects noted, no edema  SKIN: no suspicious lesions or rashes  NEURO: Normal strength and tone, oriented to person.   PSYCH: mentation appears normal, affect normal/bright      Lab on 12/12/2023   Component Date Value Ref Range Status    WBC Count 12/12/2023 5.6  4.0 - 11.0 10e3/uL Final    RBC Count 12/12/2023 4.77  4.40 - 5.90 10e6/uL Final    Hemoglobin 12/12/2023 14.2  13.3 - 17.7 g/dL Final    Hematocrit 12/12/2023 45.3  40.0 - 53.0 % Final    MCV 12/12/2023 95  78 - 100 fL Final    MCH 12/12/2023 29.8  26.5 - 33.0 pg Final    MCHC 12/12/2023 31.3 (L)  31.5 - 36.5 g/dL Final    RDW 12/12/2023 12.8  10.0 - 15.0 % Final    Platelet Count 12/12/2023 224  150 - 450 10e3/uL Final    Sodium 12/12/2023 138  135 - 145 mmol/L Final    Reference intervals for this test were updated on 09/26/2023 to more accurately reflect our healthy population. There may be differences in the flagging of prior results with similar values performed with this method. Interpretation of those prior results can be made in the context of the updated reference intervals.     Potassium 12/12/2023 4.5  3.4 - 5.3 mmol/L Final    Carbon Dioxide (CO2) 12/12/2023 28  22 - 29 mmol/L Final    Anion Gap 12/12/2023 8  7 - 15 mmol/L Final    Urea Nitrogen 12/12/2023 13.0  8.0 - 23.0 mg/dL Final    Creatinine 12/12/2023 0.94  0.67 - 1.17 mg/dL Final    GFR Estimate 12/12/2023 >90  >60 mL/min/1.73m2 Final    Calcium 12/12/2023 9.3  8.8 - 10.2 mg/dL Final    Chloride 12/12/2023 102  98 - 107 mmol/L Final    Glucose 12/12/2023 105 (H)  70 - 99 mg/dL Final    Alkaline Phosphatase 12/12/2023 76  40 - 150 U/L Final    Reference intervals for this test were updated on 11/14/2023 to more accurately reflect our healthy population. There may be differences in the flagging of prior results with similar values performed with this method. Interpretation of those prior results can be made in the context of the updated reference  intervals.    AST 12/12/2023 33  0 - 45 U/L Final    Reference intervals for this test were updated on 6/12/2023 to more accurately reflect our healthy population. There may be differences in the flagging of prior results with similar values performed with this method. Interpretation of those prior results can be made in the context of the updated reference intervals.    ALT 12/12/2023 32  0 - 70 U/L Final    Reference intervals for this test were updated on 6/12/2023 to more accurately reflect our healthy population. There may be differences in the flagging of prior results with similar values performed with this method. Interpretation of those prior results can be made in the context of the updated reference intervals.      Protein Total 12/12/2023 6.7  6.4 - 8.3 g/dL Final    Albumin 12/12/2023 4.3  3.5 - 5.2 g/dL Final    Bilirubin Total 12/12/2023 2.4 (H)  <=1.2 mg/dL Final    Cholesterol 12/12/2023 127  <200 mg/dL Final    Triglycerides 12/12/2023 87  <150 mg/dL Final    Direct Measure HDL 12/12/2023 66  >=40 mg/dL Final    LDL Cholesterol Calculated 12/12/2023 44  <=100 mg/dL Final    Non HDL Cholesterol 12/12/2023 61  <130 mg/dL Final    Patient Fasting > 8hrs? 12/12/2023 Yes   Final    Creatinine Urine mg/dL 12/12/2023 122.0  mg/dL Final    The reference ranges have not been established in urine creatinine. The results should be integrated into the clinical context for interpretation.    Albumin Urine mg/L 12/12/2023 <12.0  mg/L Final    The reference ranges have not been established in urine albumin. The results should be integrated into the clinical context for interpretation.    Albumin Urine mg/g Cr 12/12/2023    Final    Unable to calculate, urine albumin and/or urine creatinine is outside detectable limits.  Microalbuminuria is defined as an albumin:creatinine ratio of 17 to 299 for males and 25 to 299 for females. A ratio of albumin:creatinine of 300 or higher is indicative of overt proteinuria.  Due  to biologic variability, positive results should be confirmed by a second, first-morning random or 24-hour timed urine specimen. If there is discrepancy, a third specimen is recommended. When 2 out of 3 results are in the microalbuminuria range, this is evidence for incipient nephropathy and warrants increased efforts at glucose control, blood pressure control, and institution of therapy with an angiotensin-converting-enzyme (ACE) inhibitor (if the patient can tolerate it).      PSA Tumor Marker 12/12/2023 2.49  0.00 - 4.50 ng/mL Final    Prostate Specific Antigen Screen 12/12/2023 2.49  0.00 - 4.50 ng/mL Final        ASSESSMENT / PLAN:   Wally was seen today for medicare visit.    Diagnoses and all orders for this visit:    Welcome to Medicare preventive visit    Early onset Alzheimer's disease without behavioral disturbance (H)    VSD (ventricular septal defect)    Hypertension goal BP (blood pressure) < 140/90  Comments:  at goal. continue with lisinopril.    Hyperlipidemia LDL goal <100  Comments:  LDL at goal. continue with atorvastatin.    * * * SBE PROPHYLAXIS * * *    Anxiety  Comments:  lexapro per neurology    Prediabetes  Comments:  continue with metformin.    Other orders  -     REVIEW OF HEALTH MAINTENANCE PROTOCOL ORDERS  -     PRIMARY CARE FOLLOW-UP SCHEDULING; Future  -     REVIEW OF HEALTH MAINTENANCE PROTOCOL ORDERS      -- stable chronic health issues. Labs reviewed, stable. He doesn't need refills of his medications yet.       COUNSELING:  Reviewed preventive health counseling, as reflected in patient instructions        He reports that he quit smoking about 17 years ago. His smoking use included cigarettes and cigars. He started smoking about 37 years ago. He has a 10.00 pack-year smoking history. He has never used smokeless tobacco.      Appropriate preventive services were discussed with this patient, including applicable screening as appropriate for fall prevention, nutrition, physical  activity, Tobacco-use cessation, weight loss and cognition.  Checklist reviewing preventive services available has been given to the patient.    Reviewed patients plan of care and provided an AVS. The Complex Care Plan (for patients with higher acuity and needing more deliberate coordination of services) for Wally meets the Care Plan requirement. This Care Plan has been established and reviewed with the Patient and spouse.        Livier Triplett MD PhD  [facility]    Identified Health Risks:  I have reviewed Opioid Use Disorder and Substance Use Disorder risk factors and made any needed referrals.

## 2023-12-18 ENCOUNTER — MYC MEDICAL ADVICE (OUTPATIENT)
Dept: NEUROLOGY | Facility: CLINIC | Age: 63
End: 2023-12-18

## 2023-12-18 DIAGNOSIS — G30.0 EARLY ONSET ALZHEIMER'S DEMENTIA WITHOUT BEHAVIORAL DISTURBANCE (H): Primary | ICD-10-CM

## 2023-12-18 DIAGNOSIS — F02.80 EARLY ONSET ALZHEIMER'S DEMENTIA WITHOUT BEHAVIORAL DISTURBANCE (H): Primary | ICD-10-CM

## 2023-12-18 DIAGNOSIS — F41.9 ANXIETY: ICD-10-CM

## 2023-12-18 RX ORDER — GABAPENTIN 300 MG/1
300 CAPSULE ORAL 2 TIMES DAILY PRN
Qty: 90 CAPSULE | Refills: 1 | Status: SHIPPED | OUTPATIENT
Start: 2023-12-18 | End: 2024-06-03

## 2023-12-19 NOTE — PATIENT INSTRUCTIONS
Patient Education   Personalized Prevention Plan  You are due for the preventive services outlined below.  Your care team is available to assist you in scheduling these services.  If you have already completed any of these items, please share that information with your care team to update in your medical record.  Health Maintenance Due   Topic Date Due    RSV VACCINE (Pregnancy & 60+) (1 - 1-dose 60+ series) Never done          Pt will get RSV vaccine at his local pharmacy.

## 2023-12-24 ENCOUNTER — MYC REFILL (OUTPATIENT)
Dept: FAMILY MEDICINE | Facility: CLINIC | Age: 63
End: 2023-12-24
Payer: MEDICARE

## 2023-12-24 DIAGNOSIS — I10 HYPERTENSION GOAL BP (BLOOD PRESSURE) < 140/90: ICD-10-CM

## 2023-12-26 RX ORDER — LISINOPRIL 20 MG/1
20 TABLET ORAL DAILY
Qty: 180 TABLET | Refills: 3 | OUTPATIENT
Start: 2023-12-26

## 2023-12-26 RX ORDER — LISINOPRIL 20 MG/1
20 TABLET ORAL 2 TIMES DAILY
Qty: 180 TABLET | Refills: 3 | Status: SHIPPED | OUTPATIENT
Start: 2023-12-26

## 2024-01-16 ENCOUNTER — MYC MEDICAL ADVICE (OUTPATIENT)
Dept: FAMILY MEDICINE | Facility: CLINIC | Age: 64
End: 2024-01-16
Payer: MEDICARE

## 2024-01-16 DIAGNOSIS — R35.0 URINARY FREQUENCY: Primary | ICD-10-CM

## 2024-01-18 ENCOUNTER — LAB (OUTPATIENT)
Dept: LAB | Facility: CLINIC | Age: 64
End: 2024-01-18
Payer: MEDICARE

## 2024-01-18 DIAGNOSIS — R35.0 URINARY FREQUENCY: ICD-10-CM

## 2024-01-18 DIAGNOSIS — R35.0 URINARY FREQUENCY: Primary | ICD-10-CM

## 2024-01-18 LAB
ALBUMIN UR-MCNC: NEGATIVE MG/DL
APPEARANCE UR: CLEAR
BACTERIA #/AREA URNS HPF: ABNORMAL /HPF
BILIRUB UR QL STRIP: NEGATIVE
COLOR UR AUTO: YELLOW
GLUCOSE UR STRIP-MCNC: NEGATIVE MG/DL
HGB UR QL STRIP: NEGATIVE
KETONES UR STRIP-MCNC: NEGATIVE MG/DL
LEUKOCYTE ESTERASE UR QL STRIP: NEGATIVE
NITRATE UR QL: NEGATIVE
PH UR STRIP: 5.5 [PH] (ref 5–8)
RBC #/AREA URNS AUTO: ABNORMAL /HPF
SP GR UR STRIP: 1.01 (ref 1–1.03)
UROBILINOGEN UR STRIP-ACNC: 0.2 E.U./DL
WBC #/AREA URNS AUTO: ABNORMAL /HPF

## 2024-01-18 PROCEDURE — 81001 URINALYSIS AUTO W/SCOPE: CPT

## 2024-02-15 NOTE — PROGRESS NOTES
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 Please see my last encounter dated 2/24/23 and the initial encounter dated 10/15/21 for a full review of the patient's history. In summary, Wally has a relevant history of resolved endocarditis, VSD, HTN, HLD, chronic ulcer in the setting of NSAID use. He has a paternal history of dementia. MMSE during our initial visit 10/2021 was 24/30. B12, TSH, CMP and CBC all unremarkable. Neuropsychological testing Dr. Sanches showed impairment in encoding and retrieval, but relative preservation of recognition memory. He scored in the mild cognitive impairment stage. MRI from July 2018 showed normal cerebral and cerebellar volumes. Medial temporal lobe sizes are within normal limits. Mild non-specific T2 signal in the subcortical white matter. 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 CSF ADMark panel from [facility] showed high p-tau and low ATI, confirming a diagnosis of early onset Alzheimer's disease. 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 He was started on Aricept 10 mg daily and is tolerating it well other than rhinorrhea. During our last visit 5/13/22 he was MCI stage vs early dementia stage with an MMSE of 26/30.     Updates From This Visit:   Today, the patient presents with his wife Aaliyah and son Sonido who is an independent historian and provides collateral information included below.     Wally feels positive about the past year. Aaliyah notes that short-term memory continues to decline. She cannot ask him to complete a task like \"bring this glass into the kitchen\" he will go away and forget what he was supposed to do. He can't do multi-step chores anymore. "     Mood/behavior: There have been times when he can be more anxious or agitated. Specifically in the evening he gets very anxious. Currently using gabapentin BID but not noticing much improvement. When he goes somewhere with Sonido, Wally may get angry and accuse him of trying to keep him from Aaliyah.   Sleep:  He may go to bed very early but will get up every 20 minutes and go to the restroom or walk around. Aaliyah wonders whether he has gotten a solid-nights sleep in months.   ADLs:  May require help with setting up a shower and keeping it going, he is independent with dressing and toileting, he may choose inappropriate clothes.   Decision Making Capacity: Unable to make own decisions  Caregiver needs: Caregiver knowledge and needs assessment was completed during this visit and the following needs were identified: Aaliyah has a therapist she sees as needed along with some friends she has met through support groups who also have family members with Alzheimer's disease      Review of Psychotropic and High-Risk Medications:  Donepezil  Escitalopram  gabapentin    Current Outpatient Medications   Medication     Acetaminophen (TYLENOL PO)     aspirin (ASA) 81 MG tablet     atorvastatin (LIPITOR) 20 MG tablet     donepezil (ARICEPT) 10 MG tablet     escitalopram (LEXAPRO) 10 MG tablet     gabapentin (NEURONTIN) 300 MG capsule     lisinopril (ZESTRIL) 20 MG tablet     metFORMIN (GLUCOPHAGE XR) 500 MG 24 hr tablet     Current Facility-Administered Medications   Medication     triamcinolone acetonide (KENALOG-40) injection 40 mg       Exam:  Neuro: Alert, awake, pleasant demeanor, normal gait, fluent speech, clear forgetfulness of recent memory.    I reviewed new external records since their last visit, which show communication to his PCP on 1/16/24 noting that Wally was experiencing increased confusion and urinating more frequently as well as holding his hand on his lower abdomen. UA was ordered that was unremarkable. He  completed his annual wellness visit in December 2023, no medication changes were made at that time. In September 2023 Wally had a syncopal episode at the dinner table where he became diaphoretic, slumped forward to the table, and started mumbling. He was taken to the ED where he was noted to be alert and conversing appropriately. Labs were unremarkable aside from slightly low potassium. EKG showed sinus bradycardia. He was offered Zio patch but declined. He was released with a diagnosis of dehydration.     I reviewed new lab results since their last visit, which include:    Latest Reference Range & Units 12/12/23 08:31 01/18/24 09:48   Sodium 135 - 145 mmol/L 138    Potassium 3.4 - 5.3 mmol/L 4.5    Chloride 98 - 107 mmol/L 102    Carbon Dioxide (CO2) 22 - 29 mmol/L 28    Urea Nitrogen 8.0 - 23.0 mg/dL 13.0    Creatinine 0.67 - 1.17 mg/dL 0.94    GFR Estimate >60 mL/min/1.73m2 >90    Calcium 8.8 - 10.2 mg/dL 9.3    Anion Gap 7 - 15 mmol/L 8    Albumin 3.5 - 5.2 g/dL 4.3    Protein Total 6.4 - 8.3 g/dL 6.7    Alkaline Phosphatase 40 - 150 U/L 76    ALT 0 - 70 U/L 32    AST 0 - 45 U/L 33    Albumin Urine mg/L mg/L <12.0    Bilirubin Total <=1.2 mg/dL 2.4 (H)    Cholesterol <200 mg/dL 127    Creatinine Urine mg/dL 122.0    Patient Fasting?  Yes    Glucose 70 - 99 mg/dL 105 (H)    HDL Cholesterol >=40 mg/dL 66    LDL Cholesterol Calculated <=100 mg/dL 44    Non HDL Cholesterol <130 mg/dL 61    PSA Tumor Marker 0.00 - 4.50 ng/mL  0.00 - 4.50 ng/mL 2.49  2.49    Triglycerides <150 mg/dL 87    WBC 4.0 - 11.0 10e3/uL 5.6    Hemoglobin 13.3 - 17.7 g/dL 14.2    Hematocrit 40.0 - 53.0 % 45.3    Platelet Count 150 - 450 10e3/uL 224    RBC Count 4.40 - 5.90 10e6/uL 4.77    MCV 78 - 100 fL 95    MCH 26.5 - 33.0 pg 29.8    MCHC 31.5 - 36.5 g/dL 31.3 (L)    RDW 10.0 - 15.0 % 12.8    Color Urine Colorless, Straw, Light Yellow, Yellow   Yellow   Appearance Urine Clear   Clear   Glucose Urine Negative mg/dL  Negative   Bilirubin Urine  Negative   Negative   Ketones Urine Negative mg/dL  Negative   Specific Gravity Urine 1.005 - 1.030   1.010   pH Urine 5.0 - 8.0   5.5   Protein Albumin Urine Negative mg/dL  Negative   Urobilinogen Urine 0.2, 1.0 E.U./dL  0.2   Nitrite Urine Negative   Negative   Blood Urine Negative   Negative   Leukocyte Esterase Urine Negative   Negative   WBC Urine 0-5 /HPF /HPF  0-5   RBC Urine 0-2 /HPF /HPF  0-2   Bacteria Urine None Seen /HPF  Few !   (H): Data is abnormally high  (L): Data is abnormally low  !: Data is abnormal    I reviewed new imaging since their last visit, including:   EKG 6/24/23: Sinus bradycardia (HR 58)  EKG 9/17/23: Sinus bradycardia  (HR 48)    Assessment and Care Plan:   #Alzheimer's disease, moderate stage disease  #Syncope    - Reduce Aricept/donepezil to 5 mg daily x 2 weeks, then stop  - Start Seroquel/quetiapine 25 mg BID. If sedation, unsteadiness or generalized slowing becomes an issue, you can reduce to 25 mg daily or 12.5 mg BID  - Happy Light or exposure to bright natural light in the morning    Today, I spent 20 minutes reviewing the chart, personally assessing objective testing, direct patient care, completing documentation and billing.

## 2024-02-16 ENCOUNTER — OFFICE VISIT (OUTPATIENT)
Dept: NEUROLOGY | Facility: CLINIC | Age: 64
End: 2024-02-16
Payer: MEDICARE

## 2024-02-16 VITALS
HEART RATE: 58 BPM | DIASTOLIC BLOOD PRESSURE: 82 MMHG | TEMPERATURE: 97.3 F | OXYGEN SATURATION: 97 % | SYSTOLIC BLOOD PRESSURE: 134 MMHG

## 2024-02-16 DIAGNOSIS — F02.80 EARLY ONSET ALZHEIMER'S DEMENTIA WITHOUT BEHAVIORAL DISTURBANCE (H): ICD-10-CM

## 2024-02-16 DIAGNOSIS — G30.0 MODERATE EARLY ONSET ALZHEIMER'S DEMENTIA WITH AGITATION (H): Primary | ICD-10-CM

## 2024-02-16 DIAGNOSIS — F02.B11 MODERATE EARLY ONSET ALZHEIMER'S DEMENTIA WITH AGITATION (H): Primary | ICD-10-CM

## 2024-02-16 DIAGNOSIS — G30.0 EARLY ONSET ALZHEIMER'S DEMENTIA WITHOUT BEHAVIORAL DISTURBANCE (H): ICD-10-CM

## 2024-02-16 RX ORDER — DONEPEZIL HYDROCHLORIDE 10 MG/1
5 TABLET, FILM COATED ORAL DAILY
Qty: 90 TABLET | Refills: 2 | COMMUNITY
Start: 2024-02-16 | End: 2024-06-19

## 2024-02-16 RX ORDER — QUETIAPINE FUMARATE 25 MG/1
25 TABLET, FILM COATED ORAL 2 TIMES DAILY
Qty: 60 TABLET | Refills: 3 | Status: SHIPPED | OUTPATIENT
Start: 2024-02-16 | End: 2024-05-20

## 2024-02-16 ASSESSMENT — PAIN SCALES - GENERAL: PAINLEVEL: NO PAIN (0)

## 2024-02-16 NOTE — LETTER
2/16/2024       RE: Wally Cano  [address] [address] [place] [id number]     Dear Colleague,    Thank you for referring your patient, Wally Cano, to the  [facility] at [facility]. Please see a copy of my visit note below.    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 Please see my last encounter dated 2/24/23 and the initial encounter dated 10/15/21 for a full review of the patient's history. In summary, Wally has a relevant history of resolved endocarditis, VSD, HTN, HLD, chronic ulcer in the setting of NSAID use. He has a paternal history of dementia. MMSE during our initial visit 10/2021 was 24/30. B12, TSH, CMP and CBC all unremarkable. Neuropsychological testing Dr. Sanches showed impairment in encoding and retrieval, but relative preservation of recognition memory. He scored in the mild cognitive impairment stage. MRI from July 2018 showed normal cerebral and cerebellar volumes. Medial temporal lobe sizes are within normal limits. Mild non-specific T2 signal in the subcortical white matter. 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 CSF ADMark panel from [facility] showed high p-tau and low ATI, confirming a diagnosis of early onset Alzheimer's disease. 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 He was started on Aricept 10 mg daily and is tolerating it well other than rhinorrhea. During our last visit 5/13/22 he was MCI stage vs early dementia stage with an MMSE of 26/30.     Updates From This Visit:   Today, the patient presents with his wife Aaliyah and son Sonido who is an independent historian and  "provides collateral information included below.     Wally feels positive about the past year. Aaliyah notes that short-term memory continues to decline. She cannot ask him to complete a task like \"bring this glass into the kitchen\" he will go away and forget what he was supposed to do. He can't do multi-step chores anymore.     Mood/behavior: There have been times when he can be more anxious or agitated. Specifically in the evening he gets very anxious. Currently using gabapentin BID but not noticing much improvement. When he goes somewhere with Sonido, Wally may get angry and accuse him of trying to keep him from Aaliyah.   Sleep:  He may go to bed very early but will get up every 20 minutes and go to the restroom or walk around. Aaliyah wonders whether he has gotten a solid-nights sleep in months.   ADLs:  May require help with setting up a shower and keeping it going, he is independent with dressing and toileting, he may choose inappropriate clothes.   Decision Making Capacity: Unable to make own decisions  Caregiver needs: Caregiver knowledge and needs assessment was completed during this visit and the following needs were identified: Aaliyah has a therapist she sees as needed along with some friends she has met through support groups who also have family members with Alzheimer's disease      Review of Psychotropic and High-Risk Medications:  Donepezil  Escitalopram  gabapentin    Current Outpatient Medications   Medication    Acetaminophen (TYLENOL PO)    aspirin (ASA) 81 MG tablet    atorvastatin (LIPITOR) 20 MG tablet    donepezil (ARICEPT) 10 MG tablet    escitalopram (LEXAPRO) 10 MG tablet    gabapentin (NEURONTIN) 300 MG capsule    lisinopril (ZESTRIL) 20 MG tablet    metFORMIN (GLUCOPHAGE XR) 500 MG 24 hr tablet     Current Facility-Administered Medications   Medication    triamcinolone acetonide (KENALOG-40) injection 40 mg       Exam:  Neuro: Alert, awake, pleasant demeanor, normal gait, fluent speech, " clear forgetfulness of recent memory.    I reviewed new external records since their last visit, which show communication to his PCP on 1/16/24 noting that Wally was experiencing increased confusion and urinating more frequently as well as holding his hand on his lower abdomen. UA was ordered that was unremarkable. He completed his annual wellness visit in December 2023, no medication changes were made at that time. In September 2023 Wally had a syncopal episode at the dinner table where he became diaphoretic, slumped forward to the table, and started mumbling. He was taken to the ED where he was noted to be alert and conversing appropriately. Labs were unremarkable aside from slightly low potassium. EKG showed sinus bradycardia. He was offered Zio patch but declined. He was released with a diagnosis of dehydration.     I reviewed new lab results since their last visit, which include:    Latest Reference Range & Units 12/12/23 08:31 01/18/24 09:48   Sodium 135 - 145 mmol/L 138    Potassium 3.4 - 5.3 mmol/L 4.5    Chloride 98 - 107 mmol/L 102    Carbon Dioxide (CO2) 22 - 29 mmol/L 28    Urea Nitrogen 8.0 - 23.0 mg/dL 13.0    Creatinine 0.67 - 1.17 mg/dL 0.94    GFR Estimate >60 mL/min/1.73m2 >90    Calcium 8.8 - 10.2 mg/dL 9.3    Anion Gap 7 - 15 mmol/L 8    Albumin 3.5 - 5.2 g/dL 4.3    Protein Total 6.4 - 8.3 g/dL 6.7    Alkaline Phosphatase 40 - 150 U/L 76    ALT 0 - 70 U/L 32    AST 0 - 45 U/L 33    Albumin Urine mg/L mg/L <12.0    Bilirubin Total <=1.2 mg/dL 2.4 (H)    Cholesterol <200 mg/dL 127    Creatinine Urine mg/dL 122.0    Patient Fasting?  Yes    Glucose 70 - 99 mg/dL 105 (H)    HDL Cholesterol >=40 mg/dL 66    LDL Cholesterol Calculated <=100 mg/dL 44    Non HDL Cholesterol <130 mg/dL 61    PSA Tumor Marker 0.00 - 4.50 ng/mL  0.00 - 4.50 ng/mL 2.49  2.49    Triglycerides <150 mg/dL 87    WBC 4.0 - 11.0 10e3/uL 5.6    Hemoglobin 13.3 - 17.7 g/dL 14.2    Hematocrit 40.0 - 53.0 % 45.3    Platelet Count 150  - 450 10e3/uL 224    RBC Count 4.40 - 5.90 10e6/uL 4.77    MCV 78 - 100 fL 95    MCH 26.5 - 33.0 pg 29.8    MCHC 31.5 - 36.5 g/dL 31.3 (L)    RDW 10.0 - 15.0 % 12.8    Color Urine Colorless, Straw, Light Yellow, Yellow   Yellow   Appearance Urine Clear   Clear   Glucose Urine Negative mg/dL  Negative   Bilirubin Urine Negative   Negative   Ketones Urine Negative mg/dL  Negative   Specific Gravity Urine 1.005 - 1.030   1.010   pH Urine 5.0 - 8.0   5.5   Protein Albumin Urine Negative mg/dL  Negative   Urobilinogen Urine 0.2, 1.0 E.U./dL  0.2   Nitrite Urine Negative   Negative   Blood Urine Negative   Negative   Leukocyte Esterase Urine Negative   Negative   WBC Urine 0-5 /HPF /HPF  0-5   RBC Urine 0-2 /HPF /HPF  0-2   Bacteria Urine None Seen /HPF  Few !   (H): Data is abnormally high  (L): Data is abnormally low  !: Data is abnormal    I reviewed new imaging since their last visit, including:   EKG 6/24/23: Sinus bradycardia (HR 58)  EKG 9/17/23: Sinus bradycardia  (HR 48)    Assessment and Care Plan:   #Alzheimer's disease, moderate stage disease  #Syncope    - Reduce Aricept/donepezil to 5 mg daily x 2 weeks, then stop  - Start Seroquel/quetiapine 25 mg BID. If sedation, unsteadiness or generalized slowing becomes an issue, you can reduce to 25 mg daily or 12.5 mg BID  - Happy Light or exposure to bright natural light in the morning    Today, I spent 20 minutes reviewing the chart, personally assessing objective testing, direct patient care, completing documentation and billing.        Again, thank you for allowing me to participate in the care of your patient.      Sincerely,    Colt Lester MD

## 2024-02-16 NOTE — PATIENT INSTRUCTIONS
- Reduce Aricept/donepezil to 5 mg daily x 2 weeks, then stop  - Start Seroquel/quetiapine 25 mg BID. If sedation, unsteadiness or generalized slowing becomes an issue, you can reduce to 25 mg daily or 12.5 mg BID  - Happy Light or exposure to bright natural light in the morning

## 2024-02-25 DIAGNOSIS — R73.03 PREDIABETES: ICD-10-CM

## 2024-02-26 RX ORDER — METFORMIN HCL 500 MG
500 TABLET, EXTENDED RELEASE 24 HR ORAL 2 TIMES DAILY WITH MEALS
Qty: 180 TABLET | Refills: 0 | Status: SHIPPED | OUTPATIENT
Start: 2024-02-26 | End: 2024-05-27

## 2024-03-01 ENCOUNTER — TELEPHONE (OUTPATIENT)
Dept: FAMILY MEDICINE | Facility: CLINIC | Age: 64
End: 2024-03-01
Payer: MEDICARE

## 2024-03-01 NOTE — TELEPHONE ENCOUNTER
Forms/Letter Request    Type of form/letter:  (Adult)    Do we have the form/letter: Yes: Will place form in Provider's bin    Who is the form from? [facility]    Where did/will the form come from? form was faxed in    When is form/letter needed by: ASAP    How would you like the form/letter returned: Fax : [phone]

## 2024-03-03 ENCOUNTER — ANCILLARY PROCEDURE (OUTPATIENT)
Dept: GENERAL RADIOLOGY | Facility: CLINIC | Age: 64
End: 2024-03-03
Attending: PHYSICIAN ASSISTANT
Payer: MEDICARE

## 2024-03-03 ENCOUNTER — OFFICE VISIT (OUTPATIENT)
Dept: URGENT CARE | Facility: URGENT CARE | Age: 64
End: 2024-03-03
Payer: MEDICARE

## 2024-03-03 VITALS
OXYGEN SATURATION: 99 % | TEMPERATURE: 97.3 F | HEART RATE: 74 BPM | DIASTOLIC BLOOD PRESSURE: 83 MMHG | WEIGHT: 176.7 LBS | SYSTOLIC BLOOD PRESSURE: 132 MMHG | BODY MASS INDEX: 27.27 KG/M2

## 2024-03-03 DIAGNOSIS — Z87.19 S/P LEFT INGUINAL HERNIA REPAIR: ICD-10-CM

## 2024-03-03 DIAGNOSIS — R19.09 MASS OF LEFT INGUINAL REGION: Primary | ICD-10-CM

## 2024-03-03 DIAGNOSIS — F02.80 EARLY ONSET ALZHEIMER'S DEMENTIA, UNSPECIFIED DEMENTIA SEVERITY, UNSPECIFIED WHETHER BEHAVIORAL, PSYCHOTIC, OR MOOD DISTURBANCE OR ANXIETY (H): ICD-10-CM

## 2024-03-03 DIAGNOSIS — Z98.890 S/P LEFT INGUINAL HERNIA REPAIR: ICD-10-CM

## 2024-03-03 DIAGNOSIS — G30.0 EARLY ONSET ALZHEIMER'S DEMENTIA, UNSPECIFIED DEMENTIA SEVERITY, UNSPECIFIED WHETHER BEHAVIORAL, PSYCHOTIC, OR MOOD DISTURBANCE OR ANXIETY (H): ICD-10-CM

## 2024-03-03 PROCEDURE — 99214 OFFICE O/P EST MOD 30 MIN: CPT | Performed by: PHYSICIAN ASSISTANT

## 2024-03-03 PROCEDURE — 74019 RADEX ABDOMEN 2 VIEWS: CPT | Mod: TC | Performed by: RADIOLOGY

## 2024-03-03 NOTE — PROGRESS NOTES
"     S: 63-year-old male presents with his wife for left lower abdominal mass.  Status post left inguinal hernia repair.  No fever.  No dysuria, frequency, urgency.  No constipation or diarrhea.  Noted it when he was bearing down and having a bowel movement.  Denies abdominal pain but it is a little tender when he palpates it.  History of early onset Alzheimer's        Allergies   Allergen Reactions    Ioxaglate      Hypotensive    Other (Do Not Use)      PN: LW Other1: -IVP dye- \"Flushing and HTN\"    Other Environmental Allergy      PN: LW CM1: CONTRAST DYE--FLUSHING Reaction :       Past Medical History:   Diagnosis Date    * * * SBE PROPHYLAXIS * * *     DDD (degenerative disc disease), lumbar     Elevated PSA 7/2014    Hypertension     Intervertebral disc rupture 2002    chronic pain medication-off now.     Melanoma of scalp (H) 1/9/2020    PE (pulmonary embolism) 1995    due to the SBE     SBE (subacute bacterial endocarditis) 1995    Varicose veins     VSD (ventricular septal defect)     1 cm south of septum        Acetaminophen (TYLENOL PO), Take 500 mg by mouth every 8 hours as needed  aspirin (ASA) 81 MG tablet, Take 81 mg by mouth daily  atorvastatin (LIPITOR) 20 MG tablet, TAKE 1 TABLET(20 MG) BY MOUTH DAILY  donepezil (ARICEPT) 10 MG tablet, Take 0.5 tablets (5 mg) by mouth daily  escitalopram (LEXAPRO) 10 MG tablet, Take 1 tablet (10 mg) by mouth daily  gabapentin (NEURONTIN) 300 MG capsule, Take 1 capsule (300 mg) by mouth 2 times daily as needed for other (Anxiety/Agitation) Take 1 capsule (300mg) by mouth one hour prior to typical onset of increased agitation/anxiety or sundowning symptoms.  lisinopril (ZESTRIL) 20 MG tablet, TAKE 1 TABLET BY MOUTH TWICE DAILY  metFORMIN (GLUCOPHAGE XR) 500 MG 24 hr tablet, TAKE 1 TABLET(500 MG) BY MOUTH TWICE DAILY WITH MEALS  QUEtiapine (SEROQUEL) 25 MG tablet, Take 1 tablet (25 mg) by mouth 2 times daily    triamcinolone acetonide (KENALOG-40) injection 40 " mg        Social History     Tobacco Use    Smoking status: Former     Packs/day: 1.00     Years: 10.00     Additional pack years: 0.00     Total pack years: 10.00     Types: Cigarettes, Cigars     Start date: 1986     Quit date: 10/28/2006     Years since quittin.3    Smokeless tobacco: Never   Vaping Use    Vaping Use: Never used   Substance Use Topics    Alcohol use: Yes     Comment: minimal, changed over to nonalcoholic beer and spirits    Drug use: Yes     Types: Other     Comment: 25 mg Delta 8 gummy 3-4 X week       ROS:  General: negative for fever  Resp: negative for chest pain   CV: negative for chest pain  ABD: as above  : negative for dysuria  Neurologic:negative for Headache    OBJECTIVE:  /83 (BP Location: Left arm, Patient Position: Sitting, Cuff Size: Adult Regular)   Pulse 74   Temp 97.3  F (36.3  C) (Tympanic)   Wt 80.2 kg (176 lb 11.2 oz)   SpO2 99%   BMI 27.27 kg/m     General:   awake, alert, and cooperative.  NAD.   Head: Normocephalic, atraumatic.  Eyes: Conjunctiva clear, non icteric.   Heart: Regular rate and rhythm. No murmur.  Lungs: Chest is clear; no wheezes or rales.  ABD: soft, no tenderness to palpation , no rigidity, guarding or rebound , bowel sounds intact.  Left upper inguinal canal/lower abdomen with a subtle firm mass without discrete edges.  Nontender currently.  Approximately 4 cm in diameter across.  No groin lymphadenopathy to palpation.  Negative hernia check with coughing.  Testicles nontender without any abnormal masses.  Neuro: Alert and oriented - normal speech.     ASSESSMENT:    ICD-10-CM    1. Mass of left inguinal region  R19.09 XR Abdomen 2 Views     Adult General Surg Referral      2. S/P left inguinal hernia repair  Z98.890 XR Abdomen 2 Views    Z87.19 Adult General Surg Referral      3. Early onset Alzheimer's dementia, unspecified dementia severity, unspecified whether behavioral, psychotic, or mood disturbance or anxiety (H)  G30.0      F02.80             PLAN: Left inguinal/abdominal mass.  Muscle tear versus hernia versus lipoma.  Abdominal x-rays.  Will MyChart with results.  Referral to general surgery for further evaluation and treatment.       Advised about symptoms which might herald more serious problems.        Cayla Boyd PA-C

## 2024-03-07 ENCOUNTER — MYC MEDICAL ADVICE (OUTPATIENT)
Dept: NEUROLOGY | Facility: CLINIC | Age: 64
End: 2024-03-07

## 2024-03-07 DIAGNOSIS — G30.0 MODERATE EARLY ONSET ALZHEIMER'S DEMENTIA WITH AGITATION (H): Primary | ICD-10-CM

## 2024-03-07 DIAGNOSIS — F02.B11 MODERATE EARLY ONSET ALZHEIMER'S DEMENTIA WITH AGITATION (H): Primary | ICD-10-CM

## 2024-03-08 RX ORDER — ESCITALOPRAM OXALATE 5 MG/1
TABLET ORAL
Qty: 21 TABLET | Refills: 0 | Status: SHIPPED | OUTPATIENT
Start: 2024-03-08 | End: 2024-06-19

## 2024-03-25 ENCOUNTER — OFFICE VISIT (OUTPATIENT)
Dept: SURGERY | Facility: CLINIC | Age: 64
End: 2024-03-25
Payer: MEDICARE

## 2024-03-25 VITALS
HEART RATE: 81 BPM | SYSTOLIC BLOOD PRESSURE: 118 MMHG | BODY MASS INDEX: 27.78 KG/M2 | DIASTOLIC BLOOD PRESSURE: 78 MMHG | WEIGHT: 180 LBS

## 2024-03-25 DIAGNOSIS — R19.09 MASS OF LEFT INGUINAL REGION: Primary | ICD-10-CM

## 2024-03-25 DIAGNOSIS — Z87.19 S/P LEFT INGUINAL HERNIA REPAIR: ICD-10-CM

## 2024-03-25 DIAGNOSIS — Z98.890 S/P LEFT INGUINAL HERNIA REPAIR: ICD-10-CM

## 2024-03-25 PROCEDURE — 99203 OFFICE O/P NEW LOW 30 MIN: CPT | Performed by: SURGERY

## 2024-03-25 NOTE — LETTER
3/25/2024         RE: Wally Cano  [address]  [place] [address]        Dear Colleague,    Thank you for referring your patient, Wally Cano, to the [facility]. Please see a copy of my visit note below.    Patient seen in consultation for left inguinal mass by Cayla Boyd PA-C urgent care    HPI:  Patient is a 63 year old male  with complaints of lump left groin  The patient noticed the symptoms about 3 weeks or so ago.    Had some pain initially now seems to just feel the lump  Had previous left inguinal hernia repair 2009  Patient has possible family history of hernia problems in father, not sure    Review Of Systems    Skin: negative  Ears/Nose/Throat: negative  Respiratory: No shortness of breath, dyspnea on exertion, cough, or hemoptysis  Cardiovascular: known VSD no related issues  Gastrointestinal: negative  Genitourinary: negative  Musculoskeletal: negative  Neurologic: memory problem, alzheimers  Hematologic/Lymphatic/Immunologic: negative  Endocrine: negative      Past Medical History:   Diagnosis Date     * * * SBE PROPHYLAXIS * * *      DDD (degenerative disc disease), lumbar      Elevated PSA 7/2014     Hypertension      Intervertebral disc rupture 2002    chronic pain medication-off now.      Melanoma of scalp (H) 1/9/2020     PE (pulmonary embolism) 1995    due to the SBE      SBE (subacute bacterial endocarditis) 1995     Varicose veins      VSD (ventricular septal defect)     1 cm south of septum        Past Surgical History:   Procedure Laterality Date     ARTHROSCOPY KNEE RT/LT  1979    removal of bone chips LT knee      COLONOSCOPY N/A 5/21/2021    Procedure: COLONOSCOPY, WITH POLYPECTOMY AND BIOPSY;  Surgeon: Fab Mcguire MD;  Location: [facility]     COLONOSCOPY N/A 8/2/2021    Procedure: COLONOSCOPY, WITH POLYPECTOMY;  Surgeon: Fab Mcguire MD;  Location: [facility] OR     EXCISE LESION FACE Right 1/16/2020    Procedure:  Right vertex scalp melanoma excision;  Surgeon: Colt Santiago MD;  Location: [facility] OR     EYE SURGERY  2016    Bilateral Cataract removal     HEMORRHOIDECTOMY  1/10     HERNIA REPAIR, INGUINAL RT/LT      Left     IMPLANT SHUNT VENTRICULOATRIAL       MOUTH SURGERY      wisdom teeth     REPAIR MOHS Right 2020    Procedure: Stage 1 right vertex scalp reconstruction with wound preparation, local flaps, complex closure;  Surgeon: Colt Santiago MD;  Location: [facility] OR     TONSILLECTOMY         Social History     Socioeconomic History     Marital status:      Spouse name: Not on file     Number of children: Not on file     Years of education: Not on file     Highest education level: Not on file   Occupational History     Occupation: Nurse Clinician      Employer: [employer]     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 [employer]    Tobacco Use     Smoking status: Former     Packs/day: 1.00     Years: 10.00     Additional pack years: 0.00     Total pack years: 10.00     Types: Cigarettes, Cigars     Start date: 1986     Quit date: 10/28/2006     Years since quittin.4     Smokeless tobacco: Never   Vaping Use     Vaping Use: Never used   Substance and Sexual Activity     Alcohol use: Yes     Comment: minimal, changed over to nonalcoholic beer and spirits     Drug use: Yes     Types: Other     Comment: 25 mg Delta 8 gummy 3-4 X week     Sexual activity: Not Currently     Partners: Female     Birth control/protection: Abstinence   Other Topics Concern     Parent/sibling w/ CABG, MI or angioplasty before 65F 55M? 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 He is  with 2 children age 9 and 11. He also has an older son from a previous marriage. His wife is a respiratory therapist at [employer]. (2013)     Social Determinants of Health     Financial Resource Strain: Low Risk  (12/8/2023)    Financial Resource Strain      Within the past 12 months, have you or your family members you live with been unable to get utilities (heat, electricity) when it was really needed?: No   Food Insecurity: Low Risk  (12/8/2023)    Food Insecurity      Within the past 12 months, did you worry that your food would run out before you got money to buy more?: No      Within the past 12 months, did the food you bought just not last and you didn t have money to get more?: No   Transportation Needs: Low Risk  (12/8/2023)    Transportation Needs      Within the past 12 months, has lack of transportation kept you from medical appointments, getting your medicines, non-medical meetings or appointments, work, or from getting things that you need?: No   Physical Activity: Not on file   Stress: Not on file   Social Connections: Not on file   Interpersonal Safety: Not on file   Housing Stability: Low Risk  (12/8/2023)    Housing Stability      Do you have housing? : Yes      Are you worried about losing your housing?: No       Current Outpatient Medications   Medication Sig Dispense Refill     Acetaminophen (TYLENOL PO) Take 500 mg by mouth every 8 hours as needed       aspirin (ASA) 81 MG tablet Take 81 mg by mouth daily       atorvastatin (LIPITOR) 20 MG tablet TAKE 1 TABLET(20 MG) BY MOUTH DAILY 90 tablet 3     donepezil (ARICEPT) 10 MG tablet Take 0.5 tablets (5 mg) by mouth daily 90 tablet 2     escitalopram (LEXAPRO) 5 MG tablet Take 1 tablet (5 mg) by mouth daily for 14 days, THEN 0.5 tablets (2.5 mg) daily for 14 days. Take 1 tablet (5 mg) by mouth daily for 14 days, THEN 0.5 tablets (2.5 mg) daily  for 14 days. THEN stop the medication. 21 tablet 0     gabapentin (NEURONTIN) 300 MG capsule Take 1 capsule (300 mg) by mouth 2 times daily as needed for other (Anxiety/Agitation) Take 1 capsule (300mg) by mouth one hour prior to typical onset of increased agitation/anxiety or sundowning symptoms. 90 capsule 1     lisinopril (ZESTRIL) 20 MG tablet TAKE 1 TABLET BY MOUTH TWICE DAILY 180 tablet 3     metFORMIN (GLUCOPHAGE XR) 500 MG 24 hr tablet TAKE 1 TABLET(500 MG) BY MOUTH TWICE DAILY WITH MEALS 180 tablet 0     QUEtiapine (SEROQUEL) 25 MG tablet Take 1 tablet (25 mg) by mouth 2 times daily 60 tablet 3       Medications and history reviewed    Physical exam:  Vitals: /78   Pulse 81   Wt 81.6 kg (180 lb)   BMI 27.78 kg/m    BMI= Body mass index is 27.78 kg/m .    Constitutional: healthy, alert, and no distress. Some tangential thoughts  Head: Normocephalic. No masses, lesions, tenderness or abnormalities  : Normal external genitalia without lesions and some firm tissue in area that could be prior scar from old repair given the location, no definite bulge or impulse to suggest recurrent hernia    Assessment:     ICD-10-CM    1. Mass of left inguinal region  R19.09 Adult General Surg Referral     CT Abdomen Pelvis w/o Contrast      2. S/P left inguinal hernia repair  Z98.890 Adult General Surg Referral    Z87.19 CT Abdomen Pelvis w/o Contrast        Plan: What I feel I think is most likely just a ridge from scar tissue from the old repair without any recurrence but since he did have some tenderness in the area before we will go ahead and further look into this with a CT scan.  Will let him know results once completed.  Follow-up as needed.    Dony Garza MD      Again, thank you for allowing me to participate in the care of your patient.        Sincerely,        Dony Garza MD

## 2024-03-25 NOTE — PROGRESS NOTES
Patient seen in consultation for left inguinal mass by Cayla Boyd PA-C urgent care    HPI:  Patient is a 63 year old male  with complaints of lump left groin  The patient noticed the symptoms about 3 weeks or so ago.    Had some pain initially now seems to just feel the lump  Had previous left inguinal hernia repair 2009  Patient has possible family history of hernia problems in father, not sure    Review Of Systems    Skin: negative  Ears/Nose/Throat: negative  Respiratory: No shortness of breath, dyspnea on exertion, cough, or hemoptysis  Cardiovascular: known VSD no related issues  Gastrointestinal: negative  Genitourinary: negative  Musculoskeletal: negative  Neurologic: memory problem, alzheimers  Hematologic/Lymphatic/Immunologic: negative  Endocrine: negative      Past Medical History:   Diagnosis Date    * * * SBE PROPHYLAXIS * * *     DDD (degenerative disc disease), lumbar     Elevated PSA 7/2014    Hypertension     Intervertebral disc rupture 2002    chronic pain medication-off now.     Melanoma of scalp (H) 1/9/2020    PE (pulmonary embolism) 1995    due to the SBE     SBE (subacute bacterial endocarditis) 1995    Varicose veins     VSD (ventricular septal defect)     1 cm south of septum        Past Surgical History:   Procedure Laterality Date    ARTHROSCOPY KNEE RT/LT  1979    removal of bone chips LT knee     COLONOSCOPY N/A 5/21/2021    Procedure: COLONOSCOPY, WITH POLYPECTOMY AND BIOPSY;  Surgeon: Fab Mcguire MD;  Location: [facility]    COLONOSCOPY N/A 8/2/2021    Procedure: COLONOSCOPY, WITH POLYPECTOMY;  Surgeon: Fab Mcguire MD;  Location: [facility] OR    EXCISE LESION FACE Right 1/16/2020    Procedure: Right vertex scalp melanoma excision;  Surgeon: Colt Santiago MD;  Location:  OR    EYE SURGERY  2016    Bilateral Cataract removal    HEMORRHOIDECTOMY  1/10    HERNIA REPAIR, INGUINAL RT/LT      Left    IMPLANT SHUNT VENTRICULOATRIAL      MOUTH SURGERY      wisdom teeth     REPAIR MOHS Right 2020    Procedure: Stage 1 right vertex scalp reconstruction with wound preparation, local flaps, complex closure;  Surgeon: Colt Santiago MD;  Location: [facility] OR    TONSILLECTOMY         Social History     Socioeconomic History    Marital status:      Spouse name: Not on file    Number of children: Not on file    Years of education: Not on file    Highest education level: Not on file   Occupational History    Occupation: Nurse Clinician      Employer: [employer]     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 [employer]    Tobacco Use    Smoking status: Former     Packs/day: 1.00     Years: 10.00     Additional pack years: 0.00     Total pack years: 10.00     Types: Cigarettes, Cigars     Start date: 1986     Quit date: 10/28/2006     Years since quittin.4    Smokeless tobacco: Never   Vaping Use    Vaping Use: Never used   Substance and Sexual Activity    Alcohol use: Yes     Comment: minimal, changed over to nonalcoholic beer and spirits    Drug use: Yes     Types: Other     Comment: 25 mg Delta 8 gummy 3-4 X week    Sexual activity: Not Currently     Partners: Female     Birth control/protection: Abstinence   Other Topics Concern    Parent/sibling w/ CABG, MI or angioplasty before 65F 55M? 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 He is  with 2 children age 9 and 11. He also has an older son from a previous marriage. His wife is a respiratory therapist at [employer]. (2013)     Social Determinants of Health     Financial Resource Strain: Low  Risk  (12/8/2023)    Financial Resource Strain     Within the past 12 months, have you or your family members you live with been unable to get utilities (heat, electricity) when it was really needed?: No   Food Insecurity: Low Risk  (12/8/2023)    Food Insecurity     Within the past 12 months, did you worry that your food would run out before you got money to buy more?: No     Within the past 12 months, did the food you bought just not last and you didn t have money to get more?: No   Transportation Needs: Low Risk  (12/8/2023)    Transportation Needs     Within the past 12 months, has lack of transportation kept you from medical appointments, getting your medicines, non-medical meetings or appointments, work, or from getting things that you need?: No   Physical Activity: Not on file   Stress: Not on file   Social Connections: Not on file   Interpersonal Safety: Not on file   Housing Stability: Low Risk  (12/8/2023)    Housing Stability     Do you have housing? : Yes     Are you worried about losing your housing?: No       Current Outpatient Medications   Medication Sig Dispense Refill    Acetaminophen (TYLENOL PO) Take 500 mg by mouth every 8 hours as needed      aspirin (ASA) 81 MG tablet Take 81 mg by mouth daily      atorvastatin (LIPITOR) 20 MG tablet TAKE 1 TABLET(20 MG) BY MOUTH DAILY 90 tablet 3    donepezil (ARICEPT) 10 MG tablet Take 0.5 tablets (5 mg) by mouth daily 90 tablet 2    escitalopram (LEXAPRO) 5 MG tablet Take 1 tablet (5 mg) by mouth daily for 14 days, THEN 0.5 tablets (2.5 mg) daily for 14 days. Take 1 tablet (5 mg) by mouth daily for 14 days, THEN 0.5 tablets (2.5 mg) daily for 14 days. THEN stop the medication. 21 tablet 0    gabapentin (NEURONTIN) 300 MG capsule Take 1 capsule (300 mg) by mouth 2 times daily as needed for other (Anxiety/Agitation) Take 1 capsule (300mg) by mouth one hour prior to typical onset of increased agitation/anxiety or sundowning symptoms. 90 capsule 1    lisinopril  (ZESTRIL) 20 MG tablet TAKE 1 TABLET BY MOUTH TWICE DAILY 180 tablet 3    metFORMIN (GLUCOPHAGE XR) 500 MG 24 hr tablet TAKE 1 TABLET(500 MG) BY MOUTH TWICE DAILY WITH MEALS 180 tablet 0    QUEtiapine (SEROQUEL) 25 MG tablet Take 1 tablet (25 mg) by mouth 2 times daily 60 tablet 3       Medications and history reviewed    Physical exam:  Vitals: /78   Pulse 81   Wt 81.6 kg (180 lb)   BMI 27.78 kg/m    BMI= Body mass index is 27.78 kg/m .    Constitutional: healthy, alert, and no distress. Some tangential thoughts  Head: Normocephalic. No masses, lesions, tenderness or abnormalities  : Normal external genitalia without lesions and some firm tissue in area that could be prior scar from old repair given the location, no definite bulge or impulse to suggest recurrent hernia    Assessment:     ICD-10-CM    1. Mass of left inguinal region  R19.09 Adult General Surg Referral     CT Abdomen Pelvis w/o Contrast      2. S/P left inguinal hernia repair  Z98.890 Adult General Surg Referral    Z87.19 CT Abdomen Pelvis w/o Contrast        Plan: What I feel I think is most likely just a ridge from scar tissue from the old repair without any recurrence but since he did have some tenderness in the area before we will go ahead and further look into this with a CT scan.  Will let him know results once completed.  Follow-up as needed.    Dony Garza MD

## 2024-04-04 ENCOUNTER — ANCILLARY PROCEDURE (OUTPATIENT)
Dept: CT IMAGING | Facility: CLINIC | Age: 64
End: 2024-04-04
Attending: SURGERY
Payer: MEDICARE

## 2024-04-04 DIAGNOSIS — Z87.19 S/P LEFT INGUINAL HERNIA REPAIR: ICD-10-CM

## 2024-04-04 DIAGNOSIS — Z98.890 S/P LEFT INGUINAL HERNIA REPAIR: ICD-10-CM

## 2024-04-04 DIAGNOSIS — R19.09 MASS OF LEFT INGUINAL REGION: ICD-10-CM

## 2024-04-04 PROCEDURE — 74176 CT ABD & PELVIS W/O CONTRAST: CPT | Mod: MG | Performed by: RADIOLOGY

## 2024-04-04 PROCEDURE — G1010 CDSM STANSON: HCPCS | Performed by: RADIOLOGY

## 2024-05-19 DIAGNOSIS — E78.5 HYPERLIPIDEMIA LDL GOAL <100: ICD-10-CM

## 2024-05-20 DIAGNOSIS — G30.0 MODERATE EARLY ONSET ALZHEIMER'S DEMENTIA WITH AGITATION (H): ICD-10-CM

## 2024-05-20 DIAGNOSIS — F02.B11 MODERATE EARLY ONSET ALZHEIMER'S DEMENTIA WITH AGITATION (H): ICD-10-CM

## 2024-05-20 RX ORDER — ATORVASTATIN CALCIUM 20 MG/1
TABLET, FILM COATED ORAL
Qty: 90 TABLET | Refills: 1 | Status: SHIPPED | OUTPATIENT
Start: 2024-05-20

## 2024-05-20 RX ORDER — QUETIAPINE FUMARATE 25 MG/1
25 TABLET, FILM COATED ORAL 2 TIMES DAILY
Qty: 60 TABLET | Refills: 3 | Status: SHIPPED | OUTPATIENT
Start: 2024-05-20 | End: 2024-07-25 | Stop reason: DRUGHIGH

## 2024-05-27 DIAGNOSIS — R73.03 PREDIABETES: ICD-10-CM

## 2024-05-27 RX ORDER — METFORMIN HCL 500 MG
500 TABLET, EXTENDED RELEASE 24 HR ORAL 2 TIMES DAILY WITH MEALS
Qty: 180 TABLET | Refills: 0 | Status: SHIPPED | OUTPATIENT
Start: 2024-05-27 | End: 2024-08-29

## 2024-06-03 DIAGNOSIS — F41.9 ANXIETY: ICD-10-CM

## 2024-06-03 DIAGNOSIS — G30.0 EARLY ONSET ALZHEIMER'S DEMENTIA WITHOUT BEHAVIORAL DISTURBANCE (H): ICD-10-CM

## 2024-06-03 DIAGNOSIS — F02.80 EARLY ONSET ALZHEIMER'S DEMENTIA WITHOUT BEHAVIORAL DISTURBANCE (H): ICD-10-CM

## 2024-06-03 RX ORDER — GABAPENTIN 300 MG/1
300 CAPSULE ORAL 2 TIMES DAILY PRN
Qty: 90 CAPSULE | Refills: 1 | Status: SHIPPED | OUTPATIENT
Start: 2024-06-03 | End: 2024-08-13

## 2024-06-03 NOTE — TELEPHONE ENCOUNTER
Last seen: 2/16/24  RTC: 2 months  Cancel: no  No-show: no  Next appt: none scheduled, will ask to schedule follow up    Incoming refill from pharmacy via [facility] team    Medication requested: gabapentin (NEURONTIN) 300 MG capsule   Directions:  Route: Take 1 capsule (300 mg) by mouth 2 times daily as needed for other (Anxiety/Agitation) Take 1 capsule (300mg) by mouth one hour prior to typical onset of increased agitation/anxiety or sundowning symptoms   Qty: 90 + 1   Last refilled: 12/18/23    Medication refill approved per refill protocol

## 2024-06-04 ENCOUNTER — APPOINTMENT (OUTPATIENT)
Dept: URBAN - METROPOLITAN AREA CLINIC 252 | Age: 64
Setting detail: DERMATOLOGY
End: 2024-06-04

## 2024-06-04 VITALS — HEIGHT: 69.5 IN | WEIGHT: 175 LBS

## 2024-06-04 DIAGNOSIS — D18.0 HEMANGIOMA: ICD-10-CM

## 2024-06-04 DIAGNOSIS — Z85.820 PERSONAL HISTORY OF MALIGNANT MELANOMA OF SKIN: ICD-10-CM

## 2024-06-04 DIAGNOSIS — Z71.89 OTHER SPECIFIED COUNSELING: ICD-10-CM

## 2024-06-04 DIAGNOSIS — L81.4 OTHER MELANIN HYPERPIGMENTATION: ICD-10-CM

## 2024-06-04 DIAGNOSIS — D22 MELANOCYTIC NEVI: ICD-10-CM

## 2024-06-04 DIAGNOSIS — L82.1 OTHER SEBORRHEIC KERATOSIS: ICD-10-CM

## 2024-06-04 DIAGNOSIS — L57.0 ACTINIC KERATOSIS: ICD-10-CM

## 2024-06-04 PROBLEM — D18.01 HEMANGIOMA OF SKIN AND SUBCUTANEOUS TISSUE: Status: ACTIVE | Noted: 2024-06-04

## 2024-06-04 PROBLEM — D22.5 MELANOCYTIC NEVI OF TRUNK: Status: ACTIVE | Noted: 2024-06-04

## 2024-06-04 PROCEDURE — 99213 OFFICE O/P EST LOW 20 MIN: CPT | Mod: 25

## 2024-06-04 PROCEDURE — OTHER ADDITIONAL NOTES: OTHER

## 2024-06-04 PROCEDURE — 17000 DESTRUCT PREMALG LESION: CPT

## 2024-06-04 PROCEDURE — OTHER LIQUID NITROGEN: OTHER

## 2024-06-04 PROCEDURE — OTHER COUNSELING: OTHER

## 2024-06-04 ASSESSMENT — LOCATION DETAILED DESCRIPTION DERM
LOCATION DETAILED: EPIGASTRIC SKIN
LOCATION DETAILED: RIGHT SUPERIOR MEDIAL UPPER BACK
LOCATION DETAILED: LEFT SUPERIOR PARIETAL SCALP
LOCATION DETAILED: RIGHT MEDIAL SUPERIOR CHEST
LOCATION DETAILED: RIGHT SUPERIOR LATERAL UPPER BACK
LOCATION DETAILED: RIGHT SUPERIOR OCCIPITAL SCALP
LOCATION DETAILED: RIGHT LATERAL ABDOMEN
LOCATION DETAILED: RIGHT SUPERIOR MEDIAL MIDBACK
LOCATION DETAILED: RIGHT POSTERIOR PARIETAL SCALP

## 2024-06-04 ASSESSMENT — LOCATION SIMPLE DESCRIPTION DERM
LOCATION SIMPLE: RIGHT UPPER BACK
LOCATION SIMPLE: RIGHT OCCIPITAL SCALP
LOCATION SIMPLE: POSTERIOR SCALP
LOCATION SIMPLE: RIGHT LOWER BACK
LOCATION SIMPLE: CHEST
LOCATION SIMPLE: SCALP
LOCATION SIMPLE: ABDOMEN

## 2024-06-04 ASSESSMENT — LOCATION ZONE DERM
LOCATION ZONE: SCALP
LOCATION ZONE: TRUNK

## 2024-06-04 NOTE — PROCEDURE: LIQUID NITROGEN
Show Aperture Variable?: No
Show Applicator Variable?: Yes
Application Tool (Optional): Liquid Nitrogen Sprayer
Detail Level: Detailed
Post-Care Instructions: - Patient was instructed to avoid picking at any of the treated lesions.
Consent: - Discussed that these are a result of cumulative sun exposure.\\n- Consent was obtained and risks were reviewed prior to procedure today. All questions were answered prior to procedure today.\\n- Risks discussed include but are not limited to pain, crusting, scabbing, blistering, scarring, temporary or permanent darker or lighter pigmentary change, recurrence, incomplete resolution, and infection.
Duration Of Freeze Thaw-Cycle (Seconds): 0

## 2024-06-19 ENCOUNTER — OFFICE VISIT (OUTPATIENT)
Dept: FAMILY MEDICINE | Facility: CLINIC | Age: 64
End: 2024-06-19
Payer: MEDICARE

## 2024-06-19 VITALS
SYSTOLIC BLOOD PRESSURE: 130 MMHG | RESPIRATION RATE: 11 BRPM | HEART RATE: 59 BPM | HEIGHT: 68 IN | BODY MASS INDEX: 26.73 KG/M2 | DIASTOLIC BLOOD PRESSURE: 85 MMHG | OXYGEN SATURATION: 98 % | WEIGHT: 176.4 LBS | TEMPERATURE: 97.4 F

## 2024-06-19 DIAGNOSIS — Q21.0 VSD (VENTRICULAR SEPTAL DEFECT): ICD-10-CM

## 2024-06-19 DIAGNOSIS — C43.4 MELANOMA OF SCALP (H): ICD-10-CM

## 2024-06-19 DIAGNOSIS — I10 HYPERTENSION GOAL BP (BLOOD PRESSURE) < 140/90: Primary | ICD-10-CM

## 2024-06-19 DIAGNOSIS — E78.5 HYPERLIPIDEMIA LDL GOAL <100: ICD-10-CM

## 2024-06-19 DIAGNOSIS — F02.80: ICD-10-CM

## 2024-06-19 DIAGNOSIS — G30.0: ICD-10-CM

## 2024-06-19 PROCEDURE — G2211 COMPLEX E/M VISIT ADD ON: HCPCS | Performed by: INTERNAL MEDICINE

## 2024-06-19 PROCEDURE — 99213 OFFICE O/P EST LOW 20 MIN: CPT | Performed by: INTERNAL MEDICINE

## 2024-06-19 ASSESSMENT — PAIN SCALES - GENERAL: PAINLEVEL: NO PAIN (0)

## 2024-06-19 NOTE — PROGRESS NOTES
"  Assessment & Plan     Wally was seen today for follow up.    Diagnoses and all orders for this visit:    Hypertension goal BP (blood pressure) < 140/90  Comments:  Blood pressure at goal    Melanoma of scalp (H)  Comments:  no recurrence. follows with dermatology    Hyperlipidemia LDL goal <100  Comments:  At goal.    VSD (ventricular septal defect)  Comments:  Stable for years.  Continue with SBE prophylaxis    Early onset Alzheimer's disease without behavioral disturbance (H)  Comments:  Follows with neurology.  His wife is primary caregiver.      -- doing well, stable chronic health issues.   -- well cared for at home.       BMI  Estimated body mass index is 27.22 kg/m  as calculated from the following:    Height as of this encounter: 1.715 m (5' 7.5\").    Weight as of this encounter: 80 kg (176 lb 6.4 oz).       Return in December as scheduled.  His wife will help him schedule for fasting lab appointment a few days before    Subjective   Wally is a 63 year old, presenting for the following health issues:  Follow Up (Chronic disease follow up. Double check for future refills needed )        6/19/2024     7:48 AM   Additional Questions   Roomed by Pippa GUARDADO   Accompanied by Wife (Aaliyah)         6/19/2024     7:48 AM   Patient Reported Additional Medications   Patient reports taking the following new medications None       Wally Cano is a 63 year old male who has Hyperlipidemia LDL goal <100; VSD (ventricular septal defect); * * * SBE PROPHYLAXIS * * *; Hypertension goal BP (blood pressure) < 140/90; S/P infectious endocarditis; Superficial thrombophlebitis; [facility]; Melanoma of scalp (H); and Early onset Alzheimer's disease without behavioral disturbance (H) on their pertinent problem list.      Chronic disease follow-up:  1.  Early onset Alzheimer's disease.  Sees neurologist.  At Aricept discontinued due to bradycardia and " "hypotension.  On Seroquel and gabapentin for agitation.  Tends to be agitated pacing between 2 to 8 PM.  His wife tries to schedule a lot of activities during that time to burn off energy, this is helping some.  2 Healthcare directive.  Patient has an active healthcare directive indicating full code.  His wife Aaliyah is the power of .  Patient confirmed today that he wants to be resuscitated.  Down the road, if his health will change, revision of healthcare directive is needed, a POLST form can be completed.  3.  Other health issues have been stable.  4.  They have moved into a triplex.  Their oldest will be coming home in December and lives in the [facility].  They will help his wife care for him.    History of Present Illness       Hypertension: He presents for follow up of hypertension.  He does check blood pressure  regularly outside of the clinic. Outpatient blood pressures have not been over 140/90. He does not follow a low salt diet.     Reason for visit:  6 month follow up    He eats 2-3 servings of fruits and vegetables daily.He consumes 1 sweetened beverage(s) daily.He exercises with enough effort to increase his heart rate 10 to 19 minutes per day.  He exercises with enough effort to increase his heart rate 4 days per week.   He is taking medications regularly.             Review of Systems  Constitutional, HEENT, cardiovascular, pulmonary, gi and gu systems are negative, except as otherwise noted.      Objective    /85 (BP Location: Right arm, Patient Position: Sitting, Cuff Size: Adult Regular)   Pulse 59   Temp 97.4  F (36.3  C) (Temporal)   Resp 11   Ht 1.715 m (5' 7.5\")   Wt 80 kg (176 lb 6.4 oz)   SpO2 98%   BMI 27.22 kg/m    Body mass index is 27.22 kg/m .  Physical Exam   GENERAL: alert and no distress  NEURO: Oriented to person only, not to time or place.  Not able to name the season months or location.            Signed Electronically by: Livier Triplett MD PhD    "

## 2024-07-22 ENCOUNTER — MYC MEDICAL ADVICE (OUTPATIENT)
Dept: NEUROLOGY | Facility: CLINIC | Age: 64
End: 2024-07-22

## 2024-07-22 DIAGNOSIS — G30.0 MODERATE EARLY ONSET ALZHEIMER'S DEMENTIA WITH AGITATION (H): Primary | ICD-10-CM

## 2024-07-22 DIAGNOSIS — F02.B11 MODERATE EARLY ONSET ALZHEIMER'S DEMENTIA WITH AGITATION (H): Primary | ICD-10-CM

## 2024-07-25 RX ORDER — QUETIAPINE FUMARATE 25 MG/1
TABLET, FILM COATED ORAL
Qty: 270 TABLET | Refills: 2 | Status: SHIPPED | OUTPATIENT
Start: 2024-07-25

## 2024-07-25 RX ORDER — ESCITALOPRAM OXALATE 5 MG/1
10 TABLET ORAL DAILY
Qty: 180 TABLET | Refills: 2 | Status: SHIPPED | OUTPATIENT
Start: 2024-07-25

## 2024-07-25 NOTE — TELEPHONE ENCOUNTER
I spoke with Aaliyah about Wally's recent symptoms. He is up much of the night (up to nine times nightly recently) and often tries to leave the house. Family has installed locks and a camera system to deter this behavior but Aaliyah often has to wake up and continue to offer redirection.     Seroquel worked well at first for reducing anxious and agitated behaviors, but the effects seem to have lessened recently, especially since Wally has been getting less sleep.     Wally requires 24 hour supervision at this time. Aaliyah notes that she recently got a flat tire and while repairing the tire, Wally went into a nearby gas station and took ice cream without paying. He is no longer able to remember his age (he answers '89' when asked). Family has respite care and  care at home intermittently which has been extremely helpful.     Wally has not experienced any change in his balance or coodination since starting Seroquel. He has had no recent falls. He remains very alert and there is no slurring of words or increased confusion.     Aaliyah and I discussed that having a drug like escitalopram on board will be helpful for us to maintain a more stable 'baseline' for Wally's mood. It hopefully will also positively impact his sleep at night as well. We will start at 5mg daily for two weeks, then increase to 10mg daily.     For nighttime agitation, anxiety, and wandering, we will increase Seroquel to 50mg. So his current dose will now be 25mg in the morning and 50mg in the evening.     Aaliyah agreed to follow-up within the next week to let us know how things are going with the medication changes.     Plan:  -Start escitalopram; 5mg daily for two weeks, then 10mg daily  -increase evening dose of Seroquel to 50mg   -follow up with a [employer] message to let us know how things are going

## 2024-08-05 ENCOUNTER — MYC MEDICAL ADVICE (OUTPATIENT)
Dept: NEUROLOGY | Facility: CLINIC | Age: 64
End: 2024-08-05

## 2024-08-13 DIAGNOSIS — F41.9 ANXIETY: ICD-10-CM

## 2024-08-13 DIAGNOSIS — F02.80 EARLY ONSET ALZHEIMER'S DEMENTIA WITHOUT BEHAVIORAL DISTURBANCE (H): ICD-10-CM

## 2024-08-13 DIAGNOSIS — G30.0 EARLY ONSET ALZHEIMER'S DEMENTIA WITHOUT BEHAVIORAL DISTURBANCE (H): ICD-10-CM

## 2024-08-14 NOTE — TELEPHONE ENCOUNTER
gabapentin (NEURONTIN) 300 MG capsule       Route: Take 1 capsule (300 mg) by mouth 2 times daily as needed for other (Anxiety/Agitation) Take 1 capsule (300mg) by mouth one hour prior to typical onset of increased agitation/anxiety or sundowning   Last Written Prescription Date:  6-3-24  Last Fill Quantity: 90,   # refills: 1  Last Office Visit : 2-16-24  Future Office visit:  10-9-27    Routing refill request to provider for review/approval because:  Drug not on the [facility], [facility] or [facility] refill protocol or controlled substance

## 2024-08-15 RX ORDER — GABAPENTIN 300 MG/1
300 CAPSULE ORAL 2 TIMES DAILY PRN
Qty: 90 CAPSULE | Refills: 1 | Status: SHIPPED | OUTPATIENT
Start: 2024-08-15

## 2024-08-16 ENCOUNTER — OFFICE VISIT (OUTPATIENT)
Dept: NEUROLOGY | Facility: CLINIC | Age: 64
End: 2024-08-16
Payer: MEDICARE

## 2024-08-16 ENCOUNTER — LAB (OUTPATIENT)
Dept: LAB | Facility: CLINIC | Age: 64
End: 2024-08-16
Payer: MEDICARE

## 2024-08-16 ENCOUNTER — ANCILLARY PROCEDURE (OUTPATIENT)
Dept: GENERAL RADIOLOGY | Facility: CLINIC | Age: 64
End: 2024-08-16
Payer: MEDICARE

## 2024-08-16 VITALS — SYSTOLIC BLOOD PRESSURE: 147 MMHG | DIASTOLIC BLOOD PRESSURE: 82 MMHG | HEART RATE: 66 BPM

## 2024-08-16 DIAGNOSIS — F41.9 ANXIETY: ICD-10-CM

## 2024-08-16 DIAGNOSIS — G30.0 MODERATE EARLY ONSET ALZHEIMER'S DEMENTIA WITH AGITATION (H): ICD-10-CM

## 2024-08-16 DIAGNOSIS — F02.B11 MODERATE EARLY ONSET ALZHEIMER'S DEMENTIA WITH AGITATION (H): ICD-10-CM

## 2024-08-16 DIAGNOSIS — R45.1 AGITATION: ICD-10-CM

## 2024-08-16 DIAGNOSIS — F02.B11 MODERATE EARLY ONSET ALZHEIMER'S DEMENTIA WITH AGITATION (H): Primary | ICD-10-CM

## 2024-08-16 DIAGNOSIS — G30.0 MODERATE EARLY ONSET ALZHEIMER'S DEMENTIA WITH AGITATION (H): Primary | ICD-10-CM

## 2024-08-16 LAB
ALBUMIN SERPL BCG-MCNC: 4.5 G/DL (ref 3.5–5.2)
ALBUMIN UR-MCNC: NEGATIVE MG/DL
ALP SERPL-CCNC: 63 U/L (ref 40–150)
ALT SERPL W P-5'-P-CCNC: 38 U/L (ref 0–70)
ANION GAP SERPL CALCULATED.3IONS-SCNC: 10 MMOL/L (ref 7–15)
APPEARANCE UR: CLEAR
AST SERPL W P-5'-P-CCNC: 50 U/L (ref 0–45)
BILIRUB SERPL-MCNC: 1.9 MG/DL
BILIRUB UR QL STRIP: NEGATIVE
BUN SERPL-MCNC: 12.5 MG/DL (ref 8–23)
CALCIUM SERPL-MCNC: 9.4 MG/DL (ref 8.8–10.4)
CHLORIDE SERPL-SCNC: 99 MMOL/L (ref 98–107)
COLOR UR AUTO: NORMAL
CREAT SERPL-MCNC: 0.99 MG/DL (ref 0.67–1.17)
EGFRCR SERPLBLD CKD-EPI 2021: 86 ML/MIN/1.73M2
GLUCOSE SERPL-MCNC: 104 MG/DL (ref 70–99)
GLUCOSE UR STRIP-MCNC: NEGATIVE MG/DL
HCO3 SERPL-SCNC: 25 MMOL/L (ref 22–29)
HGB UR QL STRIP: NEGATIVE
KETONES UR STRIP-MCNC: NEGATIVE MG/DL
LEUKOCYTE ESTERASE UR QL STRIP: NEGATIVE
NITRATE UR QL: NEGATIVE
PH UR STRIP: 6.5 [PH] (ref 5–7)
POTASSIUM SERPL-SCNC: 4.1 MMOL/L (ref 3.4–5.3)
PROT SERPL-MCNC: 6.7 G/DL (ref 6.4–8.3)
RBC #/AREA URNS AUTO: NORMAL /HPF
SKIP: NORMAL
SODIUM SERPL-SCNC: 134 MMOL/L (ref 135–145)
SP GR UR STRIP: 1.01 (ref 1–1.03)
TSH SERPL DL<=0.005 MIU/L-ACNC: 1.15 UIU/ML (ref 0.3–4.2)
UROBILINOGEN UR STRIP-MCNC: NORMAL MG/DL
VIT B12 SERPL-MCNC: 427 PG/ML (ref 232–1245)
WBC #/AREA URNS AUTO: NORMAL /HPF

## 2024-08-16 PROCEDURE — 80053 COMPREHEN METABOLIC PANEL: CPT

## 2024-08-16 PROCEDURE — 71045 X-RAY EXAM CHEST 1 VIEW: CPT | Mod: GC | Performed by: RADIOLOGY

## 2024-08-16 PROCEDURE — 84443 ASSAY THYROID STIM HORMONE: CPT

## 2024-08-16 PROCEDURE — 81001 URINALYSIS AUTO W/SCOPE: CPT

## 2024-08-16 PROCEDURE — 82607 VITAMIN B-12: CPT

## 2024-08-16 PROCEDURE — 36415 COLL VENOUS BLD VENIPUNCTURE: CPT
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
## 2024-08-16 NOTE — PATIENT INSTRUCTIONS
Plan:  -repeat CMP  -TSH  -vitamin B12  -Urinalysis  -Chest Xray  -I will call you regarding results and we can discuss Trazodone
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
## 2024-08-19 ENCOUNTER — MYC MEDICAL ADVICE (OUTPATIENT)
Dept: NEUROLOGY | Facility: CLINIC | Age: 64
End: 2024-08-19

## 2024-08-19 DIAGNOSIS — F02.B11 MODERATE EARLY ONSET ALZHEIMER'S DEMENTIA WITH AGITATION (H): Primary | ICD-10-CM

## 2024-08-19 DIAGNOSIS — G47.00 INSOMNIA, UNSPECIFIED TYPE: ICD-10-CM

## 2024-08-19 DIAGNOSIS — F41.9 ANXIETY: ICD-10-CM

## 2024-08-19 DIAGNOSIS — R45.1 AGITATION: ICD-10-CM

## 2024-08-19 DIAGNOSIS — G30.0 MODERATE EARLY ONSET ALZHEIMER'S DEMENTIA WITH AGITATION (H): Primary | ICD-10-CM

## 2024-08-19 RX ORDER — TRAZODONE HYDROCHLORIDE 50 MG/1
25 TABLET, FILM COATED ORAL AT BEDTIME
Qty: 15 TABLET | Refills: 2 | Status: SHIPPED | OUTPATIENT
Start: 2024-08-19

## 2024-08-19 NOTE — TELEPHONE ENCOUNTER
I reviewed the results of recent testing with Jaqueline (Wally's wife). There were no findings to suggest infection causing his increased agitation/confusion.     Wally has continued to have restlessness and agitation in the evenings. Last night was particularly bothersome and one of his children had to leave the home due to Wally getting so upset with their presence. He was up every 30 minutes and ended up pacing around the house removing articles of clothing for three hours straight. He set off the door alarms a minimum of 6 times and tried to get out of the locked doors several times more than that.    I discussed the following plan with Jaqueline:    Referral to psych for assistance/input on medications to manage agitation and restlessness.   We will start trazodone 25mg nightly for insomnia. We discussed the reason for prescribing and possible side-effects of medication. Results should be seen within the first few doses of this medication, so Wally should become more sleepy within 30-60 minutes of taking the medication. Wally's sodium level was 134, we will recheck this in about one month at his next appointment.   If the trazodone is ineffective, we can consider increasing the Seroquel dose to 50mg TID.       Jaqueline and I also discussed ongoing care options for Wally. Jaqueline has been in contact with a  but options for Wally are limited due to his young age and high out of pocket costs (upwards of $100,000+ per year for inpatient care). She will reach out to our team if she needs further input on navigating her options.

## 2024-08-23 RX ORDER — QUETIAPINE FUMARATE 25 MG/1
25 TABLET, FILM COATED ORAL PRN
Qty: 30 TABLET | Refills: 2 | Status: SHIPPED | OUTPATIENT
Start: 2024-08-23 | End: 2024-09-22

## 2024-08-23 NOTE — TELEPHONE ENCOUNTER
"Spoke with Jaqueline on how Wally is doing. Trazodone is offering some mild relief in the evenings, he continues to get up and pace nightly, but he has improved to 3-4 times per night rather than 8-9. He continues to be extremely restless during the daytime. Their son Sonido is out of town and Wally is now directing a bit more verbal aggression towards Jaqueline at times. He has not had any physical outbursts towards her at the time of our phone call.     Jaquleine has been searching for a psychiatry provider who can help manage medications for Wally and is struggling to find one with availability who is willing to take on a client with Alzheimer's disease. I will do my best to assist in this search.     We discussed adding in a PRN 25mg dose of Seroquel in the afternoons for when Wally is more agitated. There are times when Wally is \"restless\" but not necessarily anxious or agitated. We reviewed that as long as Wally is not in any visible distress, or a risk to himself or others, there is no need to intervene. Jaqueline does a phenomenal job at utilizing non-pharmacologic interventions for redirecting behaviors and will continue to do so. She will send an update via [employer] if she needs to give the PRN dose with an update on how things go.     Jaqueline has been exploring placement options for both respite care and permanent care for Wally. This has proven to be extremely difficult. She is in contact with a . Jaqueline and I reviewed if at any time she feels unsafe caring for Wally then she should call 911 or report to the ED for emergent intervention.   "

## 2024-08-23 NOTE — ADDENDUM NOTE
Addended by: JOSE HARRISON on: 8/23/2024 01:09 PM     Modules accepted: Orders     [FreeTextEntry1] : Transthoracic Echocardiogram at [facility] 12/8/22:\par Left ventricular ejection fraction 30 to 35%.\par Small right ventricle\par Moderately enlarged left atrium\par Normal right atrial size\par Large pleural effusion in both left and right lateral regions\par Trivial pericardial effusion\par Moderate mitral annular calcification\par Trace mitral valve regurgitation \par \par \par \par \par \par \par \par \par Patient:   PRAVEEN GONZALEZ\par \par Accession:                             [id number]\par \par Collected Date/Time:                   11/27/2022 17:00 EST\par Received Date/Time:                    11/28/2022 11:45 EST\par \par Surgical Pathology Report - Auth (Verified)\par \par Specimen(s) Submitted\par 1  Mitral valve\par \par Final Diagnosis\par Mitral valve:\par - 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 Infarct is of at least 48-72 hours.\par Verified by: Flip Costello MD\par (Electronic Signature)

## 2024-08-29 DIAGNOSIS — R73.03 PREDIABETES: ICD-10-CM

## 2024-08-29 RX ORDER — METFORMIN HCL 500 MG
500 TABLET, EXTENDED RELEASE 24 HR ORAL 2 TIMES DAILY WITH MEALS
Qty: 180 TABLET | Refills: 0 | Status: SHIPPED | OUTPATIENT
Start: 2024-08-29

## 2024-10-08 ENCOUNTER — TELEPHONE (OUTPATIENT)
Dept: FAMILY MEDICINE | Facility: CLINIC | Age: 64
End: 2024-10-08
Payer: MEDICARE

## 2024-10-08 NOTE — PROGRESS NOTES
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 Please see my last encounter dated 2/16/24 and the initial encounter dated 10/15/21 for a full review of the patient's history.     Wally has a relevant history of resolved endocarditis, VSD, HTN, HLD, chronic ulcer in the setting of NSAID use. He has a paternal history of dementia. MMSE during our initial visit 10/2021 was 24/30. B12, TSH, CMP and CBC were all unremarkable. Neuropsychological testing Dr. Sanches showed impairment in encoding and retrieval, but relative preservation of recognition memory. He scored in the mild cognitive impairment stage. MRI from July 2018 showed normal cerebral and cerebellar volumes. Medial temporal lobe sizes are within normal limits. Mild non-specific T2 signal in the subcortical white matter. 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 CSF ADMark panel from [facility] showed high p-tau and low ATI, confirming a diagnosis of early onset Alzheimer's disease. 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     Wally is unable to tell others his age, has difficulty recognizing his wife and children at times, and has a decreased sense of personal safety.     Wally has been dealing with increased restlessness and agitation, including some violent behaviors towards one of his children. Please see visit notes from SEJAL Willingham on 8/16/24. UA was negative, TSH, B12, and CMP were unremarkable (except sodium of 134, and AST of 50). CXR was obtained due to cough and was negative. Trazodone (25mg) was added at bedtime as well as a PRN dose of Seroquel (25mg) in the afternoon. Wally's wife Jaqueline utilizes several non-pharmacologic  strategies to minimize anxiety and restlessness for Wally. They are working on connecting with a psychiatry provider to further manage agitation.     He previously was on donepezil but this was discontinued due to bradycardia and a syncopal episode.     Updates From This Visit:   Today, the patient presents with his wife who is an independent historian and provides collateral information included below.     He continues to have problems with fragmented sleep, pacing at night.  He gets agitated with his son when his son tries to take over caregiving, including trying to get out of the car while his son is driving.   His wife has entered him into a day program.  Frequent pacing.  His wife has safety-proofed the house including locks that Wally cannot figure out how to open.  He has bowel incontinence, frequently Wally is unaware.  He chews his medications.    Review of Psychotropic and High-Risk Medications:  Escitalopram  Quetiapine (removing morning dose)  Trazodone  Cannabis delta 8    Current Outpatient Medications   Medication Sig Dispense Refill    aspirin (ASA) 81 MG tablet Take 81 mg by mouth daily      atorvastatin (LIPITOR) 20 MG tablet TAKE 1 TABLET(20 MG) BY MOUTH DAILY 90 tablet 1    escitalopram (LEXAPRO) 5 MG tablet Take 2 tablets (10 mg) by mouth daily 180 tablet 2    gabapentin (NEURONTIN) 300 MG capsule Take 1 capsule (300 mg) by mouth 2 times daily as needed for other (Anxiety/Agitation) Take 1 capsule (300mg) by mouth one hour prior to typical onset of increased agitation/anxiety or sundowning symptoms. PLEASE SCHEDULE FOLLOW UP APPOINTMENT. [phone]. 90 capsule 1    lisinopril (ZESTRIL) 20 MG tablet TAKE 1 TABLET BY MOUTH TWICE DAILY 180 tablet 3    medical cannabis (Patient's own supply) See Admin Instructions (The purpose of this order is to document that the patient reports taking medical cannabis.  This is not a prescription, and is not used to certify that the patient has a qualifying  medical condition.)      metFORMIN (GLUCOPHAGE XR) 500 MG 24 hr tablet TAKE 1 TABLET(500 MG) BY MOUTH TWICE DAILY WITH MEALS 180 tablet 0    QUEtiapine (SEROQUEL) 25 MG tablet Take 1 tablet (25 mg) by mouth as needed (Every afternoon PRN). 30 tablet 2    QUEtiapine (SEROQUEL) 25 MG tablet Take 1 tablet (25 mg) by mouth every morning AND 2 tablets (50 mg) every evening. 270 tablet 2    traZODone (DESYREL) 50 MG tablet Take 0.5 tablets (25 mg) by mouth at bedtime 15 tablet 2     No current facility-administered medications for this visit.     Exam:  Neuro: Alert, awake, high energy, difficulty following simple commands from what appears to be distractibility and impulsiveness, fluent speech, normal gait, no increased appendicular muscle tone.    I reviewed new external records since their last visit, which show visit with SEJAL Willingham in August 2024.     I reviewed new lab results since their last visit, which include:    Latest Reference Range & Units 08/16/24 13:57   Sodium 135 - 145 mmol/L 134 (L)   Potassium 3.4 - 5.3 mmol/L 4.1   Chloride 98 - 107 mmol/L 99   Carbon Dioxide (CO2) 22 - 29 mmol/L 25   Urea Nitrogen 8.0 - 23.0 mg/dL 12.5   Creatinine 0.67 - 1.17 mg/dL 0.99   GFR Estimate >60 mL/min/1.73m2 86   Calcium 8.8 - 10.4 mg/dL 9.4   Anion Gap 7 - 15 mmol/L 10   Albumin 3.5 - 5.2 g/dL 4.5   Protein Total 6.4 - 8.3 g/dL 6.7   Alkaline Phosphatase 40 - 150 U/L 63   ALT 0 - 70 U/L 38   AST 0 - 45 U/L 50 (H)   Bilirubin Total <=1.2 mg/dL 1.9 (H)   Glucose 70 - 99 mg/dL 104 (H)   TSH 0.30 - 4.20 uIU/mL 1.15   Vitamin B12 232 - 1,245 pg/mL 427   Color Urine Colorless, Straw, Light Yellow, Yellow  Light Yellow   Appearance Urine Clear  Clear   Glucose Urine Negative mg/dL Negative   Bilirubin Urine Negative  Negative   Ketones Urine Negative mg/dL Negative   Specific Gravity Urine 0.999 - 1.035  1.015   pH Urine 5.0 - 7.0  6.5   Protein Albumin Urine Negative mg/dL Negative   Urobilinogen mg/dL Normal, 2.0  mg/dL Normal   Nitrite Urine Negative  Negative   Blood Urine Negative  Negative   Leukocyte Esterase Urine Negative  Negative   WBC Urine 0-5 /HPF /HPF 0-5   RBC Urine 0-2 /HPF /HPF None Seen   (L): Data is abnormally low  (H): Data is abnormally high    I reviewed new imaging since their last visit, including: no pertinent new imaging.     Assessment and Care Plan:   #Alzheimer's disease; moderate disease stage  #Anxiety  #Agitation  #Insomnia   #Incontinence for stool    No medication changes. Agree with excellent plan as provided by Dr. Amairani Rios in [facility], including starting trazodone and decreasing AM quetiapine.    I explained that they are doing things to the best of their abilities, and that they are doing the best with the situation they are given.    1 year follow-up    Today, I spent 31 minutes reviewing the chart, personally assessing objective testing, direct patient care, completing documentation and billing.    The longitudinal plan of care for the diagnosis(es)/condition(s) as documented were addressed during this visit. Due to the added complexity in care, I will continue to support Wally in the subsequent management and with ongoing continuity of care.

## 2024-10-08 NOTE — TELEPHONE ENCOUNTER
Forms/Letter Request    Type of form/letter: Adult Day Program    Do we have the form/letter: YES- Placed on provider's desk    Who [facility] the form from[facility]     Where did/will the form come from? form was faxed in    How would you like the form/letter returned: Fax : [phone]

## 2024-10-09 ENCOUNTER — OFFICE VISIT (OUTPATIENT)
Dept: NEUROLOGY | Facility: CLINIC | Age: 64
End: 2024-10-09
Payer: MEDICARE

## 2024-10-09 VITALS
WEIGHT: 178.4 LBS | TEMPERATURE: 97.1 F | SYSTOLIC BLOOD PRESSURE: 134 MMHG | OXYGEN SATURATION: 98 % | HEART RATE: 55 BPM | DIASTOLIC BLOOD PRESSURE: 89 MMHG | BODY MASS INDEX: 27.53 KG/M2

## 2024-10-09 DIAGNOSIS — G30.0 EARLY ONSET ALZHEIMER'S DEMENTIA WITHOUT BEHAVIORAL DISTURBANCE (H): Primary | ICD-10-CM

## 2024-10-09 DIAGNOSIS — F41.9 ANXIETY: ICD-10-CM

## 2024-10-09 DIAGNOSIS — F02.80 EARLY ONSET ALZHEIMER'S DEMENTIA WITHOUT BEHAVIORAL DISTURBANCE (H): Primary | ICD-10-CM

## 2024-10-09 DIAGNOSIS — R45.1 AGITATION: ICD-10-CM

## 2024-10-09 RX ORDER — PHENOL 1.4 %
10 AEROSOL, SPRAY (ML) MUCOUS MEMBRANE
COMMUNITY

## 2024-10-09 ASSESSMENT — PAIN SCALES - GENERAL: PAINLEVEL: NO PAIN (0)

## 2024-10-09 NOTE — PATIENT INSTRUCTIONS
Fax: [phone]    No medication changes. Agree with excellent plan as provided by Dr. Amairani Rios in [facility], including starting trazodone and decreasing AM quetiapine.    I explained that they are doing things to the best of their abilities, and that they are doing the best with the situation they are given.

## 2024-10-09 NOTE — LETTER
10/9/2024       RE: Wally Cano  [address]  [place] [address]     Dear Colleague,    Thank you for referring your patient, Wally Cano, to the  [facility]. Please see a copy of my visit note below.    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 Please see my last encounter dated 2/16/24 and the initial encounter dated 10/15/21 for a full review of the patient's history.     Wally has a relevant history of resolved endocarditis, VSD, HTN, HLD, chronic ulcer in the setting of NSAID use. He has a paternal history of dementia. MMSE during our initial visit 10/2021 was 24/30. B12, TSH, CMP and CBC were all unremarkable. Neuropsychological testing Dr. Sanches showed impairment in encoding and retrieval, but relative preservation of recognition memory. He scored in the mild cognitive impairment stage. MRI from July 2018 showed normal cerebral and cerebellar volumes. Medial temporal lobe sizes are within normal limits. Mild non-specific T2 signal in the subcortical white matter. 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 CSF ADMark panel from [email or web address] showed high p-tau and low ATI, confirming a diagnosis of early onset Alzheimer's disease. 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     Wally is unable to tell others his age, has difficulty recognizing his wife and children at times, and has a decreased sense of personal safety.     Wally has been dealing with increased restlessness and agitation, including some violent behaviors towards one of his children. Please see visit notes from Shashi  TOMAS BanegasRIVERA on 8/16/24. UA was negative, TSH, B12, and CMP were unremarkable (except sodium of 134, and AST of 50). CXR was obtained due to cough and was negative. Trazodone (25mg) was added at bedtime as well as a PRN dose of Seroquel (25mg) in the afternoon. Wally's wife Jaqueline utilizes several non-pharmacologic strategies to minimize anxiety and restlessness for Wally. They are working on connecting with a psychiatry provider to further manage agitation.     He previously was on donepezil but this was discontinued due to bradycardia and a syncopal episode.     Updates From This Visit:   Today, the patient presents with his wife who is an independent historian and provides collateral information included below.     He continues to have problems with fragmented sleep, pacing at night.  He gets agitated with his son when his son tries to take over caregiving, including trying to get out of the car while his son is driving.   His wife has entered him into a day program.  Frequent pacing.  His wife has safety-proofed the house including locks that Wally cannot figure out how to open.  He has bowel incontinence, frequently Wally is unaware.  He chews his medications.    Review of Psychotropic and High-Risk Medications:  Escitalopram  Quetiapine (removing morning dose)  Trazodone  Cannabis delta 8    Current Outpatient Medications   Medication Sig Dispense Refill     aspirin (ASA) 81 MG tablet Take 81 mg by mouth daily       atorvastatin (LIPITOR) 20 MG tablet TAKE 1 TABLET(20 MG) BY MOUTH DAILY 90 tablet 1     escitalopram (LEXAPRO) 5 MG tablet Take 2 tablets (10 mg) by mouth daily 180 tablet 2     gabapentin (NEURONTIN) 300 MG capsule Take 1 capsule (300 mg) by mouth 2 times daily as needed for other (Anxiety/Agitation) Take 1 capsule (300mg) by mouth one hour prior to typical onset of increased agitation/anxiety or sundowning symptoms. PLEASE SCHEDULE FOLLOW UP APPOINTMENT. [phone]. 38 capsule 1     lisinopril  (ZESTRIL) 20 MG tablet TAKE 1 TABLET BY MOUTH TWICE DAILY 180 tablet 3     medical cannabis (Patient's own supply) See Admin Instructions (The purpose of this order is to document that the patient reports taking medical cannabis.  This is not a prescription, and is not used to certify that the patient has a qualifying medical condition.)       metFORMIN (GLUCOPHAGE XR) 500 MG 24 hr tablet TAKE 1 TABLET(500 MG) BY MOUTH TWICE DAILY WITH MEALS 180 tablet 0     QUEtiapine (SEROQUEL) 25 MG tablet Take 1 tablet (25 mg) by mouth as needed (Every afternoon PRN). 30 tablet 2     QUEtiapine (SEROQUEL) 25 MG tablet Take 1 tablet (25 mg) by mouth every morning AND 2 tablets (50 mg) every evening. 270 tablet 2     traZODone (DESYREL) 50 MG tablet Take 0.5 tablets (25 mg) by mouth at bedtime 15 tablet 2     No current facility-administered medications for this visit.     Exam:  Neuro: Alert, awake, high energy, difficulty following simple commands from what appears to be distractibility and impulsiveness, fluent speech, normal gait, no increased appendicular muscle tone.    I reviewed new external records since their last visit, which show visit with SEJAL Willingham in August 2024.     I reviewed new lab results since their last visit, which include:    Latest Reference Range & Units 08/16/24 13:57   Sodium 135 - 145 mmol/L 134 (L)   Potassium 3.4 - 5.3 mmol/L 4.1   Chloride 98 - 107 mmol/L 99   Carbon Dioxide (CO2) 22 - 29 mmol/L 25   Urea Nitrogen 8.0 - 23.0 mg/dL 12.5   Creatinine 0.67 - 1.17 mg/dL 0.99   GFR Estimate >60 mL/min/1.73m2 86   Calcium 8.8 - 10.4 mg/dL 9.4   Anion Gap 7 - 15 mmol/L 10   Albumin 3.5 - 5.2 g/dL 4.5   Protein Total 6.4 - 8.3 g/dL 6.7   Alkaline Phosphatase 40 - 150 U/L 63   ALT 0 - 70 U/L 38   AST 0 - 45 U/L 50 (H)   Bilirubin Total <=1.2 mg/dL 1.9 (H)   Glucose 70 - 99 mg/dL 104 (H)   TSH 0.30 - 4.20 uIU/mL 1.15   Vitamin B12 232 - 1,245 pg/mL 427   Color Urine Colorless, Straw, Light Yellow,  Yellow  Light Yellow   Appearance Urine Clear  Clear   Glucose Urine Negative mg/dL Negative   Bilirubin Urine Negative  Negative   Ketones Urine Negative mg/dL Negative   Specific Gravity Urine 0.999 - 1.035  1.015   pH Urine 5.0 - 7.0  6.5   Protein Albumin Urine Negative mg/dL Negative   Urobilinogen mg/dL Normal, 2.0 mg/dL Normal   Nitrite Urine Negative  Negative   Blood Urine Negative  Negative   Leukocyte Esterase Urine Negative  Negative   WBC Urine 0-5 /HPF /HPF 0-5   RBC Urine 0-2 /HPF /HPF None Seen   (L): Data is abnormally low  (H): Data is abnormally high    I reviewed new imaging since their last visit, including: no pertinent new imaging.     Assessment and Care Plan:   #Alzheimer's disease; moderate disease stage  #Anxiety  #Agitation  #Insomnia   #Incontinence for stool    No medication changes. Agree with excellent plan as provided by Dr. Amairani Rios in [facility], including starting trazodone and decreasing AM quetiapine.    I explained that they are doing things to the best of their abilities, and that they are doing the best with the situation they are given.    1 year follow-up    Today, I spent 31 minutes reviewing the chart, personally assessing objective testing, direct patient care, completing documentation and billing.        Again, thank you for allowing me to participate in the care of your patient.      Sincerely,    Colt Lester MD

## 2024-10-11 ENCOUNTER — OFFICE VISIT (OUTPATIENT)
Dept: FAMILY MEDICINE | Facility: CLINIC | Age: 64
End: 2024-10-11
Payer: MEDICARE

## 2024-10-11 VITALS
HEART RATE: 63 BPM | DIASTOLIC BLOOD PRESSURE: 93 MMHG | WEIGHT: 180 LBS | HEIGHT: 67 IN | TEMPERATURE: 97.7 F | SYSTOLIC BLOOD PRESSURE: 135 MMHG | RESPIRATION RATE: 18 BRPM | BODY MASS INDEX: 28.25 KG/M2 | OXYGEN SATURATION: 97 %

## 2024-10-11 DIAGNOSIS — R19.7 DIARRHEA, UNSPECIFIED TYPE: Primary | ICD-10-CM

## 2024-10-11 DIAGNOSIS — R73.03 PREDIABETES: ICD-10-CM

## 2024-10-11 LAB
ALBUMIN SERPL BCG-MCNC: 4.5 G/DL (ref 3.5–5.2)
ALP SERPL-CCNC: 65 U/L (ref 40–150)
ALT SERPL W P-5'-P-CCNC: 33 U/L (ref 0–70)
ANION GAP SERPL CALCULATED.3IONS-SCNC: 10 MMOL/L (ref 7–15)
AST SERPL W P-5'-P-CCNC: 46 U/L (ref 0–45)
BILIRUB SERPL-MCNC: 2.2 MG/DL
BUN SERPL-MCNC: 15.3 MG/DL (ref 8–23)
CALCIUM SERPL-MCNC: 9.5 MG/DL (ref 8.8–10.4)
CHLORIDE SERPL-SCNC: 106 MMOL/L (ref 98–107)
CREAT SERPL-MCNC: 1.04 MG/DL (ref 0.67–1.17)
EGFRCR SERPLBLD CKD-EPI 2021: 81 ML/MIN/1.73M2
ERYTHROCYTE [DISTWIDTH] IN BLOOD BY AUTOMATED COUNT: 13.2 % (ref 10–15)
EST. AVERAGE GLUCOSE BLD GHB EST-MCNC: 123 MG/DL
GLUCOSE SERPL-MCNC: 131 MG/DL (ref 70–99)
HBA1C MFR BLD: 5.9 % (ref 0–5.6)
HCO3 SERPL-SCNC: 25 MMOL/L (ref 22–29)
HCT VFR BLD AUTO: 43.6 % (ref 40–53)
HGB BLD-MCNC: 14.1 G/DL (ref 13.3–17.7)
MCH RBC QN AUTO: 30.5 PG (ref 26.5–33)
MCHC RBC AUTO-ENTMCNC: 32.3 G/DL (ref 31.5–36.5)
MCV RBC AUTO: 94 FL (ref 78–100)
PLATELET # BLD AUTO: 222 10E3/UL (ref 150–450)
POTASSIUM SERPL-SCNC: 4.2 MMOL/L (ref 3.4–5.3)
PROT SERPL-MCNC: 6.9 G/DL (ref 6.4–8.3)
RBC # BLD AUTO: 4.62 10E6/UL (ref 4.4–5.9)
SODIUM SERPL-SCNC: 141 MMOL/L (ref 135–145)
WBC # BLD AUTO: 7.5 10E3/UL (ref 4–11)

## 2024-10-11 PROCEDURE — 85027 COMPLETE CBC AUTOMATED: CPT | Performed by: PHYSICIAN ASSISTANT

## 2024-10-11 PROCEDURE — 80053 COMPREHEN METABOLIC PANEL: CPT | Performed by: PHYSICIAN ASSISTANT

## 2024-10-11 PROCEDURE — 83036 HEMOGLOBIN GLYCOSYLATED A1C: CPT | Performed by: PHYSICIAN ASSISTANT

## 2024-10-11 PROCEDURE — 36415 COLL VENOUS BLD VENIPUNCTURE: CPT | Performed by: PHYSICIAN ASSISTANT

## 2024-10-11 PROCEDURE — 99214 OFFICE O/P EST MOD 30 MIN: CPT | Performed by: PHYSICIAN ASSISTANT

## 2024-10-11 ASSESSMENT — ENCOUNTER SYMPTOMS: DIARRHEA: 1

## 2024-10-11 ASSESSMENT — PAIN SCALES - GENERAL: PAINLEVEL: NO PAIN (0)

## 2024-10-11 NOTE — PROGRESS NOTES
Assessment & Plan     Diarrhea, unspecified type    Eval for possible infectious causes given the explosiveness of the symptoms.  Further management pending stool testing.     - Comprehensive metabolic panel (BMP + Alb, Alk Phos, ALT, AST, Total. Bili, TP)  - CBC with platelets  - C. difficile Toxin B PCR with reflex to C. difficile Antigen and Toxins A/B EIA  - Enteric Bacteria and Virus Panel by [facility]      Prediabetes  - Hemoglobin A1c - normal range of 5.9.  Will stop the metformin for the time being in the face of a normal A1C and significant weight loss since starting. This may or may not be contributing to the bowel symptoms.                     Bryan Lo is a 63 year old, presenting for the following health issues:  His wife is present today for the visit and offers most of the history.  The only change was a slight adjustment in Seroquel dose upwards and then down in the last several weeks.  She describes that the stool are explosive and for 10+ days have only been liquid.  No recent antibiotics, no travel or hospital stays.  Wally does participate in adult day care programs but no known exposure to infectious persons.      No vomiting, no fevers, no reports of pain or cramping.  No change to appetite no noted blood to stools.  Urination is   Diarrhea      10/11/2024     7:54 AM   Additional Questions   Roomed by rip   Accompanied by wife- Aaliyah     History of Present Illness       Reason for visit:  Loose stools  Symptom onset:  1-2 weeks ago  Symptoms include:  Loose stools overnight, distended belly most evenings  Symptom intensity:  Moderate  Symptom progression:  Staying the same  Had these symptoms before:  No  What makes it worse:  No  What makes it better:  No   He is taking medications regularly.                     Objective    BP (!) 145/85 (BP Location: Left arm, Patient Position: Chair, Cuff Size: Adult Regular)   Pulse 63   Temp 97.7  F (36.5  C) (Tympanic)   Resp 18    "Ht 1.702 m (5' 7\")   Wt 81.6 kg (180 lb)   SpO2 97%   BMI 28.19 kg/m    Body mass index is 28.19 kg/m .  Physical Exam   GENERAL: alert and no distress  NECK: no adenopathy, no asymmetry, masses, or scars  RESP: lungs clear to auscultation - no rales, rhonchi or wheezes  CV: regular rate and rhythm, normal S1 S2, no S3 or S4, noted murmur, 3/6 systolic,  no click or rub, no peripheral edema  ABDOMEN: soft, nontender, no hepatosplenomegaly, no masses and bowel sounds hyperactive  PSYCH: confused and affect normal/bright            Signed Electronically by: Radha Albarran PA-C  /  "

## 2024-10-14 PROCEDURE — 87493 C DIFF AMPLIFIED PROBE: CPT | Performed by: PHYSICIAN ASSISTANT

## 2024-10-14 PROCEDURE — 87507 IADNA-DNA/RNA PROBE TQ 12-25: CPT | Mod: GZ | Performed by: PHYSICIAN ASSISTANT

## 2024-10-15 LAB

## 2024-10-16 ENCOUNTER — MEDICAL CORRESPONDENCE (OUTPATIENT)
Dept: HEALTH INFORMATION MANAGEMENT | Facility: CLINIC | Age: 64
End: 2024-10-16
Payer: MEDICARE

## 2024-10-24 ENCOUNTER — MYC MEDICAL ADVICE (OUTPATIENT)
Dept: FAMILY MEDICINE | Facility: CLINIC | Age: 64
End: 2024-10-24
Payer: MEDICARE

## 2024-10-24 DIAGNOSIS — F02.B11 MODERATE EARLY ONSET ALZHEIMER'S DEMENTIA WITH AGITATION (H): ICD-10-CM

## 2024-10-24 DIAGNOSIS — G47.00 INSOMNIA, UNSPECIFIED TYPE: ICD-10-CM

## 2024-10-24 DIAGNOSIS — R45.1 AGITATION: ICD-10-CM

## 2024-10-24 DIAGNOSIS — G30.0 MODERATE EARLY ONSET ALZHEIMER'S DEMENTIA WITH AGITATION (H): ICD-10-CM

## 2024-10-24 DIAGNOSIS — F41.9 ANXIETY: ICD-10-CM

## 2024-10-28 RX ORDER — TRAZODONE HYDROCHLORIDE 50 MG/1
75 TABLET, FILM COATED ORAL AT BEDTIME
COMMUNITY
Start: 2024-10-28

## 2024-11-06 ENCOUNTER — TELEPHONE (OUTPATIENT)
Dept: FAMILY MEDICINE | Facility: CLINIC | Age: 64
End: 2024-11-06
Payer: MEDICARE

## 2024-11-06 NOTE — TELEPHONE ENCOUNTER
Forms/Letter Request    Type of form/letter: [facility]     Do we have the form/letter: Yes:     Who is the form from? Assisted Living     Where did/will the form come from? form was faxed in    When is form/letter needed by: asap    How would you like the form/letter returned: Fax : [phone]

## 2024-11-07 NOTE — TELEPHONE ENCOUNTER
Received call in regarding form. Assisted Living would like this to be signed today. Pt will be moving in on Monday.

## 2024-11-08 ENCOUNTER — MEDICAL CORRESPONDENCE (OUTPATIENT)
Dept: HEALTH INFORMATION MANAGEMENT | Facility: CLINIC | Age: 64
End: 2024-11-08
Payer: MEDICARE

## 2024-11-08 ENCOUNTER — TELEPHONE (OUTPATIENT)
Dept: FAMILY MEDICINE | Facility: CLINIC | Age: 64
End: 2024-11-08
Payer: MEDICARE

## 2024-11-08 NOTE — TELEPHONE ENCOUNTER
Forms/Letter Request    Type of form/letter: Nursing Home/Assisted Living Orders      Do we have the form/letter: Yes: Physician Orders for admission, [facility]    Who is the form from? Home care    Where did/will the form come from? form was faxed in    When is form/letter needed by:     How would you like the form/letter returned: Fax :

## 2024-11-08 NOTE — TELEPHONE ENCOUNTER
Form completed. Please fax   One of the question is resident needs a locked memory care unit.   If family doesn't want him to be in locked memory care unit, let me know.

## 2024-11-13 ENCOUNTER — TELEPHONE (OUTPATIENT)
Dept: FAMILY MEDICINE | Facility: CLINIC | Age: 64
End: 2024-11-13
Payer: MEDICARE

## 2024-11-13 NOTE — TELEPHONE ENCOUNTER
Forms/Letter Request     Type of form/letter: [facility]     Do we have the form/letter: YES- Placed on provider's desk     Who is the form from? [facility]      Where did/will the form come from? form was faxed in     How would you like the form/letter returned: Fax : [phone]

## 2024-11-14 DIAGNOSIS — E78.5 HYPERLIPIDEMIA LDL GOAL <100: ICD-10-CM

## 2024-11-14 RX ORDER — ATORVASTATIN CALCIUM 20 MG/1
20 TABLET, FILM COATED ORAL DAILY
Qty: 90 TABLET | Refills: 2 | Status: SHIPPED | OUTPATIENT
Start: 2024-11-14

## 2024-11-18 ENCOUNTER — DOCUMENTATION ONLY (OUTPATIENT)
Dept: OTHER | Facility: CLINIC | Age: 64
End: 2024-11-18
Payer: MEDICARE

## 2024-11-26 ENCOUNTER — TELEPHONE (OUTPATIENT)
Dept: FAMILY MEDICINE | Facility: CLINIC | Age: 64
End: 2024-11-26
Payer: MEDICARE

## 2024-11-26 ENCOUNTER — MEDICAL CORRESPONDENCE (OUTPATIENT)
Dept: HEALTH INFORMATION MANAGEMENT | Facility: CLINIC | Age: 64
End: 2024-11-26
Payer: MEDICARE

## 2024-11-26 DIAGNOSIS — F02.80 EARLY ONSET ALZHEIMER'S DEMENTIA WITHOUT BEHAVIORAL DISTURBANCE (H): ICD-10-CM

## 2024-11-26 DIAGNOSIS — G30.0 EARLY ONSET ALZHEIMER'S DEMENTIA WITHOUT BEHAVIORAL DISTURBANCE (H): ICD-10-CM

## 2024-11-26 DIAGNOSIS — F41.9 ANXIETY: ICD-10-CM

## 2024-11-26 NOTE — TELEPHONE ENCOUNTER
Forms/Letter Request    Type of form/letter: OTHER: Physician Order Request Form       Do we have the form/letter: Yes: [facility], Physicians Order Request Form, Date: 11/12/24    Who is the form from? Home care    Where did/will the form come from? form was faxed in    When is form/letter needed by:     How would you like the form/letter returned: Fax : [phone]

## 2024-11-27 RX ORDER — GABAPENTIN 300 MG/1
300 CAPSULE ORAL 2 TIMES DAILY PRN
Qty: 90 CAPSULE | Refills: 1 | Status: SHIPPED | OUTPATIENT
Start: 2024-11-27

## 2024-12-04 ENCOUNTER — APPOINTMENT (OUTPATIENT)
Dept: URBAN - METROPOLITAN AREA CLINIC 254 | Age: 64
Setting detail: DERMATOLOGY
End: 2024-12-05

## 2024-12-04 ENCOUNTER — MYC MEDICAL ADVICE (OUTPATIENT)
Dept: FAMILY MEDICINE | Facility: CLINIC | Age: 64
End: 2024-12-04
Payer: MEDICARE

## 2024-12-04 VITALS — HEIGHT: 69 IN | WEIGHT: 170 LBS

## 2024-12-04 DIAGNOSIS — Z12.5 PROSTATE CANCER SCREENING: ICD-10-CM

## 2024-12-04 DIAGNOSIS — L82.1 OTHER SEBORRHEIC KERATOSIS: ICD-10-CM

## 2024-12-04 DIAGNOSIS — L57.8 OTHER SKIN CHANGES DUE TO CHRONIC EXPOSURE TO NONIONIZING RADIATION: ICD-10-CM

## 2024-12-04 DIAGNOSIS — L81.4 OTHER MELANIN HYPERPIGMENTATION: ICD-10-CM

## 2024-12-04 DIAGNOSIS — D18.0 HEMANGIOMA: ICD-10-CM

## 2024-12-04 DIAGNOSIS — D22 MELANOCYTIC NEVI: ICD-10-CM

## 2024-12-04 DIAGNOSIS — Z71.89 OTHER SPECIFIED COUNSELING: ICD-10-CM

## 2024-12-04 DIAGNOSIS — I10 HYPERTENSION GOAL BP (BLOOD PRESSURE) < 140/90: ICD-10-CM

## 2024-12-04 DIAGNOSIS — E78.5 HYPERLIPIDEMIA LDL GOAL <100: Primary | ICD-10-CM

## 2024-12-04 DIAGNOSIS — Z85.820 PERSONAL HISTORY OF MALIGNANT MELANOMA OF SKIN: ICD-10-CM

## 2024-12-04 PROBLEM — D18.01 HEMANGIOMA OF SKIN AND SUBCUTANEOUS TISSUE: Status: ACTIVE | Noted: 2024-12-04

## 2024-12-04 PROBLEM — D22.5 MELANOCYTIC NEVI OF TRUNK: Status: ACTIVE | Noted: 2024-12-04

## 2024-12-04 PROCEDURE — OTHER MIPS QUALITY: OTHER

## 2024-12-04 PROCEDURE — 99213 OFFICE O/P EST LOW 20 MIN: CPT

## 2024-12-04 PROCEDURE — OTHER COUNSELING: OTHER

## 2024-12-04 ASSESSMENT — LOCATION ZONE DERM
LOCATION ZONE: TRUNK
LOCATION ZONE: SCALP

## 2024-12-04 ASSESSMENT — LOCATION DETAILED DESCRIPTION DERM
LOCATION DETAILED: LEFT SUPERIOR MEDIAL UPPER BACK
LOCATION DETAILED: RIGHT SUPERIOR OCCIPITAL SCALP
LOCATION DETAILED: LEFT MEDIAL UPPER BACK

## 2024-12-04 ASSESSMENT — LOCATION SIMPLE DESCRIPTION DERM
LOCATION SIMPLE: RIGHT OCCIPITAL SCALP
LOCATION SIMPLE: LEFT UPPER BACK

## 2024-12-04 NOTE — PROCEDURE: MIPS QUALITY
Detail Level: Detailed
Quality 226: Preventive Care And Screening: Tobacco Use: Screening And Cessation Intervention: Patient screened for tobacco use and is an ex/non-smoker
[de-identified] : I injected the patient's right shoulder today with cortisone.\par \par I discussed at length with the patient the planned steroid and lidocaine injection. The risks, benefits, convalescence and alternatives were reviewed. The possible side effects discussed included but were not limited to: pain, swelling, heat, bleeding, and redness. Symptoms are generally mild but if they are extensive then contact the office. Giving pain relievers by mouth such as NSAIDs or Tylenol can generally treat the reactions to steroid and lidocaine. Rare cases of infection have been noted. Rash, hives and itching may occur post injection. If you have muscle pain or cramps, flushing and or swelling of the face, rapid heart beat, nausea, dizziness, fever, chills, headache, difficulty breathing, swelling in the arms or legs, or have a prickly feeling of your skin, contact a health care provider immediately. Following this discussion, the knee was prepped with Alcohol and under sterile condition the 80 mg Depo-Medrol and 6 cc Lidocaine injection was performed with a 20 gauge needle through a superolateral injection site. The needle was introduced into the joint, aspiration was performed to ensure intra-articular placement and the medication was injected. Upon withdrawal of the needle the site was cleaned with alcohol and a band aid applied. The patient tolerated the injection well and there were no adverse effects. Post injection instructions included no strenuous activity for 24 hours, cryotherapy and if there are any adverse effects to contact the office.

## 2024-12-17 ENCOUNTER — LAB (OUTPATIENT)
Dept: LAB | Facility: CLINIC | Age: 64
End: 2024-12-17
Payer: MEDICARE

## 2024-12-17 DIAGNOSIS — Z12.5 PROSTATE CANCER SCREENING: ICD-10-CM

## 2024-12-17 DIAGNOSIS — E78.5 HYPERLIPIDEMIA LDL GOAL <100: ICD-10-CM

## 2024-12-17 DIAGNOSIS — I10 HYPERTENSION GOAL BP (BLOOD PRESSURE) < 140/90: ICD-10-CM

## 2024-12-17 LAB
ALBUMIN SERPL BCG-MCNC: 4.3 G/DL (ref 3.5–5.2)
ALP SERPL-CCNC: 65 U/L (ref 40–150)
ALT SERPL W P-5'-P-CCNC: 27 U/L (ref 0–70)
ANION GAP SERPL CALCULATED.3IONS-SCNC: 9 MMOL/L (ref 7–15)
AST SERPL W P-5'-P-CCNC: 40 U/L (ref 0–45)
BILIRUB SERPL-MCNC: 1.3 MG/DL
BUN SERPL-MCNC: 10.2 MG/DL (ref 8–23)
CALCIUM SERPL-MCNC: 9.6 MG/DL (ref 8.8–10.4)
CHLORIDE SERPL-SCNC: 107 MMOL/L (ref 98–107)
CHOLEST SERPL-MCNC: 142 MG/DL
CREAT SERPL-MCNC: 1.1 MG/DL (ref 0.67–1.17)
EGFRCR SERPLBLD CKD-EPI 2021: 75 ML/MIN/1.73M2
ERYTHROCYTE [DISTWIDTH] IN BLOOD BY AUTOMATED COUNT: 12.8 % (ref 10–15)
FASTING STATUS PATIENT QL REPORTED: NO
FASTING STATUS PATIENT QL REPORTED: NO
GLUCOSE SERPL-MCNC: 116 MG/DL (ref 70–99)
HCO3 SERPL-SCNC: 27 MMOL/L (ref 22–29)
HCT VFR BLD AUTO: 44.2 % (ref 40–53)
HDLC SERPL-MCNC: 55 MG/DL
HGB BLD-MCNC: 14.5 G/DL (ref 13.3–17.7)
LDLC SERPL CALC-MCNC: 65 MG/DL
MCH RBC QN AUTO: 30.3 PG (ref 26.5–33)
MCHC RBC AUTO-ENTMCNC: 32.8 G/DL (ref 31.5–36.5)
MCV RBC AUTO: 93 FL (ref 78–100)
NONHDLC SERPL-MCNC: 87 MG/DL
PLATELET # BLD AUTO: 199 10E3/UL (ref 150–450)
POTASSIUM SERPL-SCNC: 4.6 MMOL/L (ref 3.4–5.3)
PROT SERPL-MCNC: 6.7 G/DL (ref 6.4–8.3)
PSA SERPL DL<=0.01 NG/ML-MCNC: 4.2 NG/ML (ref 0–4.5)
RBC # BLD AUTO: 4.78 10E6/UL (ref 4.4–5.9)
SODIUM SERPL-SCNC: 143 MMOL/L (ref 135–145)
TRIGL SERPL-MCNC: 108 MG/DL
WBC # BLD AUTO: 6.2 10E3/UL (ref 4–11)

## 2024-12-17 PROCEDURE — 80053 COMPREHEN METABOLIC PANEL: CPT

## 2024-12-17 PROCEDURE — 85027 COMPLETE CBC AUTOMATED: CPT

## 2024-12-17 PROCEDURE — 36415 COLL VENOUS BLD VENIPUNCTURE: CPT

## 2024-12-17 PROCEDURE — 80061 LIPID PANEL: CPT

## 2024-12-17 PROCEDURE — G0103 PSA SCREENING: HCPCS

## 2024-12-17 SDOH — HEALTH STABILITY: PHYSICAL HEALTH: ON AVERAGE, HOW MANY DAYS PER WEEK DO YOU ENGAGE IN MODERATE TO STRENUOUS EXERCISE (LIKE A BRISK WALK)?: 1 DAY

## 2024-12-17 SDOH — HEALTH STABILITY: PHYSICAL HEALTH: ON AVERAGE, HOW MANY MINUTES DO YOU ENGAGE IN EXERCISE AT THIS LEVEL?: 20 MIN

## 2024-12-17 ASSESSMENT — SOCIAL DETERMINANTS OF HEALTH (SDOH): HOW OFTEN DO YOU GET TOGETHER WITH FRIENDS OR RELATIVES?: MORE THAN THREE TIMES A WEEK

## 2024-12-20 ENCOUNTER — OFFICE VISIT (OUTPATIENT)
Dept: FAMILY MEDICINE | Facility: CLINIC | Age: 64
End: 2024-12-20
Attending: INTERNAL MEDICINE
Payer: MEDICARE

## 2024-12-20 VITALS
TEMPERATURE: 97.4 F | RESPIRATION RATE: 16 BRPM | OXYGEN SATURATION: 97 % | WEIGHT: 171.4 LBS | DIASTOLIC BLOOD PRESSURE: 81 MMHG | HEIGHT: 67 IN | BODY MASS INDEX: 26.9 KG/M2 | HEART RATE: 62 BPM | SYSTOLIC BLOOD PRESSURE: 120 MMHG

## 2024-12-20 DIAGNOSIS — I10 HYPERTENSION GOAL BP (BLOOD PRESSURE) < 140/90: ICD-10-CM

## 2024-12-20 DIAGNOSIS — G30.0 MODERATE EARLY ONSET ALZHEIMER'S DEMENTIA WITH AGITATION (H): ICD-10-CM

## 2024-12-20 DIAGNOSIS — R73.03 PREDIABETES: ICD-10-CM

## 2024-12-20 DIAGNOSIS — E78.5 HYPERLIPIDEMIA LDL GOAL <100: ICD-10-CM

## 2024-12-20 DIAGNOSIS — Z00.00 ENCOUNTER FOR MEDICARE ANNUAL WELLNESS EXAM: Primary | ICD-10-CM

## 2024-12-20 DIAGNOSIS — R45.1 AGITATION: ICD-10-CM

## 2024-12-20 DIAGNOSIS — F02.B11 MODERATE EARLY ONSET ALZHEIMER'S DEMENTIA WITH AGITATION (H): ICD-10-CM

## 2024-12-20 PROCEDURE — 99214 OFFICE O/P EST MOD 30 MIN: CPT | Mod: 25 | Performed by: INTERNAL MEDICINE

## 2024-12-20 PROCEDURE — G0439 PPPS, SUBSEQ VISIT: HCPCS | Performed by: INTERNAL MEDICINE

## 2024-12-20 RX ORDER — MIRTAZAPINE 7.5 MG/1
7.5 TABLET, FILM COATED ORAL AT BEDTIME
COMMUNITY
Start: 2024-12-20

## 2024-12-20 RX ORDER — QUETIAPINE FUMARATE 25 MG/1
25 TABLET, FILM COATED ORAL 2 TIMES DAILY
COMMUNITY
Start: 2024-12-20

## 2024-12-20 SDOH — HEALTH STABILITY: PHYSICAL HEALTH: ON AVERAGE, HOW MANY DAYS PER WEEK DO YOU ENGAGE IN MODERATE TO STRENUOUS EXERCISE (LIKE A BRISK WALK)?: 1 DAY

## 2024-12-20 SDOH — HEALTH STABILITY: PHYSICAL HEALTH: ON AVERAGE, HOW MANY MINUTES DO YOU ENGAGE IN EXERCISE AT THIS LEVEL?: 20 MIN

## 2024-12-20 ASSESSMENT — PAIN SCALES - GENERAL: PAINLEVEL_OUTOF10: NO PAIN (0)

## 2024-12-20 ASSESSMENT — SOCIAL DETERMINANTS OF HEALTH (SDOH): HOW OFTEN DO YOU GET TOGETHER WITH FRIENDS OR RELATIVES?: MORE THAN THREE TIMES A WEEK

## 2024-12-20 NOTE — PROGRESS NOTES
"Preventive Care Visit  [facility] MAKAYLA PATIÑO  Livier Triplett MD PhD, Internal Medicine - Pediatrics  Dec 20, 2024      Assessment & Plan     Wally was seen today for wellness visit.    Diagnoses and all orders for this visit:    Encounter for Medicare annual wellness exam    Moderate early onset Alzheimer's dementia with agitation (H)  Comments:  Follows with neurology    Agitation  Comments:  On Seroquel    Prediabetes  Comments:  Monitor carb intake    Hyperlipidemia LDL goal <100  Comments:  Given his advanced Alzheimer's and rapid decline, reasonable to stop atorvastatin.    Hypertension goal BP (blood pressure) < 140/90  Comments:  At goal, continue with lisinopril    Other orders  -     PRIMARY CARE FOLLOW-UP SCHEDULING  -     REVIEW OF HEALTH MAINTENANCE PROTOCOL ORDERS  -     PRIMARY CARE FOLLOW-UP SCHEDULING; Future       Early onset dementia, declining.  Follows with neurology.  Other chronic health issues stable.  Medications updated.  Returning 1 year for annual wellness visit, sooner if new issues or concerns.      BMI  Estimated body mass index is 27.25 kg/m  as calculated from the following:    Height as of this encounter: 1.689 m (5' 6.5\").    Weight as of this encounter: 77.7 kg (171 lb 6.4 oz).       Counseling  Appropriate preventive services were addressed with this patient via screening, questionnaire, or discussion as appropriate for fall prevention, nutrition, physical activity, Tobacco-use cessation, social engagement, weight loss and cognition.  Checklist reviewing preventive services available has been given to the patient.  Reviewed patient's diet, addressing concerns and/or questions.   He is at risk for lack of exercise and has been provided with information to increase physical activity for the benefit of his well-being.   The patient was instructed to see the dentist every 6 months.   He is at risk for psychosocial distress and has been provided with information to reduce risk. "   Discussed possible causes of fatigue. Updated plan of care.  Patient reported difficulty with activities of daily living were addressed today.Addressed any concerns about safety while driving.  The patient was provided with written information regarding signs of hearing loss.   Information on urinary incontinence and treatment options given to patient.           Bryan Lo is a 64 year old, presenting for the following:  Wellness Visit          12/20/2024     2:19 PM   Additional Questions   Roomed by Troy   Accompanied by Self         12/20/2024     2:19 PM   Patient Reported Additional Medications   Patient reports taking the following new medications None       HPI    Wally Cano is a 64 year old male who has Hyperlipidemia LDL goal <100; VSD (ventricular septal defect); * * * SBE PROPHYLAXIS * * *; Hypertension goal BP (blood pressure) < 140/90; S/P infectious endocarditis; Superficial thrombophlebitis; [facility]; Melanoma of scalp (H); and Early onset Alzheimer's disease without behavioral disturbance (H) on their pertinent problem list.       Dementia has gotten worse.  Memory has had fairly rapid decline.  Some aggression toward men.  Some hallucinations and agitations.  Sees neurologist.  Has medication adjusted, updated on chart today.    Came back from a 2 week respite.  Has been pacing more often.   Bedtime 5:30 PM, gets up pacing at 5 AM at times.   Has video camera to ensure safety.   Has a geripsych    Sometime in the summer had chest discomfort, went to [facility], checked out.   Helped after belching.  Struggles with some loose stools.  Has done adjustment.     His wife Aaliyah is the primary caregiver.  She is realistic regarding his decline and is in preparation for more advanced care.  She is interested in simplify his regimen.  Considering discontinuing statin.  This is reasonable.    Health Care  Directive  Patient has a Health Care Directive on file  Advance care planning document is on file and is current.      12/20/2024   General Health   How would you rate your overall physical health? Good   Feel stress (tense, anxious, or unable to sleep) Very much   (!) STRESS CONCERN      12/20/2024   Nutrition   Diet: Regular (no restrictions)         12/20/2024   Exercise   Days per week of moderate/strenous exercise 1 day   Average minutes spent exercising at this level 20 min   (!) EXERCISE CONCERN      12/20/2024   Social Factors   Frequency of gathering with friends or relatives More than three times a week   Worry food won't last until get money to buy more No   Food not last or not have enough money for food? No   Do you have housing? (Housing is defined as stable permanent housing and does not include staying ouside in a car, in a tent, in an abandoned building, in an overnight shelter, or couch-surfing.) Yes   Are you worried about losing your housing? No   Lack of transportation? No   Unable to get utilities (heat,electricity)? No         12/20/2024   Fall Risk   Fallen 2 or more times in the past year? No    Trouble with walking or balance? No        Proxy-reported          12/20/2024   Activities of Daily Living- Home Safety   Needs help with the following daily activites Telephone use    Transportation    Shopping    Preparing meals    Housework    Bathing    Laundry    Medication administration    Money management    Toileting    Dressing   Safety concerns in the home None of the above       Multiple values from one day are sorted in reverse-chronological order         12/20/2024   Dental   Dentist two times every year? (!) NO         12/20/2024   Hearing Screening   Hearing concerns? (!) I NEED TO ASK PEOPLE TO SPEAK UP OR REPEAT THEMSELVES.    (!) IT'S HARD TO FOLLOW A CONVERSATION IN A NOISY RESTAURANT OR CROWDED ROOM.    (!) TROUBLE UNDERSTANDING SOFT OR WHISPERED SPEECH.    (!) TROUBLE  UNDERSTANDING SPEECH ON THE TELEPHONE       Multiple values from one day are sorted in reverse-chronological order         2024   Driving Risk Screening   Patient/family members have concerns about driving (!) YES. No longer driving         2024   General Alertness/Fatigue Screening   Have you been more tired than usual lately? (!) YES         2024   Urinary Incontinence Screening   Bothered by leaking urine in past 6 months Yes            Today's PHQ-2 Score:       2024     2:21 PM   PHQ-2 (  [employer])   Q1: Little interest or pleasure in doing things 0    Q2: Feeling down, depressed or hopeless 0    PHQ-2 Score 0    Q1: Little interest or pleasure in doing things Not at all   Q2: Feeling down, depressed or hopeless Not at all   PHQ-2 Score 0       Proxy-reported           2024   Substance Use   Alcohol more than 3/day or more than 7/wk No   Do you have a current opioid prescription? No   How severe/bad is pain from 1 to 10? 2/10   Do you use any other substances recreationally? (!) CANNABIS PRODUCTS     Social History     Tobacco Use    Smoking status: Former     Current packs/day: 0.00     Average packs/day: 1 pack/day for 20.8 years (20.8 ttl pk-yrs)     Types: Cigarettes, Cigars     Start date: 1986     Quit date: 10/28/2006     Years since quittin.1     Passive exposure: Never    Smokeless tobacco: Never   Vaping Use    Vaping status: Never Used   Substance Use Topics    Alcohol use: Yes     Comment: minimal, changed over to nonalcoholic beer and spirits    Drug use: Yes     Types: Other     Comment: 25 mg Delta 8 gummy 3-4 X week       Last PSA:   PSA   Date Value Ref Range Status   2020 2.74 0 - 4 ug/L Final     Comment:     Assay Method:  Chemiluminescence using Siemens Vista analyzer     Prostate Specific Antigen Screen   Date Value Ref Range Status   2024 4.20 0.00 - 4.50 ng/mL Final   2022 4.90 (H) 0.00 - 4.00 ug/L Final     PSA Tumor Marker    Date Value Ref Range Status   12/12/2023 2.49 0.00 - 4.50 ng/mL Final     ASCVD Risk   The 10-year ASCVD risk score (Sajan DK, et al., 2019) is: 9.1%    Values used to calculate the score:      Age: 64 years      Sex: Male      Is Non- : No      Diabetic: No      Tobacco smoker: No      Systolic Blood Pressure: 120 mmHg      Is BP treated: Yes      HDL Cholesterol: 55 mg/dL      Total Cholesterol: 142 mg/dL      Reviewed and updated as needed this visit by Provider                      Current providers sharing in care for this patient include:  Patient Care Team:  Livier Triplett MD PhD as PCP - General (Internal Medicine - Pediatrics)  Herminio Quevedo RN (Inactive) as Nurse Coordinator (Cardiology)  Karen Lemus MD as MD (Cardiology)  Livier Triplett MD PhD as Assigned PCP  Elisa Felix RN as Specialty Care Coordinator (Cardiology)  Colt Lester MD as Assigned Neuroscience Provider  Juli Pineda MD as MD (Urology)  Cayla Boyd PA-C as Physician Assistant (Family Medicine)  Dony Garza MD as Assigned Surgical Provider  Amairani Rios APRN CNP as Nurse Practitioner (Psychiatry)    The following health maintenance items are reviewed in Epic and correct as of today:  Health Maintenance   Topic Date Due    MICROALBUMIN  12/12/2024    BMP  12/17/2025    LIPID  12/17/2025    PSA  12/17/2025    MEDICARE ANNUAL WELLNESS VISIT  12/20/2025    ANNUAL REVIEW OF HM ORDERS  12/20/2025    DTAP/TDAP/TD IMMUNIZATION (4 - Td or Tdap) 11/21/2027    GLUCOSE  12/17/2027    COLORECTAL CANCER SCREENING  08/02/2028    ADVANCE CARE PLANNING  12/20/2029    HEPATITIS C SCREENING  Completed    PHQ-2 (once per calendar year)  Completed    INFLUENZA VACCINE  Completed    Pneumococcal Vaccine: 50+ Years  Completed    ZOSTER IMMUNIZATION  Completed    RSV VACCINE  Completed    COVID-19 Vaccine  Completed    HIV SCREENING  Addressed    HPV IMMUNIZATION  Aged Out     "MENINGITIS IMMUNIZATION  Aged Out    RSV MONOCLONAL ANTIBODY  Aged Out         Review of Systems  Constitutional, HEENT, cardiovascular, pulmonary, gi and gu systems are negative, except as otherwise noted.     Objective    Exam  /81 (BP Location: Right arm, Patient Position: Sitting, Cuff Size: Adult Regular)   Pulse 62   Temp 97.4  F (36.3  C) (Tympanic)   Resp 16   Ht 1.689 m (5' 6.5\")   Wt 77.7 kg (171 lb 6.4 oz)   SpO2 97%   BMI 27.25 kg/m     Estimated body mass index is 27.25 kg/m  as calculated from the following:    Height as of this encounter: 1.689 m (5' 6.5\").    Weight as of this encounter: 77.7 kg (171 lb 6.4 oz).    Physical Exam  GENERAL: alert and no distress  EYES: Eyes grossly normal to inspection, PERRL and conjunctivae and sclerae normal  HENT: ear canals and TM's normal, nose and mouth without ulcers or lesions  NECK: no adenopathy, no asymmetry, masses, or scars  RESP: lungs clear to auscultation - no rales, rhonchi or wheezes  CV: regular rate and rhythm, normal S1 S2, no S3 or S4, no murmur, click or rub, no peripheral edema  ABDOMEN: soft, nontender, no hepatosplenomegaly, no masses and bowel sounds normal  MS: no gross musculoskeletal defects noted, no edema  SKIN: no suspicious lesions or rashes  NEURO: Alert, normal speech, not oriented to time or place.  His responses were nonsensical.  PSYCH: Affect is pleasant.          12/20/2024   Mini Cog   Mini-Cog Not Completed (choose reason) Known dementia     Lab on 12/17/2024   Component Date Value Ref Range Status    Prostate Specific Antigen Screen 12/17/2024 4.20  0.00 - 4.50 ng/mL Final    WBC Count 12/17/2024 6.2  4.0 - 11.0 10e3/uL Final    RBC Count 12/17/2024 4.78  4.40 - 5.90 10e6/uL Final    Hemoglobin 12/17/2024 14.5  13.3 - 17.7 g/dL Final    Hematocrit 12/17/2024 44.2  40.0 - 53.0 % Final    MCV 12/17/2024 93  78 - 100 fL Final    MCH 12/17/2024 30.3  26.5 - 33.0 pg Final    MCHC 12/17/2024 32.8  31.5 - 36.5 g/dL " Final    RDW 12/17/2024 12.8  10.0 - 15.0 % Final    Platelet Count 12/17/2024 199  150 - 450 10e3/uL Final    Sodium 12/17/2024 143  135 - 145 mmol/L Final    Potassium 12/17/2024 4.6  3.4 - 5.3 mmol/L Final    Carbon Dioxide (CO2) 12/17/2024 27  22 - 29 mmol/L Final    Anion Gap 12/17/2024 9  7 - 15 mmol/L Final    Urea Nitrogen 12/17/2024 10.2  8.0 - 23.0 mg/dL Final    Creatinine 12/17/2024 1.10  0.67 - 1.17 mg/dL Final    GFR Estimate 12/17/2024 75  >60 mL/min/1.73m2 Final    eGFR calculated using 2021 CKD-EPI equation.    Calcium 12/17/2024 9.6  8.8 - 10.4 mg/dL Final    Reference intervals for this test were updated on 7/16/2024 to reflect our healthy population more accurately. There may be differences in the flagging of prior results with similar values performed with this method. Those prior results can be interpreted in the context of the updated reference intervals.    Chloride 12/17/2024 107  98 - 107 mmol/L Final    Glucose 12/17/2024 116 (H)  70 - 99 mg/dL Final    Alkaline Phosphatase 12/17/2024 65  40 - 150 U/L Final    AST 12/17/2024 40  0 - 45 U/L Final    ALT 12/17/2024 27  0 - 70 U/L Final    Protein Total 12/17/2024 6.7  6.4 - 8.3 g/dL Final    Albumin 12/17/2024 4.3  3.5 - 5.2 g/dL Final    Bilirubin Total 12/17/2024 1.3 (H)  <=1.2 mg/dL Final    Patient Fasting > 8hrs? 12/17/2024 No   Final    Cholesterol 12/17/2024 142  <200 mg/dL Final    Triglycerides 12/17/2024 108  <150 mg/dL Final    Direct Measure HDL 12/17/2024 55  >=40 mg/dL Final    LDL Cholesterol Calculated 12/17/2024 65  <100 mg/dL Final    Non HDL Cholesterol 12/17/2024 87  <130 mg/dL Final    Patient Fasting > 8hrs? 12/17/2024 No   Final         Signed Electronically by: Livier Triplett MD PhD

## 2024-12-20 NOTE — PATIENT INSTRUCTIONS
Patient Education   Preventive Care Advice   This is general advice given by our system to help you stay healthy. However, your care team may have specific advice just for you. Please talk to your care team about your preventive care needs.  Nutrition  Eat 5 or more servings of fruits and vegetables each day.  Try wheat bread, brown rice and whole grain pasta (instead of white bread, rice, and pasta).  Get enough calcium and vitamin D. Check the label on foods and aim for 100% of the RDA (recommended daily allowance).  Lifestyle  Exercise at least 150 minutes each week  (30 minutes a day, 5 days a week).  Do muscle strengthening activities 2 days a week. These help control your weight and prevent disease.  No smoking.  Wear sunscreen to prevent skin cancer.  Have a dental exam and cleaning every 6 months.  Yearly exams  See your health care team every year to talk about:  Any changes in your health.  Any medicines your care team has prescribed.  Preventive care, family planning, and ways to prevent chronic diseases.  Shots (vaccines)   HPV shots (up to age 26), if you've never had them before.  Hepatitis B shots (up to age 59), if you've never had them before.  COVID-19 shot: Get this shot when it's due.  Flu shot: Get a flu shot every year.  Tetanus shot: Get a tetanus shot every 10 years.  Pneumococcal, hepatitis A, and RSV shots: Ask your care team if you need these based on your risk.  Shingles shot (for age 50 and up)  General health tests  Diabetes screening:  Starting at age 35, Get screened for diabetes at least every 3 years.  If you are younger than age 35, ask your care team if you should be screened for diabetes.  Cholesterol test: At age 39, start having a cholesterol test every 5 years, or more often if advised.  Bone density scan (DEXA): At age 50, ask your care team if you should have this scan for osteoporosis (brittle bones).  Hepatitis C: Get tested at least once in your life.  STIs (sexually  transmitted infections)  Before age 24: Ask your care team if you should be screened for STIs.  After age 24: Get screened for STIs if you're at risk. You are at risk for STIs (including HIV) if:  You are sexually active with more than one person.  You don't use condoms every time.  You or a partner was diagnosed with a sexually transmitted infection.  If you are at risk for HIV, ask about PrEP medicine to prevent HIV.  Get tested for HIV at least once in your life, whether you are at risk for HIV or not.  Cancer screening tests  Cervical cancer screening: If you have a cervix, begin getting regular cervical cancer screening tests starting at age 21.  Breast cancer scan (mammogram): If you've ever had breasts, begin having regular mammograms starting at age 40. This is a scan to check for breast cancer.  Colon cancer screening: It is important to start screening for colon cancer at age 45.  Have a colonoscopy test every 10 years (or more often if you're at risk) Or, ask your provider about stool tests like a FIT test every year or Cologuard test every 3 years.  To learn more about your testing options, visit:   .  For help making a decision, visit:   https://bit.ly/rr57119.  Prostate cancer screening test: If you have a prostate, ask your care team if a prostate cancer screening test (PSA) at age 55 is right for you.  Lung cancer screening: If you are a current or former smoker ages 50 to 80, ask your care team if ongoing lung cancer screenings are right for you.  For informational purposes only. Not to replace the advice of your health care provider. Copyright   2023 [facility]. All rights reserved. Clinically reviewed by the [facility]. [employer] [id number] - REV 01/24.  Learning About Activities of Daily Living  What are activities of daily living?     Activities of daily living (ADLs) are the basic self-care tasks you do every day. These include eating, bathing, dressing,  and moving around.  As you age, and if you have health problems, you may find that it's harder to do some of these tasks. If so, your doctor can suggest ideas that may help.  To measure what kind of help you may need, your doctor will ask how well you are able to do ADLs. Let your doctor know if there are any tasks that you are having trouble doing. This is an important first step to getting help. And when you have the help you need, you can stay as independent as possible.  How will a doctor assess your ADLs?  Asking about ADLs is part of a routine health checkup your doctor will likely do as you age. Your health check might be done in a doctor's office, in your home, or at a hospital. The goal is to find out if you are having any problems that could make it hard to care for yourself or that make it unsafe for you to be on your own.  To measure your ADLs, your doctor will ask how hard it is for you to do routine tasks. Your doctor may also want to know if you have changed the way you do a task because of a health problem. Your doctor may watch how you:  Walk back and forth.  Keep your balance while you stand or walk.  Move from sitting to standing or from a bed to a chair.  Button or unbutton a shirt or sweater.  Remove and put on your shoes.  It's common to feel a little worried or anxious if you find you can't do all the things you used to be able to do. Talking with your doctor about ADLs is a way to make sure you're as safe as possible and able to care for yourself as well as you can. You may want to bring a caregiver, friend, or family member to your checkup. They can help you talk to your doctor.  Follow-up care is a key part of your treatment and safety. Be sure to make and go to all appointments, and call your doctor if you are having problems. It's also a good idea to know your test results and keep a list of the medicines you take.  Current as of: October 24, 2023  Content Version: 14.3    2024 [facility]  [employer].   Care instructions adapted under license by your healthcare professional. If you have questions about a medical condition or this instruction, always ask your healthcare professional. [employer] disclaims any warranty or liability for your use of this information.    Hearing Loss: Care Instructions  Overview     Hearing loss is a sudden or slow decrease in how well you hear. It can range from slight to profound. Permanent hearing loss can occur with aging. It also can happen when you are exposed long-term to loud noise. Examples include listening to loud music, riding motorcycles, or being around other loud machines.  Hearing loss can affect your work and home life. It can make you feel lonely or depressed. You may feel that you have lost your independence. But hearing aids and other devices can help you hear better and feel connected to others.  Follow-up care is a key part of your treatment and safety. Be sure to make and go to all appointments, and call your doctor if you are having problems. It's also a good idea to know your test results and keep a list of the medicines you take.  How can you care for yourself at home?  Avoid loud noises whenever possible. This helps keep your hearing from getting worse.  Always wear hearing protection around loud noises.  Wear a hearing aid as directed.  A professional can help you pick a hearing aid that will work best for you.  You can also get hearing aids over the counter for mild to moderate hearing loss.  Have hearing tests as your doctor suggests. They can show whether your hearing has changed. Your hearing aid may need to be adjusted.  Use other devices as needed. These may include:  Telephone amplifiers and hearing aids that can connect to a television, stereo, radio, or microphone.  Devices that use lights or vibrations. These alert you to the doorbell, a ringing telephone, or a baby monitor.  Television closed-captioning. This shows the  "words at the bottom of the screen. Most new TVs can do this.  TTY (text telephone). This lets you type messages back and forth on the telephone instead of talking or listening. These devices are also called TDD. When messages are typed on the keyboard, they are sent over the phone line to a receiving TTY. The message is shown on a monitor.  Use text messaging, social media, and email if it is hard for you to communicate by telephone.  Try to learn a listening technique called speechreading. It is not lipreading. You pay attention to people's gestures, expressions, posture, and tone of voice. These clues can help you understand what a person is saying. Face the person you are talking to, and have them face you. Make sure the lighting is good. You need to see the other person's face clearly.  Think about counseling if you need help to adjust to your hearing loss.  When should you call for help?  Watch closely for changes in your health, and be sure to contact your doctor if:    You think your hearing is getting worse.     You have new symptoms, such as dizziness or nausea.   Where can you learn more?  Go to https://www.Plerts.net/patiented  Enter R798 in the search box to learn more about \"Hearing Loss: Care Instructions.\"  Current as of: September 27, 2023  Content Version: 14.3    2024 [employer].   Care instructions adapted under license by your healthcare professional. If you have questions about a medical condition or this instruction, always ask your healthcare professional. [employer] disclaims any warranty or liability for your use of this information.    Learning About Stress  What is stress?     Stress is your body's response to a hard situation. Your body can have a physical, emotional, or mental response. Stress is a fact of life for most people, and it affects everyone differently. What causes stress for you may not be stressful for someone else.  A lot of things can cause " stress. You may feel stress when you go on a job interview, take a test, or run a race. This kind of short-term stress is normal and even useful. It can help you if you need to work hard or react quickly. For example, stress can help you finish an important job on time.  Long-term stress is caused by ongoing stressful situations or events. Examples of long-term stress include long-term health problems, ongoing problems at work, or conflicts in your family. Long-term stress can harm your health.  How does stress affect your health?  When you are stressed, your body responds as though you are in danger. It makes hormones that speed up your heart, make you breathe faster, and give you a burst of energy. This is called the fight-or-flight stress response. If the stress is over quickly, your body goes back to normal and no harm is done.  But if stress happens too often or lasts too long, it can have bad effects. Long-term stress can make you more likely to get sick, and it can make symptoms of some diseases worse. If you tense up when you are stressed, you may develop neck, shoulder, or low back pain. Stress is linked to high blood pressure and heart disease.  Stress also harms your emotional health. It can make you castaneda, tense, or depressed. Your relationships may suffer, and you may not do well at work or school.  What can you do to manage stress?  You can try these things to help manage stress:   Do something active. Exercise or activity can help reduce stress. Walking is a great way to get started. Even everyday activities such as housecleaning or yard work can help.  Try yoga or enedina chi. These techniques combine exercise and meditation. You may need some training at first to learn them.  Do something you enjoy. For example, listen to music or go to a movie. Practice your hobby or do volunteer work.  Meditate. This can help you relax, because you are not worrying about what happened before or what may happen in the  "future.  Do guided imagery. Imagine yourself in any setting that helps you feel calm. You can use online videos, books, or a teacher to guide you.  Do breathing exercises. For example:  From a standing position, bend forward from the waist with your knees slightly bent. Let your arms dangle close to the floor.  Breathe in slowly and deeply as you return to a standing position. Roll up slowly and lift your head last.  Hold your breath for just a few seconds in the standing position.  Breathe out slowly and bend forward from the waist.  Let your feelings out. Talk, laugh, cry, and express anger when you need to. Talking with supportive friends or family, a counselor, or a sonia leader about your feelings is a healthy way to relieve stress. Avoid discussing your feelings with people who make you feel worse.  Write. It may help to write about things that are bothering you. This helps you find out how much stress you feel and what is causing it. When you know this, you can find better ways to cope.  What can you do to prevent stress?  You might try some of these things to help prevent stress:  Manage your time. This helps you find time to do the things you want and need to do.  Get enough sleep. Your body recovers from the stresses of the day while you are sleeping.  Get support. Your family, friends, and community can make a difference in how you experience stress.  Limit your news feed. Avoid or limit time on social media or news that may make you feel stressed.  Do something active. Exercise or activity can help reduce stress. Walking is a great way to get started.  Where can you learn more?  Go to https://www.Actions.net/patiented  Enter N032 in the search box to learn more about \"Learning About Stress.\"  Current as of: October 24, 2023  Content Version: 14.3    2024 [facility] [employer].   Care instructions adapted under license by your healthcare professional. If you have questions about a medical condition or " this instruction, always ask your healthcare professional. [employer] disclaims any warranty or liability for your use of this information.    Learning About Sleeping Well  What does sleeping well mean?     Sleeping well means getting enough sleep to feel good and stay healthy. How much sleep is enough varies among people.  The number of hours you sleep and how you feel when you wake up are both important. If you do not feel refreshed, you probably need more sleep. Another sign of not getting enough sleep is feeling tired during the day.  Experts recommend that adults get at least 7 or more hours of sleep per day. Children and older adults need more sleep.  Why is getting enough sleep important?  Getting enough quality sleep is a basic part of good health. When your sleep suffers, your physical health, mood, and your thoughts can suffer too. You may find yourself feeling more grumpy or stressed. Not getting enough sleep also can lead to serious problems, including injury, accidents, anxiety, and depression.  What might cause poor sleeping?  Many things can cause sleep problems, including:  Changes to your sleep schedule.  Stress. Stress can be caused by fear about a single event, such as giving a speech. Or you may have ongoing stress, such as worry about work or school.  Depression, anxiety, and other mental or emotional conditions.  Changes in your sleep habits or surroundings. This includes changes that happen where you sleep, such as noise, light, or sleeping in a different bed. It also includes changes in your sleep pattern, such as having jet lag or working a late shift.  Health problems, such as pain, breathing problems, and restless legs syndrome.  Lack of regular exercise.  Using alcohol, nicotine, or caffeine before bed.  How can you help yourself?  Here are some tips that may help you sleep more soundly and wake up feeling more refreshed.  Your sleeping area   Use your bedroom only for  "sleeping and sex. A bit of light reading may help you fall asleep. But if it doesn't, do your reading elsewhere in the house. Try not to use your TV, computer, smartphone, or tablet while you are in bed.  Be sure your bed is big enough to stretch out comfortably, especially if you have a sleep partner.  Keep your bedroom quiet, dark, and cool. Use curtains, blinds, or a sleep mask to block out light. To block out noise, use earplugs, soothing music, or a \"white noise\" machine.  Your evening and bedtime routine   Create a relaxing bedtime routine. You might want to take a warm shower or bath, or listen to soothing music.  Go to bed at the same time every night. And get up at the same time every morning, even if you feel tired.  What to avoid   Limit caffeine (coffee, tea, caffeinated sodas) during the day, and don't have any for at least 6 hours before bedtime.  Avoid drinking alcohol before bedtime. Alcohol can cause you to wake up more often during the night.  Try not to smoke or use tobacco, especially in the evening. Nicotine can keep you awake.  Limit naps during the day, especially close to bedtime.  Avoid lying in bed awake for too long. If you can't fall asleep or if you wake up in the middle of the night and can't get back to sleep within about 20 minutes, get out of bed and go to another room until you feel sleepy.  Avoid taking medicine right before bed that may keep you awake or make you feel hyper or energized. Your doctor can tell you if your medicine may do this and if you can take it earlier in the day.  If you can't sleep   Imagine yourself in a peaceful, pleasant scene. Focus on the details and feelings of being in a place that is relaxing.  Get up and do a quiet or boring activity until you feel sleepy.  Avoid drinking any liquids before going to bed to help prevent waking up often to use the bathroom.  Where can you learn more?  Go to https://www.[employer] Housing Investmentswise.net/patiented  Enter J942 in the search " "box to learn more about \"Learning About Sleeping Well.\"  Current as of: July 31, 2024  Content Version: 14.3    2024 [employer].   Care instructions adapted under license by your healthcare professional. If you have questions about a medical condition or this instruction, always ask your healthcare professional. [employer] disclaims any warranty or liability for your use of this information.    Bladder Training: Care Instructions  Your Care Instructions     Bladder training is used to treat urge incontinence and stress incontinence. Urge incontinence means that the need to urinate comes on so fast that you can't get to a toilet in time. Stress incontinence means that you leak urine because of pressure on your bladder. For example, it may happen when you laugh, cough, or lift something heavy.  Bladder training can increase how long you can wait before you have to urinate. It can also help your bladder hold more urine. And it can give you better control over the urge to urinate.  It is important to remember that bladder training takes a few weeks to a few months to make a difference. You may not see results right away, but don't give up.  Follow-up care is a key part of your treatment and safety. Be sure to make and go to all appointments, and call your doctor if you are having problems. It's also a good idea to know your test results and keep a list of the medicines you take.  How can you care for yourself at home?  Work with your doctor to come up with a bladder training program that is right for you. You may use one or more of the following methods.  Delayed urination  In the beginning, try to keep from urinating for 5 minutes after you first feel the need to go.  While you wait, take deep, slow breaths to relax. Kegel exercises can also help you delay the need to go to the bathroom.  After some practice, when you can easily wait 5 minutes to urinate, try to wait 10 minutes before you " "urinate.  Slowly increase the waiting period until you are able to control when you have to urinate.  Scheduled urination  Empty your bladder when you first wake up in the morning.  Schedule times throughout the day when you will urinate.  Start by going to the bathroom every hour, even if you don't need to go.  Slowly increase the time between trips to the bathroom.  When you have found a schedule that works well for you, keep doing it.  If you wake up during the night and have to urinate, do it. Apply your schedule to waking hours only.  Kegel exercises  These tighten and strengthen pelvic muscles, which can help you control the flow of urine. (If doing these exercises causes pain, stop doing them and talk with your doctor.) To do Kegel exercises:  Squeeze your muscles as if you were trying not to pass gas. Or squeeze your muscles as if you were stopping the flow of urine. Your belly, legs, and buttocks shouldn't move.  Hold the squeeze for 3 seconds, then relax for 5 to 10 seconds.  Start with 3 seconds, then add 1 second each week until you are able to squeeze for 10 seconds.  Repeat the exercise 10 times a session. Do 3 to 8 sessions a day.  When should you call for help?  Watch closely for changes in your health, and be sure to contact your doctor if:    Your incontinence is getting worse.     You do not get better as expected.   Where can you learn more?  Go to https://www.Fan TV.net/patiented  Enter V684 in the search box to learn more about \"Bladder Training: Care Instructions.\"  Current as of: April 30, 2024  Content Version: 14.3    2024 [employer].   Care instructions adapted under license by your healthcare professional. If you have questions about a medical condition or this instruction, always ask your healthcare professional. [employer] disclaims any warranty or liability for your use of this information.    Substance Use Disorder: Care Instructions  Overview     You can " improve your life and health by stopping your use of alcohol or drugs. When you don't drink or use drugs, you may feel and sleep better. You may get along better with your family, friends, and coworkers. There are medicines and programs that can help with substance use disorder.  How can you care for yourself at home?  Here are some ways to help you stay sober and prevent relapse.  If you have been given medicine to help keep you sober or reduce your cravings, be sure to take it exactly as prescribed.  Talk to your doctor about programs that can help you stop using drugs or drinking alcohol.  Do not keep alcohol or drugs in your home.  Plan ahead. Think about what you'll say if other people ask you to drink or use drugs. Try not to spend time with people who drink or use drugs.  Use the time and money spent on drinking or drugs to do something that's important to you.  Preventing a relapse  Have a plan to deal with relapse. Learn to recognize changes in your thinking that lead you to drink or use drugs. Get help before you start to drink or use drugs again.  Try to stay away from situations, friends, or places that may lead you to drink or use drugs.  If you feel the need to drink alcohol or use drugs again, seek help right away. Call a trusted friend or family member. Some people get support from organizations such as Narcotics Anonymous or [employer] or from treatment facilities.  If you relapse, get help as soon as you can. Some people make a plan with another person that outlines what they want that person to do for them if they relapse. The plan usually includes how to handle the relapse and who to notify in case of relapse.  Don't give up. Remember that a relapse doesn't mean that you have failed. Use the experience to learn the triggers that lead you to drink or use drugs. Then quit again. Recovery is a lifelong process. Many people have several relapses before they are able to quit for good.  Follow-up  "care is a erickson part of your treatment and safety. Be sure to make and go to all appointments, and call your doctor if you are having problems. It's also a good idea to know your test results and keep a list of the medicines you take.  When should you call for help?   Call 911  anytime you think you may need emergency care. For example, call if you or someone else:    Has overdosed or has withdrawal signs. Be sure to tell the emergency workers that you are or someone else is using or trying to quit using drugs. Overdose or withdrawal signs may include:  Losing consciousness.  Seizure.  Seeing or hearing things that aren't there (hallucinations).     Is thinking or talking about suicide or harming others.   Where to get help 24 hours a day, 7 days a week   If you or someone you know talks about suicide, self-harm, a mental health crisis, a substance use crisis, or any other kind of emotional distress, get help right away. You can:    Call the Suicide and Crisis Lifeline at 988.     Call 0-822-935-TALK (1-827.367.4194).     Text HOME to 740133 to access the Crisis Text Line.   Consider saving these numbers in your phone.  Go to [employer] for more information or to chat online.  Call your doctor now or seek immediate medical care if:    You are having withdrawal symptoms. These may include nausea or vomiting, sweating, shakiness, and anxiety.   Watch closely for changes in your health, and be sure to contact your doctor if:    You have a relapse.     You need more help or support to stop.   Where can you learn more?  Go to https://www.[employer].net/patiented  Enter H573 in the search box to learn more about \"Substance Use Disorder: Care Instructions.\"  Current as of: November 15, 2023  Content Version: 14.3    2024 "[employer] [employer].   Care instructions adapted under license by your healthcare professional. If you have questions about a medical condition or this instruction, always ask your healthcare " professional. [facility], [facility] disclaims any warranty or liability for your use of this information.

## 2024-12-30 PROBLEM — E08.49 DIABETES DUE TO UNDRL CONDITION W OTH DIABETIC NEURO COMP (H): Status: RESOLVED | Noted: 2024-12-30 | Resolved: 2024-12-30

## 2024-12-30 PROBLEM — E08.49 DIABETES DUE TO UNDRL CONDITION W OTH DIABETIC NEURO COMP (H): Status: ACTIVE | Noted: 2024-12-30

## 2025-01-19 DIAGNOSIS — I10 HYPERTENSION GOAL BP (BLOOD PRESSURE) < 140/90: ICD-10-CM

## 2025-01-20 RX ORDER — LISINOPRIL 20 MG/1
20 TABLET ORAL 2 TIMES DAILY
Qty: 180 TABLET | Refills: 3 | Status: SHIPPED | OUTPATIENT
Start: 2025-01-20

## 2025-06-06 ENCOUNTER — TELEPHONE (OUTPATIENT)
Dept: FAMILY MEDICINE | Facility: CLINIC | Age: 65
End: 2025-06-06
Payer: MEDICARE

## 2025-06-06 NOTE — TELEPHONE ENCOUNTER
Forms/Letter Request    Type of form/letter:   Is Release of Information needed?: Yes  Was an PIYUSH obtained?  Yes    Do we have the form/letter: Yes: Will place in provider;s bin    Who is the form from? [facility]    Where did/will the form come from? form was faxed in    When is form/letter needed by: asap    How would you like the form/letter returned: Fax : [phone]

## 2025-06-12 ENCOUNTER — MYC MEDICAL ADVICE (OUTPATIENT)
Dept: FAMILY MEDICINE | Facility: CLINIC | Age: 65
End: 2025-06-12
Payer: MEDICARE

## 2025-06-13 NOTE — TELEPHONE ENCOUNTER
Routing to provider to review and advise on MyChart message.    CARLITO Velez  [facility]

## 2025-06-17 ENCOUNTER — VIRTUAL VISIT (OUTPATIENT)
Dept: FAMILY MEDICINE | Facility: CLINIC | Age: 65
End: 2025-06-17
Payer: MEDICARE

## 2025-06-17 ENCOUNTER — TELEPHONE (OUTPATIENT)
Dept: FAMILY MEDICINE | Facility: CLINIC | Age: 65
End: 2025-06-17

## 2025-06-17 DIAGNOSIS — C43.4 MELANOMA OF SCALP (H): ICD-10-CM

## 2025-06-17 DIAGNOSIS — R07.89 CHEST TIGHTNESS: ICD-10-CM

## 2025-06-17 DIAGNOSIS — R06.09 DOE (DYSPNEA ON EXERTION): Primary | ICD-10-CM

## 2025-06-17 DIAGNOSIS — G30.0 MODERATE EARLY ONSET ALZHEIMER'S DEMENTIA WITH AGITATION (H): ICD-10-CM

## 2025-06-17 DIAGNOSIS — F02.B11 MODERATE EARLY ONSET ALZHEIMER'S DEMENTIA WITH AGITATION (H): ICD-10-CM

## 2025-06-17 PROCEDURE — 98006 SYNCH AUDIO-VIDEO EST MOD 30: CPT | Performed by: INTERNAL MEDICINE

## 2025-06-17 NOTE — PROGRESS NOTES
"  If patient has telephone visit, have they been educated on video visit as preferred visit method and offered to change to video visit? NOT APPLICABLE        Instructions Relayed to Patient by Virtual Roomer:     Patient is active on [employer]:   Relayed following to patient: \"It looks like you are active on [employer], are you able to join the visit this way? If not, do you need us to send you a link now or would you like your provider to send a link via text or email when they are ready to initiate the visit?\"      Patient Confirmed they will join visit via: [employer]  Reminded patient to ensure they were logged on to virtual visit by arrival time listed.   Asked if patient has flexibility to initiate visit sooner than arrival time: patient is unable to initiate visit earlier than arrival time     If pediatric virtual visit, ensured pediatric patient along with parent/guardian will be present for video visit.     Patient offered the website [email or web address] and/or phone number to [employer] Help line: [phone]      Answers submitted by the patient for this visit:  General Questionnaire (Submitted on 6/16/2025)  Chief Complaint: Chronic problems general questions HPI Form  What is the reason for your visit today? : anxiety v chest pain in mod/severe alzheimer's  How many servings of fruits and vegetables do you eat daily?: 0-1  On average, how many sweetened beverages do you drink each day (Examples: soda, juice, sweet tea, etc.  Do NOT count diet or artificially sweetened beverages)?: 1  How many minutes a day do you exercise enough to make your heart beat faster?: 9 or less  How many days a week do you exercise enough to make your heart beat faster?: 4  How many days per week do you miss taking your medication?: 0  Questionnaire about: Chronic problems general questions HPI Form (Submitted on 6/16/2025)  Chief Complaint: Chronic problems general questions HPI Form  Wally is a 64 year old who is " Health Maintenance Due   Topic Date Due    Hepatitis C Screening  Never done    COVID-19 Vaccine (1) Never done    Pneumococcal Vaccines (Age 0-64) (1 of 2 - PPSV23) Never done    Foot Exam  Never done    TETANUS VACCINE  Never done    Shingles Vaccine (1 of 2) Never done    DEXA SCAN  04/16/2021    Eye Exam  05/18/2021    Mammogram  03/17/2022     Updates were requested from care everywhere.  Chart was reviewed for overdue Proactive [facility] Encounters (PHOEBE) topics (CRS, Breast Cancer Screening, Eye exam)  Health Maintenance has been updated.  LINKS immunization registry triggered.  Immunizations were reconciled.     "being evaluated via a billable video visit.    How would you like to obtain your AVS? MyChart  If the video visit is dropped, the invitation should be resent by: Text to cell phone: [phone]  Will anyone else be joining your video visit? No      Assessment & Plan     Wally was seen today for patient request.    Diagnoses and all orders for this visit:    GONZALEZ (dyspnea on exertion)  -     Echocardiogram Dobutamine Stress; Future    Chest tightness  -     Echocardiogram Dobutamine Stress; Future    Moderate early onset Alzheimer's dementia with agitation (H)  -     Hospice Referral; Future    Melanoma of scalp (H)      Alzheimer's dementia with significant functional declined in the last 6 months and weight loss 11 lbs. It is unpredictable where his functional status will be in another 6 months. It is reasonable to start hospice assessment to see if he may qualify and the parameters for admission for him for planning purpose.     Dyspnea on exertion/chest tightness: some systemic symptoms being clammy is a little concerning. Has known VSD. Last Echo was 2018. We'll do a stress echo. Even though patient is DNR, info will be helpful for family planning in regards to end of life care.         BMI  Estimated body mass index is 27.25 kg/m  as calculated from the following:    Height as of 12/20/24: 1.689 m (5' 6.5\").    Weight as of 12/20/24: 77.7 kg (171 lb 6.4 oz).   Weight management plan: has had unintentional weight loss of 11 lbs. Concerning.       Return in November as scheduled.     The longitudinal plan of care for the diagnosis(es)/condition(s) as documented were addressed during this visit. Due to the added complexity in care, I will continue to support Wally in the subsequent management and with ongoing continuity of care.          Subjective   Wally is a 64 year old, presenting for the following health issues:  Patient Request        6/17/2025     8:31 AM   Additional Questions   Roomed by Tawanna " "  Accompanied by self       Video Start Time: 9:39 AM      Wally Cano is a 64 year old male who has Hyperlipidemia LDL goal <100; VSD (ventricular septal defect); * * * SBE PROPHYLAXIS * * *; Hypertension goal BP (blood pressure) < 140/90; S/P infectious endocarditis; Superficial thrombophlebitis; [facility]; Melanoma of scalp (H); and Early onset Alzheimer's disease without behavioral disturbance (H) on their pertinent problem list.      History of Present Illness       Reason for visit:  Anxiety v chest pain in mod/severe alzheimer's    He eats 0-1 servings of fruits and vegetables daily.He consumes 1 sweetened beverage(s) daily.He exercises with enough effort to increase his heart rate 9 or less minutes per day.  He exercises with enough effort to increase his heart rate 4 days per week.   He is taking medications regularly.        Discuss new concern sent through [employer].   His wife Aaliyah provides most of the history and context.     \"As a preface we signed the POLST for Wally and will be doing minimal interventions. He is doubly incontinent and a very poor historian who will give automatic yes answers.     Wally has had a few episodes of being cool, clammy, slightly SOB. BP has been high with this 180/150 but settles to a more normal 135/85 on 5 minute recheck. He motions to his left upper chest when asked if he hurts, but then also points to his belly. We decided against the ED since we went through this last summer and he checked out fine.       We would not be looking to fix any underlying issue, nor are we sure if there even is one as his answers are so variable. However, if there is a serious problem we would pursue hospice and we are wondering what to do to get some information or answers to help us plan if needed.\"    I last saw him 6 months ago. Since then there has been significant decline.  He has lost 10 lbs. Current " weight at 160 lbs.   Became completely incontinent with BM and urine.  Lost ability for ADLs such as make a sandwich, dress himself, shower etc.   No decision making capacity.   He is able to feed himself but sometimes eats cereal with finger.     Has seen geriatric specialist. His Alzheimer's dementia is the rapid declining type.   Speech and language will be the next area involved. So far he is still able to speak clearly although a lot of times not making sense.     In the last 10 days or so, he has had almost daily complain of chest discomfort. It goes away in about 5 minutes. It is sometimes accompanied by pacing and agitation.     Today when asked about his chest pain, he acknowledged pain on the left upper chest toward the shoulder but currently not present.     His wife reported he gets short of breath and chest pain when climbing one flight of stairs. When he gets short of breath and complains of the chest pain, he gets cool and clammy. This tends to happen in the evening.     He went to [facility] last Thursday, it went uneventful. The care center didn't report any complaints from the patient.     He has known small congenital VSD. Last Echo was in 2018. Recommended every 5 year check but opted to stop monitoring at the time due to dementia.     Aaliyah has explored hospice option. She is also interested in known if there is any serious underlying health issue that they need to anticipate even through they do not intend to do aggressive intervention, but will help family with planning the next stage of dementia progression. [facility] would be able to do an assessment if three is  referral in place.         Review of Systems  Constitutional, HEENT, cardiovascular, pulmonary, gi and gu systems are negative, except as otherwise noted.      Objective           Vitals:  No vitals were obtained today due to virtual visit.    Physical Exam   GENERAL: alert and no distress  EYES: Eyes grossly  normal to inspection.  No discharge or erythema, or obvious scleral/conjunctival abnormalities.  RESP: No audible wheeze, cough, or visible cyanosis.    SKIN: Visible skin clear. No significant rash, abnormal pigmentation or lesions.  NEURO: Cranial nerves grossly intact.  Mentation and speech appropriate for age.  PSYCH: Appropriate affect, tone, and pace of words          Video-Visit Details    Type of service:  Video Visit   Video End Time:10:03 AM  Originating Location (pt. Location): Home    Distant Location (provider location):  Off-site  Platform used for Video Visit: Chris  Signed Electronically by: Livier Triplett MD PhD

## 2025-06-23 ENCOUNTER — HOSPITAL ENCOUNTER (OUTPATIENT)
Dept: CARDIOLOGY | Facility: CLINIC | Age: 65
Discharge: HOME | End: 2025-06-23
Attending: INTERNAL MEDICINE | Admitting: INTERNAL MEDICINE
Payer: MEDICARE

## 2025-06-23 ENCOUNTER — MYC MEDICAL ADVICE (OUTPATIENT)
Dept: FAMILY MEDICINE | Facility: CLINIC | Age: 65
End: 2025-06-23

## 2025-06-23 VITALS — WEIGHT: 160 LBS | BODY MASS INDEX: 25.44 KG/M2

## 2025-06-23 DIAGNOSIS — R06.09 DOE (DYSPNEA ON EXERTION): ICD-10-CM

## 2025-06-23 DIAGNOSIS — R07.89 CHEST TIGHTNESS: ICD-10-CM

## 2025-06-23 LAB
CV STRESS CURRENT BP HE: NORMAL
CV STRESS CURRENT HR HE: 108
CV STRESS CURRENT HR HE: 108
CV STRESS CURRENT HR HE: 110
CV STRESS CURRENT HR HE: 120
CV STRESS CURRENT HR HE: 124
CV STRESS CURRENT HR HE: 127
CV STRESS CURRENT HR HE: 128
CV STRESS CURRENT HR HE: 128
CV STRESS CURRENT HR HE: 131
CV STRESS CURRENT HR HE: 131
CV STRESS CURRENT HR HE: 132
CV STRESS CURRENT HR HE: 135
CV STRESS CURRENT HR HE: 56
CV STRESS CURRENT HR HE: 56
CV STRESS CURRENT HR HE: 58
CV STRESS CURRENT HR HE: 59
CV STRESS CURRENT HR HE: 62
CV STRESS CURRENT HR HE: 62
CV STRESS CURRENT HR HE: 64
CV STRESS CURRENT HR HE: 64
CV STRESS CURRENT HR HE: 65
CV STRESS CURRENT HR HE: 73
CV STRESS CURRENT HR HE: 74
CV STRESS CURRENT HR HE: 77
CV STRESS CURRENT HR HE: 80
CV STRESS CURRENT HR HE: 83
CV STRESS CURRENT HR HE: 95
CV STRESS CURRENT HR HE: 95
CV STRESS CURRENT HR HE: 99
CV STRESS DEVIATION TIME HE: NORMAL
CV STRESS ECHO PERCENT HR HE: NORMAL
CV STRESS EXERCISE STAGE HE: NORMAL
CV STRESS EXERCISE STAGE REACHED HE: NORMAL
CV STRESS FINAL RESTING BP HE: NORMAL
CV STRESS FINAL RESTING HR HE: 95
CV STRESS MAX HR HE: 136
CV STRESS MAX TREADMILL GRADE HE: 0
CV STRESS MAX TREADMILL SPEED HE: 0
CV STRESS PEAK DIA BP HE: NORMAL
CV STRESS PEAK SYS BP HE: NORMAL
CV STRESS PHASE HE: NORMAL
CV STRESS PROTOCOL HE: NORMAL
CV STRESS REASON STOPPED HE: NORMAL
CV STRESS ST DEVIATION AMOUNT HE: NORMAL
CV STRESS ST DEVIATION ELEVATION HE: NORMAL
CV STRESS ST EVELATION AMOUNT HE: NORMAL
CV STRESS SYMPTOMS HE: NORMAL
CV STRESS TEST TYPE HE: NORMAL
CV STRESS TOTAL STAGE TIME MIN 1 HE: NORMAL
STRESS ECHO BASELINE DIASTOLIC HE: 82
STRESS ECHO BASELINE HR: 65
STRESS ECHO BASELINE SYSTOLIC BP: 156
STRESS ECHO LAST STRESS DIASTOLIC BP: 81
STRESS ECHO LAST STRESS HR: 131
STRESS ECHO LAST STRESS SYSTOLIC BP: 177
STRESS ECHO POST ESTIMATED WORKLOAD: 1
STRESS ECHO POST EXERCISE DUR MIN: 14
STRESS ECHO POST EXERCISE DUR SEC: 8
STRESS ECHO TARGET HR: 133

## 2025-06-23 PROCEDURE — 255N000002 HC RX 255 OP 636: Performed by: INTERNAL MEDICINE

## 2025-06-23 PROCEDURE — 250N000011 HC RX IP 250 OP 636: Performed by: INTERNAL MEDICINE

## 2025-06-23 PROCEDURE — 93325 DOPPLER ECHO COLOR FLOW MAPG: CPT | Mod: TC

## 2025-06-23 PROCEDURE — 250N000009 HC RX 250: Performed by: INTERNAL MEDICINE

## 2025-06-23 PROCEDURE — 258N000003 HC RX IP 258 OP 636: Performed by: INTERNAL MEDICINE

## 2025-06-23 RX ORDER — LIDOCAINE 40 MG/G
CREAM TOPICAL
Status: DISCONTINUED | OUTPATIENT
Start: 2025-06-23 | End: 2025-06-24 | Stop reason: HOSPADM

## 2025-06-23 RX ORDER — DOBUTAMINE HYDROCHLORIDE 200 MG/100ML
10-50 INJECTION INTRAVENOUS CONTINUOUS
Status: ACTIVE | OUTPATIENT
Start: 2025-06-23 | End: 2025-06-23

## 2025-06-23 RX ORDER — ATROPINE SULFATE 0.4 MG/ML
.2-.4 AMPUL (ML) INJECTION
Status: DISCONTINUED | OUTPATIENT
Start: 2025-06-23 | End: 2025-06-24 | Stop reason: HOSPADM

## 2025-06-23 RX ORDER — METOPROLOL TARTRATE 1 MG/ML
1-5 INJECTION, SOLUTION INTRAVENOUS
Status: ACTIVE | OUTPATIENT
Start: 2025-06-23 | End: 2025-06-23

## 2025-06-23 RX ADMIN — ATROPINE SULFATE 0.2 MG: 0.4 INJECTION, SOLUTION INTRAVENOUS at 14:23

## 2025-06-23 RX ADMIN — ATROPINE SULFATE 0.2 MG: 0.4 INJECTION, SOLUTION INTRAVENOUS at 14:26

## 2025-06-23 RX ADMIN — METOPROLOL TARTRATE 1 MG: 5 INJECTION INTRAVENOUS at 14:32

## 2025-06-23 RX ADMIN — ATROPINE SULFATE 0.4 MG: 0.4 INJECTION, SOLUTION INTRAVENOUS at 14:24

## 2025-06-23 RX ADMIN — DEXTROSE MONOHYDRATE 10 MCG/KG/MIN: 50 INJECTION, SOLUTION INTRAVENOUS at 14:16

## 2025-06-23 RX ADMIN — PERFLUTREN 5 ML (DILUTED): 6.52 INJECTION, SUSPENSION INTRAVENOUS at 14:41

## 2025-06-23 RX ADMIN — METOPROLOL TARTRATE 1 MG: 5 INJECTION INTRAVENOUS at 14:30

## 2025-06-23 NOTE — PROGRESS NOTES
Pt here for dobutamine stress test accompanied by wife Aaliyah. Test, meds and side effects reviewed with patient. Achieved target HR at 40 mcg Dobutamine and a total of 0.8 mg IV atropine. Gave a total of 2 mg IV Metoprolol to bring HR back to baseline. Post monitoring complete and VSS. Pt escorted out to the gold waiting room.     Jess Summers RN

## 2025-06-26 ENCOUNTER — RESULTS FOLLOW-UP (OUTPATIENT)
Dept: FAMILY MEDICINE | Facility: CLINIC | Age: 65
End: 2025-06-26

## 2025-07-12 ENCOUNTER — MYC MEDICAL ADVICE (OUTPATIENT)
Dept: NEUROLOGY | Facility: CLINIC | Age: 65
End: 2025-07-12

## 2025-07-12 DIAGNOSIS — F02.80 EARLY ONSET ALZHEIMER'S DEMENTIA WITHOUT BEHAVIORAL DISTURBANCE (H): Primary | ICD-10-CM

## 2025-07-12 DIAGNOSIS — G30.0 EARLY ONSET ALZHEIMER'S DEMENTIA WITHOUT BEHAVIORAL DISTURBANCE (H): Primary | ICD-10-CM

## 2025-07-14 NOTE — TELEPHONE ENCOUNTER
I called and spoke with Aaliyah regarding genetic testing for Alzheimer's disease. The purpose of testing would be for the benefit of Wally's children (to better understand their risk for developing Alzheimer's disease), this is not diagnostic testing and will not change his current plan of care while on hospice.    Wally has a strong family history of Alzheimer's/dementia on his fathers side. His father was one of 13 children, 8 of whom developed dementia anywhere from age 60-80's. The details of these family members are unclear as Wally can no longer provide information on them, and Aaliyah was not part of the family when they were living. Aaliyah does remark that there were several cardiovascular comorbidities in these family members. We discussed that cardiovascular risk factors can play a significant role in memory loss and dementia.     Risks and benefits of APOE genetic testing were discussed. They would like to move forward with testing.     Having the APOE4 gene variant increases the risk of developing Alzheimer's disease, especially for those with two copies (3-4x population risk with 1 copy, 8-12x population risk with two copies of APOE4). APOE4 homozygotes can have a lifetime risk of Alzheimer's dementia as high as 60% by age 85. APOE4 is thus a risk factor, not a cause. APOE4 is associated with increased levels of amyloid beta and other Alzheimer's disease biomarkers in the brain, which can increase the chances of developing a related condition called cerebral amyloid angiopathy (a condition where amyloid beta protein deposits in the brain's blood vessel, causing them to be leaky and break open, leading to micro or macrohemorrhages). APOE4 increases the risk for heart attacks and high cholesterol, too.

## 2025-07-15 ENCOUNTER — LAB (OUTPATIENT)
Dept: LAB | Facility: CLINIC | Age: 65
End: 2025-07-15
Payer: MEDICARE

## 2025-07-15 DIAGNOSIS — G30.0 EARLY ONSET ALZHEIMER'S DEMENTIA WITHOUT BEHAVIORAL DISTURBANCE (H): ICD-10-CM

## 2025-07-15 DIAGNOSIS — F02.80 EARLY ONSET ALZHEIMER'S DEMENTIA WITHOUT BEHAVIORAL DISTURBANCE (H): ICD-10-CM

## 2025-07-15 PROCEDURE — 99000 SPECIMEN HANDLING OFFICE-LAB: CPT

## 2025-07-15 PROCEDURE — 81401 MOPATH PROCEDURE LEVEL 2: CPT | Mod: 90

## 2025-07-15 PROCEDURE — 36415 COLL VENOUS BLD VENIPUNCTURE: CPT

## 2025-07-21 LAB
APOE ALLELE E2+E3+E4 BLD/T: ABNORMAL
SPECIMEN SOURCE: ABNORMAL

## (undated) DEVICE — SU VICRYL 3-0 RB-1 27" UND J215H

## (undated) DEVICE — BLADE KNIFE SURG 15 371115

## (undated) DEVICE — ESU HOLSTER PLASTIC DISP E2400

## (undated) DEVICE — SOL WATER IRRIG 1000ML BOTTLE 07139-09

## (undated) DEVICE — DECANTER TRANSFER DEVICE 2008S

## (undated) DEVICE — TUBING SUCTION 12"X1/4" N612

## (undated) DEVICE — ESU GROUND PAD ADULT W/CORD E7507

## (undated) DEVICE — NDL 19GA 1.5"

## (undated) DEVICE — SU PROLENE 5-0 P-3 18" 8698G

## (undated) DEVICE — KIT ENDO FIRST STEP DISINFECTANT 200ML W/POUCH EP-4

## (undated) DEVICE — PACK MINOR SBA15MIFSE

## (undated) DEVICE — SU VICRYL 0 CT-2 27" UND J270H

## (undated) DEVICE — SPECIMEN CONTAINER 3OZ W/FORMALIN 59901

## (undated) DEVICE — SOL WATER IRRIG 500ML BOTTLE 2F7113

## (undated) DEVICE — SYR 10ML FINGER CONTROL W/O NDL 309695

## (undated) DEVICE — DRSG TELFA 3X8" 1238

## (undated) DEVICE — DRSG GAUZE 4X4" TRAY 6939

## (undated) DEVICE — NDL 30GA 1" 305128

## (undated) DEVICE — GLOVE PROTEXIS W/NEU-THERA 7.0  2D73TE70

## (undated) DEVICE — SNARE CAPIVATOR ROUND COLD SNR BX10 M00561101

## (undated) DEVICE — DRAPE POUCH IRR 1016

## (undated) DEVICE — KIT ENDO TURNOVER/PROCEDURE CARRY-ON 101822

## (undated) DEVICE — SU VICRYL 4-0 P-3 18" UND  J494H

## (undated) DEVICE — ENDO FORCEP SPIKED SERRATED SHAFT JUMBO 239CM G56998

## (undated) DEVICE — ESU ELEC NDL 1" COATED/INSULATED E1465

## (undated) DEVICE — DRAPE SPLIT EENT 76X124" 3X28" 9447

## (undated) DEVICE — GLOVE PROTEXIS W/NEU-THERA 7.5  2D73TE75

## (undated) DEVICE — GOWN IMPERVIOUS 2XL BLUE

## (undated) DEVICE — ENDO TRAP POLYP E-TRAP 00711099

## (undated) DEVICE — SUCTION MANIFOLD NEPTUNE 2 SYS 1 PORT 702-025-000

## (undated) DEVICE — DRSG STERI STRIP 1/2X4" R1547

## (undated) RX ORDER — LIDOCAINE HYDROCHLORIDE 10 MG/ML
INJECTION, SOLUTION EPIDURAL; INFILTRATION; INTRACAUDAL; PERINEURAL
Status: DISPENSED
Start: 2021-10-26

## (undated) RX ORDER — LIDOCAINE HYDROCHLORIDE 10 MG/ML
INJECTION, SOLUTION EPIDURAL; INFILTRATION; INTRACAUDAL; PERINEURAL
Status: DISPENSED
Start: 2023-03-01

## (undated) RX ORDER — FENTANYL CITRATE 50 UG/ML
INJECTION, SOLUTION INTRAMUSCULAR; INTRAVENOUS
Status: DISPENSED
Start: 2021-08-02

## (undated) RX ORDER — MUPIROCIN 20 MG/G
OINTMENT TOPICAL
Status: DISPENSED
Start: 2020-01-16

## (undated) RX ORDER — DOBUTAMINE HYDROCHLORIDE 200 MG/100ML
INJECTION INTRAVENOUS
Status: DISPENSED
Start: 2025-06-23

## (undated) RX ORDER — METOPROLOL TARTRATE 1 MG/ML
INJECTION, SOLUTION INTRAVENOUS
Status: DISPENSED
Start: 2025-06-23

## (undated) RX ORDER — ONDANSETRON 2 MG/ML
INJECTION INTRAMUSCULAR; INTRAVENOUS
Status: DISPENSED
Start: 2020-01-30

## (undated) RX ORDER — GENTAMICIN 40 MG/ML
INJECTION, SOLUTION INTRAMUSCULAR; INTRAVENOUS
Status: DISPENSED
Start: 2023-03-01

## (undated) RX ORDER — ACETAMINOPHEN 325 MG/1
TABLET ORAL
Status: DISPENSED
Start: 2020-01-30

## (undated) RX ORDER — GABAPENTIN 300 MG/1
CAPSULE ORAL
Status: DISPENSED
Start: 2020-01-30

## (undated) RX ORDER — PROPOFOL 10 MG/ML
INJECTION, EMULSION INTRAVENOUS
Status: DISPENSED
Start: 2020-01-30

## (undated) RX ORDER — CEFAZOLIN SODIUM 1 G/3ML
INJECTION, POWDER, FOR SOLUTION INTRAMUSCULAR; INTRAVENOUS
Status: DISPENSED
Start: 2020-01-30

## (undated) RX ORDER — LIDOCAINE HYDROCHLORIDE AND EPINEPHRINE 10; 10 MG/ML; UG/ML
INJECTION, SOLUTION INFILTRATION; PERINEURAL
Status: DISPENSED
Start: 2020-01-16

## (undated) RX ORDER — FENTANYL CITRATE 50 UG/ML
INJECTION, SOLUTION INTRAMUSCULAR; INTRAVENOUS
Status: DISPENSED
Start: 2020-01-30

## (undated) RX ORDER — ATROPINE SULFATE 0.4 MG/ML
AMPUL (ML) INJECTION
Status: DISPENSED
Start: 2025-06-23

## (undated) RX ORDER — FENTANYL CITRATE 50 UG/ML
INJECTION, SOLUTION INTRAMUSCULAR; INTRAVENOUS
Status: DISPENSED
Start: 2021-05-21